# Patient Record
Sex: FEMALE | Race: WHITE | NOT HISPANIC OR LATINO | Employment: OTHER | URBAN - METROPOLITAN AREA
[De-identification: names, ages, dates, MRNs, and addresses within clinical notes are randomized per-mention and may not be internally consistent; named-entity substitution may affect disease eponyms.]

---

## 2017-06-02 ENCOUNTER — TRANSCRIBE ORDERS (OUTPATIENT)
Dept: ADMINISTRATIVE | Facility: HOSPITAL | Age: 77
End: 2017-06-02

## 2017-06-02 DIAGNOSIS — Z13.9 SCREENING: Primary | ICD-10-CM

## 2017-06-19 ENCOUNTER — HOSPITAL ENCOUNTER (OUTPATIENT)
Facility: HOSPITAL | Age: 77
Setting detail: HOSPITAL OUTPATIENT SURGERY
End: 2017-06-19
Attending: INTERNAL MEDICINE | Admitting: INTERNAL MEDICINE

## 2017-07-11 ENCOUNTER — HOSPITAL ENCOUNTER (OUTPATIENT)
Dept: MAMMOGRAPHY | Facility: HOSPITAL | Age: 77
Discharge: HOME | End: 2017-07-11
Attending: INTERNAL MEDICINE | Admitting: INTERNAL MEDICINE

## 2017-07-11 DIAGNOSIS — Z13.9 SCREENING: ICD-10-CM

## 2017-07-11 PROCEDURE — G0202 SCR MAMMO BI INCL CAD: HCPCS

## 2017-07-11 PROCEDURE — 77063 BREAST TOMOSYNTHESIS BI: CPT

## 2018-06-26 ENCOUNTER — TRANSCRIBE ORDERS (OUTPATIENT)
Dept: ADMINISTRATIVE | Facility: HOSPITAL | Age: 78
End: 2018-06-26

## 2018-06-26 DIAGNOSIS — Z12.39 BREAST SCREENING: Primary | ICD-10-CM

## 2018-07-24 ENCOUNTER — HOSPITAL ENCOUNTER (OUTPATIENT)
Dept: MAMMOGRAPHY | Facility: HOSPITAL | Age: 78
Discharge: HOME | End: 2018-07-24
Attending: INTERNAL MEDICINE | Admitting: INTERNAL MEDICINE

## 2018-07-24 DIAGNOSIS — Z12.39 BREAST SCREENING: ICD-10-CM

## 2018-07-24 PROCEDURE — 77067 SCR MAMMO BI INCL CAD: CPT

## 2018-07-24 PROCEDURE — 77063 BREAST TOMOSYNTHESIS BI: CPT

## 2019-01-03 ENCOUNTER — OFFICE VISIT (OUTPATIENT)
Dept: ORTHOPEDIC SURGERY | Facility: CLINIC | Age: 79
End: 2019-01-03

## 2019-01-03 VITALS
DIASTOLIC BLOOD PRESSURE: 85 MMHG | HEART RATE: 71 BPM | BODY MASS INDEX: 23.92 KG/M2 | WEIGHT: 130 LBS | SYSTOLIC BLOOD PRESSURE: 139 MMHG | HEIGHT: 62 IN

## 2019-01-03 DIAGNOSIS — R52 PAIN: Primary | ICD-10-CM

## 2019-01-03 DIAGNOSIS — S56.912A STRAIN OF LEFT FOREARM, INITIAL ENCOUNTER: ICD-10-CM

## 2019-01-03 PROCEDURE — 73090 X-RAY EXAM OF FOREARM: CPT | Performed by: ORTHOPAEDIC SURGERY

## 2019-01-03 PROCEDURE — 99203 OFFICE O/P NEW LOW 30 MIN: CPT | Performed by: ORTHOPAEDIC SURGERY

## 2019-01-03 RX ORDER — LEVOTHYROXINE SODIUM 0.05 MG/1
TABLET ORAL
COMMUNITY
Start: 2012-08-30 | End: 2019-05-28 | Stop reason: SDUPTHER

## 2019-01-03 RX ORDER — ATENOLOL 50 MG/1
TABLET ORAL
COMMUNITY
Start: 2012-05-22 | End: 2019-04-26 | Stop reason: SDUPTHER

## 2019-01-03 RX ORDER — LEVOTHYROXINE SODIUM 0.03 MG/1
TABLET ORAL
COMMUNITY
Start: 2012-10-05 | End: 2019-06-05 | Stop reason: DRUGHIGH

## 2019-01-03 RX ORDER — HYDROCHLOROTHIAZIDE 25 MG/1
TABLET ORAL
COMMUNITY
Start: 2012-05-22 | End: 2019-04-26 | Stop reason: SDUPTHER

## 2019-01-03 RX ORDER — LOSARTAN POTASSIUM 50 MG/1
50 TABLET ORAL NIGHTLY
COMMUNITY
End: 2019-08-13 | Stop reason: SDUPTHER

## 2019-01-03 RX ORDER — MELOXICAM 7.5 MG/1
7.5 TABLET ORAL DAILY
Qty: 30 TABLET | Refills: 5 | Status: SHIPPED | OUTPATIENT
Start: 2019-01-03 | End: 2019-05-13

## 2019-01-03 RX ORDER — VITAMIN E 268 MG
400 CAPSULE ORAL DAILY
COMMUNITY

## 2019-01-03 NOTE — PROGRESS NOTES
Subjective: Left forearm pain     Patient ID: Nguyen Zhang is a 78 y.o. female.    Chief Complaint:    History of Present Illness 78-year-old female known to me but has not seen for over 3 years that evaluation of the left forearm which he injured back in November cane firewood into her home.  Was carrying firewood and the following day began developing pain along the ulnar border of the arm and along the dorsal radial aspect of the thumb.  This has persisted for the past 6 weeks showing no improvement.       Social History     Occupational History   • Not on file   Tobacco Use   • Smoking status: Never Smoker   • Smokeless tobacco: Never Used   Substance and Sexual Activity   • Alcohol use: No     Frequency: Never   • Drug use: No   • Sexual activity: Defer      Review of Systems   Constitutional: Negative for chills, diaphoresis, fever and unexpected weight change.   HENT: Negative for hearing loss, nosebleeds, sore throat and tinnitus.    Eyes: Negative for pain and visual disturbance.   Respiratory: Negative for cough, shortness of breath and wheezing.    Cardiovascular: Negative for chest pain and palpitations.   Gastrointestinal: Negative for abdominal pain, diarrhea, nausea and vomiting.   Endocrine: Negative for cold intolerance, heat intolerance and polydipsia.   Genitourinary: Negative for difficulty urinating, dysuria and hematuria.   Musculoskeletal: Positive for arthralgias, joint swelling and myalgias. Negative for neck pain.   Skin: Negative for rash and wound.   Allergic/Immunologic: Negative for environmental allergies.   Neurological: Negative for dizziness, syncope and numbness.   Hematological: Does not bruise/bleed easily.   Psychiatric/Behavioral: Negative for dysphoric mood and sleep disturbance. The patient is not nervous/anxious.          History reviewed. No pertinent past medical history.  Past Surgical History:   Procedure Laterality Date   • HYSTERECTOMY     • KNEE SURGERY Right     • SHOULDER SURGERY Right      History reviewed. No pertinent family history.      Objective:  Vitals:    01/03/19 1330   BP: 139/85   Pulse: 71         01/03/19  1330   Weight: 59 kg (130 lb)     Body mass index is 23.78 kg/m².        Ortho Exam   AP and lateral of her left forearm to evaluate her chief complain shows evidence of an old ulnar fracture from the 60s and some arthritis at the base of the thumb of that left hand no other acute or chronic injuries noted.  She is alert and oriented ×3.  Has normal cephalic.  Sclerae clear.  She has no motor deficit as far as radial ulnar median nerve function no sensory loss.  Positive grind test and some pain to palpation over the metacarpal carpal junction.  She has full range of motion of the elbow far flexion extension pronation supination.  Mild tenderness along the ulnar border of the ulna itself but there is no erythema swelling noted.  No tenderness over the lateral epicondyle.  Minimal pain with pronation supination of the forearm against resistance.  She can take anti-inflammatories over she's taken none recently.  The pain is on a daily basis and does tend in appearance with activities of daily living as far as feeding dressing care for herself.    Assessment:        1. Pain    2. Strain of left forearm, initial encounter           Plan: Over 20 minutes was spent reviewing the x-rays with the patient and her physical exam.  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  I will place her on meloxicam once daily for the next 3 weeks.  She does home aspirin in the morning as I told her to take meloxicam at nighttime with her evening meal.  Return to see me in 3 weeks if still symptomatic she stopped the meloxicam and she is asymptomatic at that 3 week.  She canceled            Work Status:    LOUISA query complete.    Orders:  Orders Placed This Encounter    Procedures   • XR Forearm 2 View Left       Medications:  New Medications Ordered This Visit   Medications   • meloxicam (MOBIC) 7.5 MG tablet     Sig: Take 1 tablet by mouth Daily.     Dispense:  30 tablet     Refill:  5       Followup:  Return in about 3 weeks (around 1/24/2019).          Dictated utilizing Dragon dictation

## 2019-01-24 ENCOUNTER — OFFICE VISIT (OUTPATIENT)
Dept: ORTHOPEDIC SURGERY | Facility: CLINIC | Age: 79
End: 2019-01-24

## 2019-01-24 VITALS — WEIGHT: 130 LBS | BODY MASS INDEX: 23.92 KG/M2 | HEIGHT: 62 IN

## 2019-01-24 DIAGNOSIS — S56.912A STRAIN OF LEFT FOREARM, INITIAL ENCOUNTER: ICD-10-CM

## 2019-01-24 DIAGNOSIS — R52 PAIN: Primary | ICD-10-CM

## 2019-01-24 PROCEDURE — 99213 OFFICE O/P EST LOW 20 MIN: CPT | Performed by: ORTHOPAEDIC SURGERY

## 2019-01-24 NOTE — PROGRESS NOTES
Subjective: Left forearm pain     Patient ID: Nguyen Zhang is a 78 y.o. female.    Chief Complaint:    History of Present Illness 78-year-old female is seen in follow-up to the left forearm which she injured about a month ago while carrying some far 1 at that time is felt she strained the ulnar border of that arm.  She was placed on molded and noted reduction of the pain is still has some slight tenderness to touch along the ulnar border which she lays her arm on a table or when she like pushing a shopping cart.       Social History     Occupational History   • Not on file   Tobacco Use   • Smoking status: Never Smoker   • Smokeless tobacco: Never Used   Substance and Sexual Activity   • Alcohol use: No     Frequency: Never   • Drug use: No   • Sexual activity: Defer      Review of Systems   Constitutional: Negative for chills, diaphoresis, fever and unexpected weight change.   HENT: Negative for hearing loss, nosebleeds, sore throat and tinnitus.    Eyes: Negative for pain and visual disturbance.   Respiratory: Negative for cough, shortness of breath and wheezing.    Cardiovascular: Negative for chest pain and palpitations.   Gastrointestinal: Negative for abdominal pain, diarrhea, nausea and vomiting.   Endocrine: Negative for cold intolerance, heat intolerance and polydipsia.   Genitourinary: Negative for difficulty urinating, dysuria and hematuria.   Musculoskeletal: Positive for arthralgias and myalgias. Negative for joint swelling.   Skin: Negative for rash and wound.   Allergic/Immunologic: Negative for environmental allergies.   Neurological: Negative for dizziness, syncope and numbness.   Hematological: Does not bruise/bleed easily.   Psychiatric/Behavioral: Negative for dysphoric mood and sleep disturbance. The patient is not nervous/anxious.          History reviewed. No pertinent past medical history.  Past Surgical History:   Procedure Laterality Date   • HYSTERECTOMY     • KNEE SURGERY Right    •  SHOULDER SURGERY Right      History reviewed. No pertinent family history.      Objective:  There were no vitals filed for this visit.      01/24/19  1035   Weight: 59 kg (130 lb)     Body mass index is 23.78 kg/m².        Ortho Exam   she is alert and oriented ×3.  Again she has full range of motion of the shoulder and of the elbow.  Flexion extension pronation supination.  No motor deficit as far as radial ulnar nerve function and no sensory deficit in either.  Some discomfort along the ulnar border of the arm with dorsi and volar flexion against resistance and some mild discomfort to palpation along the subcutaneous border of the ulna to palpation but again there is no erythema.  The skin is cool to touch.  She is tolerating the meloxicam.    Assessment:        1. Pain    2. Strain of left forearm, initial encounter           Plan: Spent 10 minutes with patient face-to-face discussing treatment options going forward.  She is using a topical gel to help increase in blood flow as BenGay or icy hot.  Again she is tolerating meloxicam.  I offered physical therapy but she like to try another month of the anti-inflammatory.  I told at some point she wants try therapy she's going to call and we can schedule it at physical therapy plus in [facility] otherwise return to see me as needed            Work Status:    LOUISA query complete.    Orders:  No orders of the defined types were placed in this encounter.      Medications:  No orders of the defined types were placed in this encounter.      Followup:  Return if symptoms worsen or fail to improve.          Dictated utilizing Dragon dictation

## 2019-04-25 ENCOUNTER — TRANSCRIBE ORDERS (OUTPATIENT)
Dept: ADMINISTRATIVE | Facility: HOSPITAL | Age: 79
End: 2019-04-25

## 2019-04-25 DIAGNOSIS — Z13.6 ENCOUNTER FOR SCREENING FOR VASCULAR DISEASE: Primary | ICD-10-CM

## 2019-04-26 RX ORDER — HYDROCHLOROTHIAZIDE 25 MG/1
TABLET ORAL
Qty: 30 TABLET | Refills: 5 | Status: SHIPPED | OUTPATIENT
Start: 2019-04-26 | End: 2019-10-26 | Stop reason: SDUPTHER

## 2019-04-26 RX ORDER — ATENOLOL 50 MG/1
TABLET ORAL
Qty: 60 TABLET | Refills: 5 | Status: SHIPPED | OUTPATIENT
Start: 2019-04-26 | End: 2019-10-26 | Stop reason: SDUPTHER

## 2019-04-30 ENCOUNTER — HOSPITAL ENCOUNTER (OUTPATIENT)
Dept: CARDIOLOGY | Facility: HOSPITAL | Age: 79
Discharge: HOME | End: 2019-04-30
Admitting: INTERNAL MEDICINE

## 2019-04-30 VITALS
WEIGHT: 132 LBS | HEART RATE: 68 BPM | HEIGHT: 62 IN | BODY MASS INDEX: 24.29 KG/M2 | SYSTOLIC BLOOD PRESSURE: 146 MMHG | DIASTOLIC BLOOD PRESSURE: 66 MMHG

## 2019-04-30 DIAGNOSIS — Z13.6 ENCOUNTER FOR SCREENING FOR VASCULAR DISEASE: ICD-10-CM

## 2019-04-30 LAB
BH CV ECHO MEAS - DIST AO DIAM: 1.35 CM
BH CV VAS BP LEFT ARM: NORMAL MMHG
BH CV VAS BP RIGHT ARM: NORMAL MMHG
BH CV XLRA MEAS - MID AO DIAM: 1.54 CM
BH CV XLRA MEAS - PAD LEFT ABI DP: 1.09
BH CV XLRA MEAS - PAD LEFT ABI PT: 1.09
BH CV XLRA MEAS - PAD LEFT ARM: 146 MMHG
BH CV XLRA MEAS - PAD LEFT LEG DP: 160 MMHG
BH CV XLRA MEAS - PAD LEFT LEG PT: 160 MMHG
BH CV XLRA MEAS - PAD RIGHT ABI DP: 1.02
BH CV XLRA MEAS - PAD RIGHT ABI PT: 1.04
BH CV XLRA MEAS - PAD RIGHT ARM: 141 MMHG
BH CV XLRA MEAS - PAD RIGHT LEG DP: 150 MMHG
BH CV XLRA MEAS - PAD RIGHT LEG PT: 152 MMHG
BH CV XLRA MEAS - PROX AO DIAM: 2.2 CM
BH CV XLRA MEAS LEFT ICA/CCA RATIO: 1.2
BH CV XLRA MEAS LEFT MID CCA PSV: NORMAL CM/SEC
BH CV XLRA MEAS LEFT MID ICA PSV: NORMAL CM/SEC
BH CV XLRA MEAS LEFT PROX ECA PSV: NORMAL CM/SEC
BH CV XLRA MEAS RIGHT ICA/CCA RATIO: 1.23
BH CV XLRA MEAS RIGHT MID CCA PSV: NORMAL CM/SEC
BH CV XLRA MEAS RIGHT MID ICA PSV: NORMAL CM/SEC
BH CV XLRA MEAS RIGHT PROX ECA PSV: NORMAL CM/SEC

## 2019-04-30 PROCEDURE — 93799 UNLISTED CV SVC/PROCEDURE: CPT

## 2019-05-13 ENCOUNTER — APPOINTMENT (OUTPATIENT)
Dept: CARDIOLOGY | Facility: HOSPITAL | Age: 79
End: 2019-05-13

## 2019-05-13 ENCOUNTER — HOSPITAL ENCOUNTER (OUTPATIENT)
Facility: HOSPITAL | Age: 79
Setting detail: OBSERVATION
Discharge: HOME | End: 2019-05-14
Attending: EMERGENCY MEDICINE | Admitting: HOSPITALIST

## 2019-05-13 ENCOUNTER — APPOINTMENT (OUTPATIENT)
Dept: GENERAL RADIOLOGY | Facility: HOSPITAL | Age: 79
End: 2019-05-13

## 2019-05-13 DIAGNOSIS — I10 ELEVATED BLOOD PRESSURE READING WITH DIAGNOSIS OF HYPERTENSION: ICD-10-CM

## 2019-05-13 DIAGNOSIS — R07.9 EXERTIONAL CHEST PAIN: Primary | ICD-10-CM

## 2019-05-13 LAB
ALBUMIN SERPL-MCNC: 4.8 G/DL (ref 3.5–5.2)
ALBUMIN/GLOB SERPL: 1.7 G/DL
ALP SERPL-CCNC: 71 U/L (ref 39–117)
ALT SERPL W P-5'-P-CCNC: 16 U/L (ref 1–33)
ANION GAP SERPL CALCULATED.3IONS-SCNC: 15.2 MMOL/L
AST SERPL-CCNC: 24 U/L (ref 1–32)
BASOPHILS # BLD AUTO: 0.03 10*3/MM3 (ref 0–0.2)
BASOPHILS NFR BLD AUTO: 0.4 % (ref 0–1.5)
BILIRUB SERPL-MCNC: 0.5 MG/DL (ref 0.2–1.2)
BUN BLD-MCNC: 16 MG/DL (ref 8–23)
BUN/CREAT SERPL: 18 (ref 7–25)
CALCIUM SPEC-SCNC: 9.8 MG/DL (ref 8.6–10.5)
CHLORIDE SERPL-SCNC: 93 MMOL/L (ref 98–107)
CO2 SERPL-SCNC: 28.8 MMOL/L (ref 22–29)
CREAT BLD-MCNC: 0.89 MG/DL (ref 0.57–1)
DEPRECATED RDW RBC AUTO: 39.4 FL (ref 37–54)
EOSINOPHIL # BLD AUTO: 0.06 10*3/MM3 (ref 0–0.4)
EOSINOPHIL NFR BLD AUTO: 0.9 % (ref 0.3–6.2)
ERYTHROCYTE [DISTWIDTH] IN BLOOD BY AUTOMATED COUNT: 11.8 % (ref 12.3–15.4)
GFR SERPL CREATININE-BSD FRML MDRD: 61 ML/MIN/1.73
GLOBULIN UR ELPH-MCNC: 2.9 GM/DL
GLUCOSE BLD-MCNC: 109 MG/DL (ref 65–99)
HCT VFR BLD AUTO: 38 % (ref 34–46.6)
HGB BLD-MCNC: 13.3 G/DL (ref 12–15.9)
IMM GRANULOCYTES # BLD AUTO: 0.01 10*3/MM3 (ref 0–0.05)
IMM GRANULOCYTES NFR BLD AUTO: 0.1 % (ref 0–0.5)
LYMPHOCYTES # BLD AUTO: 1.77 10*3/MM3 (ref 0.7–3.1)
LYMPHOCYTES NFR BLD AUTO: 25.5 % (ref 19.6–45.3)
MCH RBC QN AUTO: 32 PG (ref 26.6–33)
MCHC RBC AUTO-ENTMCNC: 35 G/DL (ref 31.5–35.7)
MCV RBC AUTO: 91.6 FL (ref 79–97)
MONOCYTES # BLD AUTO: 0.64 10*3/MM3 (ref 0.1–0.9)
MONOCYTES NFR BLD AUTO: 9.2 % (ref 5–12)
NEUTROPHILS # BLD AUTO: 4.44 10*3/MM3 (ref 1.7–7)
NEUTROPHILS NFR BLD AUTO: 63.9 % (ref 42.7–76)
NRBC BLD AUTO-RTO: 0 /100 WBC (ref 0–0.2)
PLATELET # BLD AUTO: 228 10*3/MM3 (ref 140–450)
PMV BLD AUTO: 10.4 FL (ref 6–12)
POTASSIUM BLD-SCNC: 3.6 MMOL/L (ref 3.5–5.2)
PROT SERPL-MCNC: 7.7 G/DL (ref 6–8.5)
RBC # BLD AUTO: 4.15 10*6/MM3 (ref 3.77–5.28)
SODIUM BLD-SCNC: 137 MMOL/L (ref 136–145)
TROPONIN T SERPL-MCNC: <0.01 NG/ML (ref 0–0.03)
TROPONIN T SERPL-MCNC: <0.01 NG/ML (ref 0–0.03)
TSH SERPL DL<=0.05 MIU/L-ACNC: 4.78 MIU/ML (ref 0.27–4.2)
WBC NRBC COR # BLD: 6.95 10*3/MM3 (ref 3.4–10.8)

## 2019-05-13 PROCEDURE — 71046 X-RAY EXAM CHEST 2 VIEWS: CPT

## 2019-05-13 PROCEDURE — G0378 HOSPITAL OBSERVATION PER HR: HCPCS

## 2019-05-13 PROCEDURE — 93005 ELECTROCARDIOGRAM TRACING: CPT

## 2019-05-13 PROCEDURE — 99204 OFFICE O/P NEW MOD 45 MIN: CPT | Performed by: NURSE PRACTITIONER

## 2019-05-13 PROCEDURE — 99284 EMERGENCY DEPT VISIT MOD MDM: CPT

## 2019-05-13 PROCEDURE — 99219 PR INITIAL OBSERVATION CARE/DAY 50 MINUTES: CPT | Performed by: HOSPITALIST

## 2019-05-13 PROCEDURE — 84484 ASSAY OF TROPONIN QUANT: CPT | Performed by: EMERGENCY MEDICINE

## 2019-05-13 PROCEDURE — 99284 EMERGENCY DEPT VISIT MOD MDM: CPT | Performed by: EMERGENCY MEDICINE

## 2019-05-13 PROCEDURE — 93005 ELECTROCARDIOGRAM TRACING: CPT | Performed by: EMERGENCY MEDICINE

## 2019-05-13 PROCEDURE — 93010 ELECTROCARDIOGRAM REPORT: CPT | Performed by: INTERNAL MEDICINE

## 2019-05-13 PROCEDURE — 80053 COMPREHEN METABOLIC PANEL: CPT | Performed by: EMERGENCY MEDICINE

## 2019-05-13 PROCEDURE — 84484 ASSAY OF TROPONIN QUANT: CPT | Performed by: HOSPITALIST

## 2019-05-13 PROCEDURE — 85025 COMPLETE CBC W/AUTO DIFF WBC: CPT | Performed by: EMERGENCY MEDICINE

## 2019-05-13 PROCEDURE — 84443 ASSAY THYROID STIM HORMONE: CPT | Performed by: HOSPITALIST

## 2019-05-13 RX ORDER — LOSARTAN POTASSIUM 50 MG/1
50 TABLET ORAL NIGHTLY
Status: DISCONTINUED | OUTPATIENT
Start: 2019-05-13 | End: 2019-05-14 | Stop reason: HOSPADM

## 2019-05-13 RX ORDER — SODIUM CHLORIDE 0.9 % (FLUSH) 0.9 %
10 SYRINGE (ML) INJECTION AS NEEDED
Status: DISCONTINUED | OUTPATIENT
Start: 2019-05-13 | End: 2019-05-14 | Stop reason: HOSPADM

## 2019-05-13 RX ORDER — AMLODIPINE BESYLATE 5 MG/1
5 TABLET ORAL NIGHTLY
Status: DISCONTINUED | OUTPATIENT
Start: 2019-05-13 | End: 2019-05-14 | Stop reason: HOSPADM

## 2019-05-13 RX ORDER — ACETAMINOPHEN 325 MG/1
650 TABLET ORAL EVERY 6 HOURS PRN
Status: DISCONTINUED | OUTPATIENT
Start: 2019-05-13 | End: 2019-05-14 | Stop reason: HOSPADM

## 2019-05-13 RX ORDER — ATENOLOL 50 MG/1
50 TABLET ORAL 2 TIMES DAILY
Status: DISCONTINUED | OUTPATIENT
Start: 2019-05-13 | End: 2019-05-14 | Stop reason: HOSPADM

## 2019-05-13 RX ORDER — HYDROCHLOROTHIAZIDE 25 MG/1
25 TABLET ORAL DAILY
Status: DISCONTINUED | OUTPATIENT
Start: 2019-05-13 | End: 2019-05-14 | Stop reason: HOSPADM

## 2019-05-13 RX ORDER — ASPIRIN 81 MG/1
324 TABLET, CHEWABLE ORAL ONCE
Status: COMPLETED | OUTPATIENT
Start: 2019-05-13 | End: 2019-05-13

## 2019-05-13 RX ORDER — HYDRALAZINE HYDROCHLORIDE 20 MG/ML
10 INJECTION INTRAMUSCULAR; INTRAVENOUS EVERY 6 HOURS PRN
Status: DISCONTINUED | OUTPATIENT
Start: 2019-05-13 | End: 2019-05-14 | Stop reason: HOSPADM

## 2019-05-13 RX ADMIN — ASPIRIN 81 MG 324 MG: 81 TABLET ORAL at 09:47

## 2019-05-13 RX ADMIN — AMLODIPINE BESYLATE 5 MG: 5 TABLET ORAL at 20:39

## 2019-05-13 RX ADMIN — LOSARTAN POTASSIUM 50 MG: 50 TABLET ORAL at 20:39

## 2019-05-13 RX ADMIN — ATENOLOL 50 MG: 50 TABLET ORAL at 20:38

## 2019-05-13 NOTE — ED PROVIDER NOTES
EMERGENCY DEPARTMENT ENCOUNTER      Room Number: 2/02      HPI:    Chief complaint: Chest pressure    Location: All across chest with some radiation to the left neck and left arm    Quality/Severity: None at this time, intermittently severe    Timing/Duration: Several weeks but worse in the last 5 days    Modifying Factors: Worse with any exertion; improved with rest    Associated Symptoms: Some dyspnea with exertion.  No nausea, vomiting, diarrhea.  No cough or hemoptysis    Narrative: Pt is a 79 y.o. female who presents complaining of exertional chest pressure with radiation to left neck and left arm.  No history of coronary artery disease.  Last stress test greater than 20 years ago.      PMD: Chelsie Rai MD    REVIEW OF SYSTEMS  Review of Systems  Normal appetite with no nausea, vomiting, diarrhea, abdominal pain.  No cough or hemoptysis.  No fevers.  No syncope.  PAST MEDICAL HISTORY  Active Ambulatory Problems     Diagnosis Date Noted   • No Active Ambulatory Problems     Resolved Ambulatory Problems     Diagnosis Date Noted   • No Resolved Ambulatory Problems     Past Medical History:   Diagnosis Date   • Arthritis    • Disease of thyroid gland    • Hypertension        PAST SURGICAL HISTORY  Past Surgical History:   Procedure Laterality Date   • HYSTERECTOMY     • KNEE SURGERY Right    • SHOULDER SURGERY Right        FAMILY HISTORY  Family History   Problem Relation Age of Onset   • Heart attack Father        SOCIAL HISTORY  Social History     Socioeconomic History   • Marital status:      Spouse name: Not on file   • Number of children: Not on file   • Years of education: Not on file   • Highest education level: Not on file   Tobacco Use   • Smoking status: Never Smoker   • Smokeless tobacco: Never Used   Substance and Sexual Activity   • Alcohol use: No     Frequency: Never   • Drug use: No   • Sexual activity: Defer       ALLERGIES  Ace inhibitors and Codeine    PHYSICAL EXAM  ED Triage  Vitals [05/13/19 0916]   Temp Heart Rate Resp BP SpO2   -- 67 15 (!) 185/88 99 %      Temp src Heart Rate Source Patient Position BP Location FiO2 (%)   -- Monitor -- -- --       Physical Exam   Constitutional: She is oriented to person, place, and time and well-developed, well-nourished, and in no distress. No distress.   HENT:   Head: Normocephalic.   Mucous membranes moist   Eyes: No scleral icterus.   Neck:   Painless range of motion   Cardiovascular: Normal rate and regular rhythm.   Pulmonary/Chest: Effort normal and breath sounds normal. No respiratory distress.   Abdominal: Soft. There is no tenderness.   Musculoskeletal: She exhibits no edema or tenderness.   Moves all extremities equally   Neurological: She is alert and oriented to person, place, and time.   Skin: Skin is warm and dry.   Psychiatric: Mood and affect normal.   Nursing note and vitals reviewed.      LAB RESULTS  Results for orders placed or performed during the hospital encounter of 05/13/19   Comprehensive Metabolic Panel   Result Value Ref Range    Glucose 109 (H) 65 - 99 mg/dL    BUN 16 8 - 23 mg/dL    Creatinine 0.89 0.57 - 1.00 mg/dL    Sodium 137 136 - 145 mmol/L    Potassium 3.6 3.5 - 5.2 mmol/L    Chloride 93 (L) 98 - 107 mmol/L    CO2 28.8 22.0 - 29.0 mmol/L    Calcium 9.8 8.6 - 10.5 mg/dL    Total Protein 7.7 6.0 - 8.5 g/dL    Albumin 4.80 3.50 - 5.20 g/dL    ALT (SGPT) 16 1 - 33 U/L    AST (SGOT) 24 1 - 32 U/L    Alkaline Phosphatase 71 39 - 117 U/L    Total Bilirubin 0.5 0.2 - 1.2 mg/dL    eGFR Non African Amer 61 >60 mL/min/1.73    Globulin 2.9 gm/dL    A/G Ratio 1.7 g/dL    BUN/Creatinine Ratio 18.0 7.0 - 25.0    Anion Gap 15.2 mmol/L   Troponin   Result Value Ref Range    Troponin T <0.010 0.000-<0.030 ng/mL   CBC Auto Differential   Result Value Ref Range    WBC 6.95 3.40 - 10.80 10*3/mm3    RBC 4.15 3.77 - 5.28 10*6/mm3    Hemoglobin 13.3 12.0 - 15.9 g/dL    Hematocrit 38.0 34.0 - 46.6 %    MCV 91.6 79.0 - 97.0 fL    MCH  32.0 26.6 - 33.0 pg    MCHC 35.0 31.5 - 35.7 g/dL    RDW 11.8 (L) 12.3 - 15.4 %    RDW-SD 39.4 37.0 - 54.0 fl    MPV 10.4 6.0 - 12.0 fL    Platelets 228 140 - 450 10*3/mm3    Neutrophil % 63.9 42.7 - 76.0 %    Lymphocyte % 25.5 19.6 - 45.3 %    Monocyte % 9.2 5.0 - 12.0 %    Eosinophil % 0.9 0.3 - 6.2 %    Basophil % 0.4 0.0 - 1.5 %    Immature Grans % 0.1 0.0 - 0.5 %    Neutrophils, Absolute 4.44 1.70 - 7.00 10*3/mm3    Lymphocytes, Absolute 1.77 0.70 - 3.10 10*3/mm3    Monocytes, Absolute 0.64 0.10 - 0.90 10*3/mm3    Eosinophils, Absolute 0.06 0.00 - 0.40 10*3/mm3    Basophils, Absolute 0.03 0.00 - 0.20 10*3/mm3    Immature Grans, Absolute 0.01 0.00 - 0.05 10*3/mm3    nRBC 0.0 0.0 - 0.2 /100 WBC         I ordered the above labs and reviewed the results    RADIOLOGY  RADIOLOGY        Study: Chest x-ray-2 views    Findings: No acute disease; small bilateral pleural effusions    Interpreted contemporaneously with treatment by me, independently viewed by me      I ordered the above radiologic testing and reviewed the results    PROCEDURES  Procedures    EKG           EKG time: 0913  Rhythm/Rate: Sinus, 70  P waves and RI: CASE within normal limits  QRS, axis: Narrow QRS complex  ST and T waves: No STEMI; QTC within normal limits    Interpreted Contemporaneously by me, independently viewed        PROGRESS AND CONSULTS  ED Course as of May 13 1049   Mon May 13, 2019   1047 CONSULT        Provider: Dr. Stephen-hospitalist    Discussion: Reviewed patient history, ED presentation and evaluation.  He accepts the patient has an observation admission with telemetry.    Agreeable c treatment and planned disposition.          [RS]   1049 Patient agreeable with admission plan.  [RS]      ED Course User Index  [RS] Herman Holland MD           MEDICAL DECISION MAKING  Results were reviewed/discussed with the patient and they were also made aware of online access. Pt also made aware that some labs, such as cultures, will not be  resulted during ER visit and follow up with PMD is necessary.     MDM       DIAGNOSIS  Final diagnoses:   Exertional chest pain   Elevated blood pressure reading with diagnosis of hypertension       Latest Documented Vital Signs:  As of 10:49 AM  BP- (!) 187/81 HR- 85 Temp- 98.3 °F (36.8 °C) (Oral) O2 sat- 99%    DISPOSITION  Observation admission-telemetry       Medication List      No changes were made to your prescriptions during this visit.                Herman Holland MD  05/13/19 1047

## 2019-05-13 NOTE — H&P
[facility]PITAL[facility]     Chelsie Rai MD    CHIEF COMPLAINT: Chest Pressure    HISTORY OF PRESENT ILLNESS:    Ms. Zhang is a pleasant, 80 y/o  female who presents w/ a 1 week histroy of intermittent chest pressure that has worsened over the past few days.  She has been in her normal state of health, and she routinely checks her BP which runs systolically in the 120's.  She notes the pressure is exacerbated by activity and relieved w/ rest.  She denies associated nausea and diaphoresis as well as dyspnea.  It would last only about several minutes.  It has intensified because of increased stressors at home from appliances breaking down (dryer, oven, etc).  She has never had symptoms like this previously w/ exertion.  She denies cough and fever.  She denies pain in her back.  She denies reflux symptoms.  She takes all of her medicines as prescribed.      Past Medical History:   Diagnosis Date   • Arthritis    • Disease of thyroid gland    • Hypertension      Past Surgical History:   Procedure Laterality Date   • HYSTERECTOMY     • KNEE SURGERY Right    • SHOULDER SURGERY Right      Family History   Problem Relation Age of Onset   • Heart attack Father      Social History     Tobacco Use   • Smoking status: Never Smoker   • Smokeless tobacco: Never Used   Substance Use Topics   • Alcohol use: No     Frequency: Never   • Drug use: No     Medications Prior to Admission   Medication Sig Dispense Refill Last Dose   • Acetaminophen (TYLENOL ARTHRITIS PAIN PO) Take 500 mg by mouth 2 (Two) Times a Day.   5/12/2019 at Unknown time   • AMLODIPINE BESYLATE PO Take 5 mg by mouth Every Night.   5/12/2019 at Unknown time   • Aspirin-Caffeine (THERON BACK & BODY) 500-32.5 MG tablet Take  by mouth.   5/12/2019 at Unknown time   • atenolol (TENORMIN) 50 MG tablet TAKE 1 TABLET BY MOUTH 2 TIMES A DAY. 60 tablet 5 5/13/2019 at Unknown time   • Bioflavonoid Products (DOROTEO C PO) Take 500 mg by mouth  "Daily.   5/13/2019 at Unknown time   • Cholecalciferol (VITAMIN D3) 1000 units capsule Take 2 capsules by mouth Daily.   5/13/2019 at Unknown time   • hydrochlorothiazide (HYDRODIURIL) 25 MG tablet TAKE 1 TABLET BY MOUTH DAILY. 30 tablet 5 5/13/2019 at Unknown time   • levothyroxine (SYNTHROID) 25 MCG tablet TAKE 1/2 TABLET DAILY WITH 50 MCG   5/13/2019 at Unknown time   • levothyroxine (SYNTHROID) 50 MCG tablet TAKE 1 TABLET DAILY FOR THYROID.   5/13/2019 at Unknown time   • losartan (COZAAR) 50 MG tablet Take 50 mg by mouth Every Night.   5/12/2019 at Unknown time   • multivitamin-minerals (CENTRUM) tablet Take 1 tablet by mouth Daily.   5/13/2019 at Unknown time   • NIACIN PO Take 500 mg by mouth Daily.   5/13/2019 at Unknown time   • Potassium (POTASSIMIN PO) Take  by mouth Once.   5/13/2019 at Unknown time   • vitamin E 400 UNIT capsule Take 400 Units by mouth Daily.   5/13/2019 at Unknown time     Allergies:  Ace inhibitors and Codeine    REVIEW OF SYSTEMS:  Please see the above history of present illness for pertinent positives and negatives.  The remainder of the patient's systems have been reviewed and are negative.    Vital Signs  Temp:  [98.3 °F (36.8 °C)] 98.3 °F (36.8 °C)  Heart Rate:  [67-85] 85  Resp:  [15-16] 16  BP: (185-187)/(81-88) 187/81  Oxygen Therapy  SpO2: 99 %  Device (Oxygen Therapy): room air}  Body mass index is 25.32 kg/m².  Flowsheet Rows      First Filed Value   Admission Height  154.9 cm (61\") Documented at 05/13/2019 0916   Admission Weight  60.8 kg (134 lb) Documented at 05/13/2019 0916           Physical Exam:  Physical Exam   Constitutional: Patient appears well-developed and well-nourished and in no acute distress.  Appears younger than stated age.   HEENT:   Head: Normocephalic and atraumatic.   Eyes:  Pupils are equal, round, and reactive to light. EOM are intact. Sclerae are anicteric and noninjected.  Mouth and Throat: Patient has moist mucous membranes. Oropharynx is clear " of any erythema or exudate.     Neck: Neck supple. No JVD present. No thyromegaly present. No lymphadenopathy present.  Cardiovascular: Regular rate, regular rhythm, S1 normal and S2 normal.  Exam reveals no gallop and no friction rub.  No murmur heard.  Pulmonary/Chest: Lungs are clear to auscultation bilaterally. No respiratory distress. No wheezes. No rhonchi. No rales.   Abdominal: Soft. Bowel sounds are normal. There is no tenderness.   Musculoskeletal: Normal muscle tone  Extremities: No edema. Pulses are palpable in all 4 extremities.  Neurological: Cranial nerves II-XII are grossly intact with no focal deficits.  Skin: Skin is warm. No rash noted. Nails show no clubbing.  No cyanosis or erythema.  Psych: AAOx3. Pleasant affect.  Normal insight and judgement.    Emotional Behavior: As above     Debilities:   Physical Weakness  None   Handicaps  None   Disabilities  None   Agitation  None     Results Review:    I reviewed the patient's new clinical results.  Lab Results (most recent)     Procedure Component Value Units Date/Time    Troponin [[id number]]  (Normal) Collected:  05/13/19 0947    Specimen:  Blood Updated:  05/13/19 1012     Troponin T <0.010 ng/mL     Narrative:       Troponin T Reference Range:  <= 0.03 ng/mL-   Negative for AMI  >0.03 ng/mL-     Abnormal for myocardial necrosis.  Clinicians would have to utilize clinical acumen, EKG, Troponin and serial changes to determine if it is an Acute Myocardial Infarction or myocardial injury due to an underlying chronic condition.     Comprehensive Metabolic Panel [[id number]]  (Abnormal) Collected:  05/13/19 0947    Specimen:  Blood Updated:  05/13/19 1010     Glucose 109 mg/dL      BUN 16 mg/dL      Creatinine 0.89 mg/dL      Sodium 137 mmol/L      Potassium 3.6 mmol/L      Chloride 93 mmol/L      CO2 28.8 mmol/L      Calcium 9.8 mg/dL      Total Protein 7.7 g/dL      Albumin 4.80 g/dL      ALT (SGPT) 16 U/L      AST (SGOT) 24 U/L      Alkaline  Phosphatase 71 U/L      Total Bilirubin 0.5 mg/dL      eGFR Non African Amer 61 mL/min/1.73      Globulin 2.9 gm/dL      A/G Ratio 1.7 g/dL      BUN/Creatinine Ratio 18.0     Anion Gap 15.2 mmol/L     Narrative:       GFR Normal >60  Chronic Kidney Disease <60  Kidney Failure <15    CBC & Differential [[id number]] Collected:  05/13/19 0947    Specimen:  Blood Updated:  05/13/19 0956    Narrative:       The following orders were created for panel order CBC & Differential.  Procedure                               Abnormality         Status                     ---------                               -----------         ------                     CBC Auto Differential[[id number]]        Abnormal            Final result                 Please view results for these tests on the individual orders.    CBC Auto Differential [[id number]]  (Abnormal) Collected:  05/13/19 0947    Specimen:  Blood Updated:  05/13/19 0956     WBC 6.95 10*3/mm3      RBC 4.15 10*6/mm3      Hemoglobin 13.3 g/dL      Hematocrit 38.0 %      MCV 91.6 fL      MCH 32.0 pg      MCHC 35.0 g/dL      RDW 11.8 %      RDW-SD 39.4 fl      MPV 10.4 fL      Platelets 228 10*3/mm3      Neutrophil % 63.9 %      Lymphocyte % 25.5 %      Monocyte % 9.2 %      Eosinophil % 0.9 %      Basophil % 0.4 %      Immature Grans % 0.1 %      Neutrophils, Absolute 4.44 10*3/mm3      Lymphocytes, Absolute 1.77 10*3/mm3      Monocytes, Absolute 0.64 10*3/mm3      Eosinophils, Absolute 0.06 10*3/mm3      Basophils, Absolute 0.03 10*3/mm3      Immature Grans, Absolute 0.01 10*3/mm3      nRBC 0.0 /100 WBC           Imaging Results (most recent)     Procedure Component Value Units Date/Time    XR Chest 2 View [[id number]] Collected:  05/13/19 1049     Updated:  05/13/19 1052    Narrative:       CHEST X-RAY, 05/13/2019         HISTORY:  79-year-old female in the ED complaining of 3-4 day history chest pain  and tightness.     TECHNIQUE:  PA and lateral upright chest x-ray.      FINDINGS:  Heart size and pulmonary vascularity are within normal limits. The lungs  are expanded and clear. No visible pulmonary infiltrate or pleural  effusion. No change since 07/03/2015.       Impression:       Negative chest.     This report was finalized on 5/13/2019 10:50 AM by Dr. Clovis Morales MD.           reviewed    ECG/EMG Results (most recent)     Procedure Component Value Units Date/Time    ECG 12 Lead [[id number]] Collected:  05/13/19 0913     Updated:  05/13/19 0915    Narrative:       HEART RATE= 72  bpm  RR Interval= 836  ms  FL Interval= 188  ms  P Horizontal Axis= 7  deg  P Front Axis= 57  deg  QRSD Interval= 80  ms  QT Interval= 381  ms  QRS Axis= 40  deg  T Wave Axis= 30  deg  - BORDERLINE ECG -  Sinus rhythm  Probable left atrial enlargement  Electronically Signed By:   Date and Time of Study: 2019-05-13 09:13:20        reviewed    Assessment/Plan     1.  Chest Pressure: I'm wondering how much her hypertension may be playing in to this given findings from ER and on the floor.  She declined nitro paste, but repeat systolic pressure is 148mmHg and she is asymptomatic.  She does have risk factors for CAD given family history, age, and treated HTN.  CXR is clear and EKG is unremarkable.  She has r/o for AMI this afternoon.  Will ask Cardiology to see.  Likely stress test to risk stratify.    2.  HTN: As noted above.  Will continue to monitor on current regimen.  Will order prn Hydralazine.    3.  Hypothyroidism: Will check a TSH.  Otherwise, continue home dose.    I discussed the patients findings and my recommendations with patient.     Abdulkadir Stephen MD  05/13/19  12:13 PM

## 2019-05-13 NOTE — CONSULTS
"    Patient Name: Nguyen Zhang  :1940  79 y.o.    Date of Admission: 2019  Date of Consultation:  19  Encounter Provider: ROMÁN Lindo  Place of Service: [facility] CARDIOLOGY  Referring Provider: No ref. provider found  Patient Care Team:  Chelsie Rai MD as PCP - General (Internal Medicine)      Chief complaint:  Chest pain    History of Present Illness:  79-year-old female with complaints of chest pain originating the left side of her chest radiating down her left arm and up through her left neck.  She has been having these episodes on and off for the last several weeks that have worsened in the last few days.  She states she notices it more when she is doing things and then when she sits down to rest.  It does subside with rest.  She states she will take Tylenol or aspirin that does help some but does not totally relieve it.  Rest seems to do that more than anything.  She states that she has \"an enlarged heart\" that someone told her about after having a chest x-ray.  She does have a history of hypertension.  She denies any known history of CAD.  She states in the mornings she will often feel very tight across her pericardium.  She questions whether or not this is musculoskeletal.  She states that she still has a stiff neck at the time of assessment.    Her father had a MI at 52 years old and eventually  from CHF.  Her mother had hypertension and hyperlipidemia.  She has a brother with hypertension and a son with hypertension.     Past Medical History:   Diagnosis Date   • Arthritis    • Disease of thyroid gland    • Hypertension        Past Surgical History:   Procedure Laterality Date   • HYSTERECTOMY     • KNEE SURGERY Right    • SHOULDER SURGERY Right          Prior to Admission medications    Medication Sig Start Date End Date Taking? Authorizing Provider   Acetaminophen (TYLENOL ARTHRITIS PAIN PO) Take 500 mg by mouth 2 (Two) Times a Day.  "  Yes Luana Mckoy MD   AMLODIPINE BESYLATE PO Take 5 mg by mouth Every Night.   Yes Luana Mckoy MD   Aspirin-Caffeine (THERON BACK & BODY) 500-32.5 MG tablet Take  by mouth.   Yes Luana Mckoy MD   atenolol (TENORMIN) 50 MG tablet TAKE 1 TABLET BY MOUTH 2 TIMES A DAY. 4/26/19  Yes Chelsie Rai MD   Bioflavonoid Products (DOROTEO C PO) Take 500 mg by mouth Daily.   Yes Luana Mckoy MD   Cholecalciferol (VITAMIN D3) 1000 units capsule Take 2 capsules by mouth Daily.   Yes Luana Mckoy MD   hydrochlorothiazide (HYDRODIURIL) 25 MG tablet TAKE 1 TABLET BY MOUTH DAILY. 4/26/19  Yes Chelsie Rai MD   levothyroxine (SYNTHROID) 25 MCG tablet TAKE 1/2 TABLET DAILY WITH 50 MCG 10/5/12  Yes Luana Mckoy MD   levothyroxine (SYNTHROID) 50 MCG tablet TAKE 1 TABLET DAILY FOR THYROID. 8/30/12  Yes Luana Mckoy MD   losartan (COZAAR) 50 MG tablet Take 50 mg by mouth Every Night.   Yes Luana Mckoy MD   multivitamin-minerals (CENTRUM) tablet Take 1 tablet by mouth Daily.   Yes Luana Mckoy MD   NIACIN PO Take 500 mg by mouth Daily.   Yes Luana Mckoy MD   Potassium (POTASSIMIN PO) Take  by mouth Once.   Yes Luana Mckoy MD   vitamin E 400 UNIT capsule Take 400 Units by mouth Daily.   Yes Luana Mckoy MD   meloxicam (MOBIC) 7.5 MG tablet Take 1 tablet by mouth Daily. 1/3/19 5/13/19  Sulaiman Jarrett MD       Allergies   Allergen Reactions   • Ace Inhibitors Cough   • Codeine Nausea And Vomiting       Social History     Socioeconomic History   • Marital status:      Spouse name: Not on file   • Number of children: Not on file   • Years of education: Not on file   • Highest education level: Not on file   Tobacco Use   • Smoking status: Never Smoker   • Smokeless tobacco: Never Used   Substance and Sexual Activity   • Alcohol use: No     Frequency: Never   • Drug use: No   • Sexual activity: Defer       Family History  "  Problem Relation Age of Onset   • Heart attack Father        REVIEW OF SYSTEMS:   All systems reviewed.  Pertinent positives identified in HPI.  All other systems are negative.      Objective:     Vitals:    05/13/19 0916 05/13/19 0921 05/13/19 1043   BP: (!) 185/88  (!) 187/81   Pulse: 67  85   Resp: 15  16   Temp:  98.3 °F (36.8 °C)    TempSrc:  Oral    SpO2: 99%  99%   Weight: 60.8 kg (134 lb)     Height: 154.9 cm (61\")       Body mass index is 25.32 kg/m².    General Appearance:    Alert, cooperative, in no acute distress   Head:    Normocephalic, without obvious abnormality, atraumatic   Eyes:            Lids and lashes normal, conjunctivae and sclerae normal, no   icterus, no pallor, corneas clear, PERRLA   Ears:    Ears appear intact with no abnormalities noted   Throat:   No oral lesions, no thrush, oral mucosa moist   Neck:   No adenopathy, supple, trachea midline, no thyromegaly, no   carotid bruit, no JVD   Back:     No kyphosis present, no scoliosis present, no skin lesions, erythema or scars, no tenderness to percussion or palpation, range of motion normal   Lungs:     Clear to auscultation,respirations regular, even and unlabored    Heart:    Regular rhythm and normal rate, normal S1 and S2, no murmur, no gallop, no rub, no click   Chest Wall:    No abnormalities observed   Abdomen:     Normal bowel sounds, no masses, no organomegaly, soft        non-tender, non-distended, no guarding, no rebound  tenderness   Extremities:   Moves all extremities well, no edema, no cyanosis, no redness   Pulses:   Pulses palpable and equal bilaterally. Normal radial, carotid, femoral, dorsalis pedis and posterior tibial pulses bilaterally. Normal abdominal aorta   Skin:  Psychiatric:   No bleeding, bruising or rash    Alert and oriented x 3, normal mood and affect   Lab Review:     Results from last 7 days   Lab Units 05/13/19  0947   SODIUM mmol/L 137   POTASSIUM mmol/L 3.6   CHLORIDE mmol/L 93*   CO2 mmol/L 28.8 " "  BUN mg/dL 16   CREATININE mg/dL 0.89   CALCIUM mg/dL 9.8   BILIRUBIN mg/dL 0.5   ALK PHOS U/L 71   ALT (SGPT) U/L 16   AST (SGOT) U/L 24   GLUCOSE mg/dL 109*     Results from last 7 days   Lab Units 05/13/19  0947   TROPONIN T ng/mL <0.010     Results from last 7 days   Lab Units 05/13/19  0947   WBC 10*3/mm3 6.95   HEMOGLOBIN g/dL 13.3   HEMATOCRIT % 38.0   PLATELETS 10*3/mm3 228                           I personally viewed and interpreted the patient's EKG/Telemetry data.      NSR  Assessment and Plan:       1.  Chest pain - intermittent for the last several weeks, worse with activity.  Last stress test was > 20 years ago. Troponin x 1 negative.  Check echo and stress test.    2.  HTN - elevated at time of admission, continue home medications    3.  \"enlarged heart\" - states she was told that from an xray.  Check echo    Hernandez Mojica, ROMÁN  05/13/19  2:04 PM              "

## 2019-05-14 ENCOUNTER — APPOINTMENT (OUTPATIENT)
Dept: CARDIOLOGY | Facility: HOSPITAL | Age: 79
End: 2019-05-14

## 2019-05-14 VITALS
RESPIRATION RATE: 18 BRPM | WEIGHT: 134 LBS | HEIGHT: 61 IN | DIASTOLIC BLOOD PRESSURE: 80 MMHG | BODY MASS INDEX: 25.3 KG/M2 | HEART RATE: 71 BPM | SYSTOLIC BLOOD PRESSURE: 127 MMHG | TEMPERATURE: 97.6 F | OXYGEN SATURATION: 98 %

## 2019-05-14 PROBLEM — R07.2 PRECORDIAL CHEST PAIN: Status: ACTIVE | Noted: 2019-05-14

## 2019-05-14 PROBLEM — I49.3 PVCS (PREMATURE VENTRICULAR CONTRACTIONS): Status: ACTIVE | Noted: 2019-05-14

## 2019-05-14 LAB
BH CV ECHO MEAS - ACS: 1.7 CM
BH CV ECHO MEAS - AO MAX PG (FULL): 3.8 MMHG
BH CV ECHO MEAS - AO MAX PG: 7.4 MMHG
BH CV ECHO MEAS - AO MEAN PG (FULL): 1 MMHG
BH CV ECHO MEAS - AO MEAN PG: 3 MMHG
BH CV ECHO MEAS - AO ROOT AREA (BSA CORRECTED): 1.9
BH CV ECHO MEAS - AO ROOT AREA: 7.3 CM^2
BH CV ECHO MEAS - AO ROOT DIAM: 3.1 CM
BH CV ECHO MEAS - AO V2 MAX: 136 CM/SEC
BH CV ECHO MEAS - AO V2 MEAN: 82.1 CM/SEC
BH CV ECHO MEAS - AO V2 VTI: 27.9 CM
BH CV ECHO MEAS - AVA(I,A): 2.3 CM^2
BH CV ECHO MEAS - AVA(I,D): 2.3 CM^2
BH CV ECHO MEAS - AVA(V,A): 2.2 CM^2
BH CV ECHO MEAS - AVA(V,D): 2.2 CM^2
BH CV ECHO MEAS - BSA(HAYCOCK): 1.6 M^2
BH CV ECHO MEAS - BSA: 1.6 M^2
BH CV ECHO MEAS - BZI_BMI: 25.3 KILOGRAMS/M^2
BH CV ECHO MEAS - BZI_METRIC_HEIGHT: 154.9 CM
BH CV ECHO MEAS - BZI_METRIC_WEIGHT: 60.8 KG
BH CV ECHO MEAS - EDV(CUBED): 20.3 ML
BH CV ECHO MEAS - EDV(MOD-SP2): 43 ML
BH CV ECHO MEAS - EDV(MOD-SP4): 31.8 ML
BH CV ECHO MEAS - EDV(TEICH): 27.8 ML
BH CV ECHO MEAS - EF(CUBED): 83.1 %
BH CV ECHO MEAS - EF(MOD-BP): 59 %
BH CV ECHO MEAS - EF(MOD-SP2): 66.5 %
BH CV ECHO MEAS - EF(MOD-SP4): 56.6 %
BH CV ECHO MEAS - EF(TEICH): 77.8 %
BH CV ECHO MEAS - ESV(CUBED): 3.4 ML
BH CV ECHO MEAS - ESV(MOD-SP2): 14.4 ML
BH CV ECHO MEAS - ESV(MOD-SP4): 13.8 ML
BH CV ECHO MEAS - ESV(TEICH): 6.2 ML
BH CV ECHO MEAS - FS: 44.7 %
BH CV ECHO MEAS - IVS/LVPW: 0.97
BH CV ECHO MEAS - IVSD: 1.1 CM
BH CV ECHO MEAS - LA DIMENSION: 2.6 CM
BH CV ECHO MEAS - LA/AO: 0.85
BH CV ECHO MEAS - LAT PEAK E' VEL: 6 CM/SEC
BH CV ECHO MEAS - LV DIASTOLIC VOL/BSA (35-75): 20 ML/M^2
BH CV ECHO MEAS - LV MASS(C)D: 81.6 GRAMS
BH CV ECHO MEAS - LV MASS(C)DI: 51.2 GRAMS/M^2
BH CV ECHO MEAS - LV MAX PG: 3.5 MMHG
BH CV ECHO MEAS - LV MEAN PG: 2 MMHG
BH CV ECHO MEAS - LV SYSTOLIC VOL/BSA (12-30): 8.7 ML/M^2
BH CV ECHO MEAS - LV V1 MAX: 94.2 CM/SEC
BH CV ECHO MEAS - LV V1 MEAN: 61.2 CM/SEC
BH CV ECHO MEAS - LV V1 VTI: 20.1 CM
BH CV ECHO MEAS - LVIDD: 2.7 CM
BH CV ECHO MEAS - LVIDS: 1.5 CM
BH CV ECHO MEAS - LVLD AP2: 6 CM
BH CV ECHO MEAS - LVLD AP4: 6 CM
BH CV ECHO MEAS - LVLS AP2: 4.6 CM
BH CV ECHO MEAS - LVLS AP4: 5.2 CM
BH CV ECHO MEAS - LVOT AREA (M): 3.1 CM^2
BH CV ECHO MEAS - LVOT AREA: 3.1 CM^2
BH CV ECHO MEAS - LVOT DIAM: 2 CM
BH CV ECHO MEAS - LVPWD: 1.1 CM
BH CV ECHO MEAS - MED PEAK E' VEL: 5 CM/SEC
BH CV ECHO MEAS - MV A DUR: 0.12 SEC
BH CV ECHO MEAS - MV A MAX VEL: 83.2 CM/SEC
BH CV ECHO MEAS - MV DEC SLOPE: 194 CM/SEC^2
BH CV ECHO MEAS - MV DEC TIME: 0.31 SEC
BH CV ECHO MEAS - MV E MAX VEL: 52 CM/SEC
BH CV ECHO MEAS - MV E/A: 0.63
BH CV ECHO MEAS - MV MAX PG: 3 MMHG
BH CV ECHO MEAS - MV MEAN PG: 1 MMHG
BH CV ECHO MEAS - MV P1/2T MAX VEL: 66.9 CM/SEC
BH CV ECHO MEAS - MV P1/2T: 101 MSEC
BH CV ECHO MEAS - MV V2 MAX: 87.3 CM/SEC
BH CV ECHO MEAS - MV V2 MEAN: 54.6 CM/SEC
BH CV ECHO MEAS - MV V2 VTI: 22 CM
BH CV ECHO MEAS - MVA P1/2T LCG: 3.3 CM^2
BH CV ECHO MEAS - MVA(P1/2T): 2.2 CM^2
BH CV ECHO MEAS - MVA(VTI): 2.9 CM^2
BH CV ECHO MEAS - PA ACC TIME: 0.04 SEC
BH CV ECHO MEAS - PA MAX PG (FULL): 0.31 MMHG
BH CV ECHO MEAS - PA MAX PG: 1.2 MMHG
BH CV ECHO MEAS - PA PR(ACCEL): 60.1 MMHG
BH CV ECHO MEAS - PA V2 MAX: 54.3 CM/SEC
BH CV ECHO MEAS - PULM A REVS DUR: 0.14 SEC
BH CV ECHO MEAS - PULM A REVS VEL: 105 CM/SEC
BH CV ECHO MEAS - PULM DIAS VEL: 50.2 CM/SEC
BH CV ECHO MEAS - PULM S/D: 1.2
BH CV ECHO MEAS - PULM SYS VEL: 62.4 CM/SEC
BH CV ECHO MEAS - PVA(V,A): 1.9 CM^2
BH CV ECHO MEAS - PVA(V,D): 1.9 CM^2
BH CV ECHO MEAS - QP/QS: 0.35
BH CV ECHO MEAS - RAP SYSTOLE: 3 MMHG
BH CV ECHO MEAS - RV MAX PG: 0.87 MMHG
BH CV ECHO MEAS - RV MEAN PG: 0 MMHG
BH CV ECHO MEAS - RV V1 MAX: 46.6 CM/SEC
BH CV ECHO MEAS - RV V1 MEAN: 30.5 CM/SEC
BH CV ECHO MEAS - RV V1 VTI: 9.9 CM
BH CV ECHO MEAS - RVOT AREA: 2.3 CM^2
BH CV ECHO MEAS - RVOT DIAM: 1.7 CM
BH CV ECHO MEAS - RVSP: 23 MMHG
BH CV ECHO MEAS - SI(AO): 127.9 ML/M^2
BH CV ECHO MEAS - SI(CUBED): 10.6 ML/M^2
BH CV ECHO MEAS - SI(LVOT): 39.6 ML/M^2
BH CV ECHO MEAS - SI(MOD-SP2): 17.9 ML/M^2
BH CV ECHO MEAS - SI(MOD-SP4): 11.3 ML/M^2
BH CV ECHO MEAS - SI(TEICH): 13.6 ML/M^2
BH CV ECHO MEAS - SV(AO): 203.8 ML
BH CV ECHO MEAS - SV(CUBED): 16.9 ML
BH CV ECHO MEAS - SV(LVOT): 63.1 ML
BH CV ECHO MEAS - SV(MOD-SP2): 28.6 ML
BH CV ECHO MEAS - SV(MOD-SP4): 18 ML
BH CV ECHO MEAS - SV(RVOT): 22.4 ML
BH CV ECHO MEAS - SV(TEICH): 21.6 ML
BH CV ECHO MEAS - TAPSE (>1.6): 1.7 CM2
BH CV ECHO MEAS - TR MAX VEL: 236 CM/SEC
BH CV ECHO MEASUREMENTS AVERAGE E/E' RATIO: 9.45
BH CV STRESS BP STAGE 1: NORMAL
BH CV STRESS BP STAGE 2: NORMAL
BH CV STRESS BP STAGE 3: NORMAL
BH CV STRESS DURATION MIN STAGE 1: 3
BH CV STRESS DURATION MIN STAGE 2: 3
BH CV STRESS DURATION SEC STAGE 1: 0
BH CV STRESS DURATION SEC STAGE 2: 0
BH CV STRESS DURATION SEC STAGE 3: 4
BH CV STRESS GRADE STAGE 1: 10
BH CV STRESS GRADE STAGE 2: 12
BH CV STRESS GRADE STAGE 3: 14
BH CV STRESS HR STAGE 1: 98
BH CV STRESS HR STAGE 2: 109
BH CV STRESS HR STAGE 3: 109
BH CV STRESS METS STAGE 1: 5
BH CV STRESS METS STAGE 2: 7.5
BH CV STRESS METS STAGE 3: 10
BH CV STRESS PROTOCOL 1: NORMAL
BH CV STRESS RECOVERY BP: NORMAL MMHG
BH CV STRESS RECOVERY HR: 74 BPM
BH CV STRESS SPEED STAGE 1: 1.7
BH CV STRESS SPEED STAGE 2: 2.5
BH CV STRESS SPEED STAGE 3: 3.4
BH CV STRESS STAGE 1: 1
BH CV STRESS STAGE 2: 2
BH CV STRESS STAGE 3: 3
BH CV VAS BP RIGHT ARM: NORMAL MMHG
BH CV XLRA - RV BASE: 3.1 CM
BH CV XLRA - TDI S': 12 CM/SEC
CHOLEST SERPL-MCNC: 218 MG/DL (ref 0–200)
HDLC SERPL-MCNC: 53 MG/DL (ref 40–60)
LDLC SERPL CALC-MCNC: 137 MG/DL (ref 0–100)
LDLC/HDLC SERPL: 2.58 {RATIO}
LEFT ATRIUM VOLUME INDEX: 17 ML/M2
MAXIMAL PREDICTED HEART RATE: 142 BPM
MAXIMAL PREDICTED HEART RATE: 142 BPM
PERCENT MAX PREDICTED HR: 77.46 %
STRESS BASELINE BP: NORMAL MMHG
STRESS BASELINE HR: 76 BPM
STRESS O2 SAT REST: 97 %
STRESS PERCENT HR: 91 %
STRESS POST ESTIMATED WORKLOAD: 7.1 METS
STRESS POST EXERCISE DUR MIN: 6 MIN
STRESS POST EXERCISE DUR SEC: 4 SEC
STRESS POST O2 SAT PEAK: 97 %
STRESS POST PEAK BP: NORMAL MMHG
STRESS POST PEAK HR: 110 BPM
STRESS TARGET HR: 121 BPM
STRESS TARGET HR: 121 BPM
T4 FREE SERPL-MCNC: 1.24 NG/DL (ref 0.93–1.7)
TRIGL SERPL-MCNC: 141 MG/DL (ref 0–150)
VLDLC SERPL-MCNC: 28.2 MG/DL (ref 7–27)

## 2019-05-14 PROCEDURE — G0378 HOSPITAL OBSERVATION PER HR: HCPCS

## 2019-05-14 PROCEDURE — 93016 CV STRESS TEST SUPVJ ONLY: CPT | Performed by: INTERNAL MEDICINE

## 2019-05-14 PROCEDURE — 80061 LIPID PANEL: CPT | Performed by: HOSPITALIST

## 2019-05-14 PROCEDURE — 84439 ASSAY OF FREE THYROXINE: CPT | Performed by: HOSPITALIST

## 2019-05-14 PROCEDURE — 93018 CV STRESS TEST I&R ONLY: CPT | Performed by: INTERNAL MEDICINE

## 2019-05-14 PROCEDURE — 99217 PR OBSERVATION CARE DISCHARGE MANAGEMENT: CPT | Performed by: HOSPITALIST

## 2019-05-14 PROCEDURE — 93306 TTE W/DOPPLER COMPLETE: CPT

## 2019-05-14 PROCEDURE — 99214 OFFICE O/P EST MOD 30 MIN: CPT | Performed by: INTERNAL MEDICINE

## 2019-05-14 PROCEDURE — 93306 TTE W/DOPPLER COMPLETE: CPT | Performed by: INTERNAL MEDICINE

## 2019-05-14 PROCEDURE — 93017 CV STRESS TEST TRACING ONLY: CPT

## 2019-05-14 RX ADMIN — HYDROCHLOROTHIAZIDE 25 MG: 25 TABLET ORAL at 09:24

## 2019-05-14 RX ADMIN — ATENOLOL 50 MG: 50 TABLET ORAL at 09:24

## 2019-05-14 NOTE — PROGRESS NOTES
LOS: 0 days   Patient Care Team:  Chelsie Rai MD as PCP - General (Internal Medicine)    Interval History: No chest pain.  She attempted a treadmill stress today but could not reach peak HR due to knee pain.  There were no cardiac symptoms during exercise and no EKG changes.        Objective   Vital Signs  Temp:  [97.1 °F (36.2 °C)-98.4 °F (36.9 °C)] 98.4 °F (36.9 °C)  Heart Rate:  [52-85] 52  Resp:  [15-18] 18  BP: (136-187)/(75-88) 136/80  No intake or output data in the 24 hours ending 05/14/19 0911        Physical Exam   Constitutional: She is oriented to person, place, and time. She appears well-developed and well-nourished.   HENT:   Head: Normocephalic.   Nose: Nose normal.   Mouth/Throat: Oropharynx is clear and moist.   Eyes: Conjunctivae and EOM are normal. Pupils are equal, round, and reactive to light.   Neck: Normal range of motion. No JVD present.   Cardiovascular: Normal rate, regular rhythm, normal heart sounds and intact distal pulses.   Pulmonary/Chest: Effort normal and breath sounds normal.   Abdominal: Soft. She exhibits no mass. There is no tenderness.   Musculoskeletal: Normal range of motion. She exhibits no edema.   Neurological: She is alert and oriented to person, place, and time. No cranial nerve deficit.   Skin: Skin is warm and dry. No erythema.   Psychiatric: She has a normal mood and affect. Her behavior is normal. Judgment and thought content normal.   Vitals reviewed.      Results Review:      Results from last 7 days   Lab Units 05/13/19  0947   SODIUM mmol/L 137   POTASSIUM mmol/L 3.6   CHLORIDE mmol/L 93*   CO2 mmol/L 28.8   BUN mg/dL 16   CREATININE mg/dL 0.89   GLUCOSE mg/dL 109*   CALCIUM mg/dL 9.8     Results from last 7 days   Lab Units 05/13/19  1551 05/13/19  0947   TROPONIN T ng/mL <0.010 <0.010     Results from last 7 days   Lab Units 05/13/19  0947   WBC 10*3/mm3 6.95   HEMOGLOBIN g/dL 13.3   HEMATOCRIT % 38.0   PLATELETS 10*3/mm3 228         Results from last  7 days   Lab Units 05/14/19  0518   CHOLESTEROL mg/dL 218*         Results from last 7 days   Lab Units 05/14/19  0518   CHOLESTEROL mg/dL 218*   TRIGLYCERIDES mg/dL 141   HDL CHOL mg/dL 53   LDL CHOL mg/dL 137*       I reviewed the patient's new clinical results.    I personally viewed and interpreted the patient's EKG/Telemetry data -- SR, PVCs        Medication Review:     amLODIPine 5 mg Oral Nightly   atenolol 50 mg Oral BID   hydrochlorothiazide 25 mg Oral Daily   losartan 50 mg Oral Nightly            Assessment/Plan     1.  Precordial chest pain -- typical and atypical features.  Echo with normal wall motion/EF but there is a small anterior effusion.  She does not have typical pericarditis symptoms or EKG changes.  She had no chest pain during exercise today and no EKG changes, although it was submaximal.      2.  PVCs (premature ventricular contractions) -- she has benign appearing PVCs at rest that resolve with exercise.  These are asymptomatic.    She can go home today from our standpoint and come in later this week for a low-level regadenoson stress.  The order has been placed and nursing will call to schedule it.        Freddie Griffith MD  05/14/19  9:11 AM

## 2019-05-14 NOTE — NURSING NOTE
Spoke to schedule one outpt stress test is scheduled for Monday 20th at 7 am pt to arrive at 0645 to register.No caffeine ,decaff beverage and no chocolate 24 hours prior to test

## 2019-05-14 NOTE — PLAN OF CARE
Problem: Skin Injury Risk (Adult)  Goal: Skin Health and Integrity  Outcome: Ongoing (interventions implemented as appropriate)   05/14/19 0454   Skin Injury Risk (Adult)   Skin Health and Integrity unable to achieve outcome

## 2019-05-14 NOTE — DISCHARGE SUMMARY
Nguyen Zhang  1940  [id number]    Hospitalists Discharge Summary    Date of Admission: 5/13/2019  Date of Discharge:  5/14/2019    Primary Discharge Diagnoses:    1.  Chest Pain    Secondary Discharge Diagnoses:    1.  HTN  2.  Hypothyroidism      History of Present Illness (taken from my H&P):    Ms. Zhang is a pleasant, 78 y/o  female who presents w/ a 1 week histroy of intermittent chest pressure that has worsened over the past few days.  She has been in her normal state of health, and she routinely checks her BP which runs systolically in the 120's.  She notes the pressure is exacerbated by activity and relieved w/ rest.  She denies associated nausea and diaphoresis as well as dyspnea.  It would last only about several minutes.  It has intensified because of increased stressors at home from appliances breaking down (dryer, oven, etc).  She has never had symptoms like this previously w/ exertion.  She denies cough and fever.  She denies pain in her back.  She denies reflux symptoms.  She takes all of her medicines as prescribed.    Hospital Course:    Ms. Zhang was admitted to the Med/Surg unit and acute MI was excluded by serial biomarker study.  Her blood pressure became better controlled once she was in her room and her home regimen was resumed.  I suspect much of her chest pressure was due to her blood pressure which was being driven by stress at home.  Her treadmill stress was negative.  TSH was elevated but Free T4 was normal.  This should be repeated as an outpatient.      PCP  Patient Care Team:  Chelsie Rai MD as PCP - General (Internal Medicine)    Consults:   Consults     Date and Time Order Name Status Description    5/13/2019 1212 Inpatient Cardiology Consult Completed           Operations and Procedures Performed:       Xr Chest 2 View    Result Date: 5/13/2019  Narrative: CHEST X-RAY, 05/13/2019     HISTORY: 79-year-old female in the ED complaining of 3-4 day  history chest pain and tightness.  TECHNIQUE: PA and lateral upright chest x-ray.  FINDINGS: Heart size and pulmonary vascularity are within normal limits. The lungs are expanded and clear. No visible pulmonary infiltrate or pleural effusion. No change since 07/03/2015.      Impression: Negative chest.  This report was finalized on 5/13/2019 10:50 AM by Dr. Clovis Morales MD.        Allergies:  is allergic to ace inhibitors and codeine.      Discharge Medications:     Discharge Medications      Continue These Medications      Instructions Start Date   AMLODIPINE BESYLATE PO   5 mg, Oral, Nightly      atenolol 50 MG tablet  Commonly known as:  TENORMIN   TAKE 1 TABLET BY MOUTH 2 TIMES A DAY.      THERON BACK & BODY 500-32.5 MG tablet  Generic drug:  Aspirin-Caffeine   Oral      DOROTEO C PO   500 mg, Oral, Daily      hydrochlorothiazide 25 MG tablet  Commonly known as:  HYDRODIURIL   TAKE 1 TABLET BY MOUTH DAILY.      losartan 50 MG tablet  Commonly known as:  COZAAR   50 mg, Oral, Nightly      multivitamin-minerals tablet   1 tablet, Oral, Daily      NIACIN PO   500 mg, Oral, Daily      POTASSIMIN PO   Oral, Once in Dialysis      SYNTHROID 50 MCG tablet  Generic drug:  levothyroxine   TAKE 1 TABLET DAILY FOR THYROID.      SYNTHROID 25 MCG tablet  Generic drug:  levothyroxine   TAKE 1/2 TABLET DAILY WITH 50 MCG      TYLENOL ARTHRITIS PAIN PO   500 mg, Oral, 2 Times Daily      Vitamin D3 1000 units capsule   2 capsules, Oral, Daily      vitamin E 400 UNIT capsule   400 Units, Oral, Daily             Last Lab Results:   Lab Results (most recent)     Procedure Component Value Units Date/Time    Lipid Panel [[id number]]  (Abnormal) Collected:  05/14/19 0518    Specimen:  Blood Updated:  05/14/19 0555     Total Cholesterol 218 mg/dL      Triglycerides 141 mg/dL      HDL Cholesterol 53 mg/dL      LDL Cholesterol  137 mg/dL      VLDL Cholesterol 28.2 mg/dL      LDL/HDL Ratio 2.58    Narrative:       Cholesterol Reference  Ranges  (U.S. Department of Health and Human Services ATP III Classifications)    Desirable          <200 mg/dL  Borderline High    200-239 mg/dL  High Risk          >240 mg/dL      Triglyceride Reference Ranges  (U.S. Department of Health and Human Services ATP III Classifications)    Normal           <150 mg/dL  Borderline High  150-199 mg/dL  High             200-499 mg/dL  Very High        >500 mg/dL    HDL Reference Ranges  (U.S. Department of Health and Human Services ATP III Classifcations)    Low     <40 mg/dl (major risk factor for CHD)  High    >60 mg/dl ('negative' risk factor for CHD)        LDL Reference Ranges  (U.S. Department of Health and Human Services ATP III Classifcations)    Optimal          <100 mg/dL  Near Optimal     100-129 mg/dL  Borderline High  130-159 mg/dL  High             160-189 mg/dL  Very High        >189 mg/dL    TSH [[id number]]  (Abnormal) Collected:  05/13/19 1551    Specimen:  Blood Updated:  05/13/19 1933     TSH 4.780 mIU/mL     Troponin [[id number]]  (Normal) Collected:  05/13/19 1551    Specimen:  Blood Updated:  05/13/19 1611     Troponin T <0.010 ng/mL     Narrative:       Troponin T Reference Range:  <= 0.03 ng/mL-   Negative for AMI  >0.03 ng/mL-     Abnormal for myocardial necrosis.  Clinicians would have to utilize clinical acumen, EKG, Troponin and serial changes to determine if it is an Acute Myocardial Infarction or myocardial injury due to an underlying chronic condition.     Troponin [[id number]]  (Normal) Collected:  05/13/19 0947    Specimen:  Blood Updated:  05/13/19 1012     Troponin T <0.010 ng/mL     Narrative:       Troponin T Reference Range:  <= 0.03 ng/mL-   Negative for AMI  >0.03 ng/mL-     Abnormal for myocardial necrosis.  Clinicians would have to utilize clinical acumen, EKG, Troponin and serial changes to determine if it is an Acute Myocardial Infarction or myocardial injury due to an underlying chronic condition.     Comprehensive Metabolic Panel  [[id number]]  (Abnormal) Collected:  05/13/19 0947    Specimen:  Blood Updated:  05/13/19 1010     Glucose 109 mg/dL      BUN 16 mg/dL      Creatinine 0.89 mg/dL      Sodium 137 mmol/L      Potassium 3.6 mmol/L      Chloride 93 mmol/L      CO2 28.8 mmol/L      Calcium 9.8 mg/dL      Total Protein 7.7 g/dL      Albumin 4.80 g/dL      ALT (SGPT) 16 U/L      AST (SGOT) 24 U/L      Alkaline Phosphatase 71 U/L      Total Bilirubin 0.5 mg/dL      eGFR Non African Amer 61 mL/min/1.73      Globulin 2.9 gm/dL      A/G Ratio 1.7 g/dL      BUN/Creatinine Ratio 18.0     Anion Gap 15.2 mmol/L     Narrative:       GFR Normal >60  Chronic Kidney Disease <60  Kidney Failure <15    CBC & Differential [[id number]] Collected:  05/13/19 0947    Specimen:  Blood Updated:  05/13/19 0956    Narrative:       The following orders were created for panel order CBC & Differential.  Procedure                               Abnormality         Status                     ---------                               -----------         ------                     CBC Auto Differential[[id number]]        Abnormal            Final result                 Please view results for these tests on the individual orders.    CBC Auto Differential [[id number]]  (Abnormal) Collected:  05/13/19 0947    Specimen:  Blood Updated:  05/13/19 0956     WBC 6.95 10*3/mm3      RBC 4.15 10*6/mm3      Hemoglobin 13.3 g/dL      Hematocrit 38.0 %      MCV 91.6 fL      MCH 32.0 pg      MCHC 35.0 g/dL      RDW 11.8 %      RDW-SD 39.4 fl      MPV 10.4 fL      Platelets 228 10*3/mm3      Neutrophil % 63.9 %      Lymphocyte % 25.5 %      Monocyte % 9.2 %      Eosinophil % 0.9 %      Basophil % 0.4 %      Immature Grans % 0.1 %      Neutrophils, Absolute 4.44 10*3/mm3      Lymphocytes, Absolute 1.77 10*3/mm3      Monocytes, Absolute 0.64 10*3/mm3      Eosinophils, Absolute 0.06 10*3/mm3      Basophils, Absolute 0.03 10*3/mm3      Immature Grans, Absolute 0.01 10*3/mm3      nRBC 0.0 /100  WBC         Imaging Results (most recent)     Procedure Component Value Units Date/Time    XR Chest 2 View [[id number]] Collected:  05/13/19 1049     Updated:  05/13/19 1052    Narrative:       CHEST X-RAY, 05/13/2019         HISTORY:  79-year-old female in the ED complaining of 3-4 day history chest pain  and tightness.     TECHNIQUE:  PA and lateral upright chest x-ray.     FINDINGS:  Heart size and pulmonary vascularity are within normal limits. The lungs  are expanded and clear. No visible pulmonary infiltrate or pleural  effusion. No change since 07/03/2015.       Impression:       Negative chest.     This report was finalized on 5/13/2019 10:50 AM by Dr. Clovis Morales MD.             PROCEDURE    Condition on Discharge: Stable    Physical Exam at Discharge  Vital Signs  Temp:  [97.1 °F (36.2 °C)-98.4 °F (36.9 °C)] 98.4 °F (36.9 °C)  Heart Rate:  [52-66] 52  Resp:  [18] 18  BP: (136-157)/(75-86) 136/80    Physical Exam:  Physical Exam   Constitutional: Patient appears well-developed and well-nourished and in no acute distress   Cardiovascular: Regular rate, regular rhythm, S1 normal and S2 normal.  Exam reveals no gallop and no friction rub.  No murmur heard.  Pulmonary/Chest: Lungs are clear to auscultation bilaterally. No respiratory distress. No wheezes. No rhonchi. No rales.   Abdominal: Soft. Bowel sounds are normal. There is no tenderness.   Musculoskeletal: Normal Muscle tone  Extremities: No edema.  Neurological: Cranial nerves II-XII are grossly intact with no focal deficits.    Discharge Disposition  Home    Visiting Nurse:    No     Home PT/OT:  No     Home Safety Evaluation:  No     DME  None    Discharge Diet:      Dietary Orders (From admission, onward)    Start     Ordered    05/14/19 0912  Diet Regular  Diet Effective Now     Question:  Diet Texture / Consistency  Answer:  Regular    05/14/19 0911          Activity at Discharge:  As tolerated      Follow-up Appointments  Future Appointments    Date Time Provider Department Center   5/20/2019  7:00 AM [facility] NM ADMIN RM 1 [facility]   5/20/2019  7:45 AM [facility] NM 1 [facility] NM [facility]   5/20/2019  8:00 AM [facility] STRESS LAB 1 [facility] CARDI [facility]   5/20/2019  8:30 AM [facility] NM 1 [facility] NM [facility]   6/5/2019  8:15 AM Chelsie Rai MD [facility]     Additional Instructions for the Follow-ups that You Need to Schedule     Discharge Follow-up with PCP   As directed       Currently Documented PCP:    Chelsie Rai MD    PCP Phone Number:    [phone]     Follow Up Details:  As scheduled               Test Results Pending at Discharge  None     Abdulkadir Stephen MD  05/14/19  11:17 AM

## 2019-05-14 NOTE — NURSING NOTE
Discharge Planning Assessment   Teresa Diana     Patient Name: Nguyen Zhang  MRN: [id number]  Today's Date: 5/14/2019    Admit Date: 5/13/2019    Discharge Needs Assessment     Row Name 05/14/19 1050       Living Environment    Lives With  spouse    Name(s) of Who Lives With Patient  Gordon -     Current Living Arrangements  home/apartment/condo    Primary Care Provided by  self    Provides Primary Care For  no one    Family Caregiver if Needed  spouse    Quality of Family Relationships  helpful;involved;supportive    Able to Return to Prior Arrangements  yes       Resource/Environmental Concerns    Resource/Environmental Concerns  none       Transition Planning    Patient/Family Anticipates Transition to  home with family    Patient/Family Anticipated Services at Transition  none    Transportation Anticipated  family or friend will provide       Discharge Needs Assessment    Readmission Within the Last 30 Days  no previous admission in last 30 days    Concerns to be Addressed  no discharge needs identified;denies needs/concerns at this time    Equipment Currently Used at Home  none    Anticipated Changes Related to Illness  none    Equipment Needed After Discharge  none        Discharge Plan     Row Name 05/14/19 1058       Plan    Plan  Home when stable    Patient/Family in Agreement with Plan  yes    Plan Comments  Spoke with Mrs Zhang at bedside.  SHe and her  live in a home in [place].  She does not have oxygen or DME.  She had home health in the past (knee surgery) but does not remember the agency.  She is independent with her ADL's and drives herself.  Her pharmacy is [facility] in [place] and there are no issues with paying for her prescriptions.  She has an advanced directive and it is on file here at the hospital.  Plan is home when stable.  Will continue to follow        Destination      No service coordination in this encounter.      Durable Medical Equipment      No service  coordination in this encounter.      Dialysis/Infusion      No service coordination in this encounter.      Home Medical Care      No service coordination in this encounter.      Therapy      No service coordination in this encounter.      Community Resources      No service coordination in this encounter.          Demographic Summary     Row Name 05/14/19 1049       General Information    Admission Type  observation    Arrived From  home    Referral Source  admission list    Reason for Consult  discharge planning    Preferred Language  English     Used During This Interaction  no       Contact Information    Permission Granted to Share Info With          Functional Status    No documentation.       Psychosocial    No documentation.       Abuse/Neglect    No documentation.       Legal    No documentation.       Substance Abuse    No documentation.       Patient Forms    No documentation.           Corrie Marina RN

## 2019-05-14 NOTE — PLAN OF CARE
Problem: Patient Care Overview  Goal: Discharge Needs Assessment  Outcome: Ongoing (interventions implemented as appropriate)   05/14/19 1050   Discharge Needs Assessment   Readmission Within the Last 30 Days no previous admission in last 30 days   Concerns to be Addressed no discharge needs identified;denies needs/concerns at this time   Patient/Family Anticipates Transition to home with family   Patient/Family Anticipated Services at Transition none   Transportation Anticipated family or friend will provide   Anticipated Changes Related to Illness none   Equipment Needed After Discharge none   Disability   Equipment Currently Used at Home none

## 2019-05-14 NOTE — NURSING NOTE
Case Management Discharge Note    Final Note: Discharge home    Destination      No service has been selected for the patient.      Durable Medical Equipment      No service has been selected for the patient.      Dialysis/Infusion      No service has been selected for the patient.      Home Medical Care      No service has been selected for the patient.      Therapy      No service has been selected for the patient.      Community Resources      No service has been selected for the patient.             Final Discharge Disposition Code: 01 - home or self-care

## 2019-05-15 ENCOUNTER — READMISSION MANAGEMENT (OUTPATIENT)
Dept: CALL CENTER | Facility: HOSPITAL | Age: 79
End: 2019-05-15

## 2019-05-15 NOTE — OUTREACH NOTE
Prep Survey      Responses   Facility patient discharged from?  [facility]   Is LACE score < 7 ?  Yes   Is patient eligible?  Yes   Discharge diagnosis  Precordial chest pain,  PVCs (premature ventricular contractions)    Does the patient have one of the following disease processes/diagnoses(primary or secondary)?  Other   Does the patient have Home health ordered?  No   Is there a DME ordered?  No   Prep survey completed?  Yes          Kate Love RN

## 2019-05-16 ENCOUNTER — READMISSION MANAGEMENT (OUTPATIENT)
Dept: CALL CENTER | Facility: HOSPITAL | Age: 79
End: 2019-05-16

## 2019-05-16 NOTE — OUTREACH NOTE
LAG < 7 Survey      Responses   Facility patient discharged from?  [facility]   Does the patient have one of the following disease processes/diagnoses(primary or secondary)?  Other   Is there a successful TCM telephone encounter documented?  No   BHLAG <7 Attempt successful?  No   Unsuccessful attempts  Attempt 1          Elizabeth Hutchins RN

## 2019-05-17 ENCOUNTER — READMISSION MANAGEMENT (OUTPATIENT)
Dept: CALL CENTER | Facility: HOSPITAL | Age: 79
End: 2019-05-17

## 2019-05-17 NOTE — OUTREACH NOTE
LAG < 7 Survey      Responses   Facility patient discharged from?  [facility]   Does the patient have one of the following disease processes/diagnoses(primary or secondary)?  Other   Is there a successful TCM telephone encounter documented?  No   BHLAG <7 Attempt successful?  No   Revoke  Decline to participate [pt hung up the phone]          Elizabeth Tuttle RN

## 2019-05-20 ENCOUNTER — HOSPITAL ENCOUNTER (OUTPATIENT)
Dept: NUCLEAR MEDICINE | Facility: HOSPITAL | Age: 79
Discharge: HOME | End: 2019-05-20

## 2019-05-20 ENCOUNTER — HOSPITAL ENCOUNTER (OUTPATIENT)
Dept: CARDIOLOGY | Facility: HOSPITAL | Age: 79
Discharge: HOME | End: 2019-05-20

## 2019-05-20 DIAGNOSIS — R07.9 EXERTIONAL CHEST PAIN: ICD-10-CM

## 2019-05-20 LAB
BH CV NUCLEAR PRIOR STUDY: 3
BH CV STRESS BP STAGE 1: NORMAL
BH CV STRESS DURATION MIN STAGE 1: 3
BH CV STRESS DURATION SEC STAGE 1: 0
BH CV STRESS GRADE STAGE 1: 0
BH CV STRESS HR STAGE 1: 135
BH CV STRESS METS STAGE 1: 2.3
BH CV STRESS PROTOCOL 1: NORMAL
BH CV STRESS RECOVERY BP: NORMAL MMHG
BH CV STRESS RECOVERY HR: 101 BPM
BH CV STRESS RECOVERY O2: 99 %
BH CV STRESS SPEED STAGE 1: 1.7
BH CV STRESS STAGE 1: 1
LV EF NUC BP: 70 %
MAXIMAL PREDICTED HEART RATE: 142 BPM
PERCENT MAX PREDICTED HR: 97.18 %
STRESS BASELINE BP: NORMAL MMHG
STRESS BASELINE HR: 70 BPM
STRESS O2 SAT REST: 98 %
STRESS PERCENT HR: 114 %
STRESS POST ESTIMATED WORKLOAD: 2.3 METS
STRESS POST EXERCISE DUR MIN: 3 MIN
STRESS POST EXERCISE DUR SEC: 0 SEC
STRESS POST O2 SAT PEAK: 99 %
STRESS POST PEAK BP: NORMAL MMHG
STRESS POST PEAK HR: 138 BPM
STRESS TARGET HR: 121 BPM

## 2019-05-20 PROCEDURE — A9500 TC99M SESTAMIBI: HCPCS | Performed by: INTERNAL MEDICINE

## 2019-05-20 PROCEDURE — 78452 HT MUSCLE IMAGE SPECT MULT: CPT

## 2019-05-20 PROCEDURE — 0 TECHNETIUM SESTAMIBI: Performed by: INTERNAL MEDICINE

## 2019-05-20 PROCEDURE — 93018 CV STRESS TEST I&R ONLY: CPT | Performed by: INTERNAL MEDICINE

## 2019-05-20 PROCEDURE — 25010000002 REGADENOSON 0.4 MG/5ML SOLUTION: Performed by: INTERNAL MEDICINE

## 2019-05-20 PROCEDURE — 93017 CV STRESS TEST TRACING ONLY: CPT

## 2019-05-20 PROCEDURE — 78452 HT MUSCLE IMAGE SPECT MULT: CPT | Performed by: INTERNAL MEDICINE

## 2019-05-20 PROCEDURE — 93016 CV STRESS TEST SUPVJ ONLY: CPT | Performed by: INTERNAL MEDICINE

## 2019-05-20 RX ADMIN — TECHNETIUM TC 99M SESTAMIBI 1 DOSE: 1 INJECTION INTRAVENOUS at 07:17

## 2019-05-20 RX ADMIN — REGADENOSON 0.4 MG: 0.08 INJECTION, SOLUTION INTRAVENOUS at 08:47

## 2019-05-20 RX ADMIN — TECHNETIUM TC 99M SESTAMIBI 1 DOSE: 1 INJECTION INTRAVENOUS at 08:47

## 2019-05-21 ENCOUNTER — TELEPHONE (OUTPATIENT)
Dept: CARDIOLOGY | Facility: CLINIC | Age: 79
End: 2019-05-21

## 2019-05-21 NOTE — TELEPHONE ENCOUNTER
----- Message from Freddie Griffith MD sent at 5/20/2019  3:26 PM EDT -----  Please let her know this is normal.  Follow up with PCP.    pamela cordova

## 2019-05-28 RX ORDER — AMLODIPINE BESYLATE 5 MG/1
TABLET ORAL
Qty: 30 TABLET | Refills: 5 | Status: SHIPPED | OUTPATIENT
Start: 2019-05-28 | End: 2019-06-05

## 2019-05-28 RX ORDER — LEVOTHYROXINE SODIUM 50 MCG
TABLET ORAL
Qty: 30 TABLET | Refills: 5 | Status: SHIPPED | OUTPATIENT
Start: 2019-05-28 | End: 2019-06-05 | Stop reason: DRUGHIGH

## 2019-06-05 ENCOUNTER — OFFICE VISIT (OUTPATIENT)
Dept: FAMILY MEDICINE CLINIC | Facility: CLINIC | Age: 79
End: 2019-06-05

## 2019-06-05 VITALS
SYSTOLIC BLOOD PRESSURE: 138 MMHG | RESPIRATION RATE: 14 BRPM | HEART RATE: 65 BPM | BODY MASS INDEX: 24.64 KG/M2 | OXYGEN SATURATION: 98 % | WEIGHT: 130.5 LBS | HEIGHT: 61 IN | DIASTOLIC BLOOD PRESSURE: 80 MMHG | TEMPERATURE: 97.5 F

## 2019-06-05 DIAGNOSIS — Z00.01 ENCOUNTER FOR WELL ADULT EXAM WITH ABNORMAL FINDINGS: ICD-10-CM

## 2019-06-05 DIAGNOSIS — E87.6 LOW SERUM POTASSIUM LEVEL: ICD-10-CM

## 2019-06-05 DIAGNOSIS — E03.9 ACQUIRED HYPOTHYROIDISM: ICD-10-CM

## 2019-06-05 DIAGNOSIS — I10 ESSENTIAL HYPERTENSION: Primary | ICD-10-CM

## 2019-06-05 PROCEDURE — 99214 OFFICE O/P EST MOD 30 MIN: CPT | Performed by: FAMILY MEDICINE

## 2019-06-05 RX ORDER — AMLODIPINE BESYLATE 10 MG/1
5 TABLET ORAL EVERY EVENING
Qty: 90 TABLET | Refills: 1 | Status: SHIPPED | OUTPATIENT
Start: 2019-06-05 | End: 2021-10-01

## 2019-06-05 RX ORDER — LEVOTHYROXINE SODIUM 0.07 MG/1
75 TABLET ORAL DAILY
Qty: 90 TABLET | Refills: 1 | Status: SHIPPED | OUTPATIENT
Start: 2019-06-05 | End: 2019-12-24

## 2019-06-05 RX ORDER — POTASSIUM CHLORIDE 750 MG/1
10 TABLET, EXTENDED RELEASE ORAL DAILY
Qty: 90 TABLET | Refills: 1 | Status: SHIPPED | OUTPATIENT
Start: 2019-06-05 | End: 2020-05-15

## 2019-06-05 NOTE — PATIENT INSTRUCTIONS

## 2019-06-05 NOTE — PROGRESS NOTES
"Subjective   Nguyen Zhang is a 79 y.o. female is here for   Chief Complaint   Patient presents with   • Hypothyroidism   • Hypertension   • Hyperlipidemia       History of Present Illness     On May 13, 2019 patient had pain in her left hand that went up her left arm and across her chest.  She states that her chest was tight and she felt like her \"heart was up in her throat.\"  She was admitted to [facility] and had a work-up for chest pain.  She was admitted overnight.  MI was excluded by serial biomarkers.  She had elevated blood pressure and her blood pressure became better controlled after she got settled in her hospital room.  States she has been pushing herself.  She had a treadmill stress test that she states she was unable to finish.  She later had a chemical stress test that was normal the following week.  During her overnight hospital stay her TSH was elevated but her free T4 was normal.  She states that she gets a little lightheaded and dizzy when she gets up and is walking and turns quickly, or if she gets real tired.  She thinks it may be related to her beta-blocker.  About a month to 6 weeks ago she had bilateral carotid ultrasound and abdominal aortic ultrasound which was normal.  She states that she was on losartan 100 mg in the past but she had some type of side effect but cannot remember exactly what that was.    The following portions of the patient's history were reviewed and updated as appropriate: allergies, current medications, past family history, past medical history, past social history, past surgical history and problem list.     reports that she has never smoked. She has never used smokeless tobacco. She reports that she does not drink alcohol or use drugs.    Review of Systems   Constitutional: Negative for activity change and unexpected weight change.   Respiratory: Negative for shortness of breath and wheezing.    Cardiovascular: Negative for chest pain and " "palpitations.   Gastrointestinal: Negative for abdominal pain, blood in stool and constipation.   Genitourinary: Negative for difficulty urinating and hematuria.   Musculoskeletal: Negative for gait problem.   Skin: Negative for color change and rash.   Neurological: Positive for dizziness.        PHQ-9 Depression Screening  Little interest or pleasure in doing things? 0   Feeling down, depressed, or hopeless? 0   Trouble falling or staying asleep, or sleeping too much?     Feeling tired or having little energy?     Poor appetite or overeating?     Feeling bad about yourself - or that you are a failure or have let yourself or your family down?     Trouble concentrating on things, such as reading the newspaper or watching television?     Moving or speaking so slowly that other people could have noticed? Or the opposite - being so fidgety or restless that you have been moving around a lot more than usual?     Thoughts that you would be better off dead, or of hurting yourself in some way?     PHQ-9 Total Score 0   If you checked off any problems, how difficult have these problems made it for you to do your work, take care of things at home, or get along with other people?           Objective   /80   Pulse 65   Temp 97.5 °F (36.4 °C) (Oral)   Resp 14   Ht 154.9 cm (61\")   Wt 59.2 kg (130 lb 8 oz)   SpO2 98%   BMI 24.66 kg/m²   Physical Exam   Constitutional: She is oriented to person, place, and time. She appears well-developed and well-nourished. No distress.   HENT:   Head: Normocephalic and atraumatic.   Right Ear: External ear normal.   Left Ear: External ear normal.   Nose: Nose normal.   Mouth/Throat: Oropharynx is clear and moist. No oropharyngeal exudate.   Eyes: Lids are normal. Right eye exhibits no discharge. Left eye exhibits no discharge. No scleral icterus.   Neck: Trachea normal, normal range of motion and full passive range of motion without pain. Neck supple. No tracheal deviation and no " edema present. No thyromegaly present.   Cardiovascular: Normal rate, regular rhythm, normal heart sounds and intact distal pulses. Exam reveals no gallop and no friction rub.   No murmur heard.  Pulmonary/Chest: Effort normal and breath sounds normal. No stridor. No tachypnea and no bradypnea. No respiratory distress. She has no decreased breath sounds. She has no wheezes. She has no rales. She exhibits no tenderness.   Abdominal: Normal appearance. There is no hepatosplenomegaly.   Musculoskeletal: She exhibits no edema.   Lymphadenopathy:        Head (right side): No submental, no submandibular, no tonsillar, no preauricular, no posterior auricular and no occipital adenopathy present.        Head (left side): No submental, no submandibular, no tonsillar, no preauricular, no posterior auricular and no occipital adenopathy present.     She has no cervical adenopathy.        Right cervical: No superficial cervical, no deep cervical and no posterior cervical adenopathy present.       Left cervical: No superficial cervical, no deep cervical and no posterior cervical adenopathy present.   Neurological: She is alert and oriented to person, place, and time. She has normal strength and normal reflexes. She is not disoriented.   Skin: Skin is warm, dry and intact. Capillary refill takes less than 2 seconds. No rash noted. She is not diaphoretic. No cyanosis or erythema. No pallor. Nails show no clubbing.   Psychiatric: She has a normal mood and affect. Her behavior is normal. Cognition and memory are normal.   Nursing note and vitals reviewed.      Procedures    Assessment/Plan   Diagnoses and all orders for this visit:    1. Essential hypertension (Primary)  Assessment & Plan:  Due to lightheadedness and fatigue we are decreasing the atenolol from 50 mg twice daily to 25 mg twice daily and increasing the amlodipine from 5 to 10 mg daily    Orders:  -     Comprehensive metabolic panel; Future  -     Lipid panel; Future  -      CK; Future  -     TSH; Future  -     amLODIPine (NORVASC) 10 MG tablet; Take 0.5 tablets by mouth Every Evening.  Dispense: 90 tablet; Refill: 1    2. Acquired hypothyroidism  Assessment & Plan:  During an overnight hospital stay for chest pain work-up her TSH was elevated with a normal T4.    Orders:  -     Comprehensive metabolic panel; Future  -     Lipid panel; Future  -     CK; Future  -     TSH; Future  -     amLODIPine (NORVASC) 10 MG tablet; Take 0.5 tablets by mouth Every Evening.  Dispense: 90 tablet; Refill: 1    3. Encounter for well adult exam with abnormal findings  -     Comprehensive metabolic panel; Future  -     Lipid panel; Future  -     CK; Future  -     TSH; Future  -     amLODIPine (NORVASC) 10 MG tablet; Take 0.5 tablets by mouth Every Evening.  Dispense: 90 tablet; Refill: 1  Ms. Zhang is going to start the amlodipine 10 mg daily today and also today decrease the atenolol from 50 mg twice daily to 25 mg twice daily.  She has a blood pressure machine at home and is going to check her blood pressure 3 times a day and make a log.  If her blood pressure goes over 140 systolic or 90 diastolic we will make another adjustment.  Is good to drop the blood pressure log off here next week for me to review.    We are going to increase her levothyroxine from 50 mcg 1-1/2 tablets daily (62.5 mcg) to 75 mcg daily.    I spent over 40 minutes with the patient, of which more than 50% was counseling on her blood pressure medicines and medical problems.

## 2019-06-05 NOTE — ASSESSMENT & PLAN NOTE
Due to lightheadedness and fatigue we are decreasing the atenolol from 50 mg twice daily to 25 mg twice daily and increasing the amlodipine from 5 to 10 mg daily

## 2019-06-06 LAB
ALBUMIN SERPL-MCNC: 5.3 G/DL (ref 3.5–5.2)
ALBUMIN/GLOB SERPL: 2.1 G/DL
ALP SERPL-CCNC: 76 U/L (ref 39–117)
ALT SERPL-CCNC: 16 U/L (ref 1–33)
APPEARANCE UR: CLEAR
AST SERPL-CCNC: 24 U/L (ref 1–32)
BACTERIA #/AREA URNS HPF: NORMAL /HPF
BASOPHILS # BLD AUTO: 0.04 10*3/MM3 (ref 0–0.2)
BASOPHILS NFR BLD AUTO: 0.6 % (ref 0–1.5)
BILIRUB SERPL-MCNC: 0.7 MG/DL (ref 0.2–1.2)
BILIRUB UR QL STRIP: NEGATIVE
BUN SERPL-MCNC: 14 MG/DL (ref 8–23)
BUN/CREAT SERPL: 15.1 (ref 7–25)
CALCIUM SERPL-MCNC: 10.7 MG/DL (ref 8.6–10.5)
CHLORIDE SERPL-SCNC: 93 MMOL/L (ref 98–107)
CHOLEST SERPL-MCNC: 240 MG/DL (ref 0–200)
CHOLEST/HDLC SERPL: 4.29 {RATIO}
CO2 SERPL-SCNC: 30.6 MMOL/L (ref 22–29)
COLOR UR: YELLOW
CREAT SERPL-MCNC: 0.93 MG/DL (ref 0.57–1)
EOSINOPHIL # BLD AUTO: 0.07 10*3/MM3 (ref 0–0.4)
EOSINOPHIL NFR BLD AUTO: 1 % (ref 0.3–6.2)
EPI CELLS #/AREA URNS HPF: NORMAL /HPF
ERYTHROCYTE [DISTWIDTH] IN BLOOD BY AUTOMATED COUNT: 12 % (ref 12.3–15.4)
GLOBULIN SER CALC-MCNC: 2.5 GM/DL
GLUCOSE SERPL-MCNC: 95 MG/DL (ref 65–99)
GLUCOSE UR QL: NEGATIVE
HCT VFR BLD AUTO: 39.7 % (ref 34–46.6)
HDLC SERPL-MCNC: 56 MG/DL (ref 40–60)
HGB BLD-MCNC: 13.3 G/DL (ref 12–15.9)
HGB UR QL STRIP: NEGATIVE
IMM GRANULOCYTES # BLD AUTO: 0.01 10*3/MM3 (ref 0–0.05)
IMM GRANULOCYTES NFR BLD AUTO: 0.1 % (ref 0–0.5)
KETONES UR QL STRIP: NEGATIVE
LDLC SERPL CALC-MCNC: 152 MG/DL (ref 0–100)
LEUKOCYTE ESTERASE UR QL STRIP: NEGATIVE
LYMPHOCYTES # BLD AUTO: 1.34 10*3/MM3 (ref 0.7–3.1)
LYMPHOCYTES NFR BLD AUTO: 19.3 % (ref 19.6–45.3)
MCH RBC QN AUTO: 31.2 PG (ref 26.6–33)
MCHC RBC AUTO-ENTMCNC: 33.5 G/DL (ref 31.5–35.7)
MCV RBC AUTO: 93.2 FL (ref 79–97)
MICRO URNS: NORMAL
MICRO URNS: NORMAL
MONOCYTES # BLD AUTO: 0.62 10*3/MM3 (ref 0.1–0.9)
MONOCYTES NFR BLD AUTO: 8.9 % (ref 5–12)
NEUTROPHILS # BLD AUTO: 4.87 10*3/MM3 (ref 1.7–7)
NEUTROPHILS NFR BLD AUTO: 70.1 % (ref 42.7–76)
NITRITE UR QL STRIP: NEGATIVE
NRBC BLD AUTO-RTO: 0 /100 WBC (ref 0–0.2)
PH UR STRIP: 7.5 [PH] (ref 5–7.5)
PLATELET # BLD AUTO: 275 10*3/MM3 (ref 140–450)
POTASSIUM SERPL-SCNC: 4 MMOL/L (ref 3.5–5.2)
PROT SERPL-MCNC: 7.8 G/DL (ref 6–8.5)
PROT UR QL STRIP: NEGATIVE
RBC # BLD AUTO: 4.26 10*6/MM3 (ref 3.77–5.28)
RBC #/AREA URNS HPF: NORMAL /HPF
SODIUM SERPL-SCNC: 137 MMOL/L (ref 136–145)
SP GR UR: 1.01 (ref 1–1.03)
TRIGL SERPL-MCNC: 160 MG/DL (ref 0–150)
TSH SERPL DL<=0.005 MIU/L-ACNC: 3.61 MIU/ML (ref 0.27–4.2)
URINALYSIS REFLEX: NORMAL
UROBILINOGEN UR STRIP-MCNC: 0.2 MG/DL (ref 0.2–1)
VLDLC SERPL CALC-MCNC: 32 MG/DL
WBC # BLD AUTO: 6.95 10*3/MM3 (ref 3.4–10.8)
WBC #/AREA URNS HPF: NORMAL /HPF

## 2019-06-06 NOTE — PROGRESS NOTES
Please call the patient regarding her abnormal result.  Please let the patient know that her labs were normal except her cholesterol was high at 240.  I recommend that she consider eating more natural fish.  She may also consider a Mediterranean diet.

## 2019-06-08 LAB
CHOLEST SERPL-MCNC: 241 MG/DL (ref 0–200)
CK SERPL-CCNC: 194 U/L (ref 20–180)
HDLC SERPL-MCNC: 56 MG/DL (ref 40–60)
LDLC SERPL CALC-MCNC: 151 MG/DL (ref 0–100)
Lab: NORMAL
TRIGL SERPL-MCNC: 168 MG/DL (ref 0–150)
VLDLC SERPL CALC-MCNC: 33.6 MG/DL
WRITTEN AUTHORIZATION: NORMAL

## 2019-06-10 ENCOUNTER — RESULTS ENCOUNTER (OUTPATIENT)
Dept: FAMILY MEDICINE CLINIC | Facility: CLINIC | Age: 79
End: 2019-06-10

## 2019-06-10 DIAGNOSIS — Z00.01 ENCOUNTER FOR WELL ADULT EXAM WITH ABNORMAL FINDINGS: ICD-10-CM

## 2019-06-10 DIAGNOSIS — E03.9 ACQUIRED HYPOTHYROIDISM: ICD-10-CM

## 2019-06-10 DIAGNOSIS — I10 ESSENTIAL HYPERTENSION: ICD-10-CM

## 2019-06-11 ENCOUNTER — HOSPITAL ENCOUNTER (OUTPATIENT)
Dept: GENERAL RADIOLOGY | Facility: HOSPITAL | Age: 79
Discharge: HOME | End: 2019-06-11
Admitting: INTERNAL MEDICINE

## 2019-06-11 DIAGNOSIS — M54.2 NECK PAIN: ICD-10-CM

## 2019-06-11 DIAGNOSIS — M54.2 NECK PAIN: Primary | ICD-10-CM

## 2019-06-11 DIAGNOSIS — M25.512 LEFT SHOULDER PAIN, UNSPECIFIED CHRONICITY: ICD-10-CM

## 2019-06-11 PROCEDURE — 72040 X-RAY EXAM NECK SPINE 2-3 VW: CPT

## 2019-06-12 DIAGNOSIS — M79.602 PAIN OF LEFT UPPER EXTREMITY: ICD-10-CM

## 2019-06-12 DIAGNOSIS — M54.2 NECK PAIN: Primary | ICD-10-CM

## 2019-06-12 RX ORDER — METHYLPREDNISOLONE 4 MG/1
TABLET ORAL
Qty: 21 EACH | Refills: 0 | Status: SHIPPED | OUTPATIENT
Start: 2019-06-12 | End: 2019-08-13 | Stop reason: ALTCHOICE

## 2019-06-12 RX ORDER — CYCLOBENZAPRINE HCL 10 MG
10 TABLET ORAL NIGHTLY PRN
Qty: 15 TABLET | Refills: 0 | Status: SHIPPED | OUTPATIENT
Start: 2019-06-12 | End: 2019-08-13

## 2019-06-13 ENCOUNTER — HOSPITAL ENCOUNTER (OUTPATIENT)
Dept: PHYSICAL THERAPY | Facility: HOSPITAL | Age: 79
Setting detail: THERAPIES SERIES
Discharge: HOME | End: 2019-06-13

## 2019-06-13 DIAGNOSIS — M54.2 NECK PAIN: ICD-10-CM

## 2019-06-13 DIAGNOSIS — M79.602 PAIN OF LEFT UPPER EXTREMITY: Primary | ICD-10-CM

## 2019-06-13 PROCEDURE — 97161 PT EVAL LOW COMPLEX 20 MIN: CPT

## 2019-06-13 NOTE — THERAPY EVALUATION
Outpatient Physical Therapy Ortho Initial Evaluation  JONATHAN Adler     Patient Name: Nguyen Zhang  : 1940  MRN: [id number]  Today's Date: 2019      Visit Date: 2019    Patient Active Problem List   Diagnosis   • Precordial chest pain   • PVCs (premature ventricular contractions)   • Essential hypertension   • Acquired hypothyroidism   • Low serum potassium level        Past Medical History:   Diagnosis Date   • Arthritis    • Disease of thyroid gland    • Hyperlipidemia    • Hypertension    • Hyperthyroidism         Past Surgical History:   Procedure Laterality Date   • HYSTERECTOMY     • KNEE SURGERY Right    • SHOULDER SURGERY Right        Visit Dx:     ICD-10-CM ICD-9-CM   1. Pain of left upper extremity M79.602 729.5   2. Neck pain M54.2 723.1         Patient History     Row Name 19 1300             History    Chief Complaint  Muscle tenderness;Muscle weakness;Pain;Tightness  (Pended)   -EK      Type of Pain  Upper Extremity / Arm;Shoulder pain  (Pended)  left  -EK      Date Current Problem(s) Began  --  (Pended)   -EK      Brief Description of Current Complaint  Pt reports insidious onset of pain in neck/left upper extremity which began 6 months ago (2018).  Pt had an X-ray performed a few months ago of the LUE and revealed nothing significant, and a second x-ray on 19 and revealed arthritis in her neck.  Pt reports pain can get up to 6/10.  Pt has utilized hot pack and OTC topical cream which provides some pain relief.  (Pended)   -EK      Patient/Caregiver Goals  Relieve pain;Return to prior level of function  (Pended)   -EK      Occupation/sports/leisure activities  Retired  (Pended)   -EK      How has patient tried to help current problem?  Gel cream and hot pack  (Pended)   -EK      What clinical tests have you had for this problem?  X-ray  (Pended)   -EK      Results of Clinical Tests  Arthritis  (Pended)   -EK      Related/Recent Hospitalizations  Yes   (Pended)   -EK      Surgery/Hospitalization  referred from PCP because symptoms presented similar to MI  (Pended)   -EK         Pain     Pain Location  Arm;Shoulder  (Pended)  Left  -EK      Pain at Present  4  (Pended)   -EK      Pain at Best  0  (Pended)   -EK      Pain at Worst  6  (Pended)  calms down in 20 minutes with heat and gel cream  -EK      Pain Frequency  Intermittent  (Pended)   -EK      Pain Description  Aching  (Pended)   -EK      What Performance Factors Make the Current Problem(s) WORSE?  More activity  (Pended)   -EK      What Performance Factors Make the Current Problem(s) BETTER?  laying on R side and flexing and adducting L arm  (Pended)   -EK      Is your sleep disturbed?  Yes  (Pended)  if arm gets cold  -EK      Is medication used to assist with sleep?  No  (Pended)   -EK      Difficulties with ADL's?  Bathing, dressing, grooming, and driving  (Pended)   -EK      Difficulties with recreational activities?  reaching overhead  (Pended)   -EK         Daily Activities    Primary Language  English  (Pended)   -EK      Are you able to read  Yes  (Pended)   -EK      Are you able to write  Yes  (Pended)   -EK      How does patient learn best?  Reading  (Pended)   -EK      Does patient have problems with the following?  None  (Pended)   -EK      Functional Status  bathing;dressing;grooming  (Pended)   -EK      Pt Participated in POC and Goals  Yes  (Pended)   -EK         Safety    Are you being hurt, hit, or frightened by anyone at home or in your life?  No  (Pended)   -EK      Are you being neglected by a caregiver  No  (Pended)   -EK        User Key  (r) = Recorded By, (t) = Taken By, (c) = Cosigned By    Initials Name Provider Type    EK Patrica Isbell, PT Student PT Student          PT Ortho     Row Name 06/13/19 1300       Posture/Observations    Alignment Options  Forward head;Rounded shoulders  (Pended)   -EK    Forward Head  Moderate  (Pended)   -EK    Rounded Shoulders  Moderate  (Pended)    -EK       Neural Tension Signs- Upper Quarter Clearing    ULNTT 1  Left:;Postive  (Pended)   -EK    ULNTT 2  Left:;Negative  (Pended)   -EK       Special Tests/Palpation    Special Tests/Palpation  Shoulder  (Pended)   -EK       Shoulder Impingement/Rotator Cuff Special Tests    Munoz-Evan Test (RC Lesion vs. Bursitis)  Left:;Negative  (Pended)   -EK    Neer Impingement Test (RC Lesion vs. Bursitis)  Left:;Negative  (Pended)   -EK    Empty Can Test (RC Lesion)  Right:;Negative;Left:;Positive  (Pended)   -EK    Drop Arm Test (Full Thickness RC Lesion)  Left:;Negative  (Pended)   -EK    Speed's Test (LH of Biceps Lesion)  Left:;Positive  (Pended)   -EK       Head/Neck/Trunk    Neck Extension AROM  50%  (Pended)   -EK    Neck Extension PROM  55%  (Pended)   -EK    Neck Flexion AROM  75%  (Pended)   -EK    Neck Flexion PROM  100%  (Pended)   -EK    Neck Lt Lateral Flexion AROM  25%  (Pended)   -EK    Neck Lt Lateral Flexion PROM  35%  (Pended)   -EK    Neck Rt Lateral Flexion AROM  25%  (Pended)   -EK    Neck Rt Lateral Flexion PROM  30%  (Pended)   -EK    Neck Lt Rotation AROM  50%  (Pended)   -EK    Neck Lt Rotation PROM  60%  (Pended)   -EK    Neck Rt Rotation AROM  50%  (Pended)   -EK    Neck Rt Rotation PROM  60%  (Pended)   -EK       Right Upper Ext    Rt Shoulder Abduction AROM  75%  (Pended)   -EK    Rt Shoulder Abduction PROM  WNL  (Pended)   -EK    Rt Shoulder Extension AROM  WNL  (Pended)   -EK    Rt Shoulder Flexion AROM  WNL  (Pended)   -EK    Rt Shoulder Flexion PROM  WNL  (Pended)   -EK    Rt Shoulder External Rotation AROM  WNL  (Pended)   -EK    Rt Shoulder External Rotation PROM  WNL  (Pended)   -EK    Rt Shoulder Internal Rotation AROM  WNL  (Pended)   -EK    Rt Shoulder Internal Rotation PROM  WNL  (Pended)   -EK       Left Upper Ext    Lt Shoulder Abduction AROM  50%  (Pended)   -EK    Lt Shoulder Abduction PROM  WNL  (Pended)   -EK    Lt Shoulder Extension AROM  WNL  (Pended)   -EK    Lt  Shoulder Flexion AROM  75%  (Pended)   -EK    Lt Shoulder Flexion PROM  WNL  (Pended)   -EK    Lt Shoulder External Rotation AROM  75%  (Pended)   -EK    Lt Shoulder External Rotation PROM  WNL  (Pended)   -EK    Lt Shoulder Internal Rotation AROM  WNL  (Pended)   -EK    Lt Shoulder Internal Rotation PROM  WNL  (Pended)   -EK       MMT Neck/Trunk    Neck Flexion MMT, Gross Movement  (5/5) normal  (Pended)   -EK    Neck Extension MMT, Gross Movement  (5/5) normal  (Pended)   -EK    Neck/Trunk Comments   --  (Pended)  bilateral sidebend and rotation 5/5  -EK       MMT Right Upper Ext    Rt Shoulder Flexion MMT, Gross Movement  (4/5) good  (Pended)   -EK    Rt Shoulder Extension MMT, Gross Movement  (5/5) normal  (Pended)   -EK    Rt Shoulder ABduction MMT, Gross Movement  (5/5) normal  (Pended)   -EK    Rt Shoulder Internal Rotation MMT, Gross Movement  (5/5) normal  (Pended)   -EK    Rt Shoulder External Rotation MMT, Gross Movement  (5/5) normal  (Pended)   -EK       MMT Left Upper Ext    Lt Shoulder Flexion MMT, Gross Movement  (3/5) fair  (Pended)  painful  -EK    Lt Shoulder Extension MMT, Gross Movement  (3+/5) fair plus  (Pended)  painful  -EK    Lt Shoulder ABduction MMT, Gross Movement  (3/5) fair  (Pended)  painful  -EK    Lt Shoulder Internal Rotation MMT, Gross Movement  (4+/5) good plus  (Pended)   -EK    Lt Shoulder External Rotation MMT, Gross Movement  (3+/5) fair plus  (Pended)  painful  -EK      User Key  (r) = Recorded By, (t) = Taken By, (c) = Cosigned By    Initials Name Provider Type    EK Patrica Isbell, PT Student PT Student                      Therapy Education  Given: (P) HEP, Symptoms/condition management, Posture/body mechanics  Program: (P) New  How Provided: (P) Written, Demonstration, Verbal  Provided to: (P) Patient  Level of Understanding: (P) Demonstrated, Verbalized     PT OP Goals     Row Name 06/13/19 1400 06/13/19 1300       PT Short Term Goals    STG Date to Achieve  --  (Pended)    -EK  06/27/19  (Pended)   -EK    STG 1  --  (Pended)   -EK  Pt will improve strength to 4-/5 shoulder flexion and abduction, and 4/5 external rotation with testing.  (Pended)   -EK    STG 2  --  (Pended)   -EK  Pt will improve range of motion neck in all planes to 75% with testing.  (Pended)   -EK    STG 3  --  (Pended)   -EK  Pt will decrease pain at worst to 3/10 with activity.  (Pended)   -EK       Long Term Goals    LTG Date to Achieve  --  (Pended)   -EK  07/11/19  (Pended)   -EK    LTG 1  --  (Pended)   -EK  Pt will improve strength to 4+/5 shoulder flexion, abduction, IR, and ER with testing.  (Pended)   -EK    LTG 2  --  (Pended)   -EK  Pt will improve range of motion neck in all planes to 100% with testing.  (Pended)   -EK    LTG 3  --  (Pended)   -EK  Pt will decrease pain at worst to 0/10 with activity.  (Pended)   -EK      User Key  (r) = Recorded By, (t) = Taken By, (c) = Cosigned By    Initials Name Provider Type    EK Patrica Isbell, PT Student PT Student          PT Assessment/Plan     Row Name 06/13/19 1300          PT Assessment    Functional Limitations  Limitation in home management;Limitations in community activities;Limitations in functional capacity and performance;Performance in leisure activities  (Pended)   -EK     Impairments  Muscle strength;Pain;Posture;Range of motion  (Pended)   -EK     Assessment Comments  Pt presents with 6 month history of left upper extremity/shoulder pain.  Pain began insidiously and has gradually worsened.  Pt demonstrates limited range of motion in neck and LUE, decreased strength in LUE and limited daily function with dressing, bathing, and grooming tasks.  (Pended)   -EK     Rehab Potential  Good  (Pended)   -EK     Patient/caregiver participated in establishment of treatment plan and goals  Yes  (Pended)   -EK     Patient would benefit from skilled therapy intervention  Yes  (Pended)   -EK        PT Plan    PT Frequency  2x/week  (Pended)   -EK     Predicted  Duration of Therapy Intervention (Therapy Eval)  4 weeks  (Pended)   -EK     Planned CPT's?  PT THER PROC EA 15 MIN: 34532;PT MANUAL THERAPY EA 15 MIN: 35611;PT HOT OR COLD PACK TREAT MCARE  (Pended)   -EK     PT Plan Comments  Pt is to continue her HEP 2x daily.  (Pended)   -EK       User Key  (r) = Recorded By, (t) = Taken By, (c) = Cosigned By    Initials Name Provider Type    Patrica Corona, PT Student PT Student            Exercises     Row Name 06/13/19 1300             Exercise 1    Exercise Name 1  Levator stretch bilateral  (Pended)   -EK      Cueing 1  Verbal;Demo  (Pended)   -EK      Reps 1  10 x 10 sec  (Pended)   -EK         Exercise 2    Exercise Name 2  Upper trap stretch  bilateral  (Pended)   -EK      Cueing 2  Verbal;Demo  (Pended)   -EK      Reps 2  10 x 10 sec  (Pended)   -EK         Exercise 3    Exercise Name 3  ER vs theraband  (Pended)   -EK      Cueing 3  Verbal;Tactile;Demo  (Pended)   -EK      Reps 3  20  (Pended)   -EK      Time 3  Green  (Pended)   -EK         Exercise 4    Exercise Name 4  IR vs theraband  (Pended)   -EK      Cueing 4  Verbal;Tactile;Demo  (Pended)   -EK      Reps 4  20  (Pended)   -EK      Time 4  Green  (Pended)   -EK         Exercise 5    Exercise Name 5  Rows vs theraband  (Pended)   -EK      Cueing 5  Verbal;Demo;Tactile  (Pended)   -EK      Reps 5  20  (Pended)   -EK      Time 5  Green  (Pended)   -EK         Exercise 6    Exercise Name 6  Scaption up  (Pended)   -EK      Cueing 6  Demo;Tactile;Verbal  (Pended)   -EK      Reps 6  20  (Pended)   -EK         Exercise 7    Exercise Name 7  Scaption down  (Pended)   -EK      Cueing 7  Tactile;Verbal;Demo  (Pended)   -EK      Reps 7  20  (Pended)   -EK        User Key  (r) = Recorded By, (t) = Taken By, (c) = Cosigned By    Initials Name Provider Type    Patrica Corona, PT Student PT Student                        Outcome Measure Options: (P) Quick DASH  Quick DASH  Open a tight or new jar.: (P) Mild Difficulty  Do  heavy household chores (e.g., wash walls, wash floors): (P) Moderate Difficulty  Carry a shopping bag or briefcase: (P) Mild Difficulty  Wash your back: (P) No Difficulty  Use a knife to cut food: (P) No Difficulty  Recreational activities in which you take some force or impact through your arm, should or hand (e.g. golf, hammering, tennis, etc.): (P) Moderate Difficulty  During the past week, to what extent has your arm, shoulder, or hand problem interfered with your normal social activites with family, friends, neighbors or groups?: (P) Moderately  During the past week, were you limited in your work or other regular daily activities as a result of your arm, shoulder or hand problem?: (P) Very limited  Arm, Shoulder, or hand pain: (P) Moderate  Tingling (pins and needles) in your arm, shoulder, or hand: (P) None  During the past week, how much difficulty have you had sleeping because of the pain in your arm, shoulder or hand?: (P) Moderate Difficiculty  Number of Questions Answered: (P) 11  Quick DASH Score: (P) 34.09         Time Calculation:     Start Time: (P) 1130  Stop Time: (P) 1230  Time Calculation (min): (P) 60 min     Therapy Charges for Today     Code Description Service Date Service Provider Modifiers Qty    13359266248 HC PT EVAL LOW COMPLEXITY 3 6/13/2019 Patrica Isbell, PT Student GP 1          PT G-Codes  Outcome Measure Options: (P) Quick DASH  Quick DASH Score: (P) 34.09         Patrica Isbell, PT Student  6/13/2019

## 2019-06-17 ENCOUNTER — HOSPITAL ENCOUNTER (OUTPATIENT)
Dept: PHYSICAL THERAPY | Facility: HOSPITAL | Age: 79
Setting detail: THERAPIES SERIES
Discharge: HOME | End: 2019-06-17

## 2019-06-17 DIAGNOSIS — M54.2 NECK PAIN: ICD-10-CM

## 2019-06-17 DIAGNOSIS — M79.602 PAIN OF LEFT UPPER EXTREMITY: Primary | ICD-10-CM

## 2019-06-17 PROCEDURE — 97110 THERAPEUTIC EXERCISES: CPT

## 2019-06-17 NOTE — THERAPY TREATMENT NOTE
Outpatient Physical Therapy Ortho Treatment Note  JONATHAN Adler     Patient Name: Nguyen Zhang  : 1940  MRN: [id number]  Today's Date: 2019      Visit Date: 2019    Visit Dx:    ICD-10-CM ICD-9-CM   1. Pain of left upper extremity M79.602 729.5   2. Neck pain M54.2 723.1       Patient Active Problem List   Diagnosis   • Precordial chest pain   • PVCs (premature ventricular contractions)   • Essential hypertension   • Acquired hypothyroidism   • Low serum potassium level        Past Medical History:   Diagnosis Date   • Arthritis    • Disease of thyroid gland    • Hyperlipidemia    • Hypertension    • Hyperthyroidism         Past Surgical History:   Procedure Laterality Date   • HYSTERECTOMY     • KNEE SURGERY Right    • SHOULDER SURGERY Right        PT Ortho     Row Name 19 6171       Subjective Comments    Subjective Comments  Pt reported her shoulder has been feeling a little better.  She also reported she began taking a steroid medication a few days ago and she doesn't know if the exercises or the medicine is what has helped her.  (Pended)   -EK       Left Upper Ext    Lt Shoulder Abduction AROM  75%  (Pended)   -EK    Lt Shoulder Abduction PROM  WNL  (Pended)   -EK    Lt Shoulder Flexion AROM  75%  (Pended)   -EK    Lt Shoulder Flexion PROM  WNL  (Pended)   -EK    Lt Shoulder External Rotation AROM  85%  (Pended)   -EK       MMT (Manual Muscle Testing)    General MMT Comments  Scaption strength is 3+/5  (Pended)   -EK       MMT Left Upper Ext    Lt Shoulder Flexion MMT, Gross Movement  (4+/5) good plus  (Pended)   -EK    Lt Shoulder ABduction MMT, Gross Movement  (4+/5) good plus  (Pended)   -EK    Lt Shoulder Internal Rotation MMT, Gross Movement  (5/5) normal  (Pended)   -EK    Lt Shoulder External Rotation MMT, Gross Movement  (4/5) good  (Pended)   -EK    Lt Scapular ABD & Lateral Rotation MMT, Gross Movement  --  (Pended)   -EK      User Key  (r) = Recorded By, (t) = Taken  By, (c) = Cosigned By    Initials Name Provider Type    CHIOMA Patrica Isbell, PT Student PT Student                      PT Assessment/Plan     Row Name 06/17/19 0830          PT Assessment    Assessment Comments  Pt tolerated treatment with occasional complaint of mild pain/fatigue.  Pt showing overall progress with improvement in range of motion and strength.  (Pended)   -EK        PT Plan    PT Plan Comments  Pt is to continue her HEP 2x daily.  (Pended)   -EK       User Key  (r) = Recorded By, (t) = Taken By, (c) = Cosigned By    Initials Name Provider Type    CHIOMA Patrica Isbell, PT Student PT Student            Exercises     Row Name 06/17/19 0830             Subjective Comments    Subjective Comments  Pt reported her shoulder has been feeling a little better.  She also reported she began taking a steroid medication a few days ago and she doesn't know if the exercises or the medicine is what has helped her.  (Pended)   -EK         Exercise 1    Exercise Name 1  Levator stretch bilateral  (Pended)   -EK      Cueing 1  Verbal;Demo  (Pended)   -EK      Reps 1  10 x 10 sec  (Pended)   -EK         Exercise 2    Exercise Name 2  Upper trap stretch  bilateral  (Pended)   -EK      Cueing 2  Verbal;Demo  (Pended)   -EK      Reps 2  10 x 10 sec  (Pended)   -EK         Exercise 3    Exercise Name 3  ER vs theraband  (Pended)   -EK      Cueing 3  Verbal;Tactile;Demo  (Pended)   -EK      Reps 3  20  (Pended)   -EK      Time 3  Green  (Pended)   -EK         Exercise 4    Exercise Name 4  IR vs theraband  (Pended)   -EK      Cueing 4  Verbal;Tactile;Demo  (Pended)   -EK      Reps 4  20  (Pended)   -EK      Time 4  Green  (Pended)   -EK         Exercise 5    Exercise Name 5  Rows vs theraband  (Pended)   -EK      Cueing 5  Verbal;Demo;Tactile  (Pended)   -EK      Reps 5  20  (Pended)   -EK      Time 5  Green  (Pended)   -EK         Exercise 6    Exercise Name 6  Scaption up  (Pended)   -EK      Cueing 6  Demo;Tactile;Verbal  (Pended)    -EK      Reps 6  20  (Pended)   -EK         Exercise 7    Exercise Name 7  Scaption down  (Pended)   -EK      Cueing 7  Tactile;Verbal;Demo  (Pended)   -EK      Reps 7  20  (Pended)   -EK         Exercise 8    Exercise Name 8  Standing abuction  (Pended)   -EK      Reps 8  20  (Pended)   -EK      Time 8  1#  (Pended)   -EK         Exercise 9    Exercise Name 9  T's  (Pended)   -EK      Cueing 9  Verbal  (Pended)   -EK      Reps 9  20  (Pended)   -EK         Exercise 10    Exercise Name 10  Y's  (Pended)   -EK      Cueing 10  Verbal;Tactile  (Pended)   -EK      Reps 10  20  (Pended)   -EK         Exercise 11    Exercise Name 11  Sidelying ER  (Pended)   -EK      Cueing 11  Verbal  (Pended)   -EK      Reps 11  20  (Pended)   -EK      Time 11  1#  (Pended)   -EK        User Key  (r) = Recorded By, (t) = Taken By, (c) = Cosigned By    Initials Name Provider Type    Patrica Corona, PT Student PT Student                      Manual Rx (last 36 hours)      Manual Treatments     Row Name 06/17/19 0830             Manual Rx 1    Manual Rx 1 Location  Left shoulder, upper trap  (Pended)   -EK      Manual Rx 1 Type  STM  (Pended)   -EK      Manual Rx 1 Duration  6 mins   (Pended)   -EK        User Key  (r) = Recorded By, (t) = Taken By, (c) = Cosigned By    Initials Name Provider Type    Patrica Corona, PT Student PT Student                             Time Calculation:   Start Time: (P) 0830  Stop Time: (P) 0920  Time Calculation (min): (P) 50 min  Therapy Charges for Today     Code Description Service Date Service Provider Modifiers Qty    99846700047  PT THER PROC EA 15 MIN 6/17/2019 Patrica Isbell, PT Student GP 2                    Patrica Isbell PT Student  6/17/2019

## 2019-06-19 RX ORDER — LOSARTAN POTASSIUM 100 MG/1
TABLET ORAL
Qty: 90 TABLET | Refills: 2 | Status: SHIPPED | OUTPATIENT
Start: 2019-06-19 | End: 2020-03-16

## 2019-06-20 ENCOUNTER — HOSPITAL ENCOUNTER (OUTPATIENT)
Dept: PHYSICAL THERAPY | Facility: HOSPITAL | Age: 79
Setting detail: THERAPIES SERIES
Discharge: HOME | End: 2019-06-20

## 2019-06-20 DIAGNOSIS — M79.602 PAIN OF LEFT UPPER EXTREMITY: Primary | ICD-10-CM

## 2019-06-20 PROCEDURE — 97110 THERAPEUTIC EXERCISES: CPT

## 2019-06-20 NOTE — THERAPY TREATMENT NOTE
Outpatient Physical Therapy Ortho Treatment Note  JONATHAN Adler     Patient Name: Nguyen Zhang  : 1940  MRN: [id number]  Today's Date: 2019      Visit Date: 2019    Visit Dx:    ICD-10-CM ICD-9-CM   1. Pain of left upper extremity M79.602 729.5       Patient Active Problem List   Diagnosis   • Precordial chest pain   • PVCs (premature ventricular contractions)   • Essential hypertension   • Acquired hypothyroidism   • Low serum potassium level        Past Medical History:   Diagnosis Date   • Arthritis    • Disease of thyroid gland    • Hyperlipidemia    • Hypertension    • Hyperthyroidism         Past Surgical History:   Procedure Laterality Date   • HYSTERECTOMY     • KNEE SURGERY Right    • SHOULDER SURGERY Right        PT Ortho     Row Name 19 1100       Head/Neck/Trunk    Neck Extension AROM  61 degrees  (Pended)   -EK    Neck Flexion AROM  35 degrees  (Pended)   -EK    Neck Lt Lateral Flexion AROM  20 degrees  (Pended)   -EK    Neck Rt Lateral Flexion AROM  21 degrees  (Pended)   -EK    Neck Lt Rotation AROM  45 degrees  (Pended)   -EK    Neck Rt Rotation AROM  48 degrees  (Pended)   -EK       Right Upper Ext    Rt Shoulder Abduction AROM  146 degrees  (Pended)   -EK    Rt Shoulder Flexion AROM  154 degrees  (Pended)   -EK    Rt Shoulder External Rotation AROM  84 degrees  (Pended)   -EK    Rt Shoulder Internal Rotation AROM  67 degrees  (Pended)   -EK       Left Upper Ext    Lt Shoulder Abduction AROM  101 degrees  (Pended)   -EK    Lt Shoulder Flexion AROM  168 degrees  (Pended)   -EK    Lt Shoulder External Rotation AROM  64 degrees  (Pended)   -EK    Lt Shoulder Internal Rotation AROM  80 degrees  (Pended)   -EK      User Key  (r) = Recorded By, (t) = Taken By, (c) = Cosigned By    Initials Name Provider Type    EK Patrica Isbell, PT Student PT Student                      PT Assessment/Plan     Row Name 19 1100          PT Assessment    Assessment Comments  Pt  tolerated treatment well with only occasional complaint of pain/soreness in anterior/lateral upper arm.  Pt showing progress with AROM.  Pt was able to initiate new stretching/strengthening exercises and advanced some strengthening exercises.  (Pended)   -EK        PT Plan    PT Plan Comments  Pt is to continue her HEP 2x daily.  (Pended)   -EK       User Key  (r) = Recorded By, (t) = Taken By, (c) = Cosigned By    Initials Name Provider Type    Patrica Corona, PT Student PT Student            Exercises     Row Name 06/20/19 1100             Subjective Comments    Subjective Comments  Pt reported her shoulder is feeling better since last visit.  However, she still feels sore in her arm sometimes.  (Pended)   -EK         Exercise 1    Exercise Name 1  Levator stretch bilateral  (Pended)   -EK      Cueing 1  Verbal;Demo  (Pended)   -EK      Reps 1  10 x 10 sec  (Pended)   -EK         Exercise 2    Exercise Name 2  Upper trap stretch  bilateral  (Pended)   -EK      Cueing 2  Verbal;Demo  (Pended)   -EK      Reps 2  10 x 10 sec  (Pended)   -EK         Exercise 3    Exercise Name 3  ER vs theraband  (Pended)   -EK      Cueing 3  Verbal;Tactile;Demo  (Pended)   -EK      Reps 3  20  (Pended)   -EK      Time 3  Green  (Pended)   -EK         Exercise 4    Exercise Name 4  IR vs theraband  (Pended)   -EK      Cueing 4  Verbal;Tactile;Demo  (Pended)   -EK      Reps 4  20  (Pended)   -EK      Time 4  Green  (Pended)   -EK         Exercise 5    Exercise Name 5  Rows vs theraband  (Pended)   -EK      Cueing 5  Verbal;Demo;Tactile  (Pended)   -EK      Reps 5  30  (Pended)   -EK      Time 5  Green  (Pended)   -EK         Exercise 6    Exercise Name 6  Scaption up  (Pended)   -EK      Cueing 6  Demo;Tactile;Verbal  (Pended)   -EK      Reps 6  20  (Pended)   -EK      Time 6  1#  (Pended)   -EK         Exercise 7    Exercise Name 7  Scaption down  (Pended)   -EK      Cueing 7  Tactile;Verbal;Demo  (Pended)   -EK      Reps 7  1#   (Pended)   -EK         Exercise 9    Exercise Name 9  T's  (Pended)   -EK      Cueing 9  Verbal  (Pended)   -EK      Reps 9  20  (Pended)   -EK      Time 9  1#  (Pended)   -EK         Exercise 10    Exercise Name 10  Y's  (Pended)   -EK      Cueing 10  Verbal;Tactile  (Pended)   -EK      Reps 10  20  (Pended)   -EK      Time 10  1#  (Pended)   -EK         Exercise 11    Exercise Name 11  Sidelying ER  (Pended)   -EK      Cueing 11  Verbal  (Pended)   -EK      Reps 11  20  (Pended)   -EK      Time 11  1#  (Pended)   -EK         Exercise 12    Exercise Name 12  Scap retraction-seated  (Pended)   -EK      Cueing 12  Verbal;Tactile  (Pended)   -EK      Reps 12  10 x 10 sec  (Pended)   -EK         Exercise 13    Exercise Name 13  Pec corner stretch  (Pended)   -EK      Cueing 13  Verbal;Demo  (Pended)   -EK      Reps 13  5 x 10 sec  (Pended)   -EK        User Key  (r) = Recorded By, (t) = Taken By, (c) = Cosigned By    Initials Name Provider Type    EK Patrica Isbell, PT Student PT Student                                          Time Calculation:   Start Time: (P) 1100  Stop Time: (P) 1200  Time Calculation (min): (P) 60 min  Therapy Charges for Today     Code Description Service Date Service Provider Modifiers Qty    96659387157  PT THER PROC EA 15 MIN 6/20/2019 Patrica Isbell, PT Student GP 2                    Patrica Isbell, PT Student  6/20/2019

## 2019-06-25 ENCOUNTER — HOSPITAL ENCOUNTER (OUTPATIENT)
Dept: PHYSICAL THERAPY | Facility: HOSPITAL | Age: 79
Setting detail: THERAPIES SERIES
Discharge: HOME | End: 2019-06-25

## 2019-06-25 DIAGNOSIS — M79.602 PAIN OF LEFT UPPER EXTREMITY: Primary | ICD-10-CM

## 2019-06-25 PROCEDURE — 97110 THERAPEUTIC EXERCISES: CPT

## 2019-06-25 PROCEDURE — 97140 MANUAL THERAPY 1/> REGIONS: CPT

## 2019-06-25 NOTE — THERAPY TREATMENT NOTE
Outpatient Physical Therapy Ortho Treatment Note   [place]     Patient Name: Nguyen Zhang  : 1940  MRN: [id number]  Today's Date: 2019      Visit Date: 2019    Visit Dx:    ICD-10-CM ICD-9-CM   1. Pain of left upper extremity M79.602 729.5       Patient Active Problem List   Diagnosis   • Precordial chest pain   • PVCs (premature ventricular contractions)   • Essential hypertension   • Acquired hypothyroidism   • Low serum potassium level        Past Medical History:   Diagnosis Date   • Arthritis    • Disease of thyroid gland    • Hyperlipidemia    • Hypertension    • Hyperthyroidism         Past Surgical History:   Procedure Laterality Date   • HYSTERECTOMY     • KNEE SURGERY Right    • SHOULDER SURGERY Right        PT Ortho     Row Name 19       Neural Tension Signs- Upper Quarter Clearing    ULNTT 1  Left:;Negative  (Pended)   -EK      User Key  (r) = Recorded By, (t) = Taken By, (c) = Cosigned By    Initials Name Provider Type    Patrica Corona, PT Student PT Student                      PT Assessment/Plan     Row Name 19          PT Assessment    Assessment Comments  Pt tolerated treatment well with only occasional complaint of soreness/mild pain with CPA's and STM. STM showing benefit with reduction in pain.  Pt tolerated advancing reps of strengthening exercises with mild fatigue.  (Pended)   -EK        PT Plan    PT Plan Comments  Pt is to continue her HEP 2x daily.  Continue with STM and CPA's.  (Pended)   -EK       User Key  (r) = Recorded By, (t) = Taken By, (c) = Cosigned By    Initials Name Provider Type    Patrica Corona, PT Student PT Student            Exercises     Row Name 19             Subjective Comments    Subjective Comments  Pt reported that she is doing better and is having less pain.  Pt stated though that she worked in her garden yesterday and did more than she should so both of her arms are sore.  (Pended)   -EK          Total Minutes    69879 - PT Manual Therapy Minutes  15  (Pended)   -EK         Exercise 1    Exercise Name 1  Levator stretch bilateral  (Pended)   -EK      Cueing 1  Verbal;Demo  (Pended)   -EK      Reps 1  10 x 10 sec  (Pended)   -EK         Exercise 2    Exercise Name 2  Upper trap stretch  bilateral  (Pended)   -EK      Cueing 2  Verbal;Demo  (Pended)   -EK      Reps 2  10 x 10 sec  (Pended)   -EK         Exercise 3    Exercise Name 3  ER vs theraband  (Pended)   -EK      Cueing 3  Verbal;Tactile;Demo  (Pended)   -EK      Reps 3  30  (Pended)   -EK      Time 3  Green  (Pended)   -EK         Exercise 4    Exercise Name 4  IR vs theraband  (Pended)   -EK      Cueing 4  Verbal;Tactile;Demo  (Pended)   -EK      Reps 4  30  (Pended)   -EK      Time 4  Green  (Pended)   -EK         Exercise 5    Exercise Name 5  Rows vs theraband  (Pended)   -EK      Cueing 5  Verbal;Demo;Tactile  (Pended)   -EK      Reps 5  30  (Pended)   -EK      Time 5  Green  (Pended)   -EK         Exercise 6    Exercise Name 6  Scaption up  (Pended)   -EK      Cueing 6  Demo;Tactile;Verbal  (Pended)   -EK      Reps 6  30  (Pended)   -EK      Time 6  1#  (Pended)   -EK         Exercise 7    Exercise Name 7  Scaption down  (Pended)   -EK      Cueing 7  Tactile;Verbal;Demo  (Pended)   -EK      Reps 7  30  (Pended)   -EK      Time 7  1#  (Pended)   -EK         Exercise 9    Exercise Name 9  T's  (Pended)   -EK      Cueing 9  Verbal  (Pended)   -EK      Reps 9  30  (Pended)   -EK      Time 9  1#  (Pended)   -EK         Exercise 10    Exercise Name 10  Y's  (Pended)   -EK      Cueing 10  Verbal;Tactile  (Pended)   -EK      Reps 10  30  (Pended)   -EK      Time 10  1#  (Pended)   -EK         Exercise 11    Exercise Name 11  Sidelying ER  (Pended)   -EK      Cueing 11  Verbal  (Pended)   -EK      Reps 11  30  (Pended)   -EK      Time 11  1#  (Pended)   -EK         Exercise 12    Exercise Name 12  Scap retraction-seated  (Pended)   -EK      Cueing 12   Verbal;Tactile  (Pended)   -EK      Reps 12  10 x 10 sec  (Pended)   -EK         Exercise 13    Exercise Name 13  Pec corner stretch  (Pended)   -EK      Cueing 13  Verbal;Demo  (Pended)   -EK      Reps 13  10 x 10 sec  (Pended)   -EK        User Key  (r) = Recorded By, (t) = Taken By, (c) = Cosigned By    Initials Name Provider Type    Patrica Corona, PT Student PT Student                      Manual Rx (last 36 hours)      Manual Treatments     Row Name 06/25/19 0930             Total Minutes    07794 - PT Manual Therapy Minutes  15  (Pended)   -EK         Manual Rx 1    Manual Rx 1 Location  Anterior and lateral upper arm and proximal forearm  (Pended)   -EK      Manual Rx 1 Type  STM  (Pended)   -EK      Manual Rx 1 Duration  10 mins  (Pended)   -EK         Manual Rx 2    Manual Rx 2 Location  Cervicothoracic spine  (Pended)   -EK      Manual Rx 2 Type  CPA's  (Pended)   -EK      Manual Rx 2 Grade  Grade III-IV  (Pended)   -EK      Manual Rx 2 Duration  5 mins  (Pended)   -EK        User Key  (r) = Recorded By, (t) = Taken By, (c) = Cosigned By    Initials Name Provider Type    Patrica Corona, PT Student PT Student                             Time Calculation:   Start Time: (P) 0930  Stop Time: (P) 1020  Time Calculation (min): (P) 50 min  Therapy Charges for Today     Code Description Service Date Service Provider Modifiers Qty    77384559012 HC PT THER PROC EA 15 MIN 6/25/2019 Patrica Isbell, PT Student GP 1    [id number] HC PT MANUAL THERAPY EA 15 MIN 6/25/2019 Patrica Isbell PT Student GP 1                    Patrica Isbell PT Student  6/25/2019

## 2019-07-01 ENCOUNTER — HOSPITAL ENCOUNTER (OUTPATIENT)
Dept: PHYSICAL THERAPY | Facility: HOSPITAL | Age: 79
Setting detail: THERAPIES SERIES
Discharge: HOME | End: 2019-07-01

## 2019-07-01 DIAGNOSIS — M79.602 PAIN OF LEFT UPPER EXTREMITY: Primary | ICD-10-CM

## 2019-07-01 PROCEDURE — 97110 THERAPEUTIC EXERCISES: CPT

## 2019-07-01 PROCEDURE — 97140 MANUAL THERAPY 1/> REGIONS: CPT

## 2019-07-01 NOTE — THERAPY TREATMENT NOTE
Outpatient Physical Therapy Ortho Treatment Note  [place] [place]     Patient Name: Nguyen Zhang  : 1940  MRN: [id number]  Today's Date: 2019      Visit Date: 2019    Visit Dx:    ICD-10-CM ICD-9-CM   1. Pain of left upper extremity M79.602 729.5       Patient Active Problem List   Diagnosis   • Precordial chest pain   • PVCs (premature ventricular contractions)   • Essential hypertension   • Acquired hypothyroidism   • Low serum potassium level        Past Medical History:   Diagnosis Date   • Arthritis    • Disease of thyroid gland    • Hyperlipidemia    • Hypertension    • Hyperthyroidism         Past Surgical History:   Procedure Laterality Date   • HYSTERECTOMY     • KNEE SURGERY Right    • SHOULDER SURGERY Right        PT Ortho     Row Name 19       Subjective Comments    Subjective Comments  Pt reported that she was hurting a 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  Pt took a muscle relaxer yesterday and stated it helped and that she isn't feeling pain prior to treatment today.  (Pended)   -EK       Posture/Observations    Rounded Shoulders  Bilateral:;Moderate  (Pended)   -EK      User Key  (r) = Recorded By, (t) = Taken By, (c) = Cosigned By    Initials Name Provider Type    Patrica Corona, PT Student PT Student                      PT Assessment/Plan     Row Name 19          PT Assessment    Assessment Comments  Pt tolerated treatment with mild complaints of tenderness/soreness in L upper trap, cervical and thoracic spine, and down lateral upper arms bilaterally.  Pt stated after cervical/thoracic mobilizations she felt better.  Pt tolerated strengthening exercises with mild fatigue/soreness.  (Pended)   -EK        PT Plan    PT Plan Comments  Pt is to continue her HEP 2x daily.   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  Pt took a muscle relaxer yesterday and stated it helped and that she isn't feeling pain prior to treatment today.  (Pended)   -EK         Total Minutes    92859 - PT Manual Therapy Minutes  25  (Pended)   -EK         Exercise 1    Exercise Name 1  Levator stretch bilateral  (Pended)   -EK      Cueing 1  Verbal;Demo  (Pended)   -EK      Reps 1  10 x 10 sec  (Pended)   -EK         Exercise 2    Exercise Name 2  Upper trap stretch  bilateral  (Pended)   -EK      Cueing 2  Verbal;Demo  (Pended)   -EK      Reps 2  10 x 10 sec  (Pended)   -EK         Exercise 3    Exercise Name 3  ER vs theraband  (Pended)   -EK      Cueing 3  Verbal;Tactile;Demo  (Pended)   -EK      Reps 3  30  (Pended)   -EK      Time 3  Green  (Pended)   -EK         Exercise 4    Exercise Name 4  IR vs theraband  (Pended)   -EK      Cueing 4  Verbal;Tactile;Demo  (Pended)   -EK      Reps 4  30  (Pended)   -EK      Time 4  Green  (Pended)   -EK         Exercise 5    Exercise Name 5  Rows vs theraband  (Pended)   -EK      Cueing 5  Verbal;Demo;Tactile  (Pended)   -EK      Reps 5  30  (Pended)   -EK      Time 5  Green  (Pended)   -EK         Exercise 6    Exercise Name 6  Scaption up  (Pended)   -EK      Cueing 6  Demo;Tactile;Verbal  (Pended)   -EK      Reps 6  30  (Pended)   -EK      Time 6  1#  (Pended)   -EK         Exercise 7    Exercise Name 7  Scaption down  (Pended)   -EK      Cueing 7  Tactile;Verbal;Demo  (Pended)   -EK      Reps 7  30  (Pended)   -EK      Time 7  1#  (Pended)   -EK         Exercise 9    Exercise Name 9  T's  (Pended)   -EK      Cueing 9  Verbal  (Pended)   -EK      Reps 9  30  (Pended)   -EK      Time 9  1#  (Pended)   -EK         Exercise 10    Exercise Name 10  Y's  (Pended)   -EK      Cueing 10  Verbal;Tactile   (Pended)   -EK      Reps 10  30  (Pended)   -EK      Time 10  1#  (Pended)   -EK         Exercise 11    Exercise Name 11  Sidelying ER  (Pended)   -EK      Cueing 11  Verbal  (Pended)   -EK      Reps 11  30  (Pended)   -EK      Time 11  1#  (Pended)   -EK         Exercise 12    Exercise Name 12  Scap retraction-seated  (Pended)   -EK      Cueing 12  Verbal;Tactile  (Pended)   -EK      Reps 12  10 x 10 sec  (Pended)   -EK         Exercise 13    Exercise Name 13  Pec corner stretch  (Pended)   -EK      Cueing 13  Verbal;Demo  (Pended)   -EK      Reps 13  10 x 10 sec  (Pended)   -EK        User Key  (r) = Recorded By, (t) = Taken By, (c) = Cosigned By    Initials Name Provider Type    Patrica Corona, PT Student PT Student                      Manual Rx (last 36 hours)      Manual Treatments     Row Name 07/01/19 0900             Total Minutes    21175 - PT Manual Therapy Minutes  25  (Pended)   -EK         Manual Rx 1    Manual Rx 1 Location  upper trap and SCM-bilateral  (Pended)   -EK      Manual Rx 1 Type  upper trap and SCM stretch  (Pended)   -EK      Manual Rx 1 Duration  10  (Pended)   -EK         Manual Rx 2    Manual Rx 2 Location  Cervicothoracic spine  (Pended)   -EK      Manual Rx 2 Type  CPA's  (Pended)   -EK      Manual Rx 2 Grade  Grade III-IV  (Pended)   -EK      Manual Rx 2 Duration  10 mins  (Pended)   -EK         Manual Rx 3    Manual Rx 3 Location  Cervical spine; Spinous process/transverse process  (Pended)   -EK      Manual Rx 3 Type  Side glides and rotation mobilizations  (Pended)   -EK      Manual Rx 3 Grade  grade II-IV  (Pended)   -EK      Manual Rx 3 Duration  5 mins  (Pended)   -EK        User Key  (r) = Recorded By, (t) = Taken By, (c) = Cosigned By    Initials Name Provider Type    Patrica Corona, PT Student PT Student                             Time Calculation:   Start Time: (P) 0900  Stop Time: (P) 1000  Time Calculation (min): (P) 60 min  Therapy Charges for Today     Code  Description Service Date Service Provider Modifiers Qty    92086538895 HC PT THER PROC EA 15 MIN 7/1/2019 Patrica Isbell, PT Student GP 1    [id number] HC PT MANUAL THERAPY EA 15 MIN 7/1/2019 Patrica Isbell, PT Student GP 1                    Patrica sIbell, PT Student  7/1/2019

## 2019-07-02 ENCOUNTER — TRANSCRIBE ORDERS (OUTPATIENT)
Dept: FAMILY MEDICINE CLINIC | Facility: CLINIC | Age: 79
End: 2019-07-02

## 2019-07-02 DIAGNOSIS — Z12.39 SCREENING BREAST EXAMINATION: Primary | ICD-10-CM

## 2019-07-08 ENCOUNTER — HOSPITAL ENCOUNTER (OUTPATIENT)
Dept: PHYSICAL THERAPY | Facility: HOSPITAL | Age: 79
Setting detail: THERAPIES SERIES
Discharge: HOME | End: 2019-07-08

## 2019-07-08 DIAGNOSIS — M79.602 PAIN OF LEFT UPPER EXTREMITY: Primary | ICD-10-CM

## 2019-07-08 PROCEDURE — 97140 MANUAL THERAPY 1/> REGIONS: CPT

## 2019-07-08 PROCEDURE — 97110 THERAPEUTIC EXERCISES: CPT

## 2019-07-08 NOTE — THERAPY TREATMENT NOTE
Outpatient Physical Therapy Ortho Treatment Note  JONATHAN Adler     Patient Name: Nguyen Zhang  : 1940  MRN: [id number]  Today's Date: 2019      Visit Date: 2019    Visit Dx:    ICD-10-CM ICD-9-CM   1. Pain of left upper extremity M79.602 729.5       Patient Active Problem List   Diagnosis   • Precordial chest pain   • PVCs (premature ventricular contractions)   • Essential hypertension   • Acquired hypothyroidism   • Low serum potassium level        Past Medical History:   Diagnosis Date   • Arthritis    • Disease of thyroid gland    • Hyperlipidemia    • Hypertension    • Hyperthyroidism         Past Surgical History:   Procedure Laterality Date   • HYSTERECTOMY     • KNEE SURGERY Right    • SHOULDER SURGERY Right        PT Ortho     Row Name 19 0840       Subjective Comments    Subjective Comments  Pt reports that her arm/shoulder is feeling a little better.  Pt also reports if her arm starts hurting she'll do her exercises and then the pain goes away.  (Pended)   -EK       Posture/Observations    Rounded Shoulders  Bilateral:;Moderate  (Pended)   -EK       Head/Neck/Trunk    Neck Extension AROM  55 degrees  (Pended)   -EK    Neck Flexion AROM  51 degrees  (Pended)   -EK    Neck Lt Lateral Flexion AROM  17 degrees  (Pended)   -EK    Neck Rt Lateral Flexion AROM  20 degrees  (Pended)   -EK    Neck Lt Rotation AROM  48 degrees  (Pended)   -EK    Neck Rt Rotation AROM  39 degrees  (Pended)   -EK       Left Upper Ext    Lt Shoulder Abduction AROM  149 degrees  (Pended)  pain with this motion  -EK    Lt Shoulder Flexion AROM  170 degrees  (Pended)   -EK    Lt Shoulder External Rotation AROM  72 degrees  (Pended)   -EK    Lt Shoulder Internal Rotation AROM  76 degrees  (Pended)  pain with this motion  -EK       MMT Left Upper Ext    Lt Shoulder Flexion MMT, Gross Movement  (4-/5) good minus  (Pended)  Unable to fully assess, due to limitation of pain  -EK    Lt Shoulder Extension MMT,  Gross Movement  (5/5) normal  (Pended)   -EK    Lt Shoulder ABduction MMT, Gross Movement  (4+/5) good plus  (Pended)   -EK    Lt Shoulder Internal Rotation MMT, Gross Movement  (5/5) normal  (Pended)   -EK    Lt Shoulder External Rotation MMT, Gross Movement  (4/5) good  (Pended)   -EK      User Key  (r) = Recorded By, (t) = Taken By, (c) = Cosigned By    Initials Name Provider Type    Patrica Corona, PT Student PT Student                      PT Assessment/Plan     Row Name 07/08/19 0840          PT Assessment    Assessment Comments  Pt tolerated treatment well with occasional complaint of pain with strength testing.  Pt still demonstrating weakness with shoulder flexion/ER.  Pt showing overall progress with range of motion, however deficits still noted.  Pt still reports pain after activity but not during activity.  Pt reports she feels pain in anterior/medial upper arm.  (Pended)   -EK        PT Plan    PT Plan Comments  Pt is to continue HEP 2x daily.  (Pended)   -EK       User Key  (r) = Recorded By, (t) = Taken By, (c) = Cosigned By    Initials Name Provider Type    Patrica Corona, PT Student PT Student            Exercises     Row Name 07/08/19 0840             Subjective Comments    Subjective Comments  Pt reports that her arm/shoulder is feeling a little better.  Pt also reports if her arm starts hurting she'll do her exercises and then the pain goes away.  (Pended)   -EK         Total Minutes    94246 - PT Manual Therapy Minutes  20  (Pended)   -EK         Exercise 1    Exercise Name 1  Levator stretch bilateral  (Pended)   -EK      Cueing 1  Verbal;Demo  (Pended)   -EK      Reps 1  10 x 10 sec  (Pended)   -EK         Exercise 2    Exercise Name 2  Upper trap stretch  bilateral  (Pended)   -EK      Cueing 2  Verbal;Demo  (Pended)   -EK      Reps 2  10 x 10 sec  (Pended)   -EK         Exercise 3    Exercise Name 3  ER vs theraband  (Pended)   -EK      Cueing 3  Verbal;Tactile;Demo  (Pended)   -EK       Reps 3  30  (Pended)   -EK      Time 3  Green  (Pended)   -EK         Exercise 4    Exercise Name 4  IR vs theraband  (Pended)   -EK      Cueing 4  Verbal;Tactile;Demo  (Pended)   -EK      Reps 4  30  (Pended)   -EK      Time 4  Green  (Pended)   -EK         Exercise 5    Exercise Name 5  Rows vs theraband  (Pended)   -EK      Cueing 5  Verbal;Demo;Tactile  (Pended)   -EK      Reps 5  30  (Pended)   -EK      Time 5  Green  (Pended)   -EK         Exercise 6    Exercise Name 6  Scaption up  (Pended)   -EK      Cueing 6  Demo;Tactile;Verbal  (Pended)   -EK      Reps 6  15  (Pended)   -EK      Time 6  2#  (Pended)   -EK         Exercise 7    Exercise Name 7  Scaption down  (Pended)   -EK      Cueing 7  Tactile;Verbal;Demo  (Pended)   -EK      Reps 7  15  (Pended)   -EK      Time 7  1#  (Pended)   -EK         Exercise 9    Exercise Name 9  T's  (Pended)   -EK      Cueing 9  Verbal  (Pended)   -EK      Reps 9  30  (Pended)   -EK      Time 9  1#-advance weight next visit  (Pended)   -EK         Exercise 10    Exercise Name 10  Y's  (Pended)   -EK      Cueing 10  Verbal;Tactile  (Pended)   -EK      Reps 10  30  (Pended)   -EK      Time 10  1#-advance weight next visit  (Pended)   -EK         Exercise 11    Exercise Name 11  Sidelying ER  (Pended)   -EK      Cueing 11  Verbal  (Pended)   -EK      Reps 11  30  (Pended)   -EK      Time 11  1#-advance weight next visit  (Pended)   -EK         Exercise 12    Exercise Name 12  Scap retraction-seated  (Pended)   -EK      Cueing 12  Verbal;Tactile  (Pended)   -EK      Reps 12  10 x 10 sec  (Pended)   -EK         Exercise 13    Exercise Name 13  Pec corner stretch  (Pended)   -EK      Cueing 13  Verbal;Demo  (Pended)   -EK      Reps 13  10 x 10 sec  (Pended)   -EK        User Key  (r) = Recorded By, (t) = Taken By, (c) = Cosigned By    Initials Name Provider Type    EK Patrica Isbell, PT Student PT Student                      Manual Rx (last 36 hours)      Manual Treatments     Row  Name 07/08/19 0840             Total Minutes    80549 - PT Manual Therapy Minutes  20  (Pended)   -EK         Manual Rx 2    Manual Rx 2 Location  Cervicothoracic spine  (Pended)   -EK      Manual Rx 2 Type  CPA's  (Pended)   -EK      Manual Rx 2 Grade  Grade III-IV  (Pended)   -EK      Manual Rx 2 Duration  10 mins  (Pended)   -EK         Manual Rx 4    Manual Rx 4 Location  Left UE- biceps and anterior shoulder  (Pended)   -EK      Manual Rx 4 Type  STM  (Pended)   -EK      Manual Rx 4 Duration  10 mins  (Pended)   -EK        User Key  (r) = Recorded By, (t) = Taken By, (c) = Cosigned By    Initials Name Provider Type    Patrica Corona, PT Student PT Student          PT OP Goals     Row Name 07/08/19 0840          PT Short Term Goals    STG Date to Achieve  06/27/19  (Pended)   -EK     STG 1  Pt will improve strength to 4-/5 shoulder flexion and abduction, and 4/5 external rotation with testing.  (Pended)   -EK     STG 1 Progress  Met  (Pended)   -EK     STG 2  Pt will improve range of motion neck in all planes to 75% with testing.  (Pended)   -EK     STG 2 Progress  Progressing  (Pended)   -EK     STG 3  Pt will decrease pain at worst to 3/10 with activity.  (Pended)   -EK     STG 3 Progress  Progressing  (Pended)  Still can get up to 7/10 pain at times  -EK        Long Term Goals    LTG Date to Achieve  07/11/19  (Pended)   -EK     LTG 1  Pt will improve strength to 4+/5 shoulder flexion, abduction, IR, and ER with testing.  (Pended)   -EK     LTG 2  Pt will improve range of motion neck in all planes to 100% with testing.  (Pended)   -EK     LTG 3  Pt will decrease pain at worst to 0/10 with activity.  (Pended)   -EK       User Key  (r) = Recorded By, (t) = Taken By, (c) = Cosigned By    Initials Name Provider Type    Patrica Corona, PT Student PT Student                         Time Calculation:   Start Time: (P) 0840  Stop Time: (P) 0940  Time Calculation (min): (P) 60 min  Therapy Charges for Today     Code  Description Service Date Service Provider Modifiers Qty    35890322065 HC PT THER PROC EA 15 MIN 7/8/2019 Patrica Isbell, PT Student GP 1    42224332841 HC PT MANUAL THERAPY EA 15 MIN 7/8/2019 Patrica Isbell, PT Student GP 1                    Patrica Isbell, PT Student  7/8/2019

## 2019-07-15 ENCOUNTER — HOSPITAL ENCOUNTER (OUTPATIENT)
Dept: PHYSICAL THERAPY | Facility: HOSPITAL | Age: 79
Setting detail: THERAPIES SERIES
Discharge: HOME | End: 2019-07-15

## 2019-07-15 DIAGNOSIS — M79.602 PAIN OF LEFT UPPER EXTREMITY: Primary | ICD-10-CM

## 2019-07-15 DIAGNOSIS — M54.2 NECK PAIN: ICD-10-CM

## 2019-07-15 PROCEDURE — 97110 THERAPEUTIC EXERCISES: CPT

## 2019-07-15 PROCEDURE — 97140 MANUAL THERAPY 1/> REGIONS: CPT

## 2019-07-15 NOTE — THERAPY TREATMENT NOTE
Outpatient Physical Therapy Ortho Treatment Note  JONATHAN Adler     Patient Name: Nguyen Zhang  : 1940  MRN: [id number]  Today's Date: 7/15/2019      Visit Date: 07/15/2019    Visit Dx:    ICD-10-CM ICD-9-CM   1. Pain of left upper extremity M79.602 729.5   2. Neck pain M54.2 723.1       Patient Active Problem List   Diagnosis   • Precordial chest pain   • PVCs (premature ventricular contractions)   • Essential hypertension   • Acquired hypothyroidism   • Low serum potassium level        Past Medical History:   Diagnosis Date   • Arthritis    • Disease of thyroid gland    • Hyperlipidemia    • Hypertension    • Hyperthyroidism         Past Surgical History:   Procedure Laterality Date   • HYSTERECTOMY     • KNEE SURGERY Right    • SHOULDER SURGERY Right                        PT Assessment/Plan     Row Name 07/15/19 0900          PT Assessment    Assessment Comments  Pt tolerated treatment with mild to moderate complaint of pain with range of motion stretching.  Pt demonstrating painful arc, range of motion was less painful with distraction force, possible impingement.    (Pended)   -EK        PT Plan    PT Plan Comments  Pt is to continue HEP.  Will reassess patient condition next visit.  (Pended)   -EK       User Key  (r) = Recorded By, (t) = Taken By, (c) = Cosigned By    Initials Name Provider Type    Patrica Corona, PT Student PT Student          Modalities     Row Name 07/15/19 0900             Subjective Comments    Subjective Comments  Pt reports that her arm was very sore after last treatment.  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  Pt states she took Tylenol this morning and her shoulder is feeling better this morning prior to treatment.  (Pended)   -EK         Moist Heat    MH Applied  Yes  (Pended)   -EK      Location  Anterior/Posterior left shoulder  (Pended)   -EK      Rx Minutes  10 mins  (Pended)   -EK      MH Prior to Rx  Yes  (Pended)    -EK         ELECTRICAL STIMULATION    Attended/Unattended  Unattended  (Pended)   -EK      Stimulation Type  IFC  (Pended)   -EK      Location/Electrode Placement/Other  L shoulder  (Pended)   -EK       PT E-Stim Unattended (Manual) Minutes  5  (Pended)  only 5 mins due to pt needing to get to another appointment  -EK        User Key  (r) = Recorded By, (t) = Taken By, (c) = Cosigned By    Initials Name Provider Type    EK Patrica Isbell, PT Student PT Student        Exercises     Row Name 07/15/19 0900             Subjective Comments    Subjective Comments  Pt reports that her arm was very sore after last treatment.  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  Pt states she took Tylenol this morning and her shoulder is feeling better this morning prior to treatment.  (Pended)   -EK         Total Minutes    29459 - PT Manual Therapy Minutes  15  (Pended)   -EK         Exercise 1    Exercise Name 1  Levator stretch bilateral  (Pended)   -EK      Cueing 1  Verbal;Demo  (Pended)   -EK      Reps 1  10 x 10 sec  (Pended)   -EK         Exercise 2    Exercise Name 2  Upper trap stretch  bilateral  (Pended)   -EK      Cueing 2  Verbal;Demo  (Pended)   -EK      Reps 2  10 x 10 sec  (Pended)   -EK         Exercise 3    Exercise Name 3  ER vs theraband  (Pended)   -EK      Cueing 3  Verbal;Tactile;Demo  (Pended)   -EK      Reps 3  30  (Pended)   -EK      Time 3  Green  (Pended)   -EK         Exercise 4    Exercise Name 4  IR vs theraband  (Pended)   -EK      Cueing 4  Verbal;Tactile;Demo  (Pended)   -EK      Reps 4  30  (Pended)   -EK      Time 4  Green  (Pended)   -EK         Exercise 5    Exercise Name 5  Rows vs theraband  (Pended)   -EK      Cueing 5  Verbal;Demo;Tactile  (Pended)   -EK      Reps 5  30  (Pended)   -EK      Time 5  Green  (Pended)   -EK         Exercise 6    Exercise Name 6  Scaption up  (Pended)   -EK      Cueing 6  Demo;Tactile;Verbal  (Pended)   -EK      Reps 6   15  (Pended)   -EK      Time 6  1#  (Pended)   -EK         Exercise 7    Exercise Name 7  Scaption down  (Pended)   -EK      Cueing 7  Tactile;Verbal;Demo  (Pended)   -EK      Reps 7  15  (Pended)   -EK      Time 7  1#  (Pended)   -EK         Exercise 9    Exercise Name 9  T's  (Pended)   -EK      Cueing 9  Verbal  (Pended)   -EK      Reps 9  30  (Pended)   -EK      Time 9  1#-advance weight next visit  (Pended)   -EK         Exercise 10    Exercise Name 10  Y's  (Pended)   -EK      Cueing 10  Verbal;Tactile  (Pended)   -EK      Reps 10  30  (Pended)   -EK      Time 10  1#-advance weight next visit  (Pended)   -EK         Exercise 11    Exercise Name 11  Sidelying ER  (Pended)   -EK      Cueing 11  Verbal  (Pended)   -EK      Reps 11  30  (Pended)   -EK      Time 11  1#-advance weight next visit  (Pended)   -EK         Exercise 12    Exercise Name 12  Scap retraction-seated  (Pended)   -EK      Cueing 12  Verbal;Tactile  (Pended)   -EK      Reps 12  10 x 10 sec  (Pended)   -EK         Exercise 13    Exercise Name 13  Pec corner stretch  (Pended)   -EK      Cueing 13  Verbal;Demo  (Pended)   -EK      Reps 13  10 x 10 sec  (Pended)   -EK        User Key  (r) = Recorded By, (t) = Taken By, (c) = Cosigned By    Initials Name Provider Type    Patrica Corona, PT Student PT Student                      Manual Rx (last 36 hours)      Manual Treatments     Row Name 07/15/19 0900             Total Minutes    44973 - PT Manual Therapy Minutes  15  (Pended)   -EK         Manual Rx 3    Manual Rx 3 Location  Cervical spine; Spinous process/transverse process  (Pended)   -EK      Manual Rx 3 Type  Side glides and rotation mobilizations  (Pended)   -EK      Manual Rx 3 Grade  grade II-IV  (Pended)   -EK      Manual Rx 3 Duration  5 mins  (Pended)   -EK         Manual Rx 5    Manual Rx 5 Location  L shoulder  (Pended)   -EK      Manual Rx 5 Type  PROM flex, abd, ER, IR  (Pended)   -EK      Manual Rx 5 Duration  10 mins  (Pended)    -EK        User Key  (r) = Recorded By, (t) = Taken By, (c) = Cosigned By    Initials Name Provider Type    EK Patrica Isbell, PT Student PT Student          PT OP Goals     Row Name 07/15/19 0900          PT Short Term Goals    STG Date to Achieve  06/27/19  (Pended)   -EK     STG 1  Pt will improve strength to 4-/5 shoulder flexion and abduction, and 4/5 external rotation with testing.  (Pended)   -EK     STG 1 Progress  Met  (Pended)   -EK     STG 2  Pt will improve range of motion neck in all planes to 75% with testing.  (Pended)   -EK     STG 2 Progress  Progressing  (Pended)   -EK     STG 3  Pt will decrease pain at worst to 3/10 with activity.  (Pended)   -EK     STG 3 Progress  Progressing  (Pended)  Still can get up to 7/10 pain at times  -EK        Long Term Goals    LTG Date to Achieve  07/11/19  (Pended)   -EK     LTG 1  Pt will improve strength to 4+/5 shoulder flexion, abduction, IR, and ER with testing.  (Pended)   -EK     LTG 1 Progress  Progressing  (Pended)   -EK     LTG 2  Pt will improve range of motion neck in all planes to 100% with testing.  (Pended)   -EK     LTG 2 Progress  Progressing  (Pended)   -EK     LTG 3  Pt will decrease pain at worst to 0/10 with activity.  (Pended)   -EK     LTG 3 Progress  Progressing  (Pended)   -EK       User Key  (r) = Recorded By, (t) = Taken By, (c) = Cosigned By    Initials Name Provider Type    Patrica Corona, PT Student PT Student                         Time Calculation:   Start Time: (P) 0900  Stop Time: (P) 1005  Time Calculation (min): (P) 65 min  Therapy Charges for Today     Code Description Service Date Service Provider Modifiers Qty    53084104173 HC PT THER PROC EA 15 MIN 7/15/2019 Patrica Isbell, PT Student GP 1    [id number] HC PT MANUAL THERAPY EA 15 MIN 7/15/2019 Patrica Isbell, PT Student GP 1                    Patrica Isbell PT Student  7/15/2019

## 2019-07-23 ENCOUNTER — HOSPITAL ENCOUNTER (OUTPATIENT)
Dept: PHYSICAL THERAPY | Facility: HOSPITAL | Age: 79
Setting detail: THERAPIES SERIES
Discharge: HOME | End: 2019-07-23

## 2019-07-23 DIAGNOSIS — M54.2 NECK PAIN: ICD-10-CM

## 2019-07-23 DIAGNOSIS — M79.602 PAIN OF LEFT UPPER EXTREMITY: Primary | ICD-10-CM

## 2019-07-23 PROCEDURE — 97110 THERAPEUTIC EXERCISES: CPT

## 2019-07-23 NOTE — THERAPY DISCHARGE NOTE
Outpatient Physical Therapy Ortho Treatment Note/Discharge Summary  [facility]     Patient Name: Nguyen Zhang  : 1940  MRN: [id number]  Today's Date: 2019      Visit Date: 2019    Visit Dx:    ICD-10-CM ICD-9-CM   1. Pain of left upper extremity M79.602 729.5   2. Neck pain M54.2 723.1       Patient Active Problem List   Diagnosis   • Precordial chest pain   • PVCs (premature ventricular contractions)   • Essential hypertension   • Acquired hypothyroidism   • Low serum potassium level        Past Medical History:   Diagnosis Date   • Arthritis    • Disease of thyroid gland    • Hyperlipidemia    • Hypertension    • Hyperthyroidism         Past Surgical History:   Procedure Laterality Date   • HYSTERECTOMY     • KNEE SURGERY Right    • SHOULDER SURGERY Right        PT Ortho     Row Name 19 0900       Subjective Comments    Subjective Comments  Pt reports that her shoulder hasn't been hurting when she's doing activity, it only hurts after activity when she's resting.  Pt wants this treatment to be her last because she knows it will take time to improve so she'll do her exercises at home.  (Pended)   -EK       Subjective Pain    Subjective Pain Comment  0/10 with activity, at worst 5/10 after activity  (Pended)   -EK       Head/Neck/Trunk    Neck Extension AROM  57 degrees  (Pended)   -EK    Neck Flexion AROM  55 degrees  (Pended)   -EK    Neck Lt Lateral Flexion AROM  23 degrees  (Pended)   -EK    Neck Rt Lateral Flexion AROM  23 degrees  (Pended)   -EK    Neck Lt Rotation AROM  54 degrees  (Pended)   -EK    Neck Rt Rotation AROM  47 degrees  (Pended)   -EK       Left Upper Ext    Lt Shoulder Abduction AROM  83 degrees  (Pended)  seated  -EK    Lt Shoulder Flexion AROM  161 degrees  (Pended)  seated  -EK    Lt Shoulder External Rotation AROM  85 degrees  (Pended)   -EK    Lt Shoulder Internal Rotation AROM  81 degrees  (Pended)   -EK       MMT Left Upper Ext    Lt Shoulder Flexion  MMT, Gross Movement  (3/5) fair  (Pended)  very painful  -EK    Lt Shoulder ABduction MMT, Gross Movement  (3/5) fair  (Pended)  very painful  -EK    Lt Shoulder Internal Rotation MMT, Gross Movement  (4+/5) good plus  (Pended)   -EK    Lt Shoulder External Rotation MMT, Gross Movement  (4-/5) good minus  (Pended)   -EK      User Key  (r) = Recorded By, (t) = Taken By, (c) = Cosigned By    Initials Name Provider Type    Patrica Corona, PT Student PT Student                      PT Assessment/Plan     Row Name 07/23/19 0900          PT Assessment    Assessment Comments  Pt demonstrated increase in cervical ROM and some shoulder ROM.  Pt not showing improvement with strength.  Pt wishes to be discharged, feels that she can improve with only home exercise program.  Pt has made progress with range of motion and met some of her goals, pt would benefit from continued skilled therapy.  (Pended)   -EK        PT Plan    PT Plan Comments  Pt is to continue her HEP daily.  (Pended)   -EK       User Key  (r) = Recorded By, (t) = Taken By, (c) = Cosigned By    Initials Name Provider Type    Patrica Corona, PT Student PT Student              Exercises     Row Name 07/23/19 0900             Subjective Comments    Subjective Comments  Pt reports that her shoulder hasn't been hurting when she's doing activity, it only hurts after activity when she's resting.  Pt wants this treatment to be her last because she knows it will take time to improve so she'll do her exercises at home.  (Pended)   -EK         Subjective Pain    Subjective Pain Comment  0/10 with activity, at worst 5/10 after activity  (Pended)   -EK         Total Minutes    01125 - PT Manual Therapy Minutes  10  (Pended)   -EK         Exercise 1    Exercise Name 1  Levator stretch bilateral  (Pended)   -EK      Cueing 1  Verbal;Demo  (Pended)   -EK      Reps 1  10 x 10 sec  (Pended)   -EK         Exercise 2    Exercise Name 2  Upper trap stretch  bilateral  (Pended)    -EK      Cueing 2  Verbal;Demo  (Pended)   -EK      Reps 2  10 x 10 sec  (Pended)   -EK         Exercise 3    Exercise Name 3  ER vs theraband  (Pended)   -EK      Cueing 3  Verbal;Tactile;Demo  (Pended)   -EK      Reps 3  30  (Pended)   -EK      Time 3  Green  (Pended)   -EK         Exercise 4    Exercise Name 4  IR vs theraband  (Pended)   -EK      Cueing 4  Verbal;Tactile;Demo  (Pended)   -EK      Reps 4  30  (Pended)   -EK      Time 4  Green  (Pended)   -EK         Exercise 5    Exercise Name 5  Rows vs theraband  (Pended)   -EK      Cueing 5  Verbal;Demo;Tactile  (Pended)   -EK      Reps 5  30  (Pended)   -EK      Time 5  Green  (Pended)   -EK         Exercise 6    Exercise Name 6  Scaption up  (Pended)   -EK      Cueing 6  Demo;Tactile;Verbal  (Pended)   -EK      Reps 6  15  (Pended)   -EK      Time 6  1#  (Pended)   -EK         Exercise 7    Exercise Name 7  Scaption down  (Pended)   -EK      Cueing 7  Tactile;Verbal;Demo  (Pended)   -EK      Reps 7  15  (Pended)   -EK      Time 7  1#  (Pended)   -EK         Exercise 9    Exercise Name 9  T's  (Pended)   -EK      Cueing 9  Verbal  (Pended)   -EK      Reps 9  30  (Pended)   -EK      Time 9  1#-advance weight next visit  (Pended)   -EK         Exercise 10    Exercise Name 10  Y's  (Pended)   -EK      Cueing 10  Verbal;Tactile  (Pended)   -EK      Reps 10  30  (Pended)   -EK      Time 10  1#-advance weight next visit  (Pended)   -EK         Exercise 11    Exercise Name 11  Sidelying ER  (Pended)   -EK      Cueing 11  Verbal  (Pended)   -EK      Reps 11  30  (Pended)   -EK      Time 11  1#-advance weight next visit  (Pended)   -EK         Exercise 12    Exercise Name 12  Scap retraction-seated  (Pended)   -EK      Cueing 12  Verbal;Tactile  (Pended)   -EK      Reps 12  10 x 10 sec  (Pended)   -EK         Exercise 13    Exercise Name 13  Pec corner stretch  (Pended)   -EK      Cueing 13  Verbal;Demo  (Pended)   -EK      Reps 13  10 x 10 sec  (Pended)   -EK         User Key  (r) = Recorded By, (t) = Taken By, (c) = Cosigned By    Initials Name Provider Type    EK Patrica Isbell, PT Student PT Student                        Manual Rx (last 36 hours)      Manual Treatments     Row Name 07/23/19 0900             Total Minutes    07418 - PT Manual Therapy Minutes  10  (Pended)   -EK         Manual Rx 5    Manual Rx 5 Location  L shoulder  (Pended)   -EK      Manual Rx 5 Type  PROM flex, abd, ER, IR  (Pended)   -EK      Manual Rx 5 Duration  10 mins  (Pended)   -EK        User Key  (r) = Recorded By, (t) = Taken By, (c) = Cosigned By    Initials Name Provider Type    EK Patrica Isbell, PT Student PT Student          PT OP Goals     Row Name 07/23/19 0900          PT Short Term Goals    STG Date to Achieve  06/27/19  (Pended)   -EK     STG 1  Pt will improve strength to 4-/5 shoulder flexion and abduction, and 4/5 external rotation with testing.  (Pended)   -EK     STG 1 Progress  Met  (Pended)   -EK     STG 2  Pt will improve range of motion neck in all planes to 75% with testing.  (Pended)   -EK     STG 2 Progress  Met  (Pended)   -EK     STG 3  Pt will decrease pain at worst to 3/10 with activity.  (Pended)   -EK     STG 3 Progress  Met  (Pended)  Still can get up to 7/10 pain at times  -EK        Long Term Goals    LTG Date to Achieve  07/11/19  (Pended)   -EK     LTG 1  Pt will improve strength to 4+/5 shoulder flexion, abduction, IR, and ER with testing.  (Pended)   -EK     LTG 1 Progress  Not Met  (Pended)   -EK     LTG 2  Pt will improve range of motion neck in all planes to 100% with testing.  (Pended)   -EK     LTG 2 Progress  Progressing  (Pended)   -EK     LTG 3  Pt will decrease pain at worst to 0/10 with activity.  (Pended)   -EK     LTG 3 Progress  Met  (Pended)   -EK       User Key  (r) = Recorded By, (t) = Taken By, (c) = Cosigned By    Initials Name Provider Type    EK Patrica Isbell, PT Student PT Student                         Time Calculation:   Start Time: (P)  0900  Stop Time: (P) 1000  Time Calculation (min): (P) 60 min  Therapy Charges for Today     Code Description Service Date Service Provider Modifiers Qty    11629156123 HC PT THER PROC EA 15 MIN 7/23/2019 Patrica Isbell, PT Student GP 1    29412563589 HC PT THER PROC EA 15 MIN 7/23/2019 Patrica Isbell, PT Student GP 1                OP PT Discharge Summary  Date of Discharge: (P) 07/23/19  Reason for Discharge: (P) Patient/Caregiver request  Outcomes Achieved: (P) Patient able to partially acheive established goals  Discharge Destination: (P) Home with home program      Patrica Isbell, PT Student  7/23/2019

## 2019-07-30 ENCOUNTER — HOSPITAL ENCOUNTER (OUTPATIENT)
Dept: MAMMOGRAPHY | Facility: HOSPITAL | Age: 79
Discharge: HOME | End: 2019-07-30
Admitting: INTERNAL MEDICINE

## 2019-07-30 DIAGNOSIS — Z12.39 SCREENING BREAST EXAMINATION: ICD-10-CM

## 2019-07-30 PROCEDURE — 77063 BREAST TOMOSYNTHESIS BI: CPT

## 2019-07-30 PROCEDURE — 77067 SCR MAMMO BI INCL CAD: CPT

## 2019-08-13 ENCOUNTER — OFFICE VISIT (OUTPATIENT)
Dept: FAMILY MEDICINE CLINIC | Facility: CLINIC | Age: 79
End: 2019-08-13

## 2019-08-13 VITALS
WEIGHT: 132 LBS | TEMPERATURE: 97.4 F | OXYGEN SATURATION: 97 % | BODY MASS INDEX: 24.92 KG/M2 | SYSTOLIC BLOOD PRESSURE: 134 MMHG | RESPIRATION RATE: 14 BRPM | HEART RATE: 61 BPM | HEIGHT: 61 IN | DIASTOLIC BLOOD PRESSURE: 80 MMHG

## 2019-08-13 DIAGNOSIS — E78.49 OTHER HYPERLIPIDEMIA: ICD-10-CM

## 2019-08-13 DIAGNOSIS — I10 ESSENTIAL HYPERTENSION: Primary | ICD-10-CM

## 2019-08-13 DIAGNOSIS — E87.6 LOW SERUM POTASSIUM LEVEL: ICD-10-CM

## 2019-08-13 DIAGNOSIS — E03.9 ACQUIRED HYPOTHYROIDISM: ICD-10-CM

## 2019-08-13 PROBLEM — M25.512 ACUTE PAIN OF LEFT SHOULDER: Status: ACTIVE | Noted: 2019-08-13

## 2019-08-13 PROCEDURE — 99214 OFFICE O/P EST MOD 30 MIN: CPT | Performed by: INTERNAL MEDICINE

## 2019-08-13 NOTE — PROGRESS NOTES
Subsequent Medicare Wellness Visit   The ABC's of the Annual Wellness Visit    Chief Complaint   Patient presents with   • Hyperlipidemia   • Hypertension   • Hypothyroidism       HPI:  Nguyen Zhang, -1940, is a 79 y.o. female who presents for a Subsequent Medicare Wellness Visit.  She is also here for hypertension, hyperlipidemia and thyroid.  Blood pressure medications were adjusted last visit.  Had labs done last visits.  Denies any chest pain or shortness of breath.  Blood pressure under control.  Still has a left shoulder and arm pain.  No numbness.  She has not seen Dr. Lazo.,  Going to physical therapy that is helping    Recent Hospitalizations:  No hospitalization(s) within the last year..    Current Medical Providers:  Patient Care Team:  Chelsie Rai MD as PCP - General (Internal Medicine)    Health Habits and Functional and Cognitive Screening and Depression Screening:  Functional & Cognitive Status 2019   Do you have difficulty preparing food and eating? No   Do you have difficulty bathing yourself, getting dressed or grooming yourself? No   Do you have difficulty using the toilet? No   Do you have difficulty moving around from place to place? No   Do you have trouble with steps or getting out of a bed or a chair? No   Current Diet Well Balanced Diet   Dental Exam Up to date   Eye Exam Up to date   Exercise (times per week) 5 times per week   Current Exercise Activities Include Aerobics   Do you need help using the phone?  No   Are you deaf or do you have serious difficulty hearing?  No   Do you need help with transportation? No   Do you need help shopping? No   Do you need help preparing meals?  No   Do you need help with housework?  No   Do you need help with laundry? No   Do you need help taking your medications? No   Do you need help managing money? No   Do you ever drive or ride in a car without wearing a seat belt? No   Have you felt unusual stress, anger or loneliness in  the last month? No   Who do you live with? Spouse   If you need help, do you have trouble finding someone available to you? No   Do you have difficulty concentrating, remembering or making decisions? No       Compared to one year ago, the patient feels her physical health is the same and her mental health is the same.    Depression Screen:  PHQ-2/PHQ-9 Depression Screening 8/13/2019   Little interest or pleasure in doing things 0   Feeling down, depressed, or hopeless 0   Trouble falling or staying asleep, or sleeping too much 0   Feeling tired or having little energy 0   Poor appetite or overeating 0   Feeling bad about yourself - or that you are a failure or have let yourself or your family down 0   Moving or speaking so slowly that other people could have noticed. Or the opposite - being so fidgety or restless that you have been moving around a lot more than usual 0   Thoughts that you would be better off dead, or of hurting yourself in some way 0   Total Score 0   If you checked off any problems, how difficult have these problems made it for you to do your work, take care of things at home, or get along with other people? Not difficult at all         Past Medical/Family/Social History:  The following portions of the patient's history were reviewed and updated as appropriate: allergies, current medications, past family history, past medical history, past social history, past surgical history and problem list.    Allergies   Allergen Reactions   • Ace Inhibitors Cough   • Codeine Nausea And Vomiting         Current Outpatient Medications:   •  Acetaminophen (TYLENOL ARTHRITIS PAIN PO), Take 500 mg by mouth 2 (Two) Times a Day., Disp: , Rfl:   •  amLODIPine (NORVASC) 10 MG tablet, Take 0.5 tablets by mouth Every Evening., Disp: 90 tablet, Rfl: 1  •  atenolol (TENORMIN) 50 MG tablet, TAKE 1 TABLET BY MOUTH 2 TIMES A DAY., Disp: 60 tablet, Rfl: 5  •  Bioflavonoid Products (DOROTEO C PO), Take 500 mg by mouth Daily.,  Disp: , Rfl:   •  Cholecalciferol (VITAMIN D3) 1000 units capsule, Take 2 capsules by mouth Daily., Disp: , Rfl:   •  hydrochlorothiazide (HYDRODIURIL) 25 MG tablet, TAKE 1 TABLET BY MOUTH DAILY., Disp: 30 tablet, Rfl: 5  •  levothyroxine (SYNTHROID) 75 MCG tablet, Take 1 tablet by mouth Daily., Disp: 90 tablet, Rfl: 1  •  losartan (COZAAR) 100 MG tablet, TAKE 1 TABLET BY MOUTH DAILY., Disp: 90 tablet, Rfl: 2  •  multivitamin-minerals (CENTRUM) tablet, Take 1 tablet by mouth Daily., Disp: , Rfl:   •  NIACIN PO, Take 500 mg by mouth Daily., Disp: , Rfl:   •  potassium chloride (K-DUR,KLOR-CON) 10 MEQ CR tablet, Take 1 tablet by mouth Daily., Disp: 90 tablet, Rfl: 1  •  vitamin E 400 UNIT capsule, Take 400 Units by mouth Daily., Disp: , Rfl:     Aspirin use counseling: Start ASA 81 mg daily     Current medication list contains no high risk medications.  No harmful drug interactions have been identified.     Family History   Problem Relation Age of Onset   • Heart attack Father    • Breast cancer Neg Hx        Social History     Tobacco Use   • Smoking status: Never Smoker   • Smokeless tobacco: Never Used   Substance Use Topics   • Alcohol use: No     Frequency: Never       Past Surgical History:   Procedure Laterality Date   • HYSTERECTOMY     • KNEE SURGERY Right    • SHOULDER SURGERY Right        Patient Active Problem List   Diagnosis   • Precordial chest pain   • PVCs (premature ventricular contractions)   • Essential hypertension   • Acquired hypothyroidism   • Low serum potassium level   • Other hyperlipidemia   • Acute pain of left shoulder       Review of Systems   Constitutional: Negative for activity change and unexpected weight change.   HENT: Negative for congestion.    Respiratory: Negative for shortness of breath and wheezing.    Cardiovascular: Negative for chest pain and palpitations.   Gastrointestinal: Negative for abdominal pain, blood in stool and constipation.   Genitourinary: Negative for  "difficulty urinating and hematuria.   Musculoskeletal: Positive for arthralgias. Negative for gait problem.        Left shoulder and arm pain no numbness.   Skin: Negative for color change and rash.   Neurological: Negative for dizziness, facial asymmetry and headaches.   Psychiatric/Behavioral: Negative for confusion, dysphoric mood and hallucinations.       Objective     Vitals:    08/13/19 1005   BP: 134/80   BP Location: Right arm   Patient Position: Sitting   Cuff Size: Adult   Pulse: 61   Resp: 14   Temp: 97.4 °F (36.3 °C)   TempSrc: Oral   SpO2: 97%   Weight: 59.9 kg (132 lb)   Height: 154.9 cm (61\")       Patient's Body mass index is 24.94 kg/m². BMI is within normal parameters. No follow-up required..      No exam data present    The patient has no evidence of cognitve impairment.  The patient has no evidence of cognitive impairment. 3/3 items were correctly remembered at 5 minutes.     Physical Exam   Constitutional: She is oriented to person, place, and time. She appears well-developed and well-nourished.   Eyes: EOM are normal. Pupils are equal, round, and reactive to light.   Neck: Normal range of motion. Neck supple. No JVD present. No tracheal deviation present. No thyromegaly present.   Cardiovascular: Normal rate, regular rhythm and normal heart sounds. Exam reveals no friction rub.   No murmur heard.  Pulmonary/Chest: Effort normal and breath sounds normal. No stridor. No respiratory distress. She has no wheezes.   Abdominal: Soft. Bowel sounds are normal. She exhibits no distension. There is no tenderness. There is no guarding.   Musculoskeletal:   Left shoulder tender, decreased range of motion.  Neurovascular intact.   Neurological: She is alert and oriented to person, place, and time. She displays normal reflexes. No cranial nerve deficit. She exhibits normal muscle tone. Coordination normal.   Psychiatric: She has a normal mood and affect. Her behavior is normal.       Recent Lab Results:  Lab " Results   Component Value Date    GLU 95 06/05/2019     Lab Results   Component Value Date    CHOL 218 (H) 05/14/2019    TRIG 160 (H) 06/05/2019    TRIG 168 (H) 06/05/2019    HDL 56 06/05/2019    HDL 56 06/05/2019    VLDL 32 06/05/2019    VLDL 33.6 06/05/2019    LDLHDL 2.58 05/14/2019       Assessment/Plan   Age-appropriate Screening Schedule:  Refer to the list below for future screening recommendations based on patient's age, sex and/or medical conditions.      Health Maintenance   Topic Date Due   • TDAP/TD VACCINES (1 - Tdap) 02/18/1960   • INFLUENZA VACCINE  08/01/2019   • ZOSTER VACCINE (1 of 2) 08/13/2019 (Originally 2/18/1991)   • LIPID PANEL  06/05/2020   • PNEUMOCOCCAL VACCINES (65+ LOW/MEDIUM RISK)  Discontinued       Medicare Risks and Personalized Health Plan:  Advance Directive Discussion      CMS-Preventive Services Quick Reference  Medicare Preventive Services Addressed:  Colorectal Cancer Screening, Colonoscopy    Advance Care Planning:  Patient does not have an advance directive - information provided to the patient today    Diagnoses and all orders for this visit:    1. Essential hypertension (Primary)  -     Basic Metabolic Panel    2. Acquired hypothyroidism  -     Basic Metabolic Panel    3. Low serum potassium level  -     Basic Metabolic Panel    4. Other hyperlipidemia        An After Visit Summary and PPPS with all of these plans were given to the patient.      Follow Up:  Return in about 3 months (around 11/13/2019).      Continue diet and exercise.  Continue current medications.  Check blood pressure at home.  Continue physical therapy.  Request patient to keep appointment with Dr. Lazo may get the relief from cortisone shot.  Discussed with patient fall precaution, keep path with lighted and clear out for objects.  Get mammogram every year, colonoscopy every 10 years flu shot every year, pneumonia shot every 10 years return in 3 months

## 2019-08-21 DIAGNOSIS — I10 ESSENTIAL HYPERTENSION: Primary | ICD-10-CM

## 2019-08-21 DIAGNOSIS — E78.49 OTHER HYPERLIPIDEMIA: ICD-10-CM

## 2019-08-21 DIAGNOSIS — E87.6 LOW SERUM POTASSIUM LEVEL: ICD-10-CM

## 2019-08-23 ENCOUNTER — LAB (OUTPATIENT)
Dept: LAB | Facility: HOSPITAL | Age: 79
End: 2019-08-23

## 2019-08-23 DIAGNOSIS — I10 ESSENTIAL HYPERTENSION: ICD-10-CM

## 2019-08-23 DIAGNOSIS — E87.6 LOW SERUM POTASSIUM LEVEL: ICD-10-CM

## 2019-08-23 DIAGNOSIS — E78.49 OTHER HYPERLIPIDEMIA: ICD-10-CM

## 2019-08-23 LAB
ANION GAP SERPL CALCULATED.3IONS-SCNC: 15.2 MMOL/L (ref 5–15)
BUN BLD-MCNC: 13 MG/DL (ref 8–23)
BUN/CREAT SERPL: 13.5 (ref 7–25)
CALCIUM SPEC-SCNC: 9.9 MG/DL (ref 8.6–10.5)
CHLORIDE SERPL-SCNC: 91 MMOL/L (ref 98–107)
CO2 SERPL-SCNC: 27.8 MMOL/L (ref 22–29)
CREAT BLD-MCNC: 0.96 MG/DL (ref 0.57–1)
GFR SERPL CREATININE-BSD FRML MDRD: 56 ML/MIN/1.73
GLUCOSE BLD-MCNC: 95 MG/DL (ref 65–99)
POTASSIUM BLD-SCNC: 3.3 MMOL/L (ref 3.5–5.2)
SODIUM BLD-SCNC: 134 MMOL/L (ref 136–145)

## 2019-08-23 PROCEDURE — 36415 COLL VENOUS BLD VENIPUNCTURE: CPT

## 2019-08-23 PROCEDURE — 80048 BASIC METABOLIC PNL TOTAL CA: CPT

## 2019-09-11 ENCOUNTER — OFFICE VISIT (OUTPATIENT)
Dept: ORTHOPEDIC SURGERY | Facility: CLINIC | Age: 79
End: 2019-09-11

## 2019-09-11 VITALS — BODY MASS INDEX: 24.92 KG/M2 | HEIGHT: 61 IN | WEIGHT: 132 LBS

## 2019-09-11 DIAGNOSIS — M47.22 OSTEOARTHRITIS OF SPINE WITH RADICULOPATHY, CERVICAL REGION: ICD-10-CM

## 2019-09-11 DIAGNOSIS — M75.50 SUBACROMIAL BURSITIS: ICD-10-CM

## 2019-09-11 DIAGNOSIS — R52 PAIN: Primary | ICD-10-CM

## 2019-09-11 PROCEDURE — 73030 X-RAY EXAM OF SHOULDER: CPT | Performed by: ORTHOPAEDIC SURGERY

## 2019-09-11 PROCEDURE — 99214 OFFICE O/P EST MOD 30 MIN: CPT | Performed by: ORTHOPAEDIC SURGERY

## 2019-09-11 NOTE — PROGRESS NOTES
Subjective: Left arm pain     Patient ID: Nguyen Zhang is a 79 y.o. female.    Chief Complaint:    History of Present Illness  79-year old female known to me seen in January for left shoulder pain now presents with left arm pain.  She now has pain and numbness extending into her hand involving the ulnar digits of the hand.  No new history of trauma.  Was seen in the ER here and x-rays of her cervical spine showed significant arthritic changes at C5-6 and 6 7.  Is been in physical therapy but has not shown significant improvement.  She is taking only one aspirin and Tylenol for pain does apparently have some renal disease pending nonsteroidal anti-inflammatories.       Social History     Occupational History   • Not on file   Tobacco Use   • Smoking status: Never Smoker   • Smokeless tobacco: Never Used   Substance and Sexual Activity   • Alcohol use: No     Frequency: Never   • Drug use: No   • Sexual activity: Defer      Review of Systems   Constitutional: Negative for chills, diaphoresis, fever and unexpected weight change.   HENT: Negative for hearing loss, nosebleeds, sore throat and tinnitus.    Eyes: Negative for pain and visual disturbance.   Respiratory: Negative for cough, shortness of breath and wheezing.    Cardiovascular: Negative for chest pain and palpitations.   Gastrointestinal: Negative for abdominal pain, diarrhea, nausea and vomiting.   Endocrine: Negative for cold intolerance, heat intolerance and polydipsia.   Genitourinary: Negative for difficulty urinating, dysuria and hematuria.   Musculoskeletal: Positive for arthralgias, myalgias, neck pain and neck stiffness. Negative for joint swelling.   Skin: Negative for rash and wound.   Allergic/Immunologic: Negative for environmental allergies.   Neurological: Negative for dizziness, syncope and numbness.   Hematological: Does not bruise/bleed easily.   Psychiatric/Behavioral: Negative for dysphoric mood and sleep disturbance. The patient is  not nervous/anxious.          Past Medical History:   Diagnosis Date   • Arthritis    • Disease of thyroid gland    • Hyperlipidemia    • Hypertension    • Hyperthyroidism      Past Surgical History:   Procedure Laterality Date   • HYSTERECTOMY     • KNEE SURGERY Right    • SHOULDER SURGERY Right      Family History   Problem Relation Age of Onset   • Heart attack Father    • Breast cancer Neg Hx          Objective:  There were no vitals filed for this visit.      09/11/19  1347   Weight: 59.9 kg (132 lb)     Body mass index is 24.94 kg/m².        Ortho Exam   Review of the C-spine x-rays done at the hospital AP and lateral show significant arthritic changes at C5-6 and 6 7.  AP lateral outlet view of her left shoulder done in the office today shows no acute or chronic changes.  No prior x-rays available for comparison.  She is alert and oriented x3.  She has no motor deficits right radial ulnar median nerve function in the left upper extremity she does have sensory loss as far as little ringing in the long finger.  She has good capillary refill.  She has full range of motion of the elbow.  She can abduct and extend about 170 degrees and abduction extension and 90 degrees is intact with minimal to no pain.  Moderate positive Munoz sign.  External rotation is 40 degrees internal rotation is 45 degrees.  Biceps function is 5/5 as his deltoid function.  She has minimal to no pain with flexion extension of the neck with minimal pain with Spurling's.  There is pain with lateral rotation on both the right and left side.  Skin is cool to touch good capillary refill no erythema noted.  Takes aspirin without any GI side effect he takes no other medication on a regular basis.  Pain is increasing to begin any further activity daily living.  He has no problem lifting her arms overhead and doing overhead activity but again has recurrent radicular pain.    Assessment:        1. Pain    2. Osteoarthritis of spine with  radiculopathy, cervical region    3. Subacromial bursitis           Plan: Over 15 minutes was spent reviewing the patient's x-rays her history and physical exam.  Based on her x-rays of her shoulder which were done here in the office and based on her x-rays done at the hospital I believe the problem is coming from her neck.  Before proceeding with further treatment I want to get an MRI of the neck to fully evaluate for cervical impingement.  Answered all questions patient was in agreement.  Return to see me after the MRIs been completed.            Work Status:    LOUISA query complete.    Orders:  Orders Placed This Encounter   Procedures   • XR Shoulder 2+ View Left   • MRI Cervical Spine Without Contrast       Medications:  No orders of the defined types were placed in this encounter.      Followup:  Return in about 2 weeks (around 9/25/2019).          Dictated utilizing Dragon dictation

## 2019-09-19 ENCOUNTER — HOSPITAL ENCOUNTER (OUTPATIENT)
Dept: MRI IMAGING | Facility: HOSPITAL | Age: 79
Discharge: HOME | End: 2019-09-19
Admitting: ORTHOPAEDIC SURGERY

## 2019-09-19 DIAGNOSIS — R52 PAIN: ICD-10-CM

## 2019-09-19 DIAGNOSIS — M47.22 OSTEOARTHRITIS OF SPINE WITH RADICULOPATHY, CERVICAL REGION: ICD-10-CM

## 2019-09-19 PROCEDURE — 72141 MRI NECK SPINE W/O DYE: CPT

## 2019-09-23 ENCOUNTER — OFFICE VISIT (OUTPATIENT)
Dept: ORTHOPEDIC SURGERY | Facility: CLINIC | Age: 79
End: 2019-09-23

## 2019-09-23 VITALS — BODY MASS INDEX: 24.92 KG/M2 | HEIGHT: 61 IN | WEIGHT: 132 LBS

## 2019-09-23 DIAGNOSIS — M47.22 OSTEOARTHRITIS OF SPINE WITH RADICULOPATHY, CERVICAL REGION: Primary | ICD-10-CM

## 2019-09-23 DIAGNOSIS — R52 PAIN: ICD-10-CM

## 2019-09-23 PROCEDURE — 99212 OFFICE O/P EST SF 10 MIN: CPT | Performed by: ORTHOPAEDIC SURGERY

## 2019-09-23 NOTE — PROGRESS NOTES
Subjective: Cervical pain with left radiculopathy     Patient ID: Nguyen Zhang is a 79 y.o. female.    Chief Complaint:    History of Present Illness patient returns with results the MRI of the neck which are personally reviewed the images and report which shows multiple disc involvement C4-5, C5-6, and C6-7 with disc protrusion and foraminal stenosis.       Social History     Occupational History   • Not on file   Tobacco Use   • Smoking status: Never Smoker   • Smokeless tobacco: Never Used   Substance and Sexual Activity   • Alcohol use: No     Frequency: Never   • Drug use: No   • Sexual activity: Defer      Review of Systems   Constitutional: Negative for chills, diaphoresis, fever and unexpected weight change.   HENT: Negative for hearing loss, nosebleeds, sore throat and tinnitus.    Eyes: Negative for pain and visual disturbance.   Respiratory: Negative for cough, shortness of breath and wheezing.    Cardiovascular: Negative for chest pain and palpitations.   Gastrointestinal: Negative for abdominal pain, diarrhea, nausea and vomiting.   Endocrine: Negative for cold intolerance, heat intolerance and polydipsia.   Genitourinary: Negative for difficulty urinating, dysuria and hematuria.   Musculoskeletal: Positive for arthralgias, myalgias and neck pain. Negative for joint swelling.   Skin: Negative for rash and wound.   Allergic/Immunologic: Negative for environmental allergies.   Neurological: Positive for numbness. Negative for dizziness and syncope.   Hematological: Does not bruise/bleed easily.   Psychiatric/Behavioral: Negative for dysphoric mood and sleep disturbance. The patient is not nervous/anxious.          Past Medical History:   Diagnosis Date   • Arthritis    • Disease of thyroid gland    • Hyperlipidemia    • Hypertension    • Hyperthyroidism      Past Surgical History:   Procedure Laterality Date   • HYSTERECTOMY     • KNEE SURGERY Right    • SHOULDER SURGERY Right      Family History    Problem Relation Age of Onset   • Heart attack Father    • Breast cancer Neg Hx          Objective:  There were no vitals filed for this visit.      09/23/19  1511   Weight: 59.9 kg (132 lb)     Body mass index is 24.94 kg/m².        Ortho Exam   Patient persistent having symptoms of the left arm with pain discomfort and paresthesia.    Assessment:        1. Osteoarthritis of spine with radiculopathy, cervical region    2. Pain           Plan:  Reviewed the results of the MRI of the patient.  We will set her up for series of cervical lumbar blocks.  Return to see me after completion of the blocks          Work Status:    LOUISA query complete.    Orders:  Orders Placed This Encounter   Procedures   • Ambulatory Referral to Pain Management       Medications:  No orders of the defined types were placed in this encounter.      Followup:  Return in about 6 weeks (around 11/4/2019).          Dictated utilizing Dragon dictation

## 2019-10-07 ENCOUNTER — HOSPITAL ENCOUNTER (OUTPATIENT)
Dept: PAIN MEDICINE | Facility: HOSPITAL | Age: 79
Discharge: HOME | End: 2019-10-07
Admitting: ANESTHESIOLOGY

## 2019-10-07 ENCOUNTER — HOSPITAL ENCOUNTER (OUTPATIENT)
Dept: GENERAL RADIOLOGY | Facility: HOSPITAL | Age: 79
Discharge: HOME | End: 2019-10-07

## 2019-10-07 ENCOUNTER — ANESTHESIA (OUTPATIENT)
Dept: PAIN MEDICINE | Facility: HOSPITAL | Age: 79
End: 2019-10-07

## 2019-10-07 ENCOUNTER — ANESTHESIA EVENT (OUTPATIENT)
Dept: PAIN MEDICINE | Facility: HOSPITAL | Age: 79
End: 2019-10-07

## 2019-10-07 VITALS
RESPIRATION RATE: 16 BRPM | HEART RATE: 67 BPM | TEMPERATURE: 97.9 F | SYSTOLIC BLOOD PRESSURE: 181 MMHG | DIASTOLIC BLOOD PRESSURE: 102 MMHG | OXYGEN SATURATION: 99 % | BODY MASS INDEX: 23.92 KG/M2 | WEIGHT: 130 LBS | HEIGHT: 62 IN

## 2019-10-07 DIAGNOSIS — R52 PAIN: ICD-10-CM

## 2019-10-07 DIAGNOSIS — M54.12 CERVICAL RADICULOPATHY: Primary | ICD-10-CM

## 2019-10-07 PROCEDURE — C1755 CATHETER, INTRASPINAL: HCPCS

## 2019-10-07 PROCEDURE — 25010000002 DEXAMETHASONE SODIUM PHOSPHATE 10 MG/ML SOLUTION: Performed by: ANESTHESIOLOGY

## 2019-10-07 PROCEDURE — 0 IOPAMIDOL 41 % SOLUTION: Performed by: ANESTHESIOLOGY

## 2019-10-07 PROCEDURE — 77003 FLUOROGUIDE FOR SPINE INJECT: CPT

## 2019-10-07 RX ORDER — LIDOCAINE HYDROCHLORIDE 10 MG/ML
1 INJECTION, SOLUTION INFILTRATION; PERINEURAL ONCE
Status: DISCONTINUED | OUTPATIENT
Start: 2019-10-07 | End: 2019-10-08 | Stop reason: HOSPADM

## 2019-10-07 RX ORDER — FENTANYL CITRATE 50 UG/ML
50 INJECTION, SOLUTION INTRAMUSCULAR; INTRAVENOUS ONCE
Status: DISCONTINUED | OUTPATIENT
Start: 2019-10-07 | End: 2019-10-08 | Stop reason: HOSPADM

## 2019-10-07 RX ORDER — ASPIRIN 325 MG
81 TABLET ORAL DAILY
COMMUNITY
End: 2021-06-01 | Stop reason: SDUPTHER

## 2019-10-07 RX ORDER — MIDAZOLAM HYDROCHLORIDE 1 MG/ML
1 INJECTION INTRAMUSCULAR; INTRAVENOUS AS NEEDED
Status: DISCONTINUED | OUTPATIENT
Start: 2019-10-07 | End: 2019-10-08 | Stop reason: HOSPADM

## 2019-10-07 RX ORDER — DEXAMETHASONE SODIUM PHOSPHATE 10 MG/ML
10 INJECTION, SOLUTION INTRAMUSCULAR; INTRAVENOUS ONCE
Status: COMPLETED | OUTPATIENT
Start: 2019-10-07 | End: 2019-10-07

## 2019-10-07 RX ADMIN — IOPAMIDOL 10 ML: 408 INJECTION, SOLUTION INTRATHECAL at 12:01

## 2019-10-07 RX ADMIN — DEXAMETHASONE SODIUM PHOSPHATE 10 MG: 10 INJECTION, SOLUTION INTRAMUSCULAR; INTRAVENOUS at 12:02

## 2019-10-07 NOTE — ANESTHESIA PROCEDURE NOTES
PAIN Epidural block      Patient reassessed immediately prior to procedure    Patient location during procedure: pain clinic  Indication:procedure for pain  Performed By  Anesthesiologist: Aniceto Crespo MD  Preanesthetic Checklist  Completed: patient identified, site marked, surgical consent, pre-op evaluation, timeout performed, risks and benefits discussed and monitors and equipment checked  Additional Notes    Needle position confirmed by fluoroscopy and epidurogram using 2cc of [id number].    Diagnosis   Post-Op Diagnosis Codes:     * Cervical radiculopathy (M54.12)     * Cervical spine degeneration (M47.812)      Prep:  Pt Position:prone  Sterile Tech:cap, gloves, mask and sterile barrier  Prep:chlorhexidine gluconate and isopropyl alcohol  Monitoring:blood pressure monitoring, continuous pulse oximetry and EKG  Procedure:Sedation: no     Approach:midline  Guidance: fluoroscopy  Location:cervical  Level:5-6  Needle Type:Tuohy  Needle Gauge:20  Aspiration:negative  Medications:  Analgesia: decadron 10mg.  Preservative Free Saline:2mL  Isovue:2mL  Comments:Needle position confirmed by fluoroscopy and epidurogram using 2cc of [id number].  Post Assessment:  Post-procedure: bandaid.  Pt Tolerance:patient tolerated the procedure well with no apparent complications  Complications:no

## 2019-10-07 NOTE — H&P
[facility]    History and Physical    Patient Name: Nguyen Zhang  :  1940  MRN:  [id number]  Date of Admission: 10/7/2019    Subjective     Patient is a 79 y.o. female presents with chief complaint of chronic, intermitent, moderate neck, shoulder: left and arm: left pain.  Onset of symptoms was gradual starting several months ago.  Symptoms are associated/aggravated by activity, exercise or twisting. Symptoms improve with rest    The following portions of the patients history were reviewed and updated as appropriate: current medications, allergies, past medical history, past surgical history, past family history, past social history and problem list                Objective     Past Medical History:   Past Medical History:   Diagnosis Date   • Arthritis    • Disease of thyroid gland    • Hyperlipidemia    • Hypertension    • Hyperthyroidism      Past Surgical History:   Past Surgical History:   Procedure Laterality Date   • HYSTERECTOMY     • KNEE SURGERY Right    • SHOULDER SURGERY Right      Family History:   Family History   Problem Relation Age of Onset   • Heart attack Father    • Breast cancer Neg Hx      Social History:   Social History     Tobacco Use   • Smoking status: Never Smoker   • Smokeless tobacco: Never Used   Substance Use Topics   • Alcohol use: No     Frequency: Never   • Drug use: No       Vital Signs Range for the last 24 hours  Temperature:     Temp Source:     BP: BP: ()/()    Pulse:     Respirations:     SPO2:     O2 Amount (l/min):     O2 Devices     Weight:           --------------------------------------------------------------------------------    Current Outpatient Medications   Medication Sig Dispense Refill   • Acetaminophen (TYLENOL ARTHRITIS PAIN PO) Take 500 mg by mouth 2 (Two) Times a Day.     • amLODIPine (NORVASC) 10 MG tablet Take 0.5 tablets by mouth Every Evening. 90 tablet 1   • atenolol (TENORMIN) 50 MG tablet TAKE 1 TABLET BY MOUTH 2 TIMES A DAY. 60  tablet 5   • Bioflavonoid Products (DOROTEO C PO) Take 500 mg by mouth Daily.     • Cholecalciferol (VITAMIN D3) 1000 units capsule Take 2 capsules by mouth Daily.     • hydrochlorothiazide (HYDRODIURIL) 25 MG tablet TAKE 1 TABLET BY MOUTH DAILY. 30 tablet 5   • levothyroxine (SYNTHROID) 75 MCG tablet Take 1 tablet by mouth Daily. 90 tablet 1   • losartan (COZAAR) 100 MG tablet TAKE 1 TABLET BY MOUTH DAILY. 90 tablet 2   • multivitamin-minerals (CENTRUM) tablet Take 1 tablet by mouth Daily.     • NIACIN PO Take 500 mg by mouth Daily.     • potassium chloride (K-DUR,KLOR-CON) 10 MEQ CR tablet Take 1 tablet by mouth Daily. 90 tablet 1   • vitamin E 400 UNIT capsule Take 400 Units by mouth Daily.       No current facility-administered medications for this encounter.        --------------------------------------------------------------------------------  Assessment/Plan      Anesthesia Evaluation     Patient summary reviewed and Nursing notes reviewed         Pain impairs ability to perform ADLs: Sleeping  Modalities previously tried to control pain with limited effectiveness within the last 4-6 weeks: Rest     Airway   Mallampati: II  TM distance: >3 FB  Neck ROM: full  Dental - normal exam     Pulmonary - negative pulmonary ROS and normal exam   Cardiovascular - normal exam    (+) hypertension 2 medications or greater, hyperlipidemia,       Neuro/Psych- negative ROS and neuro exam normal  GI/Hepatic/Renal/Endo    (+)   hypothyroidism,     Musculoskeletal (-) normal exam    (+) neck pain, radiculopathy Left upper extremity  Abdominal  - normal exam   Substance History - negative use     OB/GYN negative ob/gyn ROS         Other   (+) arthritis                Diagnosis and Plan    Treatment Plan  ASA 3      Procedures: Cervical Epidural Steroid Injection(SAMANTHA), With fluoroscopy,       Anesthetic plan and risks discussed with patient.          Diagnosis     * Cervical radiculopathy [M54.12]     * Cervical spine degeneration  [S04.330]

## 2019-10-28 RX ORDER — HYDROCHLOROTHIAZIDE 25 MG/1
TABLET ORAL
Qty: 30 TABLET | Refills: 5 | Status: SHIPPED | OUTPATIENT
Start: 2019-10-28 | End: 2020-04-24

## 2019-10-28 RX ORDER — ATENOLOL 50 MG/1
TABLET ORAL
Qty: 60 TABLET | Refills: 5 | Status: SHIPPED | OUTPATIENT
Start: 2019-10-28 | End: 2020-04-24

## 2019-11-05 ENCOUNTER — TELEPHONE (OUTPATIENT)
Dept: PAIN MEDICINE | Facility: HOSPITAL | Age: 79
End: 2019-11-05

## 2019-11-05 ENCOUNTER — OFFICE VISIT (OUTPATIENT)
Dept: FAMILY MEDICINE CLINIC | Facility: CLINIC | Age: 79
End: 2019-11-05

## 2019-11-05 VITALS
OXYGEN SATURATION: 99 % | TEMPERATURE: 97.9 F | HEART RATE: 66 BPM | SYSTOLIC BLOOD PRESSURE: 130 MMHG | HEIGHT: 62 IN | DIASTOLIC BLOOD PRESSURE: 80 MMHG | BODY MASS INDEX: 24.29 KG/M2 | WEIGHT: 132 LBS | RESPIRATION RATE: 14 BRPM

## 2019-11-05 DIAGNOSIS — E78.49 OTHER HYPERLIPIDEMIA: ICD-10-CM

## 2019-11-05 DIAGNOSIS — M25.512 ACUTE PAIN OF LEFT SHOULDER: ICD-10-CM

## 2019-11-05 DIAGNOSIS — E03.9 ACQUIRED HYPOTHYROIDISM: ICD-10-CM

## 2019-11-05 DIAGNOSIS — I10 ESSENTIAL HYPERTENSION: Primary | ICD-10-CM

## 2019-11-05 PROCEDURE — 90653 IIV ADJUVANT VACCINE IM: CPT | Performed by: INTERNAL MEDICINE

## 2019-11-05 PROCEDURE — G0008 ADMIN INFLUENZA VIRUS VAC: HCPCS | Performed by: INTERNAL MEDICINE

## 2019-11-05 PROCEDURE — 99214 OFFICE O/P EST MOD 30 MIN: CPT | Performed by: INTERNAL MEDICINE

## 2019-11-05 PROCEDURE — 90472 IMMUNIZATION ADMIN EACH ADD: CPT | Performed by: INTERNAL MEDICINE

## 2019-11-05 PROCEDURE — 90715 TDAP VACCINE 7 YRS/> IM: CPT | Performed by: INTERNAL MEDICINE

## 2019-11-05 RX ORDER — AMLODIPINE BESYLATE 5 MG/1
TABLET ORAL
COMMUNITY
Start: 2019-09-23 | End: 2020-10-22

## 2019-11-05 NOTE — PROGRESS NOTES
LIDA@  Nguyenyolie Zhang is a 79 y.o. female.     Chief Complaint   Patient presents with   • Hypertension   • Hyperlipidemia   Hypothyroid, neck and left shoulder pain    History of Present Illness   Patient here follow-up for hypertension, hyperlipidemia, thyroid.  Reports blood pressure under control.  Dieting and exercising.  No issues with Synthroid.  Taking medications as prescribed.  Taking higher dose of aspirin reports more for pain.  She has neck pain and left shoulder pain undergoing pain shortness without much relief.  She is following with Dr. Lazo.  MRI of the neck reviewed.  The following portions of the patient's history were reviewed and updated as appropriate: allergies, current medications, past family history, past medical history, past social history, past surgical history and problem list.    Review of Systems   Constitutional: Negative for activity change, appetite change, fatigue and fever.   Eyes: Negative for blurred vision and double vision.   Respiratory: Negative.  Negative for shortness of breath.    Cardiovascular: Negative.  Negative for chest pain, palpitations and leg swelling.   Gastrointestinal: Negative.    Endocrine: Negative.    Genitourinary: Negative for amenorrhea, dysuria and flank pain.   Musculoskeletal: Positive for arthralgias and neck pain.   Neurological: Negative for dizziness, seizures, syncope, speech difficulty, light-headedness, numbness and headache.   Psychiatric/Behavioral: Negative for agitation, behavioral problems, decreased concentration and depressed mood.       Allergies   Allergen Reactions   • Ace Inhibitors Cough   • Codeine Nausea And Vomiting       Current Outpatient Medications on File Prior to Visit   Medication Sig Dispense Refill   • Acetaminophen (TYLENOL ARTHRITIS PAIN PO) Take 500 mg by mouth 2 (Two) Times a Day.     • amLODIPine (NORVASC) 5 MG tablet      • aspirin 325 MG tablet Take 325 mg by mouth Daily.     • atenolol  (TENORMIN) 50 MG tablet TAKE 1 TABLET BY MOUTH 2 TIMES A DAY. 60 tablet 5   • Bioflavonoid Products (DOROTEO C PO) Take 500 mg by mouth Daily.     • Cholecalciferol (VITAMIN D3) 1000 units capsule Take 2 capsules by mouth Daily.     • hydroCHLOROthiazide (HYDRODIURIL) 25 MG tablet TAKE 1 TABLET BY MOUTH DAILY. 30 tablet 5   • levothyroxine (SYNTHROID) 75 MCG tablet Take 1 tablet by mouth Daily. 90 tablet 1   • losartan (COZAAR) 100 MG tablet TAKE 1 TABLET BY MOUTH DAILY. 90 tablet 2   • multivitamin-minerals (CENTRUM) tablet Take 1 tablet by mouth Daily.     • NIACIN PO Take 500 mg by mouth Daily.     • potassium chloride (K-DUR,KLOR-CON) 10 MEQ CR tablet Take 1 tablet by mouth Daily. 90 tablet 1   • vitamin E 400 UNIT capsule Take 400 Units by mouth Daily.     • amLODIPine (NORVASC) 10 MG tablet Take 0.5 tablets by mouth Every Evening. 90 tablet 1     No current facility-administered medications on file prior to visit.        Family History   Problem Relation Age of Onset   • Heart attack Father    • Breast cancer Neg Hx        Past Medical History:   Diagnosis Date   • Arthritis    • Disease of thyroid gland    • Hyperlipidemia    • Hypertension    • Hyperthyroidism    • Neck pain        Past Surgical History:   Procedure Laterality Date   • HYSTERECTOMY     • KNEE SURGERY Right    • SHOULDER SURGERY Right        Social History     Socioeconomic History   • Marital status:      Spouse name: Not on file   • Number of children: Not on file   • Years of education: Not on file   • Highest education level: Not on file   Tobacco Use   • Smoking status: Never Smoker   • Smokeless tobacco: Never Used   Substance and Sexual Activity   • Alcohol use: No     Frequency: Never   • Drug use: No   • Sexual activity: Defer       Patient Active Problem List   Diagnosis   • Precordial chest pain   • PVCs (premature ventricular contractions)   • Essential hypertension   • Acquired hypothyroidism   • Low serum potassium level    • Other hyperlipidemia   • Acute pain of left shoulder       Vitals:    11/05/19 0931   BP: 130/80   Pulse: 66   Resp: 14   Temp: 97.9 °F (36.6 °C)   SpO2: 99%       Objective   Physical Exam   Constitutional: She is oriented to person, place, and time. She appears well-developed and well-nourished.   HENT:   Head: Normocephalic and atraumatic.   Eyes: EOM are normal. Pupils are equal, round, and reactive to light.   Neck: Normal range of motion. Neck supple. No JVD present. No tracheal deviation present. No thyromegaly present.   Cardiovascular: Normal rate, regular rhythm and intact distal pulses. Exam reveals no gallop and no friction rub.   No murmur heard.  Pulmonary/Chest: Effort normal and breath sounds normal. No stridor. No respiratory distress. She has no wheezes. She has no rales. She exhibits no tenderness.   Abdominal: Soft. Bowel sounds are normal. She exhibits no distension and no mass. There is no tenderness. There is no rebound and no guarding. No hernia.   Musculoskeletal: Normal range of motion. She exhibits no edema, tenderness or deformity.   Left shoulder tenderness to range of motion   Lymphadenopathy:     She has no cervical adenopathy.   Neurological: She is alert and oriented to person, place, and time. She displays normal reflexes. No cranial nerve deficit or sensory deficit. She exhibits normal muscle tone. Coordination normal.   Skin: Skin is warm and dry.   Psychiatric: She has a normal mood and affect. Her behavior is normal.         Assessment/Plan   Nguyen was seen today for hypertension and hyperlipidemia.    Diagnoses and all orders for this visit:    Essential hypertension  -     CBC & Differential  -     Comprehensive Metabolic Panel  -     Lipid Panel With / Chol / HDL Ratio  -     TSH  -     T4, Free    Other hyperlipidemia  -     CBC & Differential  -     Comprehensive Metabolic Panel  -     Lipid Panel With / Chol / HDL Ratio  -     TSH  -     T4, Free    Acquired  hypothyroidism  -     CBC & Differential  -     Comprehensive Metabolic Panel  -     Lipid Panel With / Chol / HDL Ratio  -     TSH  -     T4, Free    Acute pain of left shoulder  -     CBC & Differential  -     Comprehensive Metabolic Panel  -     Lipid Panel With / Chol / HDL Ratio  -     TSH  -     T4, Free    Other orders  -     Fluad Tri 65yr ([id number])  -     Td Vaccine Greater Than or Equal To 6yo Preservative Free IM    New diet and exercise.  Continue current medications.  Patient cannot tolerate statin does not want to take it.  Continue blood pressure medication and Synthroid.  Patient on high dose of aspirin for pain control.  Keep follow-up with the pain management and Dr. Lazo.  Return in 3 months time.  All other problems are chronic and stable except for neck and shoulder pain.

## 2019-11-05 NOTE — TELEPHONE ENCOUNTER
..What would you rate your pain on a 1-10 scale...   Prior to your last procedure? 5   On the best days following your last procedure? 2   Currently? 6    How frequently were you having pain...   Prior to your last procedure? Almost all the time   On the best days following your last procedure? Not near as much, maybe every other day    Currently? All day    When you do have pain does an episode last as long as before your last procedure? yes  What has been the difference?      Has your function improved?  Are you walking farther or more often?  I can use my arms for basic stuff in the house, but if I use left arm too much it starts really hurting  If so, how much farther and how much more often than before your last procedure?    Have you tried/continued to try any other treatments since your last procedure? (Chiropractor, physical therapy, massage, yoga, back exercises, heat/ice) I use a gel on my arm (Absorbine) I    What medications are you currently taking to help with your pain?Aspirin and Tylenol  (Narcotics, muscle relaxer's, NSAID's, Tylenol, other)    Considering all factors what % improvement would you say you have had overall?50%   At its best following your last procedure? 75%   Currently? 50%    Is there anything else you'd like to tell us that has improved since your last procedure? No, except there was three weeks when the pain was a lot better, but it is now returning

## 2019-11-06 LAB
ALBUMIN SERPL-MCNC: 5.2 G/DL (ref 3.5–5.2)
ALBUMIN/GLOB SERPL: 2.2 G/DL
ALP SERPL-CCNC: 73 U/L (ref 39–117)
ALT SERPL-CCNC: 17 U/L (ref 1–33)
AST SERPL-CCNC: 25 U/L (ref 1–32)
BASOPHILS # BLD AUTO: 0.04 10*3/MM3 (ref 0–0.2)
BASOPHILS NFR BLD AUTO: 0.6 % (ref 0–1.5)
BILIRUB SERPL-MCNC: 0.6 MG/DL (ref 0.2–1.2)
BUN SERPL-MCNC: 12 MG/DL (ref 8–23)
BUN/CREAT SERPL: 12 (ref 7–25)
CALCIUM SERPL-MCNC: 9.8 MG/DL (ref 8.6–10.5)
CHLORIDE SERPL-SCNC: 90 MMOL/L (ref 98–107)
CHOLEST SERPL-MCNC: 230 MG/DL (ref 0–200)
CHOLEST/HDLC SERPL: 4.11 {RATIO}
CO2 SERPL-SCNC: 29.4 MMOL/L (ref 22–29)
CREAT SERPL-MCNC: 1 MG/DL (ref 0.57–1)
EOSINOPHIL # BLD AUTO: 0.02 10*3/MM3 (ref 0–0.4)
EOSINOPHIL NFR BLD AUTO: 0.3 % (ref 0.3–6.2)
ERYTHROCYTE [DISTWIDTH] IN BLOOD BY AUTOMATED COUNT: 12 % (ref 12.3–15.4)
GLOBULIN SER CALC-MCNC: 2.4 GM/DL
GLUCOSE SERPL-MCNC: 87 MG/DL (ref 65–99)
HCT VFR BLD AUTO: 37.5 % (ref 34–46.6)
HDLC SERPL-MCNC: 56 MG/DL (ref 40–60)
HGB BLD-MCNC: 12.6 G/DL (ref 12–15.9)
IMM GRANULOCYTES # BLD AUTO: 0.02 10*3/MM3 (ref 0–0.05)
IMM GRANULOCYTES NFR BLD AUTO: 0.3 % (ref 0–0.5)
LDLC SERPL CALC-MCNC: 148 MG/DL (ref 0–100)
LYMPHOCYTES # BLD AUTO: 1.62 10*3/MM3 (ref 0.7–3.1)
LYMPHOCYTES NFR BLD AUTO: 24.4 % (ref 19.6–45.3)
MCH RBC QN AUTO: 31.3 PG (ref 26.6–33)
MCHC RBC AUTO-ENTMCNC: 33.6 G/DL (ref 31.5–35.7)
MCV RBC AUTO: 93.3 FL (ref 79–97)
MONOCYTES # BLD AUTO: 0.71 10*3/MM3 (ref 0.1–0.9)
MONOCYTES NFR BLD AUTO: 10.7 % (ref 5–12)
NEUTROPHILS # BLD AUTO: 4.22 10*3/MM3 (ref 1.7–7)
NEUTROPHILS NFR BLD AUTO: 63.7 % (ref 42.7–76)
NRBC BLD AUTO-RTO: 0 /100 WBC (ref 0–0.2)
PLATELET # BLD AUTO: 280 10*3/MM3 (ref 140–450)
POTASSIUM SERPL-SCNC: 3.5 MMOL/L (ref 3.5–5.2)
PROT SERPL-MCNC: 7.6 G/DL (ref 6–8.5)
RBC # BLD AUTO: 4.02 10*6/MM3 (ref 3.77–5.28)
SODIUM SERPL-SCNC: 135 MMOL/L (ref 136–145)
T4 FREE SERPL-MCNC: 1.6 NG/DL (ref 0.93–1.7)
TRIGL SERPL-MCNC: 132 MG/DL (ref 0–150)
TSH SERPL DL<=0.005 MIU/L-ACNC: 2.86 UIU/ML (ref 0.27–4.2)
VLDLC SERPL CALC-MCNC: 26.4 MG/DL
WBC # BLD AUTO: 6.63 10*3/MM3 (ref 3.4–10.8)

## 2019-11-19 ENCOUNTER — HOSPITAL ENCOUNTER (OUTPATIENT)
Dept: PAIN MEDICINE | Facility: HOSPITAL | Age: 79
Discharge: HOME | End: 2019-11-19
Admitting: ANESTHESIOLOGY

## 2019-11-19 ENCOUNTER — ANESTHESIA (OUTPATIENT)
Dept: PAIN MEDICINE | Facility: HOSPITAL | Age: 79
End: 2019-11-19

## 2019-11-19 ENCOUNTER — HOSPITAL ENCOUNTER (OUTPATIENT)
Dept: GENERAL RADIOLOGY | Facility: HOSPITAL | Age: 79
Discharge: HOME | End: 2019-11-19

## 2019-11-19 ENCOUNTER — ANESTHESIA EVENT (OUTPATIENT)
Dept: PAIN MEDICINE | Facility: HOSPITAL | Age: 79
End: 2019-11-19

## 2019-11-19 VITALS
SYSTOLIC BLOOD PRESSURE: 153 MMHG | RESPIRATION RATE: 16 BRPM | OXYGEN SATURATION: 97 % | HEART RATE: 67 BPM | TEMPERATURE: 98 F | DIASTOLIC BLOOD PRESSURE: 80 MMHG

## 2019-11-19 DIAGNOSIS — R52 PAIN: ICD-10-CM

## 2019-11-19 DIAGNOSIS — M54.12 CERVICAL RADICULOPATHY: ICD-10-CM

## 2019-11-19 PROCEDURE — C1755 CATHETER, INTRASPINAL: HCPCS

## 2019-11-19 PROCEDURE — 25010000002 DEXAMETHASONE SODIUM PHOSPHATE 10 MG/ML SOLUTION: Performed by: ANESTHESIOLOGY

## 2019-11-19 PROCEDURE — 77003 FLUOROGUIDE FOR SPINE INJECT: CPT

## 2019-11-19 PROCEDURE — 0 IOPAMIDOL 41 % SOLUTION: Performed by: ANESTHESIOLOGY

## 2019-11-19 RX ORDER — LIDOCAINE HYDROCHLORIDE 10 MG/ML
1 INJECTION, SOLUTION INFILTRATION; PERINEURAL ONCE AS NEEDED
Status: DISCONTINUED | OUTPATIENT
Start: 2019-11-19 | End: 2019-11-20 | Stop reason: HOSPADM

## 2019-11-19 RX ORDER — MIDAZOLAM HYDROCHLORIDE 1 MG/ML
1 INJECTION INTRAMUSCULAR; INTRAVENOUS AS NEEDED
Status: DISCONTINUED | OUTPATIENT
Start: 2019-11-19 | End: 2019-11-20 | Stop reason: HOSPADM

## 2019-11-19 RX ORDER — DEXAMETHASONE SODIUM PHOSPHATE 10 MG/ML
10 INJECTION INTRAMUSCULAR; INTRAVENOUS EVERY 6 HOURS
Status: CANCELLED | OUTPATIENT
Start: 2019-11-19

## 2019-11-19 RX ORDER — FENTANYL CITRATE 50 UG/ML
50 INJECTION, SOLUTION INTRAMUSCULAR; INTRAVENOUS AS NEEDED
Status: DISCONTINUED | OUTPATIENT
Start: 2019-11-19 | End: 2019-11-20 | Stop reason: HOSPADM

## 2019-11-19 RX ORDER — DEXAMETHASONE SODIUM PHOSPHATE 10 MG/ML
10 INJECTION, SOLUTION INTRAMUSCULAR; INTRAVENOUS EVERY 6 HOURS
Status: DISCONTINUED | OUTPATIENT
Start: 2019-11-19 | End: 2019-11-20 | Stop reason: HOSPADM

## 2019-11-19 RX ORDER — SODIUM CHLORIDE 0.9 % (FLUSH) 0.9 %
1-10 SYRINGE (ML) INJECTION AS NEEDED
Status: DISCONTINUED | OUTPATIENT
Start: 2019-11-19 | End: 2019-11-20 | Stop reason: HOSPADM

## 2019-11-19 RX ADMIN — IOPAMIDOL 2 ML: 408 INJECTION, SOLUTION INTRATHECAL at 14:19

## 2019-11-19 RX ADMIN — DEXAMETHASONE SODIUM PHOSPHATE 10 MG: 10 INJECTION, SOLUTION INTRAMUSCULAR; INTRAVENOUS at 14:20

## 2019-11-19 NOTE — H&P
HPI:  The patient returns for another Cervical epidural steroid injection today.  They have received 50-75% improvement since their last injection with a pain level of 4/10 at its worst recently.    Conservative measures tried in the interim Tylenol water aerobics    Current Outpatient Medications on File Prior to Encounter   Medication Sig Dispense Refill   • Acetaminophen (TYLENOL ARTHRITIS PAIN PO) Take 500 mg by mouth 2 (Two) Times a Day.     • amLODIPine (NORVASC) 10 MG tablet Take 0.5 tablets by mouth Every Evening. 90 tablet 1   • amLODIPine (NORVASC) 5 MG tablet      • aspirin 325 MG tablet Take 325 mg by mouth Daily.     • atenolol (TENORMIN) 50 MG tablet TAKE 1 TABLET BY MOUTH 2 TIMES A DAY. 60 tablet 5   • Bioflavonoid Products (DOROTEO C PO) Take 500 mg by mouth Daily.     • Cholecalciferol (VITAMIN D3) 1000 units capsule Take 2 capsules by mouth Daily.     • hydroCHLOROthiazide (HYDRODIURIL) 25 MG tablet TAKE 1 TABLET BY MOUTH DAILY. 30 tablet 5   • levothyroxine (SYNTHROID) 75 MCG tablet Take 1 tablet by mouth Daily. 90 tablet 1   • losartan (COZAAR) 100 MG tablet TAKE 1 TABLET BY MOUTH DAILY. 90 tablet 2   • multivitamin-minerals (CENTRUM) tablet Take 1 tablet by mouth Daily.     • NIACIN PO Take 500 mg by mouth Daily.     • potassium chloride (K-DUR,KLOR-CON) 10 MEQ CR tablet Take 1 tablet by mouth Daily. 90 tablet 1   • vitamin E 400 UNIT capsule Take 400 Units by mouth Daily.       No current facility-administered medications on file prior to encounter.        Past Medical History:   Diagnosis Date   • Arthritis    • Disease of thyroid gland    • Hyperlipidemia    • Hypertension    • Hyperthyroidism    • Neck pain        Negative screen for CAROLINA  Review of systems negative for hematologic, infectious or constitutional symptoms    Exam:  There were no vitals taken for this visit.  Airway Mallampatti 2  Alert and oriented    Diagnosis:    Post-Op Diagnosis Codes:     * DDD (degenerative disc disease),  cervical [M50.30]     * Cervical neuritis [M54.12]    Plan:  Cervical 6 epidural steroid injection under fluoroscopic guidance    I have encouraged them to continue:  1.  Physical therapy exercises at home as prescribed by physical therapy or from the pain clinic handout (given to the patient).  Continuation of these exercises every day, or multiple times per week, even when the patient has good pain relief, was stressed to the patient as a preventative measure to decrease the frequency and severity of future pain episodes.  2.  Continue pain medicines as already prescribed.  If patient not currently taking any, it is recommended to begin Acetaminophen 1000 mg po q 8 hours.  If other medicines containing Acetaminophen are currently prescribed, maintain daily dose at 3000mg.    3.  If they can tolerate NSAIDS, it is recommended to take Ibuprofen 600 mg po q 6 hours for 7 days during pain exacerbations.  Alternatively, they may substitute an NSAID of their choice (e.g. Aleve)  4.  Heat and ice to the affected area as tolerated for pain control.  It was discussed that heating pads can cause burns.  5.  Low impact exercise such as walking or water exercise was recommended to maintain overall health and aid in weight control.   6.  Follow up as needed for subsequent injections.  7.  Patient was counseled to abstain from tobacco products.

## 2019-11-19 NOTE — ANESTHESIA PROCEDURE NOTES
PAIN Epidural block      Patient reassessed immediately prior to procedure    Patient location during procedure: pain clinic  Indication:procedure for pain  Performed By  Anesthesiologist: Dorian Quinonez MD  Preanesthetic Checklist  Completed: patient identified and risks and benefits discussed  Additional Notes  Diagnosis:    Post-Op Diagnosis Codes:     * DDD (degenerative disc disease), cervical (M50.30)     * Cervical neuritis (M54.12)    Sedation: none    A cervical epidural steroid injection with fluoroscopic guidance and an Isovue dye epidurogram was performed.  Under fluoroscopic guidance, the epidural space was identified and accessed, confirmed by loss of resistance to saline followed by injection of Isovue dye, which shows a good epidurogram.  The above medications were injected uneventfully.    Prep:  Pt Position:prone  Sterile Tech:cap, gloves, mask and sterile barrier  Prep:chlorhexidine gluconate and isopropyl alcohol  Monitoring:blood pressure monitoring, continuous pulse oximetry and EKG  Procedure:Sedation: no     Approach:left paramedian  Guidance: fluoroscopy  Location:cervical  Level:6-7  Needle Type:Tuohy  Needle Gauge:20  Aspiration:negative  Medications:  Isovue:1mL  Comments:Isovue dye reveals good epidurogram    Decadron 10 mg preservative-free  Post Assessment:  Pt Tolerance:patient tolerated the procedure well with no apparent complications  Complications:no

## 2019-11-25 RX ORDER — AMLODIPINE BESYLATE 5 MG/1
TABLET ORAL
Qty: 30 TABLET | Refills: 5 | Status: SHIPPED | OUTPATIENT
Start: 2019-11-25 | End: 2020-05-26

## 2019-12-24 DIAGNOSIS — E03.9 ACQUIRED HYPOTHYROIDISM: ICD-10-CM

## 2019-12-24 RX ORDER — LEVOTHYROXINE SODIUM 0.07 MG/1
75 TABLET ORAL DAILY
Qty: 90 TABLET | Refills: 2 | Status: SHIPPED | OUTPATIENT
Start: 2019-12-24 | End: 2020-09-16

## 2019-12-30 ENCOUNTER — TELEPHONE (OUTPATIENT)
Dept: FAMILY MEDICINE CLINIC | Facility: CLINIC | Age: 79
End: 2019-12-30

## 2019-12-30 ENCOUNTER — OFFICE VISIT (OUTPATIENT)
Dept: FAMILY MEDICINE CLINIC | Facility: CLINIC | Age: 79
End: 2019-12-30

## 2019-12-30 VITALS
TEMPERATURE: 98.1 F | BODY MASS INDEX: 24.84 KG/M2 | RESPIRATION RATE: 16 BRPM | WEIGHT: 135 LBS | HEIGHT: 62 IN | OXYGEN SATURATION: 97 % | SYSTOLIC BLOOD PRESSURE: 134 MMHG | DIASTOLIC BLOOD PRESSURE: 74 MMHG | HEART RATE: 72 BPM

## 2019-12-30 DIAGNOSIS — J20.8 ACUTE BACTERIAL BRONCHITIS: Primary | ICD-10-CM

## 2019-12-30 DIAGNOSIS — B96.89 ACUTE BACTERIAL BRONCHITIS: Primary | ICD-10-CM

## 2019-12-30 PROCEDURE — 99213 OFFICE O/P EST LOW 20 MIN: CPT | Performed by: FAMILY MEDICINE

## 2019-12-30 RX ORDER — AZITHROMYCIN 250 MG/1
TABLET, FILM COATED ORAL
Qty: 6 TABLET | Refills: 0 | Status: SHIPPED | OUTPATIENT
Start: 2019-12-30 | End: 2020-10-22

## 2019-12-30 RX ORDER — DEXTROMETHORPHAN HYDROBROMIDE AND PROMETHAZINE HYDROCHLORIDE 15; 6.25 MG/5ML; MG/5ML
SOLUTION ORAL
Qty: 120 ML | Refills: 0 | OUTPATIENT
Start: 2019-12-30

## 2019-12-30 NOTE — PATIENT INSTRUCTIONS
If symptoms do not improve within the next 24 to 48 hours return to the office or go to the urgent care.  If symptoms get worse any time go to the emergency room.      Upper Respiratory Infection, Adult  An upper respiratory infection (URI) is a common viral infection of the nose, throat, and upper air passages that lead to the lungs. The most common type of URI is the common cold. URIs usually get better on their own, without medical treatment.  What are the causes?  A URI is caused by a virus. You may catch a virus by:  · Breathing in droplets from an infected person's cough or sneeze.  · Touching something that has been exposed to the virus (contaminated) and then touching your mouth, nose, or eyes.  What increases the risk?  You are more likely to get a URI if:  · You are very young or very old.  · It is lyn or winter.  · You have close contact with others, such as at a , school, or health care facility.  · You smoke.  · You have long-term (chronic) heart or lung disease.  · You have a weakened disease-fighting (immune) system.  · You have nasal allergies or asthma.  · You are experiencing a lot of stress.  · You work in an area that has poor air circulation.  · You have poor nutrition.  What are the signs or symptoms?  A URI usually involves some of the following symptoms:  · Runny or stuffy (congested) nose.  · Sneezing.  · Cough.  · Sore throat.  · Headache.  · Fatigue.  · Fever.  · Loss of appetite.  · Pain in your forehead, behind your eyes, and over your cheekbones (sinus pain).  · Muscle aches.  · Redness or irritation of the eyes.  · Pressure in the ears or face.  How is this diagnosed?  This condition may be diagnosed based on your medical history and symptoms, and a physical exam. Your health care provider may use a cotton swab to take a mucus sample from your nose (nasal swab). This sample can be tested to determine what virus is causing the illness.  How is this treated?  URIs usually get  better on their own within 7-10 days. You can take steps at home to relieve your symptoms. Medicines cannot cure URIs, but your health care provider may recommend certain medicines to help relieve symptoms, such as:  · Over-the-counter cold medicines.  · Cough suppressants. Coughing is a type of defense against infection that helps to clear the respiratory system, so take these medicines only as recommended by your health care provider.  · Fever-reducing medicines.  Follow these instructions at home:  Activity  · Rest as needed.  · If you have a fever, stay home from work or school until your fever is gone or until your health care provider says you are no longer contagious. Your health care provider may have you wear a face mask to prevent your infection from spreading.  Relieving symptoms  · Gargle with a salt-water mixture 3-4 times a day or as needed. To make a salt-water mixture, completely dissolve ½-1 tsp of salt in 1 cup of warm water.  · Use a cool-mist humidifier to add moisture to the air. This can help you breathe more easily.  Eating and drinking    · Drink enough fluid to keep your urine pale yellow.  · Eat soups and other clear broths.  General instructions    · Take over-the-counter and prescription medicines only as told by your health care provider. These include cold medicines, fever reducers, and cough suppressants.  · Do not use any products that contain nicotine or tobacco, such as cigarettes and e-cigarettes. If you need help quitting, ask your health care provider.  · Stay away from secondhand smoke.  · Stay up to date on all immunizations, including the yearly (annual) flu vaccine.  · Keep all follow-up visits as told by your health care provider. This is important.  How to prevent the spread of infection to others    · URIs can be passed from person to person (are contagious). To prevent the infection from spreading:  ? Wash your hands often with soap and water. If soap and water are not  available, use hand .  ? Avoid touching your mouth, face, eyes, or nose.  ? Cough or sneeze into a tissue or your sleeve or elbow instead of into your hand or into the air.  Contact a health care provider if:  · You are getting worse instead of better.  · You have a fever or chills.  · Your mucus is brown or red.  · You have yellow or brown discharge coming from your nose.  · You have pain in your face, especially when you bend forward.  · You have swollen neck glands.  · You have pain while swallowing.  · You have white areas in the back of your throat.  Get help right away if:  · You have shortness of breath that gets worse.  · You have severe or persistent:  ? Headache.  ? Ear pain.  ? Sinus pain.  ? Chest pain.  · You have chronic lung disease along with any of the following:  ? Wheezing.  ? Prolonged cough.  ? Coughing up blood.  ? A change in your usual mucus.  · You have a stiff neck.  · You have changes in your:  ? Vision.  ? Hearing.  ? Thinking.  ? Mood.  Summary  · An upper respiratory infection (URI) is a common infection of the nose, throat, and upper air passages that lead to the lungs.  · A URI is caused by a virus.  · URIs usually get better on their own within 7-10 days.  · Medicines cannot cure URIs, but your health care provider may recommend certain medicines to help relieve symptoms.  This information is not intended to replace advice given to you by your health care provider. Make sure you discuss any questions you have with your health care provider.  Document Released: 06/13/2002 Document Revised: 08/03/2018 Document Reviewed: 08/03/2018  [employer] Interactive Patient Education © 2019 [employer]

## 2019-12-30 NOTE — PROGRESS NOTES
Subjective   Nguyen Zhang is a 79 y.o. female is here for   Chief Complaint   Patient presents with   • URI     thick phlegm        History of Present Illness     Patient states a couple weeks ago her  was sick.  She states about a week and half ago she started feeling bad and developed a moderate and persistent cough with thick yellow sputum that is become clear.  She is not had any nausea or vomiting.  She has had some loose stool.  No fever or chills.  No blood in the sputum.  No shortness of breath.  No chest pain, dizziness, or headaches.  No ear pain.  She does have a runny nose.  No sinus pressure or pain.  She went to the urgent care on Friday and was given Omnicef.  Has not seemed to help.    The following portions of the patient's history were reviewed and updated as appropriate: allergies, current medications, past family history, past medical history, past social history, past surgical history and problem list.     reports that she has never smoked. She has never used smokeless tobacco. She reports that she does not drink alcohol or use drugs.    Review of Systems   Constitutional: Negative for activity change and unexpected weight change.   HENT: Positive for congestion, postnasal drip and trouble swallowing.    Respiratory: Positive for cough and chest tightness. Negative for shortness of breath and wheezing.    Cardiovascular: Negative for chest pain and palpitations.   Gastrointestinal: Negative for abdominal pain, blood in stool and constipation.   Genitourinary: Negative for difficulty urinating and hematuria.   Musculoskeletal: Negative for gait problem.   Skin: Negative for color change and rash.        PHQ-9 Depression Screening  Little interest or pleasure in doing things?     Feeling down, depressed, or hopeless?     Trouble falling or staying asleep, or sleeping too much?     Feeling tired or having little energy?     Poor appetite or overeating?     Feeling bad about yourself -  "or that you are a failure or have let yourself or your family down?     Trouble concentrating on things, such as reading the newspaper or watching television?     Moving or speaking so slowly that other people could have noticed? Or the opposite - being so fidgety or restless that you have been moving around a lot more than usual?     Thoughts that you would be better off dead, or of hurting yourself in some way?     PHQ-9 Total Score     If you checked off any problems, how difficult have these problems made it for you to do your work, take care of things at home, or get along with other people?           Objective   /74 (BP Location: Right arm, Patient Position: Sitting, Cuff Size: Adult)   Pulse 72   Temp 98.1 °F (36.7 °C) (Oral)   Resp 16   Ht 157.5 cm (62\")   Wt 61.2 kg (135 lb)   SpO2 97%   BMI 24.69 kg/m²   Physical Exam   Constitutional: She is oriented to person, place, and time. She appears well-developed and well-nourished. No distress.   HENT:   Head: Normocephalic and atraumatic.   Right Ear: External ear normal.   Left Ear: External ear normal.   Nose: Nose normal.   Mouth/Throat: Oropharynx is clear and moist. No oropharyngeal exudate.   Eyes: Lids are normal. Right eye exhibits no discharge. Left eye exhibits no discharge. No scleral icterus.   Neck: Trachea normal, normal range of motion and full passive range of motion without pain. Neck supple. No tracheal deviation and no edema present. No thyromegaly present.   Cardiovascular: Normal rate, regular rhythm, normal heart sounds and intact distal pulses. Exam reveals no gallop and no friction rub.   No murmur heard.  Pulmonary/Chest: Effort normal and breath sounds normal. No stridor. No tachypnea and no bradypnea. No respiratory distress. She has no decreased breath sounds. She has no wheezes. She has no rales. She exhibits no tenderness.   Abdominal: Normal appearance. There is no hepatosplenomegaly.   Musculoskeletal: She exhibits no " edema.   Lymphadenopathy:        Head (right side): No submental, no submandibular, no tonsillar, no preauricular, no posterior auricular and no occipital adenopathy present.        Head (left side): No submental, no submandibular, no tonsillar, no preauricular, no posterior auricular and no occipital adenopathy present.     She has no cervical adenopathy.        Right cervical: No superficial cervical, no deep cervical and no posterior cervical adenopathy present.       Left cervical: No superficial cervical, no deep cervical and no posterior cervical adenopathy present.   Neurological: She is alert and oriented to person, place, and time. She has normal strength and normal reflexes. She is not disoriented.   Skin: Skin is warm, dry and intact. Capillary refill takes less than 2 seconds. No rash noted. She is not diaphoretic. No cyanosis or erythema. No pallor. Nails show no clubbing.   Psychiatric: She has a normal mood and affect. Her behavior is normal. Cognition and memory are normal.   Nursing note and vitals reviewed.      Procedures    Assessment/Plan   Diagnoses and all orders for this visit:    1. Acute bacterial bronchitis (Primary)  -     azithromycin (ZITHROMAX Z-WILBUR) 250 MG tablet; Take 2 tablets the first day, then 1 tablet daily for 4 days.  Dispense: 6 tablet; Refill: 0

## 2020-01-14 ENCOUNTER — OFFICE VISIT (OUTPATIENT)
Dept: FAMILY MEDICINE CLINIC | Facility: CLINIC | Age: 80
End: 2020-01-14

## 2020-01-14 ENCOUNTER — HOSPITAL ENCOUNTER (OUTPATIENT)
Dept: GENERAL RADIOLOGY | Facility: HOSPITAL | Age: 80
Discharge: HOME | End: 2020-01-14
Admitting: INTERNAL MEDICINE

## 2020-01-14 VITALS
DIASTOLIC BLOOD PRESSURE: 72 MMHG | RESPIRATION RATE: 16 BRPM | HEIGHT: 62 IN | HEART RATE: 65 BPM | SYSTOLIC BLOOD PRESSURE: 130 MMHG | TEMPERATURE: 98.5 F | WEIGHT: 132 LBS | BODY MASS INDEX: 24.29 KG/M2 | OXYGEN SATURATION: 98 %

## 2020-01-14 DIAGNOSIS — J06.9 ACUTE URI: Primary | ICD-10-CM

## 2020-01-14 LAB
EXPIRATION DATE: NORMAL
FLUAV AG NPH QL: NEGATIVE
FLUBV AG NPH QL: NEGATIVE
INTERNAL CONTROL: NORMAL
Lab: NORMAL

## 2020-01-14 PROCEDURE — 87804 INFLUENZA ASSAY W/OPTIC: CPT | Performed by: INTERNAL MEDICINE

## 2020-01-14 PROCEDURE — 99213 OFFICE O/P EST LOW 20 MIN: CPT | Performed by: INTERNAL MEDICINE

## 2020-01-14 PROCEDURE — 71046 X-RAY EXAM CHEST 2 VIEWS: CPT

## 2020-01-14 RX ORDER — POTASSIUM CHLORIDE 750 MG/1
TABLET, FILM COATED, EXTENDED RELEASE ORAL
COMMUNITY
Start: 2019-11-25 | End: 2020-11-26

## 2020-01-14 NOTE — PROGRESS NOTES
LIDA@  Nguyen Zhang is a 79 y.o. female.     Chief Complaint   Patient presents with   • Sinusitis       History of Present Illness   Patient here for continued cough, congestion feverish,.  Main thing patient's reports is bothering her tracheal and throat that causing her to cough.  Mostly dry.  No shortness of breath, chest pain, nausea vomiting abdominal pain.  Patient was seen in the ER received Omnicef, she was seen here by Dr. Jones and and received Z-Chucho.  This all within the last couple weeks.  Exam is normal  The following portions of the patient's history were reviewed and updated as appropriate: allergies, current medications, past family history, past medical history, past social history, past surgical history and problem list.    Review of Systems   Constitutional: Positive for chills. Negative for activity change, appetite change, fatigue and fever.   HENT: Positive for postnasal drip and sneezing. Negative for congestion, ear discharge, ear pain, facial swelling, rhinorrhea, sinus pressure and sore throat.    Respiratory: Positive for cough. Negative for chest tightness, shortness of breath, wheezing and stridor.        Allergies   Allergen Reactions   • Ace Inhibitors Cough   • Codeine Nausea And Vomiting       Current Outpatient Medications on File Prior to Visit   Medication Sig Dispense Refill   • Acetaminophen (TYLENOL ARTHRITIS PAIN PO) Take 500 mg by mouth 2 (Two) Times a Day.     • amLODIPine (NORVASC) 10 MG tablet Take 0.5 tablets by mouth Every Evening. 90 tablet 1   • amLODIPine (NORVASC) 5 MG tablet TAKE 1 TABLET BY MOUTH EVERY EVENING. 30 tablet 5   • aspirin 325 MG tablet Take 325 mg by mouth Daily.     • atenolol (TENORMIN) 50 MG tablet TAKE 1 TABLET BY MOUTH 2 TIMES A DAY. 60 tablet 5   • Bioflavonoid Products (DOROTEO C PO) Take 500 mg by mouth Daily.     • Cholecalciferol (VITAMIN D3) 1000 units capsule Take 2 capsules by mouth Daily.     • hydroCHLOROthiazide  (HYDRODIURIL) 25 MG tablet TAKE 1 TABLET BY MOUTH DAILY. 30 tablet 5   • levothyroxine (SYNTHROID, LEVOTHROID) 75 MCG tablet TAKE 1 TABLET BY MOUTH DAILY. 90 tablet 2   • losartan (COZAAR) 100 MG tablet TAKE 1 TABLET BY MOUTH DAILY. 90 tablet 2   • multivitamin-minerals (CENTRUM) tablet Take 1 tablet by mouth Daily.     • NIACIN PO Take 500 mg by mouth Daily.     • potassium chloride (K-DUR,KLOR-CON) 10 MEQ CR tablet Take 1 tablet by mouth Daily. 90 tablet 1   • vitamin E 400 UNIT capsule Take 400 Units by mouth Daily.     • amLODIPine (NORVASC) 5 MG tablet      • azithromycin (ZITHROMAX Z-WILBUR) 250 MG tablet Take 2 tablets the first day, then 1 tablet daily for 4 days. 6 tablet 0   • potassium chloride (K-DUR) 10 MEQ CR tablet        No current facility-administered medications on file prior to visit.        Family History   Problem Relation Age of Onset   • Heart attack Father    • Breast cancer Neg Hx        Past Medical History:   Diagnosis Date   • Arthritis    • Disease of thyroid gland    • Hyperlipidemia    • Hypertension    • Hyperthyroidism    • Neck pain        Past Surgical History:   Procedure Laterality Date   • HYSTERECTOMY     • KNEE SURGERY Right    • SHOULDER SURGERY Right        Social History     Socioeconomic History   • Marital status:      Spouse name: Not on file   • Number of children: Not on file   • Years of education: Not on file   • Highest education level: Not on file   Tobacco Use   • Smoking status: Never Smoker   • Smokeless tobacco: Never Used   Substance and Sexual Activity   • Alcohol use: No     Frequency: Never   • Drug use: No   • Sexual activity: Defer       Patient Active Problem List   Diagnosis   • Precordial chest pain   • PVCs (premature ventricular contractions)   • Essential hypertension   • Acquired hypothyroidism   • Low serum potassium level   • Other hyperlipidemia   • Acute pain of left shoulder       /72 (BP Location: Left arm, Patient Position:  "Sitting, Cuff Size: Adult)   Pulse 65   Temp 98.5 °F (36.9 °C) (Oral)   Resp 16   Ht 157.5 cm (62\")   Wt 59.9 kg (132 lb)   SpO2 98%   BMI 24.14 kg/m²   Body mass index is 24.14 kg/m².    Objective   Physical Exam   Constitutional: She appears well-developed and well-nourished.   Neck: Normal range of motion. Neck supple. No JVD present. No tracheal deviation present. No thyromegaly present.   Cardiovascular: Normal rate, regular rhythm and normal heart sounds.   Pulmonary/Chest: Effort normal and breath sounds normal. No stridor. No respiratory distress. She has no wheezes. She has no rales.   Abdominal: Soft. Bowel sounds are normal. She exhibits no distension. There is no tenderness. There is no guarding.   Nursing note and vitals reviewed.        Assessment/Plan   Nguyen was seen today for sinusitis.    Diagnoses and all orders for this visit:    Acute URI  -     Cancel: Influenza Antigen, Rapid - Swab, Nasopharynx; Future  -     CBC & Differential  -     XR Chest 2 View  -     POCT Influenza A/B    Flu test is negative.  Discussed with patient she has to course of antibiotics recently.  I would rather not given antibiotics.  Most likely is viral.  Symptomatic treatment with Flonase, Allegra.  Treat with antibiotic if chest x-ray showed pneumonia, or high white count.  Check temperature.  Return if no better.  Patient aware that chest x-ray normal, CBC normal vitals cough can stay for 4 weeks.  Return as needed or as scheduled.         "

## 2020-01-15 LAB
BASOPHILS # BLD AUTO: 0.05 10*3/MM3 (ref 0–0.2)
BASOPHILS NFR BLD AUTO: 0.7 % (ref 0–1.5)
EOSINOPHIL # BLD AUTO: 0.05 10*3/MM3 (ref 0–0.4)
EOSINOPHIL NFR BLD AUTO: 0.7 % (ref 0.3–6.2)
ERYTHROCYTE [DISTWIDTH] IN BLOOD BY AUTOMATED COUNT: 11.8 % (ref 12.3–15.4)
HCT VFR BLD AUTO: 34.9 % (ref 34–46.6)
HGB BLD-MCNC: 11.7 G/DL (ref 12–15.9)
IMM GRANULOCYTES # BLD AUTO: 0.02 10*3/MM3 (ref 0–0.05)
IMM GRANULOCYTES NFR BLD AUTO: 0.3 % (ref 0–0.5)
LYMPHOCYTES # BLD AUTO: 1.71 10*3/MM3 (ref 0.7–3.1)
LYMPHOCYTES NFR BLD AUTO: 23.6 % (ref 19.6–45.3)
MCH RBC QN AUTO: 31.5 PG (ref 26.6–33)
MCHC RBC AUTO-ENTMCNC: 33.5 G/DL (ref 31.5–35.7)
MCV RBC AUTO: 93.8 FL (ref 79–97)
MONOCYTES # BLD AUTO: 1.08 10*3/MM3 (ref 0.1–0.9)
MONOCYTES NFR BLD AUTO: 14.9 % (ref 5–12)
NEUTROPHILS # BLD AUTO: 4.34 10*3/MM3 (ref 1.7–7)
NEUTROPHILS NFR BLD AUTO: 59.8 % (ref 42.7–76)
NRBC BLD AUTO-RTO: 0 /100 WBC (ref 0–0.2)
PLATELET # BLD AUTO: 262 10*3/MM3 (ref 140–450)
RBC # BLD AUTO: 3.72 10*6/MM3 (ref 3.77–5.28)
WBC # BLD AUTO: 7.25 10*3/MM3 (ref 3.4–10.8)

## 2020-02-11 ENCOUNTER — OFFICE VISIT (OUTPATIENT)
Dept: FAMILY MEDICINE CLINIC | Facility: CLINIC | Age: 80
End: 2020-02-11

## 2020-02-11 VITALS
OXYGEN SATURATION: 98 % | TEMPERATURE: 97.5 F | HEART RATE: 59 BPM | DIASTOLIC BLOOD PRESSURE: 72 MMHG | SYSTOLIC BLOOD PRESSURE: 132 MMHG | BODY MASS INDEX: 23.92 KG/M2 | WEIGHT: 130 LBS | RESPIRATION RATE: 14 BRPM | HEIGHT: 62 IN

## 2020-02-11 DIAGNOSIS — I10 ESSENTIAL HYPERTENSION: Primary | ICD-10-CM

## 2020-02-11 DIAGNOSIS — E03.9 ACQUIRED HYPOTHYROIDISM: ICD-10-CM

## 2020-02-11 DIAGNOSIS — E78.49 OTHER HYPERLIPIDEMIA: ICD-10-CM

## 2020-02-11 PROCEDURE — G0439 PPPS, SUBSEQ VISIT: HCPCS | Performed by: INTERNAL MEDICINE

## 2020-02-11 PROCEDURE — 99213 OFFICE O/P EST LOW 20 MIN: CPT | Performed by: INTERNAL MEDICINE

## 2020-02-11 NOTE — PROGRESS NOTES
Subsequent Medicare Wellness Visit   The ABC's of the Annual Wellness Visit    Chief Complaint   Patient presents with   • Medicare Wellness-subsequent   • Hypertension   • Hypothyroidism   • Hyperlipidemia       HPI:  Nguyen Zhang, -1940, is a 79 y.o. female who presents for a Subsequent Medicare Wellness Visit.    Recent Hospitalizations:  No hospitalization(s) within the last year..    Current Medical Providers:  Patient Care Team:  Chelsie Rai MD as PCP - General (Internal Medicine)    Health Habits and Functional and Cognitive Screening and Depression Screening:  Functional & Cognitive Status 2020   Do you have difficulty preparing food and eating? No   Do you have difficulty bathing yourself, getting dressed or grooming yourself? No   Do you have difficulty using the toilet? No   Do you have difficulty moving around from place to place? No   Do you have trouble with steps or getting out of a bed or a chair? No   Current Diet Well Balanced Diet   Dental Exam Up to date   Eye Exam Up to date   Exercise (times per week) 3 times per week   Current Exercise Activities Include Walking   Do you need help using the phone?  No   Are you deaf or do you have serious difficulty hearing?  No   Do you need help with transportation? No   Do you need help shopping? No   Do you need help preparing meals?  No   Do you need help with housework?  No   Do you need help with laundry? No   Do you need help taking your medications? No   Do you need help managing money? No   Do you ever drive or ride in a car without wearing a seat belt? No   Have you felt unusual stress, anger or loneliness in the last month? No   Who do you live with? Spouse   If you need help, do you have trouble finding someone available to you? No   Have you been bothered in the last four weeks by sexual problems? No   Do you have difficulty concentrating, remembering or making decisions? No       Compared to one year ago, the patient  feels her physical health is the same and her mental health is the same.    Depression Screen:  PHQ-2/PHQ-9 Depression Screening 2/11/2020   Little interest or pleasure in doing things 0   Feeling down, depressed, or hopeless 0   Trouble falling or staying asleep, or sleeping too much -   Feeling tired or having little energy -   Poor appetite or overeating -   Feeling bad about yourself - or that you are a failure or have let yourself or your family down -   Moving or speaking so slowly that other people could have noticed. Or the opposite - being so fidgety or restless that you have been moving around a lot more than usual -   Thoughts that you would be better off dead, or of hurting yourself in some way -   Total Score 0   If you checked off any problems, how difficult have these problems made it for you to do your work, take care of things at home, or get along with other people? -         Past Medical/Family/Social History:  The following portions of the patient's history were reviewed and updated as appropriate: allergies, current medications, past family history, past medical history, past social history, past surgical history and problem list.    Allergies   Allergen Reactions   • Ace Inhibitors Cough   • Codeine Nausea And Vomiting         Current Outpatient Medications:   •  amLODIPine (NORVASC) 10 MG tablet, Take 0.5 tablets by mouth Every Evening., Disp: 90 tablet, Rfl: 1  •  amLODIPine (NORVASC) 5 MG tablet, TAKE 1 TABLET BY MOUTH EVERY EVENING., Disp: 30 tablet, Rfl: 5  •  aspirin 325 MG tablet, Take 325 mg by mouth Daily., Disp: , Rfl:   •  atenolol (TENORMIN) 50 MG tablet, TAKE 1 TABLET BY MOUTH 2 TIMES A DAY., Disp: 60 tablet, Rfl: 5  •  Bioflavonoid Products (DOROTEO C PO), Take 500 mg by mouth Daily., Disp: , Rfl:   •  Cholecalciferol (VITAMIN D3) 1000 units capsule, Take 2 capsules by mouth Daily., Disp: , Rfl:   •  hydroCHLOROthiazide (HYDRODIURIL) 25 MG tablet, TAKE 1 TABLET BY MOUTH DAILY.,  Disp: 30 tablet, Rfl: 5  •  levothyroxine (SYNTHROID, LEVOTHROID) 75 MCG tablet, TAKE 1 TABLET BY MOUTH DAILY., Disp: 90 tablet, Rfl: 2  •  losartan (COZAAR) 100 MG tablet, TAKE 1 TABLET BY MOUTH DAILY., Disp: 90 tablet, Rfl: 2  •  multivitamin-minerals (CENTRUM) tablet, Take 1 tablet by mouth Daily., Disp: , Rfl:   •  NIACIN PO, Take 500 mg by mouth Daily., Disp: , Rfl:   •  potassium chloride (K-DUR) 10 MEQ CR tablet, , Disp: , Rfl:   •  potassium chloride (K-DUR,KLOR-CON) 10 MEQ CR tablet, Take 1 tablet by mouth Daily., Disp: 90 tablet, Rfl: 1  •  vitamin E 400 UNIT capsule, Take 400 Units by mouth Daily., Disp: , Rfl:   •  Acetaminophen (TYLENOL ARTHRITIS PAIN PO), Take 500 mg by mouth 2 (Two) Times a Day., Disp: , Rfl:   •  amLODIPine (NORVASC) 5 MG tablet, , Disp: , Rfl:   •  azithromycin (ZITHROMAX Z-WILBUR) 250 MG tablet, Take 2 tablets the first day, then 1 tablet daily for 4 days., Disp: 6 tablet, Rfl: 0    Aspirin use counseling: Start ASA 81 mg daily     Current medication list contains no high risk medications.  No harmful drug interactions have been identified.     Family History   Problem Relation Age of Onset   • Heart attack Father    • Breast cancer Neg Hx        Social History     Tobacco Use   • Smoking status: Never Smoker   • Smokeless tobacco: Never Used   Substance Use Topics   • Alcohol use: No     Frequency: Never       Past Surgical History:   Procedure Laterality Date   • HYSTERECTOMY     • KNEE SURGERY Right    • SHOULDER SURGERY Right        Patient Active Problem List   Diagnosis   • Precordial chest pain   • PVCs (premature ventricular contractions)   • Essential hypertension   • Acquired hypothyroidism   • Low serum potassium level   • Other hyperlipidemia   • Acute pain of left shoulder       Review of Systems    Objective     Vitals:    02/11/20 0947   BP: 132/72   BP Location: Right arm   Pulse: 59   Resp: 14   Temp: 97.5 °F (36.4 °C)   TempSrc: Oral   SpO2: 98%   Weight: 59 kg (130  "lb)   Height: 157.5 cm (62\")       Patient's Body mass index is 23.78 kg/m². BMI is within normal parameters. No follow-up required..      No exam data present    The patient has no evidence of cognitve impairment.     Physical Exam    Recent Lab Results:  Lab Results   Component Value Date    GLU 87 11/05/2019     Lab Results   Component Value Date    CHOL 218 (H) 05/14/2019    TRIG 132 11/05/2019    HDL 56 11/05/2019    VLDL 26.4 11/05/2019    LDLHDL 2.58 05/14/2019       Assessment/Plan   Age-appropriate Screening Schedule:  Refer to the list below for future screening recommendations based on patient's age, sex and/or medical conditions.      Health Maintenance   Topic Date Due   • ZOSTER VACCINE (1 of 2) 12/30/2020 (Originally 2/18/1991)   • LIPID PANEL  11/05/2020   • TDAP/TD VACCINES (2 - Tdap) 11/05/2029   • INFLUENZA VACCINE  Completed       Medicare Risks and Personalized Health Plan:  Fall Risk      CMS-Preventive Services Quick Reference  Medicare Preventive Services Addressed:  Annual Wellness Visit (AWV)    Advance Care Planning:  ACP discussion was held with the patient during this visit. Patient has an advance directive that is valid in EMR.     There are no diagnoses linked to this encounter.    An After Visit Summary and PPPS with all of these plans were given to the patient.      Follow Up:  No follow-ups on file.      Discussed with patient take medication regularly.  Keep follow-ups.  Fall precaution.  Keep pathways clear and lighted.  Colonoscopy every 10 years.  Mammogram every year.  Flu shot every year, pneumonia shot every 10 years.                                      "

## 2020-02-11 NOTE — PROGRESS NOTES
LIDA@  Nguyen Zhang is a 79 y.o. female.     Chief Complaint   Patient presents with   • Medicare Wellness-subsequent   • Hypertension   • Hypothyroidism   • Hyperlipidemia       History of Present Illness   Here follow-up for hypertension, hypothyroidism, hyperlipidemia.  Denies any chest pain or shortness of breath.  Cough and congestion she had is improved.  Cholesterol is high but does not take any medication due to myalgia, dieting and exercising.  Blood pressure is under control checking blood pressure.  She is taking Synthroid without any problems no palpitation, weakness.  The following portions of the patient's history were reviewed and updated as appropriate: allergies, current medications, past family history, past medical history, past social history, past surgical history and problem list.    Review of Systems   Constitutional: Negative for activity change, appetite change, fatigue and fever.   Eyes: Negative for blurred vision and double vision.   Respiratory: Negative for shortness of breath.    Cardiovascular: Negative for chest pain, palpitations and leg swelling.   Gastrointestinal: Negative.    Genitourinary: Negative for hematuria.   Neurological: Negative for dizziness, syncope, light-headedness and headache.   Psychiatric/Behavioral: Negative for agitation, behavioral problems and depressed mood.       Allergies   Allergen Reactions   • Ace Inhibitors Cough   • Codeine Nausea And Vomiting       Current Outpatient Medications on File Prior to Visit   Medication Sig Dispense Refill   • amLODIPine (NORVASC) 10 MG tablet Take 0.5 tablets by mouth Every Evening. 90 tablet 1   • aspirin 325 MG tablet Take 325 mg by mouth Daily.     • atenolol (TENORMIN) 50 MG tablet TAKE 1 TABLET BY MOUTH 2 TIMES A DAY. 60 tablet 5   • Bioflavonoid Products (DOROTEO C PO) Take 500 mg by mouth Daily.     • Cholecalciferol (VITAMIN D3) 1000 units capsule Take 2 capsules by mouth Daily.     •  hydroCHLOROthiazide (HYDRODIURIL) 25 MG tablet TAKE 1 TABLET BY MOUTH DAILY. 30 tablet 5   • levothyroxine (SYNTHROID, LEVOTHROID) 75 MCG tablet TAKE 1 TABLET BY MOUTH DAILY. 90 tablet 2   • losartan (COZAAR) 100 MG tablet TAKE 1 TABLET BY MOUTH DAILY. 90 tablet 2   • multivitamin-minerals (CENTRUM) tablet Take 1 tablet by mouth Daily.     • NIACIN PO Take 500 mg by mouth Daily.     • potassium chloride (K-DUR,KLOR-CON) 10 MEQ CR tablet Take 1 tablet by mouth Daily. 90 tablet 1   • vitamin E 400 UNIT capsule Take 400 Units by mouth Daily.     • Acetaminophen (TYLENOL ARTHRITIS PAIN PO) Take 500 mg by mouth 2 (Two) Times a Day.     • amLODIPine (NORVASC) 5 MG tablet      • amLODIPine (NORVASC) 5 MG tablet TAKE 1 TABLET BY MOUTH EVERY EVENING. 30 tablet 5   • azithromycin (ZITHROMAX Z-WILBUR) 250 MG tablet Take 2 tablets the first day, then 1 tablet daily for 4 days. 6 tablet 0   • potassium chloride (K-DUR) 10 MEQ CR tablet        No current facility-administered medications on file prior to visit.        Family History   Problem Relation Age of Onset   • Heart attack Father    • Breast cancer Neg Hx        Past Medical History:   Diagnosis Date   • Arthritis    • Disease of thyroid gland    • Hyperlipidemia    • Hypertension    • Hyperthyroidism    • Neck pain        Past Surgical History:   Procedure Laterality Date   • HYSTERECTOMY     • KNEE SURGERY Right    • SHOULDER SURGERY Right        Social History     Socioeconomic History   • Marital status:      Spouse name: Not on file   • Number of children: Not on file   • Years of education: Not on file   • Highest education level: Not on file   Tobacco Use   • Smoking status: Never Smoker   • Smokeless tobacco: Never Used   Substance and Sexual Activity   • Alcohol use: No     Frequency: Never   • Drug use: No   • Sexual activity: Defer       Patient Active Problem List   Diagnosis   • Precordial chest pain   • PVCs (premature ventricular contractions)   •  "Essential hypertension   • Acquired hypothyroidism   • Low serum potassium level   • Other hyperlipidemia   • Acute pain of left shoulder       /72 (BP Location: Right arm)   Pulse 59   Temp 97.5 °F (36.4 °C) (Oral)   Resp 14   Ht 157.5 cm (62\")   Wt 59 kg (130 lb)   SpO2 98%   BMI 23.78 kg/m²   Body mass index is 23.78 kg/m².    Objective   Physical Exam   Constitutional: She is oriented to person, place, and time. She appears well-developed.   Eyes: Pupils are equal, round, and reactive to light.   Neck: Normal range of motion. Neck supple. No JVD present. No tracheal deviation present. No thyromegaly present.   Cardiovascular: Normal rate, regular rhythm and intact distal pulses.   No murmur heard.  Pulmonary/Chest: Effort normal and breath sounds normal. No respiratory distress. She has no wheezes.   Abdominal: Soft. Bowel sounds are normal. She exhibits no distension and no mass. There is no tenderness. There is no rebound and no guarding.   Musculoskeletal: She exhibits no edema.   Lymphadenopathy:     She has no cervical adenopathy.   Neurological: She is alert and oriented to person, place, and time. She displays normal reflexes. No cranial nerve deficit. She exhibits normal muscle tone. Coordination normal.   Psychiatric: She has a normal mood and affect. Her behavior is normal.   Nursing note and vitals reviewed.        Assessment/Plan   Nguyen was seen today for medicare wellness-subsequent, hypertension, hypothyroidism and hyperlipidemia.    Diagnoses and all orders for this visit:    Essential hypertension  -     Comprehensive Metabolic Panel  -     Lipid Panel With / Chol / HDL Ratio  -     TSH    Other hyperlipidemia  -     Comprehensive Metabolic Panel  -     Lipid Panel With / Chol / HDL Ratio  -     TSH    Acquired hypothyroidism  -     Comprehensive Metabolic Panel  -     Lipid Panel With / Chol / HDL Ratio  -     TSH    Continue diet and exercise.  Continue amlodipine, Cozaar, " hydrochlorothiazide.  Patient also on potassium.  Discussed with patient she is taking high dose of aspirin 305 she does not need that recommend cutting it down to 81.  Patient reports taking 4 pain the shoulder.  Continue Synthroid.  Continue diet and exercise.  Patient does not want statins.  Return in 3 months time.  All conditions are chronic and stable.

## 2020-02-12 LAB
ALBUMIN SERPL-MCNC: 5.2 G/DL (ref 3.5–5.2)
ALBUMIN/GLOB SERPL: 2 G/DL
ALP SERPL-CCNC: 70 U/L (ref 39–117)
ALT SERPL-CCNC: 15 U/L (ref 1–33)
AST SERPL-CCNC: 23 U/L (ref 1–32)
BILIRUB SERPL-MCNC: 0.6 MG/DL (ref 0.2–1.2)
BUN SERPL-MCNC: 17 MG/DL (ref 8–23)
BUN/CREAT SERPL: 17.9 (ref 7–25)
CALCIUM SERPL-MCNC: 9.8 MG/DL (ref 8.6–10.5)
CHLORIDE SERPL-SCNC: 90 MMOL/L (ref 98–107)
CHOLEST SERPL-MCNC: 250 MG/DL (ref 0–200)
CHOLEST/HDLC SERPL: 3.91 {RATIO}
CO2 SERPL-SCNC: 28.6 MMOL/L (ref 22–29)
CREAT SERPL-MCNC: 0.95 MG/DL (ref 0.57–1)
GLOBULIN SER CALC-MCNC: 2.6 GM/DL
GLUCOSE SERPL-MCNC: 87 MG/DL (ref 65–99)
HDLC SERPL-MCNC: 64 MG/DL (ref 40–60)
LDLC SERPL CALC-MCNC: 163 MG/DL (ref 0–100)
POTASSIUM SERPL-SCNC: 3.4 MMOL/L (ref 3.5–5.2)
PROT SERPL-MCNC: 7.8 G/DL (ref 6–8.5)
SODIUM SERPL-SCNC: 136 MMOL/L (ref 136–145)
TRIGL SERPL-MCNC: 114 MG/DL (ref 0–150)
TSH SERPL DL<=0.005 MIU/L-ACNC: 2.28 UIU/ML (ref 0.27–4.2)
VLDLC SERPL CALC-MCNC: 22.8 MG/DL

## 2020-03-16 RX ORDER — LOSARTAN POTASSIUM 100 MG/1
TABLET ORAL
Qty: 90 TABLET | Refills: 2 | Status: SHIPPED | OUTPATIENT
Start: 2020-03-16 | End: 2020-12-16

## 2020-04-24 RX ORDER — HYDROCHLOROTHIAZIDE 25 MG/1
TABLET ORAL
Qty: 30 TABLET | Refills: 6 | Status: SHIPPED | OUTPATIENT
Start: 2020-04-24 | End: 2020-11-26

## 2020-04-24 RX ORDER — ATENOLOL 50 MG/1
TABLET ORAL
Qty: 60 TABLET | Refills: 6 | Status: SHIPPED | OUTPATIENT
Start: 2020-04-24 | End: 2020-04-27

## 2020-04-27 RX ORDER — ATENOLOL 50 MG/1
TABLET ORAL
Qty: 60 TABLET | Refills: 3 | Status: SHIPPED | OUTPATIENT
Start: 2020-04-27 | End: 2020-08-24

## 2020-05-15 DIAGNOSIS — I10 ESSENTIAL HYPERTENSION: ICD-10-CM

## 2020-05-15 DIAGNOSIS — Z00.01 ENCOUNTER FOR WELL ADULT EXAM WITH ABNORMAL FINDINGS: ICD-10-CM

## 2020-05-15 DIAGNOSIS — E03.9 ACQUIRED HYPOTHYROIDISM: ICD-10-CM

## 2020-05-15 RX ORDER — POTASSIUM CHLORIDE 750 MG/1
TABLET, FILM COATED, EXTENDED RELEASE ORAL
Qty: 90 TABLET | Refills: 0 | Status: SHIPPED | OUTPATIENT
Start: 2020-05-15 | End: 2020-08-24

## 2020-05-19 ENCOUNTER — OFFICE VISIT (OUTPATIENT)
Dept: FAMILY MEDICINE CLINIC | Facility: CLINIC | Age: 80
End: 2020-05-19

## 2020-05-19 VITALS
BODY MASS INDEX: 23.55 KG/M2 | OXYGEN SATURATION: 97 % | TEMPERATURE: 98.5 F | DIASTOLIC BLOOD PRESSURE: 70 MMHG | WEIGHT: 128 LBS | HEIGHT: 62 IN | SYSTOLIC BLOOD PRESSURE: 148 MMHG | HEART RATE: 68 BPM

## 2020-05-19 DIAGNOSIS — E03.9 ACQUIRED HYPOTHYROIDISM: ICD-10-CM

## 2020-05-19 DIAGNOSIS — E78.49 OTHER HYPERLIPIDEMIA: ICD-10-CM

## 2020-05-19 DIAGNOSIS — I10 ESSENTIAL HYPERTENSION: Primary | ICD-10-CM

## 2020-05-19 DIAGNOSIS — M15.9 PRIMARY OSTEOARTHRITIS INVOLVING MULTIPLE JOINTS: ICD-10-CM

## 2020-05-19 PROCEDURE — 99214 OFFICE O/P EST MOD 30 MIN: CPT | Performed by: INTERNAL MEDICINE

## 2020-05-19 NOTE — PROGRESS NOTES
Subjective   Nguyen Zhang is a 80 y.o. female is here for   Chief Complaint   Patient presents with   • Hyperlipidemia   • Hypertension   Thyroidism    History of Present Illness   Patient here follow-up for hypertension hyperlipidemia thyroid, DJD.  Denies any chest pain or shortness of breath.  Taking blood pressure at home has been okay.  She is taking Synthroid, Norvasc, higher dose of aspirin 325 but reports he takes more for arthritis than for cardioprotection.  She is also on Tenormin, hydrochlorothiazide, losartan.  No problems with thyroid, no palpitation weakness.  Blood pressure under control.  Dieting and exercising.  Cannot tolerate statins.  Arthritis under control with Tylenol and aspirin.  No stomach issues.  The following portions of the patient's history were reviewed and updated as appropriate: allergies, current medications, past family history, past medical history, past social history, past surgical history and problem list.     reports that she has never smoked. She has never used smokeless tobacco. She reports that she does not drink alcohol or use drugs.    Review of Systems   Constitutional: Negative for activity change, appetite change and unexpected weight change.   HENT: Negative for congestion.    Respiratory: Negative.  Negative for shortness of breath and wheezing.    Cardiovascular: Negative.  Negative for chest pain and palpitations.   Gastrointestinal: Negative.  Negative for abdominal pain, blood in stool and constipation.   Endocrine: Negative.    Genitourinary: Negative for difficulty urinating and hematuria.   Musculoskeletal: Negative for gait problem.   Skin: Negative for color change and rash.   Neurological: Negative.    Psychiatric/Behavioral: Negative for behavioral problems, decreased concentration and dysphoric mood.        PHQ-9 Depression Screening  Little interest or pleasure in doing things?     Feeling down, depressed, or hopeless?     Trouble falling or  "staying asleep, or sleeping too much?     Feeling tired or having little energy?     Poor appetite or overeating?     Feeling bad about yourself - or that you are a failure or have let yourself or your family down?     Trouble concentrating on things, such as reading the newspaper or watching television?     Moving or speaking so slowly that other people could have noticed? Or the opposite - being so fidgety or restless that you have been moving around a lot more than usual?     Thoughts that you would be better off dead, or of hurting yourself in some way?     PHQ-9 Total Score     If you checked off any problems, how difficult have these problems made it for you to do your work, take care of things at home, or get along with other people?           Objective   /70 (BP Location: Right arm, Patient Position: Sitting, Cuff Size: Adult)   Pulse 68   Temp 98.5 °F (36.9 °C) (Tympanic)   Ht 157.5 cm (62.01\")   Wt 58.1 kg (128 lb)   SpO2 97%   BMI 23.41 kg/m²   Physical Exam   Constitutional: She is oriented to person, place, and time. She appears well-developed and well-nourished.   Eyes: Pupils are equal, round, and reactive to light. EOM are normal.   Neck: Normal range of motion. Neck supple. No JVD present. No tracheal deviation present. No thyromegaly present.   Cardiovascular: Normal rate, regular rhythm, normal heart sounds and intact distal pulses.   No murmur heard.  Pulmonary/Chest: Effort normal and breath sounds normal. No respiratory distress. She has no wheezes. She has no rales.   Abdominal: Soft. Bowel sounds are normal. She exhibits no distension and no mass. There is no tenderness. There is rebound. There is no guarding.   Musculoskeletal: She exhibits no edema or deformity.   Lymphadenopathy:     She has no cervical adenopathy.   Neurological: She is alert and oriented to person, place, and time. She displays normal reflexes. No cranial nerve deficit or sensory deficit. She exhibits normal " muscle tone. Coordination normal.   Psychiatric: She has a normal mood and affect. Her behavior is normal.   Nursing note and vitals reviewed.      Procedures    Assessment/Plan   Diagnoses and all orders for this visit:    1. Essential hypertension (Primary)  -     CBC & Differential  -     Comprehensive Metabolic Panel  -     TSH  -     T4, Free  -     Lipid Panel With / Chol / HDL Ratio    2. Other hyperlipidemia  -     CBC & Differential  -     Comprehensive Metabolic Panel  -     TSH  -     T4, Free  -     Lipid Panel With / Chol / HDL Ratio    3. Acquired hypothyroidism  -     CBC & Differential  -     Comprehensive Metabolic Panel  -     TSH  -     T4, Free  -     Lipid Panel With / Chol / HDL Ratio    4. Primary osteoarthritis involving multiple joints  -     CBC & Differential  -     Comprehensive Metabolic Panel  -     TSH  -     T4, Free  -     Lipid Panel With / Chol / HDL Ratio    Continue diet and exercise.  Went to the list of all medications Synthroid, losartan, Tenormin, Norvasc, aspirin.  Encourage patient to cut down aspirin to 81 mg daily.  Check blood pressure.  Patient cannot tolerate statin.  Continue diet and exercise.  Return in 3 months time.  All her conditions are chronic and stable.  Doing labs.         Answers for HPI/ROS submitted by the patient on 5/15/2020   Hypertension  What is the primary reason for your visit?: High Blood Pressure

## 2020-05-20 LAB
ALBUMIN SERPL-MCNC: 5 G/DL (ref 3.5–5.2)
ALBUMIN/GLOB SERPL: 2 G/DL
ALP SERPL-CCNC: 65 U/L (ref 39–117)
ALT SERPL-CCNC: 16 U/L (ref 1–33)
AST SERPL-CCNC: 23 U/L (ref 1–32)
BASOPHILS # BLD AUTO: 0.05 10*3/MM3 (ref 0–0.2)
BASOPHILS NFR BLD AUTO: 0.7 % (ref 0–1.5)
BILIRUB SERPL-MCNC: 0.8 MG/DL (ref 0.2–1.2)
BUN SERPL-MCNC: 14 MG/DL (ref 8–23)
BUN/CREAT SERPL: 15.1 (ref 7–25)
CALCIUM SERPL-MCNC: 10 MG/DL (ref 8.6–10.5)
CHLORIDE SERPL-SCNC: 93 MMOL/L (ref 98–107)
CHOLEST SERPL-MCNC: 218 MG/DL (ref 0–200)
CHOLEST/HDLC SERPL: 3.69 {RATIO}
CO2 SERPL-SCNC: 28.4 MMOL/L (ref 22–29)
CREAT SERPL-MCNC: 0.93 MG/DL (ref 0.57–1)
EOSINOPHIL # BLD AUTO: 0.06 10*3/MM3 (ref 0–0.4)
EOSINOPHIL NFR BLD AUTO: 0.9 % (ref 0.3–6.2)
ERYTHROCYTE [DISTWIDTH] IN BLOOD BY AUTOMATED COUNT: 11.8 % (ref 12.3–15.4)
GLOBULIN SER CALC-MCNC: 2.5 GM/DL
GLUCOSE SERPL-MCNC: 97 MG/DL (ref 65–99)
HCT VFR BLD AUTO: 35.1 % (ref 34–46.6)
HDLC SERPL-MCNC: 59 MG/DL (ref 40–60)
HGB BLD-MCNC: 12.2 G/DL (ref 12–15.9)
IMM GRANULOCYTES # BLD AUTO: 0.01 10*3/MM3 (ref 0–0.05)
IMM GRANULOCYTES NFR BLD AUTO: 0.1 % (ref 0–0.5)
LDLC SERPL CALC-MCNC: 141 MG/DL (ref 0–100)
LYMPHOCYTES # BLD AUTO: 1.58 10*3/MM3 (ref 0.7–3.1)
LYMPHOCYTES NFR BLD AUTO: 23.4 % (ref 19.6–45.3)
MCH RBC QN AUTO: 31.9 PG (ref 26.6–33)
MCHC RBC AUTO-ENTMCNC: 34.8 G/DL (ref 31.5–35.7)
MCV RBC AUTO: 91.9 FL (ref 79–97)
MONOCYTES # BLD AUTO: 0.68 10*3/MM3 (ref 0.1–0.9)
MONOCYTES NFR BLD AUTO: 10.1 % (ref 5–12)
NEUTROPHILS # BLD AUTO: 4.37 10*3/MM3 (ref 1.7–7)
NEUTROPHILS NFR BLD AUTO: 64.8 % (ref 42.7–76)
NRBC BLD AUTO-RTO: 0 /100 WBC (ref 0–0.2)
PLATELET # BLD AUTO: 254 10*3/MM3 (ref 140–450)
POTASSIUM SERPL-SCNC: 4.1 MMOL/L (ref 3.5–5.2)
PROT SERPL-MCNC: 7.5 G/DL (ref 6–8.5)
RBC # BLD AUTO: 3.82 10*6/MM3 (ref 3.77–5.28)
SODIUM SERPL-SCNC: 137 MMOL/L (ref 136–145)
T4 FREE SERPL-MCNC: 1.81 NG/DL (ref 0.93–1.7)
TRIGL SERPL-MCNC: 90 MG/DL (ref 0–150)
TSH SERPL DL<=0.005 MIU/L-ACNC: 1.12 UIU/ML (ref 0.27–4.2)
VLDLC SERPL CALC-MCNC: 18 MG/DL
WBC # BLD AUTO: 6.75 10*3/MM3 (ref 3.4–10.8)

## 2020-05-26 RX ORDER — AMLODIPINE BESYLATE 5 MG/1
TABLET ORAL
Qty: 30 TABLET | Refills: 5 | Status: SHIPPED | OUTPATIENT
Start: 2020-05-26 | End: 2020-10-22

## 2020-06-17 ENCOUNTER — TRANSCRIBE ORDERS (OUTPATIENT)
Dept: ADMINISTRATIVE | Facility: HOSPITAL | Age: 80
End: 2020-06-17

## 2020-06-17 DIAGNOSIS — Z12.31 SCREENING MAMMOGRAM, ENCOUNTER FOR: Primary | ICD-10-CM

## 2020-07-07 ENCOUNTER — HOSPITAL ENCOUNTER (OUTPATIENT)
Dept: MAMMOGRAPHY | Facility: HOSPITAL | Age: 80
Discharge: HOME | End: 2020-07-07
Admitting: INTERNAL MEDICINE

## 2020-07-07 DIAGNOSIS — Z12.31 SCREENING MAMMOGRAM, ENCOUNTER FOR: ICD-10-CM

## 2020-07-07 PROCEDURE — 77063 BREAST TOMOSYNTHESIS BI: CPT

## 2020-07-07 PROCEDURE — 77067 SCR MAMMO BI INCL CAD: CPT

## 2020-08-24 DIAGNOSIS — I10 ESSENTIAL HYPERTENSION: ICD-10-CM

## 2020-08-24 DIAGNOSIS — E03.9 ACQUIRED HYPOTHYROIDISM: ICD-10-CM

## 2020-08-24 DIAGNOSIS — Z00.01 ENCOUNTER FOR WELL ADULT EXAM WITH ABNORMAL FINDINGS: ICD-10-CM

## 2020-08-24 RX ORDER — POTASSIUM CHLORIDE 750 MG/1
TABLET, FILM COATED, EXTENDED RELEASE ORAL
Qty: 90 TABLET | Refills: 0 | Status: SHIPPED | OUTPATIENT
Start: 2020-08-24 | End: 2020-10-22

## 2020-08-24 RX ORDER — ATENOLOL 50 MG/1
TABLET ORAL
Qty: 60 TABLET | Refills: 11 | Status: SHIPPED | OUTPATIENT
Start: 2020-08-24 | End: 2021-08-25

## 2020-08-25 ENCOUNTER — OFFICE VISIT (OUTPATIENT)
Dept: FAMILY MEDICINE CLINIC | Facility: CLINIC | Age: 80
End: 2020-08-25

## 2020-08-25 VITALS
HEART RATE: 67 BPM | SYSTOLIC BLOOD PRESSURE: 142 MMHG | WEIGHT: 126 LBS | BODY MASS INDEX: 23.19 KG/M2 | HEIGHT: 62 IN | TEMPERATURE: 97.1 F | OXYGEN SATURATION: 98 % | DIASTOLIC BLOOD PRESSURE: 74 MMHG | RESPIRATION RATE: 14 BRPM

## 2020-08-25 DIAGNOSIS — E03.9 ACQUIRED HYPOTHYROIDISM: ICD-10-CM

## 2020-08-25 DIAGNOSIS — I10 ESSENTIAL HYPERTENSION: Primary | ICD-10-CM

## 2020-08-25 DIAGNOSIS — M15.9 PRIMARY OSTEOARTHRITIS INVOLVING MULTIPLE JOINTS: ICD-10-CM

## 2020-08-25 DIAGNOSIS — E78.49 OTHER HYPERLIPIDEMIA: ICD-10-CM

## 2020-08-25 PROCEDURE — 99214 OFFICE O/P EST MOD 30 MIN: CPT | Performed by: INTERNAL MEDICINE

## 2020-08-25 NOTE — PROGRESS NOTES
LIDA@  Nguyen Zhang is a 80 y.o. female.     Chief Complaint   Patient presents with   • Hypertension   • Hyperlipidemia   • Hypothyroidism   DJD    History of Present Illness   Patient here follow-up for hypertension, hyperlipidemia, thyroid, DJD.  No chest pain or shortness of breath.  Continues to take Norvasc 5, Tenormin, hydrochlorothiazide, potassium, Cozaar.  Denies any chest pain or shortness breath.  Blood pressure under control.  Reports she is taking higher dose of aspirin for arthritis.  She has been doing it for years has no problems.  Had the same discussion last visit .  The following portions of the patient's history were reviewed and updated as appropriate: allergies, current medications, past family history, past medical history, past social history, past surgical history and problem list.    Review of Systems   Constitutional: Negative for activity change, appetite change, fatigue and fever.   Eyes: Negative for blurred vision and double vision.   Respiratory: Negative.  Negative for shortness of breath.    Cardiovascular: Negative for chest pain, palpitations and leg swelling.   Gastrointestinal: Negative.    Genitourinary: Negative for dysuria and hematuria.   Musculoskeletal: Negative for arthralgias and back pain.   Neurological: Negative for dizziness, syncope, light-headedness and headache.   Psychiatric/Behavioral: Negative for agitation, behavioral problems, decreased concentration and dysphoric mood.       Allergies   Allergen Reactions   • Ace Inhibitors Cough   • Codeine Nausea And Vomiting       Current Outpatient Medications on File Prior to Visit   Medication Sig Dispense Refill   • Acetaminophen (TYLENOL ARTHRITIS PAIN PO) Take 500 mg by mouth Daily.     • amLODIPine (NORVASC) 5 MG tablet TAKE 1 TABLET BY MOUTH EVERY EVENING. 30 tablet 5   • aspirin 325 MG tablet Take 325 mg by mouth Daily.     • atenolol (TENORMIN) 50 MG tablet TAKE 1 TABLET BY MOUTH 2 TIMES A DAY.  60 tablet 11   • Bioflavonoid Products (DOROTEO C PO) Take 500 mg by mouth Daily.     • Cholecalciferol (VITAMIN D3) 1000 units capsule Take 2 capsules by mouth Daily.     • hydroCHLOROthiazide (HYDRODIURIL) 25 MG tablet TAKE 1 TABLET BY MOUTH DAILY. 30 tablet 6   • levothyroxine (SYNTHROID, LEVOTHROID) 75 MCG tablet TAKE 1 TABLET BY MOUTH DAILY. 90 tablet 2   • losartan (COZAAR) 100 MG tablet TAKE 1 TABLET BY MOUTH DAILY. 90 tablet 2   • multivitamin-minerals (CENTRUM) tablet Take 1 tablet by mouth Daily.     • NIACIN PO Take 500 mg by mouth Daily.     • potassium chloride (K-DUR) 10 MEQ CR tablet TAKE 1 TABLET BY MOUTH DAILY. 90 tablet 0   • vitamin E 400 UNIT capsule Take 400 Units by mouth Daily.     • amLODIPine (NORVASC) 10 MG tablet Take 0.5 tablets by mouth Every Evening. 90 tablet 1   • amLODIPine (NORVASC) 5 MG tablet      • azithromycin (ZITHROMAX Z-WILBUR) 250 MG tablet Take 2 tablets the first day, then 1 tablet daily for 4 days. 6 tablet 0   • potassium chloride (K-DUR) 10 MEQ CR tablet        No current facility-administered medications on file prior to visit.        Family History   Problem Relation Age of Onset   • Heart attack Father    • Breast cancer Neg Hx        Past Medical History:   Diagnosis Date   • Arthritis    • Disease of thyroid gland    • Hyperlipidemia    • Hypertension    • Hyperthyroidism    • Neck pain        Past Surgical History:   Procedure Laterality Date   • HYSTERECTOMY     • KNEE SURGERY Right    • SHOULDER SURGERY Right        Social History     Socioeconomic History   • Marital status:      Spouse name: Not on file   • Number of children: Not on file   • Years of education: Not on file   • Highest education level: Not on file   Tobacco Use   • Smoking status: Never Smoker   • Smokeless tobacco: Never Used   Substance and Sexual Activity   • Alcohol use: No     Frequency: Never   • Drug use: No   • Sexual activity: Defer       Patient Active Problem List   Diagnosis   •  "Precordial chest pain   • PVCs (premature ventricular contractions)   • Essential hypertension   • Acquired hypothyroidism   • Low serum potassium level   • Other hyperlipidemia   • Acute pain of left shoulder   • Osteoarthritis of multiple joints       /74 (BP Location: Left arm, Patient Position: Sitting, Cuff Size: Adult)   Pulse 67   Temp 97.1 °F (36.2 °C) (Temporal)   Resp 14   Ht 157.5 cm (62\")   Wt 57.2 kg (126 lb)   SpO2 98%   BMI 23.05 kg/m²   Body mass index is 23.05 kg/m².    Objective   Physical Exam   Constitutional: She is oriented to person, place, and time. She appears well-developed.   Eyes: Pupils are equal, round, and reactive to light.   Neck: Normal range of motion. Neck supple. No JVD present. No tracheal deviation present. No thyromegaly present.   Cardiovascular: Normal rate, regular rhythm and intact distal pulses.   No murmur heard.  Pulmonary/Chest: Effort normal and breath sounds normal. No respiratory distress. She has no wheezes.   Abdominal: Soft. Bowel sounds are normal. She exhibits no distension and no mass. There is no tenderness. There is no rebound and no guarding. No hernia.   Musculoskeletal: She exhibits no edema.   Lymphadenopathy:     She has no cervical adenopathy.   Neurological: She is alert and oriented to person, place, and time. She displays normal reflexes. No cranial nerve deficit or sensory deficit. She exhibits normal muscle tone. Coordination normal.   Skin: Skin is warm and dry.   Psychiatric: She has a normal mood and affect. Her behavior is normal.   Nursing note and vitals reviewed.        Assessment/Plan   Nguyen was seen today for hypertension, hyperlipidemia and hypothyroidism.    Diagnoses and all orders for this visit:    Essential hypertension  -     Hepatitis C antibody  -     CBC & Differential  -     Comprehensive Metabolic Panel  -     TSH    Other hyperlipidemia  -     Hepatitis C antibody  -     CBC & Differential  -     Comprehensive " Metabolic Panel  -     TSH    Acquired hypothyroidism  -     Hepatitis C antibody  -     CBC & Differential  -     Comprehensive Metabolic Panel  -     TSH    Primary osteoarthritis involving multiple joints  -     Hepatitis C antibody  -     CBC & Differential  -     Comprehensive Metabolic Panel  -     TSH    Continue diet and exercise.  Continue all current medication.  Recommend cutting down aspirin to 81.  Continue checking blood pressure.  Return in 3 months time.  All her problems are chronic and stable for now.  Awaiting labs.

## 2020-08-26 LAB
ALBUMIN SERPL-MCNC: 4.9 G/DL (ref 3.5–5.2)
ALBUMIN/GLOB SERPL: 2.7 G/DL
ALP SERPL-CCNC: 63 U/L (ref 39–117)
ALT SERPL-CCNC: 17 U/L (ref 1–33)
AST SERPL-CCNC: 26 U/L (ref 1–32)
BASOPHILS # BLD AUTO: 0.04 10*3/MM3 (ref 0–0.2)
BASOPHILS NFR BLD AUTO: 0.6 % (ref 0–1.5)
BILIRUB SERPL-MCNC: 0.7 MG/DL (ref 0–1.2)
BUN SERPL-MCNC: 16 MG/DL (ref 8–23)
BUN/CREAT SERPL: 17.8 (ref 7–25)
CALCIUM SERPL-MCNC: 9.4 MG/DL (ref 8.6–10.5)
CHLORIDE SERPL-SCNC: 94 MMOL/L (ref 98–107)
CO2 SERPL-SCNC: 29 MMOL/L (ref 22–29)
CREAT SERPL-MCNC: 0.9 MG/DL (ref 0.57–1)
EOSINOPHIL # BLD AUTO: 0.06 10*3/MM3 (ref 0–0.4)
EOSINOPHIL NFR BLD AUTO: 0.9 % (ref 0.3–6.2)
ERYTHROCYTE [DISTWIDTH] IN BLOOD BY AUTOMATED COUNT: 11.9 % (ref 12.3–15.4)
GLOBULIN SER CALC-MCNC: 1.8 GM/DL
GLUCOSE SERPL-MCNC: 88 MG/DL (ref 65–99)
HCT VFR BLD AUTO: 36.4 % (ref 34–46.6)
HCV AB S/CO SERPL IA: <0.1 S/CO RATIO (ref 0–0.9)
HGB BLD-MCNC: 12.5 G/DL (ref 12–15.9)
IMM GRANULOCYTES # BLD AUTO: 0.01 10*3/MM3 (ref 0–0.05)
IMM GRANULOCYTES NFR BLD AUTO: 0.1 % (ref 0–0.5)
LYMPHOCYTES # BLD AUTO: 1.56 10*3/MM3 (ref 0.7–3.1)
LYMPHOCYTES NFR BLD AUTO: 22.6 % (ref 19.6–45.3)
MCH RBC QN AUTO: 31.6 PG (ref 26.6–33)
MCHC RBC AUTO-ENTMCNC: 34.3 G/DL (ref 31.5–35.7)
MCV RBC AUTO: 92.2 FL (ref 79–97)
MONOCYTES # BLD AUTO: 0.64 10*3/MM3 (ref 0.1–0.9)
MONOCYTES NFR BLD AUTO: 9.3 % (ref 5–12)
NEUTROPHILS # BLD AUTO: 4.58 10*3/MM3 (ref 1.7–7)
NEUTROPHILS NFR BLD AUTO: 66.5 % (ref 42.7–76)
NRBC BLD AUTO-RTO: 0 /100 WBC (ref 0–0.2)
PLATELET # BLD AUTO: 227 10*3/MM3 (ref 140–450)
POTASSIUM SERPL-SCNC: 3.8 MMOL/L (ref 3.5–5.2)
PROT SERPL-MCNC: 6.7 G/DL (ref 6–8.5)
RBC # BLD AUTO: 3.95 10*6/MM3 (ref 3.77–5.28)
SODIUM SERPL-SCNC: 136 MMOL/L (ref 136–145)
TSH SERPL DL<=0.005 MIU/L-ACNC: 3.92 UIU/ML (ref 0.27–4.2)
WBC # BLD AUTO: 6.89 10*3/MM3 (ref 3.4–10.8)

## 2020-08-27 ENCOUNTER — TRANSCRIBE ORDERS (OUTPATIENT)
Dept: ADMINISTRATIVE | Facility: HOSPITAL | Age: 80
End: 2020-08-27

## 2020-08-27 DIAGNOSIS — Z13.6 ENCOUNTER FOR SCREENING FOR VASCULAR DISEASE: Primary | ICD-10-CM

## 2020-09-08 ENCOUNTER — HOSPITAL ENCOUNTER (OUTPATIENT)
Dept: CARDIOLOGY | Facility: HOSPITAL | Age: 80
Discharge: HOME | End: 2020-09-08
Admitting: INTERNAL MEDICINE

## 2020-09-08 VITALS
WEIGHT: 125 LBS | SYSTOLIC BLOOD PRESSURE: 160 MMHG | BODY MASS INDEX: 23.6 KG/M2 | HEART RATE: 60 BPM | HEIGHT: 61 IN | DIASTOLIC BLOOD PRESSURE: 74 MMHG

## 2020-09-08 DIAGNOSIS — Z13.6 ENCOUNTER FOR SCREENING FOR VASCULAR DISEASE: ICD-10-CM

## 2020-09-08 LAB
BH CV ECHO MEAS - DIST AO DIAM: 1.32 CM
BH CV VAS BP LEFT ARM: NORMAL MMHG
BH CV VAS BP RIGHT ARM: NORMAL MMHG
BH CV XLRA MEAS - MID AO DIAM: 1.57 CM
BH CV XLRA MEAS - PAD LEFT ABI DP: 1.06
BH CV XLRA MEAS - PAD LEFT ABI PT: 1.06
BH CV XLRA MEAS - PAD LEFT ARM: 160 MMHG
BH CV XLRA MEAS - PAD LEFT LEG DP: 170 MMHG
BH CV XLRA MEAS - PAD LEFT LEG PT: 170 MMHG
BH CV XLRA MEAS - PAD RIGHT ABI DP: 1.06
BH CV XLRA MEAS - PAD RIGHT ABI PT: 1.12
BH CV XLRA MEAS - PAD RIGHT ARM: 158 MMHG
BH CV XLRA MEAS - PAD RIGHT LEG DP: 170 MMHG
BH CV XLRA MEAS - PAD RIGHT LEG PT: 180 MMHG
BH CV XLRA MEAS - PROX AO DIAM: 1.71 CM
BH CV XLRA MEAS LEFT ICA/CCA RATIO: 1.35
BH CV XLRA MEAS LEFT MID CCA PSV: NORMAL CM/SEC
BH CV XLRA MEAS LEFT MID ICA PSV: NORMAL CM/SEC
BH CV XLRA MEAS LEFT PROX ECA PSV: NORMAL CM/SEC
BH CV XLRA MEAS RIGHT ICA/CCA RATIO: 1.23
BH CV XLRA MEAS RIGHT MID CCA PSV: NORMAL CM/SEC
BH CV XLRA MEAS RIGHT MID ICA PSV: NORMAL CM/SEC
BH CV XLRA MEAS RIGHT PROX ECA PSV: NORMAL CM/SEC

## 2020-09-08 PROCEDURE — 93799 UNLISTED CV SVC/PROCEDURE: CPT

## 2020-09-15 DIAGNOSIS — E03.9 ACQUIRED HYPOTHYROIDISM: ICD-10-CM

## 2020-09-16 RX ORDER — LEVOTHYROXINE SODIUM 0.07 MG/1
75 TABLET ORAL DAILY
Qty: 90 TABLET | Refills: 3 | Status: SHIPPED | OUTPATIENT
Start: 2020-09-16 | End: 2021-09-17

## 2020-10-16 ENCOUNTER — APPOINTMENT (OUTPATIENT)
Dept: GENERAL RADIOLOGY | Facility: HOSPITAL | Age: 80
End: 2020-10-16

## 2020-10-16 ENCOUNTER — HOSPITAL ENCOUNTER (EMERGENCY)
Facility: HOSPITAL | Age: 80
Discharge: HOME | End: 2020-10-16
Attending: EMERGENCY MEDICINE | Admitting: EMERGENCY MEDICINE

## 2020-10-16 VITALS
BODY MASS INDEX: 23.94 KG/M2 | HEIGHT: 61 IN | TEMPERATURE: 97 F | OXYGEN SATURATION: 96 % | HEART RATE: 77 BPM | WEIGHT: 126.8 LBS | SYSTOLIC BLOOD PRESSURE: 167 MMHG | RESPIRATION RATE: 18 BRPM | DIASTOLIC BLOOD PRESSURE: 93 MMHG

## 2020-10-16 DIAGNOSIS — R55 NEAR SYNCOPE: Primary | ICD-10-CM

## 2020-10-16 DIAGNOSIS — I10 HYPERTENSION, UNSPECIFIED TYPE: ICD-10-CM

## 2020-10-16 DIAGNOSIS — R11.0 NAUSEA: ICD-10-CM

## 2020-10-16 LAB
ALBUMIN SERPL-MCNC: 4.8 G/DL (ref 3.5–5.2)
ALBUMIN/GLOB SERPL: 1.5 G/DL
ALP SERPL-CCNC: 80 U/L (ref 39–117)
ALT SERPL W P-5'-P-CCNC: 24 U/L (ref 1–33)
ANION GAP SERPL CALCULATED.3IONS-SCNC: 11.5 MMOL/L (ref 5–15)
AST SERPL-CCNC: 31 U/L (ref 1–32)
BASOPHILS # BLD AUTO: 0.04 10*3/MM3 (ref 0–0.2)
BASOPHILS NFR BLD AUTO: 0.9 % (ref 0–1.5)
BILIRUB SERPL-MCNC: 0.3 MG/DL (ref 0–1.2)
BUN SERPL-MCNC: 18 MG/DL (ref 8–23)
BUN/CREAT SERPL: 17.5 (ref 7–25)
CALCIUM SPEC-SCNC: 10 MG/DL (ref 8.6–10.5)
CHLORIDE SERPL-SCNC: 95 MMOL/L (ref 98–107)
CO2 SERPL-SCNC: 30.5 MMOL/L (ref 22–29)
CREAT SERPL-MCNC: 1.03 MG/DL (ref 0.57–1)
DEPRECATED RDW RBC AUTO: 41.1 FL (ref 37–54)
EOSINOPHIL # BLD AUTO: 0.02 10*3/MM3 (ref 0–0.4)
EOSINOPHIL NFR BLD AUTO: 0.5 % (ref 0.3–6.2)
ERYTHROCYTE [DISTWIDTH] IN BLOOD BY AUTOMATED COUNT: 11.8 % (ref 12.3–15.4)
GFR SERPL CREATININE-BSD FRML MDRD: 52 ML/MIN/1.73
GLOBULIN UR ELPH-MCNC: 3.2 GM/DL
GLUCOSE BLDC GLUCOMTR-MCNC: 95 MG/DL (ref 70–130)
GLUCOSE SERPL-MCNC: 97 MG/DL (ref 65–99)
HCT VFR BLD AUTO: 40.2 % (ref 34–46.6)
HGB BLD-MCNC: 13.4 G/DL (ref 12–15.9)
IMM GRANULOCYTES # BLD AUTO: 0.01 10*3/MM3 (ref 0–0.05)
IMM GRANULOCYTES NFR BLD AUTO: 0.2 % (ref 0–0.5)
LYMPHOCYTES # BLD AUTO: 0.9 10*3/MM3 (ref 0.7–3.1)
LYMPHOCYTES NFR BLD AUTO: 20.7 % (ref 19.6–45.3)
MCH RBC QN AUTO: 31.3 PG (ref 26.6–33)
MCHC RBC AUTO-ENTMCNC: 33.3 G/DL (ref 31.5–35.7)
MCV RBC AUTO: 93.9 FL (ref 79–97)
MONOCYTES # BLD AUTO: 0.76 10*3/MM3 (ref 0.1–0.9)
MONOCYTES NFR BLD AUTO: 17.5 % (ref 5–12)
NEUTROPHILS NFR BLD AUTO: 2.61 10*3/MM3 (ref 1.7–7)
NEUTROPHILS NFR BLD AUTO: 60.2 % (ref 42.7–76)
PLATELET # BLD AUTO: 208 10*3/MM3 (ref 140–450)
PMV BLD AUTO: 10 FL (ref 6–12)
POTASSIUM SERPL-SCNC: 3.6 MMOL/L (ref 3.5–5.2)
PROT SERPL-MCNC: 8 G/DL (ref 6–8.5)
RBC # BLD AUTO: 4.28 10*6/MM3 (ref 3.77–5.28)
SARS-COV-2 RNA PNL SPEC NAA+PROBE: NOT DETECTED
SODIUM SERPL-SCNC: 137 MMOL/L (ref 136–145)
TROPONIN T SERPL-MCNC: <0.01 NG/ML (ref 0–0.03)
WBC # BLD AUTO: 4.34 10*3/MM3 (ref 3.4–10.8)

## 2020-10-16 PROCEDURE — 99282 EMERGENCY DEPT VISIT SF MDM: CPT | Performed by: EMERGENCY MEDICINE

## 2020-10-16 PROCEDURE — 80053 COMPREHEN METABOLIC PANEL: CPT | Performed by: EMERGENCY MEDICINE

## 2020-10-16 PROCEDURE — 93010 ELECTROCARDIOGRAM REPORT: CPT | Performed by: INTERNAL MEDICINE

## 2020-10-16 PROCEDURE — 99284 EMERGENCY DEPT VISIT MOD MDM: CPT

## 2020-10-16 PROCEDURE — 93005 ELECTROCARDIOGRAM TRACING: CPT | Performed by: EMERGENCY MEDICINE

## 2020-10-16 PROCEDURE — 85025 COMPLETE CBC W/AUTO DIFF WBC: CPT | Performed by: EMERGENCY MEDICINE

## 2020-10-16 PROCEDURE — 71045 X-RAY EXAM CHEST 1 VIEW: CPT

## 2020-10-16 PROCEDURE — 87635 SARS-COV-2 COVID-19 AMP PRB: CPT | Performed by: EMERGENCY MEDICINE

## 2020-10-16 PROCEDURE — 84484 ASSAY OF TROPONIN QUANT: CPT | Performed by: EMERGENCY MEDICINE

## 2020-10-16 PROCEDURE — 82962 GLUCOSE BLOOD TEST: CPT

## 2020-10-16 RX ORDER — SODIUM CHLORIDE 0.9 % (FLUSH) 0.9 %
10 SYRINGE (ML) INJECTION AS NEEDED
Status: DISCONTINUED | OUTPATIENT
Start: 2020-10-16 | End: 2020-10-16 | Stop reason: HOSPADM

## 2020-10-16 RX ADMIN — SODIUM CHLORIDE 500 ML: 9 INJECTION, SOLUTION INTRAVENOUS at 07:48

## 2020-10-16 NOTE — ED PROVIDER NOTES
I assumed care of this patient from Dr. Rodríguez at the time of shift change.  Certain labs and the x-ray report were still pending at that time.  I have now reviewed all the labs.    Results for orders placed or performed during the hospital encounter of 10/16/20   COVID-19,[facility] IN-HOUSE,Nasal Swab No Transport Media 3-4 HR TAT - Swab, Nasal Cavity    Specimen: Nasal Cavity; Swab   Result Value Ref Range    COVID19 Not Detected Not Detected - Ref. Range   Comprehensive Metabolic Panel    Specimen: Blood   Result Value Ref Range    Glucose 97 65 - 99 mg/dL    BUN 18 8 - 23 mg/dL    Creatinine 1.03 (H) 0.57 - 1.00 mg/dL    Sodium 137 136 - 145 mmol/L    Potassium 3.6 3.5 - 5.2 mmol/L    Chloride 95 (L) 98 - 107 mmol/L    CO2 30.5 (H) 22.0 - 29.0 mmol/L    Calcium 10.0 8.6 - 10.5 mg/dL    Total Protein 8.0 6.0 - 8.5 g/dL    Albumin 4.80 3.50 - 5.20 g/dL    ALT (SGPT) 24 1 - 33 U/L    AST (SGOT) 31 1 - 32 U/L    Alkaline Phosphatase 80 39 - 117 U/L    Total Bilirubin 0.3 0.0 - 1.2 mg/dL    eGFR Non African Amer 52 (L) >60 mL/min/1.73    Globulin 3.2 gm/dL    A/G Ratio 1.5 g/dL    BUN/Creatinine Ratio 17.5 7.0 - 25.0    Anion Gap 11.5 5.0 - 15.0 mmol/L   Troponin    Specimen: Blood   Result Value Ref Range    Troponin T <0.010 0.000 - 0.030 ng/mL   CBC Auto Differential    Specimen: Blood   Result Value Ref Range    WBC 4.34 3.40 - 10.80 10*3/mm3    RBC 4.28 3.77 - 5.28 10*6/mm3    Hemoglobin 13.4 12.0 - 15.9 g/dL    Hematocrit 40.2 34.0 - 46.6 %    MCV 93.9 79.0 - 97.0 fL    MCH 31.3 26.6 - 33.0 pg    MCHC 33.3 31.5 - 35.7 g/dL    RDW 11.8 (L) 12.3 - 15.4 %    RDW-SD 41.1 37.0 - 54.0 fl    MPV 10.0 6.0 - 12.0 fL    Platelets 208 140 - 450 10*3/mm3    Neutrophil % 60.2 42.7 - 76.0 %    Lymphocyte % 20.7 19.6 - 45.3 %    Monocyte % 17.5 (H) 5.0 - 12.0 %    Eosinophil % 0.5 0.3 - 6.2 %    Basophil % 0.9 0.0 - 1.5 %    Immature Grans % 0.2 0.0 - 0.5 %    Neutrophils, Absolute 2.61 1.70 - 7.00 10*3/mm3    Lymphocytes,  "Absolute 0.90 0.70 - 3.10 10*3/mm3    Monocytes, Absolute 0.76 0.10 - 0.90 10*3/mm3    Eosinophils, Absolute 0.02 0.00 - 0.40 10*3/mm3    Basophils, Absolute 0.04 0.00 - 0.20 10*3/mm3    Immature Grans, Absolute 0.01 0.00 - 0.05 10*3/mm3   POC Glucose Once    Specimen: Blood   Result Value Ref Range    Glucose 95 70 - 130 mg/dL       RADIOLOGY        Study: Chest x-ray    Findings: 1. Pulmonary hyperinflation, otherwise negative chest    Interpreted contemporaneously with treatment by Dr. Tejada, independently viewed by me          I just returned from the patient's bedside.  She says she is feeling better now.  She says that she felt like she might faint earlier this morning when she was getting very nauseated in the bathroom.  She did not have any chest pain or shortness of breath feeling at all.  I have given her some IV fluids here.  We stood her up for orthostatic readings and she did very well with those position changes, not feeling weak or dizzy at all.  Her Covid-19 swab is negative, although I do understand her  at home is positive for the diagnosis.  I had a long conversation with the patient that right now it does not seem her symptoms are related to Covid-19 at all.  However I explained that she certainly is still at high risk for acquiring the viral illness and should be on the look out for any further symptoms.    Patient says she is feeling much better now and would like to return home.  I will have the nurse and technician unhook her from all monitoring devices and ambulate her around the room and hallway.  If she does well with that and does not have any presyncopal or syncopal type of feelings, then I think we can discharge her home safely to continue her usual medications and therapies per routine.  I did advise her to follow-up with her PCP for repeat evaluation.  I will review with her all of the usual \"return to ER\" instructions were any worsening signs or symptoms prior to her " departure.        Diagnosis:  Near syncopal episode  Nausea  Hypertension         Jomar Oviedo MD  10/16/20 0900

## 2020-10-16 NOTE — DISCHARGE INSTRUCTIONS
Rest as needed.  Drink plenty fluids to stay well-hydrated.  Please return to the emergency room for any worsening pain, fevers, nausea, vomiting, diarrhea, weakness, dizziness, difficulties breathing or any other concerns.

## 2020-10-16 NOTE — ED NOTES
Pt presented with c/o near syncopal episode this AM after using bathroom. She woke up to use restroom, had a BM, became nauseated, dry heaved twice then had a near syncopal episode that made her fall to the floor. She felt very weak and shaky and could not stand up so called 911. Pt denies pain, denies injury. Denies LOC. She is A&Ox4 .  covid +. Pt denies SOB/cough/fever     Keysha Chang, RN  10/16/20 5118

## 2020-10-16 NOTE — ED PROVIDER NOTES
"Subjective   Nguyen Zhang is a 80-year-old white female who presents secondary to near syncopal episode.  Patient was seated on the toilet.  She had just completed having a bowel movement when she suddenly felt nauseous.  Patient then slid into the floor.  She denies any injury.  Patient states she could have been \"out for a split second\".  Following this event patient was very shaky. She felt too weak to get out of the floor.  Eventually patient was able to get out of the floor. However patient's  was diagnosed earlier this week with COVID-19.  Patient was obviously concerned that she had contracted COVID.  Thus EMS was called.  Patient was stable in route.  She presents for evaluation.    Patient has had 2 prior syncopal episodes.  The most recent was 7 years ago.  The first was 20+ years ago.  Patient reports both these occurred when she needed to use the restroom.      History provided by:  Patient and EMS personnel      Review of Systems   Constitutional: Negative.  Negative for fever.   HENT: Negative.  Negative for rhinorrhea.    Eyes: Negative.  Negative for redness.   Respiratory: Negative for cough.    Cardiovascular: Negative for chest pain.   Gastrointestinal: Negative for abdominal pain.   Endocrine: Negative.    Genitourinary: Negative.  Negative for difficulty urinating.   Musculoskeletal: Negative.  Negative for back pain.   Skin: Negative.  Negative for color change.   Neurological: Positive for syncope.   Hematological: Negative.    Psychiatric/Behavioral: Negative.    All other systems reviewed and are negative.      Past Medical History:   Diagnosis Date   • Arthritis    • Disease of thyroid gland    • Hyperlipidemia    • Hypertension    • Hyperthyroidism    • Neck pain        Allergies   Allergen Reactions   • Ace Inhibitors Cough   • Codeine Nausea And Vomiting       Past Surgical History:   Procedure Laterality Date   • HYSTERECTOMY     • KNEE SURGERY Right    • SHOULDER SURGERY " Right        Family History   Problem Relation Age of Onset   • Heart attack Father    • Breast cancer Neg Hx        Social History     Socioeconomic History   • Marital status:      Spouse name: Not on file   • Number of children: Not on file   • Years of education: Not on file   • Highest education level: Not on file   Tobacco Use   • Smoking status: Never Smoker   • Smokeless tobacco: Never Used   Substance and Sexual Activity   • Alcohol use: No     Frequency: Never   • Drug use: No   • Sexual activity: Defer           Objective   Physical Exam  Vitals signs and nursing note reviewed.   Constitutional:       General: She is not in acute distress.     Appearance: Normal appearance. She is well-developed and normal weight. She is not ill-appearing or diaphoretic.      Comments: 80-year-old white female laying in bed.  Patient appears in good health for age.  Vital signs are unremarkable.  Patient is friendly and cooperative.  She is in no distress.   HENT:      Head: Normocephalic and atraumatic.      Right Ear: External ear normal.      Left Ear: External ear normal.      Nose: Nose normal.   Eyes:      Conjunctiva/sclera: Conjunctivae normal.      Pupils: Pupils are equal, round, and reactive to light.   Neck:      Musculoskeletal: Normal range of motion and neck supple.   Cardiovascular:      Rate and Rhythm: Normal rate and regular rhythm.      Heart sounds: Normal heart sounds. No murmur. No friction rub. No gallop.    Pulmonary:      Effort: Pulmonary effort is normal.      Breath sounds: Normal breath sounds.   Abdominal:      General: Bowel sounds are normal. There is no distension.      Palpations: Abdomen is soft.      Tenderness: There is no abdominal tenderness.   Musculoskeletal: Normal range of motion.   Skin:     General: Skin is warm and dry.   Neurological:      Mental Status: She is alert and oriented to person, place, and time.      Deep Tendon Reflexes: Reflexes are normal and symmetric.    Psychiatric:         Behavior: Behavior normal.         Procedures     EKG 12-lead  Date 10/16/2020  Time 06: 47  Normal sinus rhythm  Normal rate axis  Normal intervals  T wave inversion in leads V1 and V2 indicating left atrial enlargement  Q wave present in V1 and V2 suggesting age-indeterminate anterior septal infarct  No ST elevations or depressions  No significant T wave abnormalities  Abnormal EKG    T wave flattening seen in V2 otherwise unchanged from EKG dated 5/13/2019      ED Course  ED Course as of Oct 18 1602   Fri Oct 16, 2020   0652 Based on patient's description she has had a vasovagal near syncope.  She has had 2 episodes of syncope.  The most recent 7 years in the past.  Both of these also sound vasovagal in nature.  Nonetheless will obtain EKG, chest x-ray and full set of lab work.  As patient's spouse is COVID positive and patient had near syncopal episode will obtain in-house COVID-19 testing.    [SS]   0738 CBC unremarkable.  CMP shows mildly elevated creatinine over baseline.  Otherwise unremarkable.  Troponin is negative.    [SS]   0739 Patient has been discussed with and care transferred to Dr. Jomar Oviedo      [SS]      ED Course User Index  [SS] Darryn Rodríguez MD      Labs Reviewed   COMPREHENSIVE METABOLIC PANEL - Abnormal; Notable for the following components:       Result Value    Creatinine 1.03 (*)     Chloride 95 (*)     CO2 30.5 (*)     eGFR Non  Amer 52 (*)     All other components within normal limits    Narrative:     GFR Normal >60  Chronic Kidney Disease <60  Kidney Failure <15     CBC WITH AUTO DIFFERENTIAL - Abnormal; Notable for the following components:    RDW 11.8 (*)     Monocyte % 17.5 (*)     All other components within normal limits   COVID-19,[facility] IN-HOUSE,[facility] NO TRANSPORT MEDIA 2 HR TAT - Normal    Narrative:     Fact sheet for providers: https://www.fda.gov/media/886324/download     Fact sheet for patients:  https://www.fda.gov/media/495443/download   TROPONIN (IN-HOUSE) - Normal    Narrative:     Troponin T Reference Range:  <= 0.03 ng/mL-   Negative for AMI  >0.03 ng/mL-     Abnormal for myocardial necrosis.  Clinicians would have to utilize clinical acumen, EKG, Troponin and serial changes to determine if it is an Acute Myocardial Infarction or myocardial injury due to an underlying chronic condition.       Results may be falsely decreased if patient taking Biotin.     POCT GLUCOSE FINGERSTICK - Normal   POCT GLUCOSE FINGERSTICK   CBC AND DIFFERENTIAL    Narrative:     The following orders were created for panel order CBC & Differential.  Procedure                               Abnormality         Status                     ---------                               -----------         ------                     CBC Auto Differential[[id number]]        Abnormal            Final result                 Please view results for these tests on the individual orders.                                          MDM    Final diagnoses:   Near syncope   Nausea   Hypertension, unspecified type            Darryn Rodríguez MD  10/18/20 5684

## 2020-10-22 ENCOUNTER — TELEPHONE (OUTPATIENT)
Dept: FAMILY MEDICINE CLINIC | Facility: CLINIC | Age: 80
End: 2020-10-22

## 2020-10-22 NOTE — TELEPHONE ENCOUNTER
Patient called stating that she needed an appointment today. Her  tested positive for covid and she started feeling bad. She had a fainting spell and seen at ER, testing negative herself for covid but still not feeling any better. I referred her back to either ER or UC

## 2020-11-17 ENCOUNTER — OFFICE VISIT (OUTPATIENT)
Dept: FAMILY MEDICINE CLINIC | Facility: CLINIC | Age: 80
End: 2020-11-17

## 2020-11-17 VITALS
OXYGEN SATURATION: 96 % | HEIGHT: 61 IN | SYSTOLIC BLOOD PRESSURE: 138 MMHG | TEMPERATURE: 96.9 F | HEART RATE: 86 BPM | DIASTOLIC BLOOD PRESSURE: 80 MMHG | BODY MASS INDEX: 23.41 KG/M2 | WEIGHT: 124 LBS

## 2020-11-17 DIAGNOSIS — M15.9 PRIMARY OSTEOARTHRITIS INVOLVING MULTIPLE JOINTS: ICD-10-CM

## 2020-11-17 DIAGNOSIS — E03.9 ACQUIRED HYPOTHYROIDISM: ICD-10-CM

## 2020-11-17 DIAGNOSIS — E78.49 OTHER HYPERLIPIDEMIA: ICD-10-CM

## 2020-11-17 DIAGNOSIS — I10 ESSENTIAL HYPERTENSION: Primary | ICD-10-CM

## 2020-11-17 PROCEDURE — G0008 ADMIN INFLUENZA VIRUS VAC: HCPCS | Performed by: INTERNAL MEDICINE

## 2020-11-17 PROCEDURE — 99214 OFFICE O/P EST MOD 30 MIN: CPT | Performed by: INTERNAL MEDICINE

## 2020-11-17 PROCEDURE — 90694 VACC AIIV4 NO PRSRV 0.5ML IM: CPT | Performed by: INTERNAL MEDICINE

## 2020-11-17 RX ORDER — AMLODIPINE BESYLATE 5 MG/1
TABLET ORAL
COMMUNITY
Start: 2020-10-26 | End: 2020-11-26

## 2020-11-17 NOTE — PROGRESS NOTES
LIDA@  Nguyenyolie Zhang is a 80 y.o. female.     Chief Complaint   Patient presents with   • Hypertension   • Hyperlipidemia   • Hypothyroidism       History of Present Illness   Follow-up for hypertension, hyperlipidemia, thyroid, DJD.  Denies any chest pain or shortness of breath.  She was seen in the ER in October for syncopal episode.  Patient reports he does that for several years once in a while early in the morning she passes out.  No symptoms since.  No chest pain or shortness of breath.  Blood pressure is under control.  She is trying to diet and exercise.  Taking Norvasc, Tenormin, hydrochlorothiazide, Cozaar, potassium, over-the-counter vitamins.  She is still on aspirin 325 I have asked her to cut down to 81.    The following portions of the patient's history were reviewed and updated as appropriate: allergies, current medications, past family history, past medical history, past social history, past surgical history and problem list.    Review of Systems   Constitutional: Negative for activity change, appetite change, fatigue and fever.   Eyes: Negative for blurred vision and double vision.   Respiratory: Negative.  Negative for shortness of breath.    Cardiovascular: Negative for chest pain, palpitations and leg swelling.   Gastrointestinal: Negative.    Genitourinary: Positive for hematuria. Negative for dysuria.   Musculoskeletal: Negative for arthralgias and back pain.   Neurological: Negative.  Negative for dizziness, syncope and light-headedness.   Psychiatric/Behavioral: Negative for agitation. The patient is not nervous/anxious.        Allergies   Allergen Reactions   • Ace Inhibitors Cough   • Codeine Nausea And Vomiting       Current Outpatient Medications on File Prior to Visit   Medication Sig Dispense Refill   • Acetaminophen (TYLENOL ARTHRITIS PAIN PO) Take 500 mg by mouth Daily.     • amLODIPine (NORVASC) 10 MG tablet Take 0.5 tablets by mouth Every Evening. 90 tablet 1   •  amLODIPine (NORVASC) 5 MG tablet      • aspirin 325 MG tablet Take 325 mg by mouth Daily.     • atenolol (TENORMIN) 50 MG tablet TAKE 1 TABLET BY MOUTH 2 TIMES A DAY. 60 tablet 11   • Bioflavonoid Products (DOROTEO C PO) Take 500 mg by mouth Daily.     • Cholecalciferol (VITAMIN D3) 1000 units capsule Take 2 capsules by mouth Daily.     • hydroCHLOROthiazide (HYDRODIURIL) 25 MG tablet TAKE 1 TABLET BY MOUTH DAILY. 30 tablet 6   • levothyroxine (SYNTHROID, LEVOTHROID) 75 MCG tablet TAKE 1 TABLET BY MOUTH DAILY. 90 tablet 3   • losartan (COZAAR) 100 MG tablet TAKE 1 TABLET BY MOUTH DAILY. 90 tablet 2   • multivitamin-minerals (CENTRUM) tablet Take 1 tablet by mouth Daily.     • NIACIN PO Take 500 mg by mouth Daily.     • potassium chloride (K-DUR) 10 MEQ CR tablet      • vitamin E 400 UNIT capsule Take 400 Units by mouth Daily.       No current facility-administered medications on file prior to visit.        Family History   Problem Relation Age of Onset   • Heart attack Father    • Breast cancer Neg Hx        Past Medical History:   Diagnosis Date   • Arthritis    • Disease of thyroid gland    • Hyperlipidemia    • Hypertension    • Hyperthyroidism    • Neck pain        Past Surgical History:   Procedure Laterality Date   • HYSTERECTOMY     • KNEE SURGERY Right    • SHOULDER SURGERY Right        Social History     Socioeconomic History   • Marital status:      Spouse name: Not on file   • Number of children: Not on file   • Years of education: Not on file   • Highest education level: Not on file   Tobacco Use   • Smoking status: Never Smoker   • Smokeless tobacco: Never Used   Substance and Sexual Activity   • Alcohol use: No     Frequency: Never   • Drug use: No   • Sexual activity: Defer       Patient Active Problem List   Diagnosis   • Precordial chest pain   • PVCs (premature ventricular contractions)   • Essential hypertension   • Acquired hypothyroidism   • Low serum potassium level   • Other  "hyperlipidemia   • Acute pain of left shoulder   • Osteoarthritis of multiple joints       /80 (BP Location: Right arm, Patient Position: Sitting, Cuff Size: Adult)   Pulse 86   Temp 96.9 °F (36.1 °C) (Temporal)   Ht 154.9 cm (61\")   Wt 56.2 kg (124 lb)   SpO2 96%   BMI 23.43 kg/m²   Body mass index is 23.43 kg/m².    Objective   Physical Exam  Vitals signs and nursing note reviewed.   Constitutional:       Appearance: She is well-developed.   Eyes:      Pupils: Pupils are equal, round, and reactive to light.   Neck:      Musculoskeletal: Normal range of motion and neck supple.      Thyroid: No thyromegaly.      Vascular: No JVD.      Trachea: No tracheal deviation.   Cardiovascular:      Rate and Rhythm: Normal rate and regular rhythm.      Heart sounds: No murmur.   Pulmonary:      Effort: Pulmonary effort is normal. No respiratory distress.      Breath sounds: Normal breath sounds. No wheezing.   Abdominal:      General: Bowel sounds are normal. There is no distension.      Palpations: Abdomen is soft. There is no mass.      Tenderness: There is no abdominal tenderness. There is no right CVA tenderness, left CVA tenderness, guarding or rebound.      Hernia: No hernia is present.   Musculoskeletal: Normal range of motion.   Lymphadenopathy:      Cervical: No cervical adenopathy.   Neurological:      General: No focal deficit present.      Mental Status: She is alert and oriented to person, place, and time. Mental status is at baseline.      Cranial Nerves: No cranial nerve deficit.      Sensory: No sensory deficit.      Motor: No weakness.      Coordination: Coordination normal.      Gait: Gait normal.      Deep Tendon Reflexes: Reflexes normal.   Psychiatric:         Mood and Affect: Mood normal.         Behavior: Behavior normal.           Assessment/Plan   Diagnoses and all orders for this visit:    1. Essential hypertension (Primary)  -     Comprehensive Metabolic Panel  -     Lipid Panel With / " Chol / HDL Ratio  -     TSH    2. Other hyperlipidemia  -     Comprehensive Metabolic Panel  -     Lipid Panel With / Chol / HDL Ratio  -     TSH    3. Acquired hypothyroidism  -     Comprehensive Metabolic Panel  -     Lipid Panel With / Chol / HDL Ratio  -     TSH    4. Primary osteoarthritis involving multiple joints    Other orders  -     Fluad Quad 65+ yrs ([id number])    Continue diet and exercise.  Continue current medications.  Check blood pressure at home.  Discussed with patient if she wants to go to a neurologist for syncopal episodes.  Per patient they are not frequent they have been going on for several years does not want to go at this time.  She does not want statins second side effects.  Continue diet and exercise.  All her problems are chronic and stable return in 3 months time.

## 2020-11-18 LAB
ALBUMIN SERPL-MCNC: 4.6 G/DL (ref 3.5–5.2)
ALBUMIN/GLOB SERPL: 2.1 G/DL
ALP SERPL-CCNC: 90 U/L (ref 39–117)
ALT SERPL-CCNC: 17 U/L (ref 1–33)
AST SERPL-CCNC: 24 U/L (ref 1–32)
BILIRUB SERPL-MCNC: 0.5 MG/DL (ref 0–1.2)
BUN SERPL-MCNC: 14 MG/DL (ref 8–23)
BUN/CREAT SERPL: 16.5 (ref 7–25)
CALCIUM SERPL-MCNC: 9.4 MG/DL (ref 8.6–10.5)
CHLORIDE SERPL-SCNC: 93 MMOL/L (ref 98–107)
CHOLEST SERPL-MCNC: 200 MG/DL (ref 0–200)
CHOLEST/HDLC SERPL: 3.39 {RATIO}
CO2 SERPL-SCNC: 30.8 MMOL/L (ref 22–29)
CREAT SERPL-MCNC: 0.85 MG/DL (ref 0.57–1)
GLOBULIN SER CALC-MCNC: 2.2 GM/DL
GLUCOSE SERPL-MCNC: 86 MG/DL (ref 65–99)
HDLC SERPL-MCNC: 59 MG/DL (ref 40–60)
LDLC SERPL CALC-MCNC: 125 MG/DL (ref 0–100)
POTASSIUM SERPL-SCNC: 3.7 MMOL/L (ref 3.5–5.2)
PROT SERPL-MCNC: 6.8 G/DL (ref 6–8.5)
SODIUM SERPL-SCNC: 135 MMOL/L (ref 136–145)
TRIGL SERPL-MCNC: 91 MG/DL (ref 0–150)
TSH SERPL DL<=0.005 MIU/L-ACNC: 2.79 UIU/ML (ref 0.27–4.2)
VLDLC SERPL CALC-MCNC: 16 MG/DL (ref 5–40)

## 2020-11-26 RX ORDER — HYDROCHLOROTHIAZIDE 25 MG/1
TABLET ORAL
Qty: 30 TABLET | Refills: 6 | Status: SHIPPED | OUTPATIENT
Start: 2020-11-26 | End: 2021-06-24

## 2020-11-26 RX ORDER — AMLODIPINE BESYLATE 5 MG/1
TABLET ORAL
Qty: 30 TABLET | Refills: 3 | Status: SHIPPED | OUTPATIENT
Start: 2020-11-26 | End: 2021-03-23

## 2020-11-26 RX ORDER — POTASSIUM CHLORIDE 750 MG/1
TABLET, FILM COATED, EXTENDED RELEASE ORAL
Qty: 90 TABLET | Refills: 0 | Status: SHIPPED | OUTPATIENT
Start: 2020-11-26 | End: 2021-03-02

## 2020-12-16 RX ORDER — LOSARTAN POTASSIUM 100 MG/1
TABLET ORAL
Qty: 90 TABLET | Refills: 3 | Status: SHIPPED | OUTPATIENT
Start: 2020-12-16 | End: 2021-12-29

## 2020-12-28 ENCOUNTER — TELEPHONE (OUTPATIENT)
Dept: FAMILY MEDICINE CLINIC | Facility: CLINIC | Age: 80
End: 2020-12-28

## 2020-12-28 NOTE — TELEPHONE ENCOUNTER
PATIENT CALLED STATED THAT SHE WOULD LIKE IT IF DR PATEL WOULD GIVE HER AN ORDER TO HAVE A WALK IN TUB PUT IN HER HOME FOR SAFETY REASONS.    PATIENT STATED THAT INSURANCE MAY PAY OR HELP PAY FOR THE TUB REPLACEMENT.  ALSO SHE WAS HOPING TO TURN IT IN THE TAX FORMS TO SEE ABOUT GETTING A TAX BREAK FOR THE HOME IMPROVEMENT.    PLEASE ADVISE  [phone]

## 2021-02-23 ENCOUNTER — OFFICE VISIT (OUTPATIENT)
Dept: FAMILY MEDICINE CLINIC | Facility: CLINIC | Age: 81
End: 2021-02-23

## 2021-02-23 VITALS
OXYGEN SATURATION: 97 % | HEIGHT: 61 IN | TEMPERATURE: 97.1 F | DIASTOLIC BLOOD PRESSURE: 70 MMHG | WEIGHT: 128 LBS | HEART RATE: 65 BPM | BODY MASS INDEX: 24.17 KG/M2 | SYSTOLIC BLOOD PRESSURE: 118 MMHG

## 2021-02-23 DIAGNOSIS — E03.9 ACQUIRED HYPOTHYROIDISM: ICD-10-CM

## 2021-02-23 DIAGNOSIS — I10 ESSENTIAL HYPERTENSION: Primary | ICD-10-CM

## 2021-02-23 DIAGNOSIS — E78.49 OTHER HYPERLIPIDEMIA: ICD-10-CM

## 2021-02-23 DIAGNOSIS — M15.9 PRIMARY OSTEOARTHRITIS INVOLVING MULTIPLE JOINTS: ICD-10-CM

## 2021-02-23 LAB
ALBUMIN SERPL-MCNC: 4.6 G/DL (ref 3.5–5.2)
ALBUMIN/GLOB SERPL: 2.4 G/DL
ALP SERPL-CCNC: 68 U/L (ref 39–117)
ALT SERPL-CCNC: 18 U/L (ref 1–33)
AST SERPL-CCNC: 28 U/L (ref 1–32)
BASOPHILS # BLD AUTO: 0.04 10*3/MM3 (ref 0–0.2)
BASOPHILS NFR BLD AUTO: 0.6 % (ref 0–1.5)
BILIRUB SERPL-MCNC: 0.6 MG/DL (ref 0–1.2)
BUN SERPL-MCNC: 16 MG/DL (ref 8–23)
BUN/CREAT SERPL: 17.4 (ref 7–25)
CALCIUM SERPL-MCNC: 9.7 MG/DL (ref 8.6–10.5)
CHLORIDE SERPL-SCNC: 94 MMOL/L (ref 98–107)
CHOLEST SERPL-MCNC: 212 MG/DL (ref 0–200)
CHOLEST/HDLC SERPL: 3.48 {RATIO}
CO2 SERPL-SCNC: 30.8 MMOL/L (ref 22–29)
CREAT SERPL-MCNC: 0.92 MG/DL (ref 0.57–1)
EOSINOPHIL # BLD AUTO: 0.07 10*3/MM3 (ref 0–0.4)
EOSINOPHIL NFR BLD AUTO: 1.1 % (ref 0.3–6.2)
ERYTHROCYTE [DISTWIDTH] IN BLOOD BY AUTOMATED COUNT: 11.8 % (ref 12.3–15.4)
GLOBULIN SER CALC-MCNC: 1.9 GM/DL
GLUCOSE SERPL-MCNC: 92 MG/DL (ref 65–99)
HCT VFR BLD AUTO: 36.3 % (ref 34–46.6)
HDLC SERPL-MCNC: 61 MG/DL (ref 40–60)
HGB BLD-MCNC: 12.4 G/DL (ref 12–15.9)
IMM GRANULOCYTES # BLD AUTO: 0 10*3/MM3 (ref 0–0.05)
IMM GRANULOCYTES NFR BLD AUTO: 0 % (ref 0–0.5)
LDLC SERPL CALC-MCNC: 133 MG/DL (ref 0–100)
LYMPHOCYTES # BLD AUTO: 1.74 10*3/MM3 (ref 0.7–3.1)
LYMPHOCYTES NFR BLD AUTO: 27.4 % (ref 19.6–45.3)
MCH RBC QN AUTO: 32 PG (ref 26.6–33)
MCHC RBC AUTO-ENTMCNC: 34.2 G/DL (ref 31.5–35.7)
MCV RBC AUTO: 93.6 FL (ref 79–97)
MONOCYTES # BLD AUTO: 0.66 10*3/MM3 (ref 0.1–0.9)
MONOCYTES NFR BLD AUTO: 10.4 % (ref 5–12)
NEUTROPHILS # BLD AUTO: 3.84 10*3/MM3 (ref 1.7–7)
NEUTROPHILS NFR BLD AUTO: 60.5 % (ref 42.7–76)
NRBC BLD AUTO-RTO: 0 /100 WBC (ref 0–0.2)
PLATELET # BLD AUTO: 236 10*3/MM3 (ref 140–450)
POTASSIUM SERPL-SCNC: 3.6 MMOL/L (ref 3.5–5.2)
PROT SERPL-MCNC: 6.5 G/DL (ref 6–8.5)
RBC # BLD AUTO: 3.88 10*6/MM3 (ref 3.77–5.28)
SODIUM SERPL-SCNC: 136 MMOL/L (ref 136–145)
T4 FREE SERPL-MCNC: 1.67 NG/DL (ref 0.93–1.7)
TRIGL SERPL-MCNC: 102 MG/DL (ref 0–150)
TSH SERPL DL<=0.005 MIU/L-ACNC: 2.65 UIU/ML (ref 0.27–4.2)
VLDLC SERPL CALC-MCNC: 18 MG/DL (ref 5–40)
WBC # BLD AUTO: 6.35 10*3/MM3 (ref 3.4–10.8)

## 2021-02-23 PROCEDURE — 99214 OFFICE O/P EST MOD 30 MIN: CPT | Performed by: INTERNAL MEDICINE

## 2021-02-23 NOTE — PROGRESS NOTES
Subjective   Nguyen Zhang is a 81 y.o. female.     Chief Complaint   Patient presents with   • Hypertension     3 month follow up and blood work    • Dizziness   Hyperlipidemia, thyroid.  DJD.    History of Present Illness   Patient here for up on hypertension, hyperlipidemia, dizziness, hypothyroidism.  Denies any chest pain or shortness of breath.  No further dizziness.  Taking all current medications.  Reports that she has bilateral arm pain mostly in the left side.  Reports couple years ago Dr. Lazo refer her for epidural shots that helped her.  She has an appoint with Dr. Lazo she is going to go back and see him she think it could be from her shoulder.  No numbness in the arms.  No weakness.  The following portions of the patient's history were reviewed and updated as appropriate: allergies, current medications, past family history, past medical history, past social history, past surgical history and problem list.    Review of Systems   Constitutional: Negative for activity change, appetite change, diaphoresis and unexpected weight gain.   Eyes: Negative for blurred vision and double vision.   Respiratory: Negative.  Negative for shortness of breath.    Cardiovascular: Negative for chest pain, palpitations and leg swelling.   Gastrointestinal: Negative.    Genitourinary: Negative for frequency and hematuria.   Musculoskeletal: Positive for arthralgias.   Skin: Negative for skin lesions.   Neurological: Negative for dizziness, syncope, numbness and headache.   Psychiatric/Behavioral: Negative for agitation and decreased concentration.       Allergies   Allergen Reactions   • Ace Inhibitors Cough   • Codeine Nausea And Vomiting       Current Outpatient Medications on File Prior to Visit   Medication Sig Dispense Refill   • Acetaminophen (TYLENOL ARTHRITIS PAIN PO) Take 500 mg by mouth Daily.     • amLODIPine (NORVASC) 10 MG tablet Take 0.5 tablets by mouth Every Evening. 90 tablet 1   • amLODIPine  (NORVASC) 5 MG tablet TAKE 1 TABLET BY MOUTH EVERY EVENING. 30 tablet 3   • aspirin 325 MG tablet Take 325 mg by mouth Daily.     • atenolol (TENORMIN) 50 MG tablet TAKE 1 TABLET BY MOUTH 2 TIMES A DAY. 60 tablet 11   • Bioflavonoid Products (DOROTEO C PO) Take 500 mg by mouth Daily.     • Cholecalciferol (VITAMIN D3) 1000 units capsule Take 2 capsules by mouth Daily.     • hydroCHLOROthiazide (HYDRODIURIL) 25 MG tablet TAKE 1 TABLET BY MOUTH DAILY. 30 tablet 6   • levothyroxine (SYNTHROID, LEVOTHROID) 75 MCG tablet TAKE 1 TABLET BY MOUTH DAILY. 90 tablet 3   • losartan (COZAAR) 100 MG tablet TAKE 1 TABLET BY MOUTH DAILY. 90 tablet 3   • multivitamin-minerals (CENTRUM) tablet Take 1 tablet by mouth Daily.     • NIACIN PO Take 500 mg by mouth Daily.     • potassium chloride 10 MEQ CR tablet TAKE 1 TABLET BY MOUTH DAILY. 90 tablet 0   • vitamin E 400 UNIT capsule Take 400 Units by mouth Daily.       No current facility-administered medications on file prior to visit.        Family History   Problem Relation Age of Onset   • Heart attack Father    • Breast cancer Neg Hx        Past Medical History:   Diagnosis Date   • Arthritis    • Disease of thyroid gland    • Hyperlipidemia    • Hypertension    • Hyperthyroidism    • Neck pain        Past Surgical History:   Procedure Laterality Date   • HYSTERECTOMY     • KNEE SURGERY Right    • SHOULDER SURGERY Right        Social History     Socioeconomic History   • Marital status:      Spouse name: Not on file   • Number of children: Not on file   • Years of education: Not on file   • Highest education level: Not on file   Tobacco Use   • Smoking status: Never Smoker   • Smokeless tobacco: Never Used   Substance and Sexual Activity   • Alcohol use: No     Frequency: Never   • Drug use: No   • Sexual activity: Defer       Patient Active Problem List   Diagnosis   • Precordial chest pain   • PVCs (premature ventricular contractions)   • Essential hypertension   • Acquired  "hypothyroidism   • Low serum potassium level   • Other hyperlipidemia   • Acute pain of left shoulder   • Osteoarthritis of multiple joints       /70 (BP Location: Right arm, Patient Position: Sitting, Cuff Size: Adult)   Pulse 65   Temp 97.1 °F (36.2 °C) (Temporal)   Ht 154.9 cm (60.98\")   Wt 58.1 kg (128 lb)   SpO2 97%   BMI 24.20 kg/m²   Body mass index is 24.2 kg/m².    Objective   Physical Exam  Vitals signs and nursing note reviewed.   Constitutional:       Appearance: She is well-developed.   Eyes:      Pupils: Pupils are equal, round, and reactive to light.   Neck:      Musculoskeletal: Normal range of motion and neck supple.      Thyroid: No thyromegaly.      Vascular: No JVD.      Trachea: No tracheal deviation.   Cardiovascular:      Rate and Rhythm: Normal rate and regular rhythm.      Heart sounds: No murmur.   Pulmonary:      Effort: Pulmonary effort is normal. No respiratory distress.      Breath sounds: Normal breath sounds. No wheezing.   Abdominal:      General: Bowel sounds are normal. There is no distension.      Palpations: Abdomen is soft. There is no mass.      Tenderness: There is no abdominal tenderness. There is no right CVA tenderness, left CVA tenderness, guarding or rebound.      Hernia: No hernia is present.   Musculoskeletal:         General: No swelling or tenderness.   Lymphadenopathy:      Cervical: No cervical adenopathy.   Neurological:      General: No focal deficit present.      Mental Status: She is alert and oriented to person, place, and time. Mental status is at baseline.      Cranial Nerves: No cranial nerve deficit.      Sensory: No sensory deficit.      Motor: No weakness.      Coordination: Coordination normal.      Gait: Gait normal.      Deep Tendon Reflexes: Reflexes normal.   Psychiatric:         Behavior: Behavior normal.           Assessment/Plan   Diagnoses and all orders for this visit:    1. Essential hypertension (Primary)  -     CBC & " Differential  -     Comprehensive Metabolic Panel  -     Lipid Panel With / Chol / HDL Ratio  -     TSH  -     T4, Free    2. Other hyperlipidemia  -     CBC & Differential  -     Comprehensive Metabolic Panel  -     Lipid Panel With / Chol / HDL Ratio  -     TSH  -     T4, Free    3. Acquired hypothyroidism  -     CBC & Differential  -     Comprehensive Metabolic Panel  -     Lipid Panel With / Chol / HDL Ratio  -     TSH  -     T4, Free    4. Primary osteoarthritis involving multiple joints  -     CBC & Differential  -     Comprehensive Metabolic Panel  -     Lipid Panel With / Chol / HDL Ratio  -     TSH  -     T4, Free    Continue diet and exercise.  Continue all current medication including Synthroid, Norvasc, Hyzaar.  Patient does not want statins.  All her problems are chronic and stable.  Regarding her left arm pain she is going to see Dr. Lazo.  There is also pain in the right side.  She thinks from the shoulder.  Patient also has degenerative disc disease from by MRI 2019 of the neck.  Rest of her problems are chronic and stable.  Return in 3 months time.

## 2021-03-02 RX ORDER — POTASSIUM CHLORIDE 750 MG/1
TABLET, FILM COATED, EXTENDED RELEASE ORAL
Qty: 90 TABLET | Refills: 0 | Status: SHIPPED | OUTPATIENT
Start: 2021-03-02 | End: 2021-06-02

## 2021-03-15 ENCOUNTER — OFFICE VISIT (OUTPATIENT)
Dept: ORTHOPEDIC SURGERY | Facility: CLINIC | Age: 81
End: 2021-03-15

## 2021-03-15 VITALS — BODY MASS INDEX: 24.17 KG/M2 | HEIGHT: 61 IN | WEIGHT: 128 LBS

## 2021-03-15 DIAGNOSIS — M75.52 SUBACROMIAL BURSITIS OF LEFT SHOULDER JOINT: ICD-10-CM

## 2021-03-15 DIAGNOSIS — M47.22 OSTEOARTHRITIS OF SPINE WITH RADICULOPATHY, CERVICAL REGION: ICD-10-CM

## 2021-03-15 DIAGNOSIS — R52 PAIN: Primary | ICD-10-CM

## 2021-03-15 PROCEDURE — 20610 DRAIN/INJ JOINT/BURSA W/O US: CPT | Performed by: ORTHOPAEDIC SURGERY

## 2021-03-15 PROCEDURE — 99213 OFFICE O/P EST LOW 20 MIN: CPT | Performed by: ORTHOPAEDIC SURGERY

## 2021-03-15 PROCEDURE — 73030 X-RAY EXAM OF SHOULDER: CPT | Performed by: ORTHOPAEDIC SURGERY

## 2021-03-15 RX ORDER — TRIAMCINOLONE ACETONIDE 40 MG/ML
40 INJECTION, SUSPENSION INTRA-ARTICULAR; INTRAMUSCULAR
Status: COMPLETED | OUTPATIENT
Start: 2021-03-15 | End: 2021-03-15

## 2021-03-15 RX ORDER — LIDOCAINE HYDROCHLORIDE 10 MG/ML
4 INJECTION, SOLUTION EPIDURAL; INFILTRATION; INTRACAUDAL; PERINEURAL
Status: COMPLETED | OUTPATIENT
Start: 2021-03-15 | End: 2021-03-15

## 2021-03-15 RX ADMIN — LIDOCAINE HYDROCHLORIDE 4 ML: 10 INJECTION, SOLUTION EPIDURAL; INFILTRATION; INTRACAUDAL; PERINEURAL at 12:10

## 2021-03-15 RX ADMIN — TRIAMCINOLONE ACETONIDE 40 MG: 40 INJECTION, SUSPENSION INTRA-ARTICULAR; INTRAMUSCULAR at 12:10

## 2021-03-15 NOTE — PROGRESS NOTES
Subjective: Left shoulder pain, osteoarthritis cervical spine     Patient ID: Nguyen Zhang is a 81 y.o. female.    Chief Complaint:    History of Present Illness 81-year-old female known to me presents today for evaluation primarily of the left shoulder but also for recurrence of some cervical arthritis type pain.  Epidural blocks 2 years ago that helped until just recently.  The shoulder pain started last summer after helping her  lift some boxes.       Social History     Occupational History   • Not on file   Tobacco Use   • Smoking status: Never Smoker   • Smokeless tobacco: Never Used   Vaping Use   • Vaping Use: Never used   Substance and Sexual Activity   • Alcohol use: No   • Drug use: No   • Sexual activity: Defer      Review of Systems   Constitutional: Negative for chills, diaphoresis, fever and unexpected weight change.   HENT: Negative for hearing loss, nosebleeds, sore throat and tinnitus.    Eyes: Negative for pain and visual disturbance.   Respiratory: Negative for cough, shortness of breath and wheezing.    Cardiovascular: Negative for chest pain and palpitations.   Gastrointestinal: Negative for abdominal pain, diarrhea, nausea and vomiting.   Endocrine: Negative for cold intolerance, heat intolerance and polydipsia.   Genitourinary: Negative for difficulty urinating, dysuria and hematuria.   Musculoskeletal: Positive for arthralgias and myalgias. Negative for joint swelling.   Skin: Negative for rash and wound.   Allergic/Immunologic: Negative for environmental allergies.   Neurological: Negative for dizziness, syncope and numbness.   Hematological: Does not bruise/bleed easily.   Psychiatric/Behavioral: Negative for dysphoric mood and sleep disturbance. The patient is not nervous/anxious.          Past Medical History:   Diagnosis Date   • Arthritis    • Disease of thyroid gland    • Hyperlipidemia    • Hypertension    • Hyperthyroidism    • Neck pain      Past Surgical History:    Procedure Laterality Date   • HYSTERECTOMY     • KNEE SURGERY Right    • SHOULDER SURGERY Right      Family History   Problem Relation Age of Onset   • Heart attack Father    • Breast cancer Neg Hx          Objective:  There were no vitals filed for this visit.      03/15/21  1142   Weight: 58.1 kg (128 lb)     Body mass index is 24.2 kg/m².        Ortho Exam   AP lateral outlet view of the left shoulder show some mild AC arthritis but otherwise no acute or chronic changes.  Noted no prior x-rays available.  She is alert and oriented x3.  Left upper extremity has no motor deficit she does have some tingling in the fingers which is probably she thinks coming from the neck.  There is pain with range of motion of the neck causing some radiculopathy.  She has full range of motion of the elbow.  She can abduct and extend to but 170 degrees at 90 degrees abduction extension is intact mildly painful.  Mildly positive Munoz sign.  External rotation is 45 degrees and internal rotation is only 25 degrees.  She has good capillary refill.    Assessment:        1. Pain    2. Osteoarthritis of spine with radiculopathy, cervical region    3. Subacromial bursitis of left shoulder joint           Plan: Reviewed the results of the shoulder x-rays and reviewed her physical exam and history with the patient.  She developed a subacromial bursitis of the shoulder.  I would inject the subacromial space with 4 cc lidocaine 1 cc of Kenalog and that was tolerated after sterile prep without complications.  Postinjection instructions given to the patient.  With regard to resumption of cervical pain number to schedule epidural blocks once again at [facility] at her request.  Answered all questions.  Return to see me as needed regarding the shoulder.  Large Joint Arthrocentesis: L glenohumeral  Date/Time: 3/15/2021 12:10 PM  Consent given by: patient  Site marked: site marked  Timeout: Immediately prior to procedure a time out was called  to verify the correct patient, procedure, equipment, support staff and site/side marked as required   Supporting Documentation  Indications: pain   Procedure Details  Location: shoulder - L glenohumeral  Preparation: Patient was prepped and draped in the usual sterile fashion  Needle size: 22 G  Approach: posterior  Medications administered: 40 mg triamcinolone acetonide 40 MG/ML; 4 mL lidocaine PF 1% 1 %  Patient tolerance: patient tolerated the procedure well with no immediate complications                  Work Status:    LOUISA query complete.    Orders:  Orders Placed This Encounter   Procedures   • Large Joint Arthrocentesis: L glenohumeral   • XR Shoulder 2+ View Left   • Ambulatory Referral to Hospital Pain Management Department       Medications:  No orders of the defined types were placed in this encounter.      Followup:  Return if symptoms worsen or fail to improve.          Dictated utilizing Dragon dictation

## 2021-03-22 ENCOUNTER — TELEPHONE (OUTPATIENT)
Dept: ORTHOPEDICS | Facility: OTHER | Age: 81
End: 2021-03-22

## 2021-03-22 NOTE — TELEPHONE ENCOUNTER
PATIENT CALLED TO LET DR. RAMIREZ KNOW THAT PAIN MANAGEMENT HAS NOT CALLED HER TO SCHEDULE AN APPT.    PLEASE ADVISE:    [phone]

## 2021-03-23 RX ORDER — AMLODIPINE BESYLATE 5 MG/1
TABLET ORAL
Qty: 30 TABLET | Refills: 11 | Status: SHIPPED | OUTPATIENT
Start: 2021-03-23 | End: 2022-03-28

## 2021-03-24 ENCOUNTER — HOSPITAL ENCOUNTER (OUTPATIENT)
Dept: PAIN MEDICINE | Facility: HOSPITAL | Age: 81
Discharge: HOME | End: 2021-03-24
Admitting: ORTHOPAEDIC SURGERY

## 2021-03-24 ENCOUNTER — ANESTHESIA EVENT (OUTPATIENT)
Dept: PAIN MEDICINE | Facility: HOSPITAL | Age: 81
End: 2021-03-24

## 2021-03-24 ENCOUNTER — ANESTHESIA (OUTPATIENT)
Dept: PAIN MEDICINE | Facility: HOSPITAL | Age: 81
End: 2021-03-24

## 2021-03-24 DIAGNOSIS — R52 PAIN: ICD-10-CM

## 2021-03-24 DIAGNOSIS — M47.22 OSTEOARTHRITIS OF SPINE WITH RADICULOPATHY, CERVICAL REGION: ICD-10-CM

## 2021-03-24 PROCEDURE — G0463 HOSPITAL OUTPT CLINIC VISIT: HCPCS

## 2021-03-24 NOTE — H&P
CHIEF COMPLAINT: Neck and arm pain      HISTORY OF PRESENT ILLNESS:  She was last here in November 2019 for cervical epidural injection.  She got several months of relief from that.  She now describes cervical neck pain which radiates into her left upper extremity.  Between a 4 and 8 out of 10 on the pain scale.  Intermittent timing.  Aching sharp burning stabbing throbbing in nature.  Relieved by massage rest pain medications and hot water and heat.  Is affecting her sleep her exercise and her ADLs.  For over 6 weeks she is tried rest heat Tylenol and a prescription muscle relaxer without relief.  She finds that being in warm water and doing some exercises helps her feel better.  Due to Covid she has not been able to do her normal water aerobics.    PAST MEDICAL HISTORY:  Current Outpatient Medications on File Prior to Encounter   Medication Sig Dispense Refill   • Acetaminophen (TYLENOL ARTHRITIS PAIN PO) Take 500 mg by mouth Daily.     • amLODIPine (NORVASC) 10 MG tablet Take 0.5 tablets by mouth Every Evening. 90 tablet 1   • amLODIPine (NORVASC) 5 MG tablet TAKE 1 TABLET BY MOUTH EVERY EVENING. 30 tablet 11   • aspirin 325 MG tablet Take 325 mg by mouth Daily.     • atenolol (TENORMIN) 50 MG tablet TAKE 1 TABLET BY MOUTH 2 TIMES A DAY. 60 tablet 11   • Bioflavonoid Products (DOROTEO C PO) Take 500 mg by mouth Daily.     • Cholecalciferol (VITAMIN D3) 1000 units capsule Take 2 capsules by mouth Daily.     • hydroCHLOROthiazide (HYDRODIURIL) 25 MG tablet TAKE 1 TABLET BY MOUTH DAILY. 30 tablet 6   • levothyroxine (SYNTHROID, LEVOTHROID) 75 MCG tablet TAKE 1 TABLET BY MOUTH DAILY. 90 tablet 3   • losartan (COZAAR) 100 MG tablet TAKE 1 TABLET BY MOUTH DAILY. 90 tablet 3   • multivitamin-minerals (CENTRUM) tablet Take 1 tablet by mouth Daily.     • NIACIN PO Take 500 mg by mouth Daily.     • potassium chloride 10 MEQ CR tablet TAKE 1 TABLET BY MOUTH DAILY. 90 tablet 0   • vitamin E 400 UNIT capsule Take 400 Units by  mouth Daily.       No current facility-administered medications on file prior to encounter.       Past Medical History:   Diagnosis Date   • Arthritis    • Disease of thyroid gland    • Hyperlipidemia    • Hypertension    • Hyperthyroidism    • Neck pain          SOCIAL HISTORY:  No tobacco    REVIEW OF SYSTEMS:  No hematologic infectious or constitutional symptoms  Negative screen for [place]      PHYSICAL EXAM:  There were no vitals taken for this visit.    Well-developed well-nourished no acute distress  Extra ocular movements intact  Mallampati class II airway  Cardiac:  Regular rate and rhythm  Lungs:  Clear to auscultation bilaterally with good effort  Alert and oriented ×3  Deep tendon reflexes normal in the bilateral bicep and tricep   3 out of 5 strength bilateral upper extremities  Cervical spine without obvious deformities ecchymoses  Cervical spine nontender to palpation      DIAGNOSIS:  Post-Op Diagnosis Codes:     * DDD (degenerative disc disease), cervical [M50.30]     * Cervical neuritis [M54.12]    PLAN:  21 minutes were spent reviewing old records and in consultation with the patient    1.  Cervical 6 epidural steroid injections, up to 3, spaced 1-2 weeks apart.  If pain control is acceptable after 1 or 2 injections, it was discussed with the patient that they may return for the subsequent injections if and when their pain returns.  The risks were discussed with the patient including failure of relief, worsening pain, Headache (post dural puncture headache), bleeding (epidural hematoma) and infection (epidural abscess or skin infection).  2.  Physical therapy exercises at home as prescribed by physical therapy or from the pain clinic handout (given to the patient).  Continuation of these exercises every day, or multiple times per week, even when the patient has good pain relief, was stressed to the patient as a preventative measure to decrease the frequency and severity of future pain episodes.  3.   Continue pain medicines as already prescribed.  If patient not currently taking any, it is recommended to begin Acetaminophen 1000 mg po q 8 hours.  If other medicines containing Acetaminophen are currently prescribed, maintain daily dose at 3000 mg.    4.  If they can tolerate NSAIDS, it is recommended to take Ibuprofen 600 mg po q 6 hours for 7 days during pain exacerbations.  Alternatively, they may substitute an NSAID of their choice (e.g. Aleve).  This may be taken at the same time as Acetaminophen.  5.  Heat and ice to the affected area as tolerated for pain control.  It was discussed that heating pads can cause burns.  6.  Low impact exercise such as walking or water exercise was recommended to maintain overall health and aid in weight control.   7.  Follow up as needed for subsequent injections.  8.  Patient was counseled to abstain from tobacco products.

## 2021-04-06 ENCOUNTER — ANESTHESIA EVENT (OUTPATIENT)
Dept: PAIN MEDICINE | Facility: HOSPITAL | Age: 81
End: 2021-04-06

## 2021-04-06 ENCOUNTER — ANESTHESIA (OUTPATIENT)
Dept: PAIN MEDICINE | Facility: HOSPITAL | Age: 81
End: 2021-04-06

## 2021-04-06 ENCOUNTER — HOSPITAL ENCOUNTER (OUTPATIENT)
Dept: GENERAL RADIOLOGY | Facility: HOSPITAL | Age: 81
Discharge: HOME | End: 2021-04-06

## 2021-04-06 ENCOUNTER — HOSPITAL ENCOUNTER (OUTPATIENT)
Dept: PAIN MEDICINE | Facility: HOSPITAL | Age: 81
Discharge: HOME | End: 2021-04-06

## 2021-04-06 VITALS
BODY MASS INDEX: 24.17 KG/M2 | WEIGHT: 128 LBS | HEART RATE: 60 BPM | OXYGEN SATURATION: 96 % | SYSTOLIC BLOOD PRESSURE: 144 MMHG | HEIGHT: 61 IN | TEMPERATURE: 97.3 F | RESPIRATION RATE: 14 BRPM | DIASTOLIC BLOOD PRESSURE: 75 MMHG

## 2021-04-06 DIAGNOSIS — M54.12 CERVICAL RADICULOPATHY: Primary | ICD-10-CM

## 2021-04-06 DIAGNOSIS — R52 PAIN: ICD-10-CM

## 2021-04-06 PROCEDURE — 77003 FLUOROGUIDE FOR SPINE INJECT: CPT

## 2021-04-06 PROCEDURE — 25010000002 DEXAMETHASONE SODIUM PHOSPHATE 10 MG/ML SOLUTION: Performed by: ANESTHESIOLOGY

## 2021-04-06 PROCEDURE — 0 IOPAMIDOL 41 % SOLUTION: Performed by: ANESTHESIOLOGY

## 2021-04-06 RX ORDER — LIDOCAINE HYDROCHLORIDE 10 MG/ML
1 INJECTION, SOLUTION INFILTRATION; PERINEURAL ONCE
Status: DISCONTINUED | OUTPATIENT
Start: 2021-04-06 | End: 2021-04-07 | Stop reason: HOSPADM

## 2021-04-06 RX ORDER — FENTANYL CITRATE 50 UG/ML
50 INJECTION, SOLUTION INTRAMUSCULAR; INTRAVENOUS ONCE
Status: DISCONTINUED | OUTPATIENT
Start: 2021-04-06 | End: 2021-04-07 | Stop reason: HOSPADM

## 2021-04-06 RX ORDER — MIDAZOLAM HYDROCHLORIDE 1 MG/ML
1 INJECTION INTRAMUSCULAR; INTRAVENOUS AS NEEDED
Status: DISCONTINUED | OUTPATIENT
Start: 2021-04-06 | End: 2021-04-07 | Stop reason: HOSPADM

## 2021-04-06 RX ORDER — DEXAMETHASONE SODIUM PHOSPHATE 10 MG/ML
10 INJECTION, SOLUTION INTRAMUSCULAR; INTRAVENOUS ONCE
Status: COMPLETED | OUTPATIENT
Start: 2021-04-06 | End: 2021-04-06

## 2021-04-06 RX ADMIN — IOPAMIDOL 1 ML: 408 INJECTION, SOLUTION INTRATHECAL at 14:18

## 2021-04-06 RX ADMIN — DEXAMETHASONE SODIUM PHOSPHATE 10 MG: 10 INJECTION, SOLUTION INTRAMUSCULAR; INTRAVENOUS at 14:19

## 2021-04-06 NOTE — INTERVAL H&P NOTE
[facility]  H&P reviewed. No changes since last visit.    Diagnosis     * Cervical radiculopathy [M54.12]     * Cervical spine degeneration [M47.812]      Airway assessed since last visit. Airway class equals: 2.

## 2021-04-06 NOTE — ANESTHESIA PROCEDURE NOTES
PAIN Epidural block      Patient reassessed immediately prior to procedure    Patient location during procedure: pain clinic  Indication:procedure for pain  Performed By  Anesthesiologist: Aniceto Crespo MD  Preanesthetic Checklist  Completed: patient identified, site marked, risks and benefits discussed, surgical consent, monitors and equipment checked, pre-op evaluation and timeout performed  Additional Notes    Needle position confirmed by fluoroscopy and epidurogram using 2cc of [id number].    Diagnosis   Post-Op Diagnosis Codes:     * Cervical radiculopathy (M54.12)     * Cervical spine degeneration (M47.812)      Prep:  Pt Position:prone  Sterile Tech:cap, gloves, mask and sterile barrier  Prep:chlorhexidine gluconate and isopropyl alcohol  Monitoring:blood pressure monitoring, continuous pulse oximetry and EKG  Procedure:Sedation: no     Approach:midline  Guidance: fluoroscopy  Location:cervical  Level:6-7  Needle Type:Tuohy  Needle Gauge:20  Aspiration:negative  Medications:  Analgesia: decadron 10mg.  Preservative Free Saline:2mL  Isovue:2mL  Comments:Needle position confirmed by fluoroscopy and epidurogram using 2cc of [id number].  Post Assessment:  Post-procedure: bandaid.  Pt Tolerance:patient tolerated the procedure well with no apparent complications  Complications:no

## 2021-04-29 ENCOUNTER — OFFICE VISIT (OUTPATIENT)
Dept: ORTHOPEDIC SURGERY | Facility: CLINIC | Age: 81
End: 2021-04-29

## 2021-04-29 VITALS — BODY MASS INDEX: 24.17 KG/M2 | WEIGHT: 128 LBS | HEIGHT: 61 IN

## 2021-04-29 DIAGNOSIS — M75.52 SUBACROMIAL BURSITIS OF LEFT SHOULDER JOINT: ICD-10-CM

## 2021-04-29 DIAGNOSIS — R52 PAIN: Primary | ICD-10-CM

## 2021-04-29 DIAGNOSIS — M47.22 OSTEOARTHRITIS OF SPINE WITH RADICULOPATHY, CERVICAL REGION: ICD-10-CM

## 2021-04-29 PROCEDURE — 99213 OFFICE O/P EST LOW 20 MIN: CPT | Performed by: ORTHOPAEDIC SURGERY

## 2021-04-29 PROCEDURE — 20610 DRAIN/INJ JOINT/BURSA W/O US: CPT | Performed by: ORTHOPAEDIC SURGERY

## 2021-04-29 RX ORDER — LIDOCAINE HYDROCHLORIDE 10 MG/ML
4 INJECTION, SOLUTION EPIDURAL; INFILTRATION; INTRACAUDAL; PERINEURAL
Status: COMPLETED | OUTPATIENT
Start: 2021-04-29 | End: 2021-04-29

## 2021-04-29 RX ORDER — TRIAMCINOLONE ACETONIDE 40 MG/ML
40 INJECTION, SUSPENSION INTRA-ARTICULAR; INTRAMUSCULAR
Status: COMPLETED | OUTPATIENT
Start: 2021-04-29 | End: 2021-04-29

## 2021-04-29 RX ADMIN — TRIAMCINOLONE ACETONIDE 40 MG: 40 INJECTION, SUSPENSION INTRA-ARTICULAR; INTRAMUSCULAR at 09:57

## 2021-04-29 RX ADMIN — LIDOCAINE HYDROCHLORIDE 4 ML: 10 INJECTION, SOLUTION EPIDURAL; INFILTRATION; INTRACAUDAL; PERINEURAL at 09:57

## 2021-04-29 NOTE — PROGRESS NOTES
Subjective: Left shoulder pain, neck pain     Patient ID: Nguyen Zhang is a 81 y.o. female.    Chief Complaint:    History of Present Illness 81-year-old female with osteoarthritis of the cervical spine and bursitis of the left shoulder returns after related to cervical epidural and having undergone a cortisone injection in her left shoulder 3 months ago.  The cervical epidural did relieve a lot of the left upper extremity pain and the cortisone injection given into the subacromial space of the left shoulder 3 months ago did reduce a lot of the pain but she is beginning to develop pain once again she presents for evaluation.       Social History     Occupational History   • Not on file   Tobacco Use   • Smoking status: Never Smoker   • Smokeless tobacco: Never Used   Vaping Use   • Vaping Use: Never used   Substance and Sexual Activity   • Alcohol use: No   • Drug use: No   • Sexual activity: Defer      Review of Systems   Constitutional: Negative for chills, diaphoresis, fever and unexpected weight change.   HENT: Negative for hearing loss, nosebleeds, sore throat and tinnitus.    Eyes: Negative for pain and visual disturbance.   Respiratory: Negative for cough, shortness of breath and wheezing.    Cardiovascular: Negative for chest pain and palpitations.   Gastrointestinal: Negative for abdominal pain, diarrhea, nausea and vomiting.   Endocrine: Negative for cold intolerance, heat intolerance and polydipsia.   Genitourinary: Negative for difficulty urinating, dysuria and hematuria.   Musculoskeletal: Positive for arthralgias, myalgias and neck pain. Negative for joint swelling.   Skin: Negative for rash and wound.   Allergic/Immunologic: Negative for environmental allergies.   Neurological: Negative for dizziness, syncope and numbness.   Hematological: Does not bruise/bleed easily.   Psychiatric/Behavioral: Negative for dysphoric mood and sleep disturbance. The patient is not nervous/anxious.          Past  Medical History:   Diagnosis Date   • Arthritis    • Disease of thyroid gland    • Hyperlipidemia    • Hypertension    • Hyperthyroidism    • Neck pain      Past Surgical History:   Procedure Laterality Date   • HYSTERECTOMY     • KNEE SURGERY Right    • SHOULDER SURGERY Right      Family History   Problem Relation Age of Onset   • Heart attack Father    • Breast cancer Neg Hx          Objective:  There were no vitals filed for this visit.      04/29/21  0945   Weight: 58.1 kg (128 lb)     Body mass index is 24.48 kg/m².        Ortho Exam   He is alert and oriented x3.  There is no motor or sensory deficit in the left upper extremity she has full range of motion of the elbow.  She can abduct and extend about 120 degrees and at 90 degrees it is they are intact but painful with deltoid function is 4 out of 5.  Mildly positive Munoz sign.  External rotation is 25 to 30 degrees internal rotation is 40 degrees.  Good capillary refill.    Assessment:        1. Pain    2. Osteoarthritis of spine with radiculopathy, cervical region    3. Subacromial bursitis of left shoulder joint           Plan: She has had one cervical epidural and if he got a good response a second shot at this time is now scheduled.  She had a good response to the cortisone injection into the subacromial space and she began to have pain once again.  After reviewing treatment options with physical therapy versus repeat injection program proceed with another subacromial injection of lidocaine Kenalog at a ratio 4:1 given to the posterior portal.  Told the patient that these injections can be given in 3 months.  Return to see me as needed.  Answered all questions  Large Joint Arthrocentesis: L subacromial bursa  Date/Time: 4/29/2021 9:57 AM  Consent given by: patient  Site marked: site marked  Timeout: Immediately prior to procedure a time out was called to verify the correct patient, procedure, equipment, support staff and site/side marked as required    Supporting Documentation  Indications: pain   Procedure Details  Location: shoulder - L subacromial bursa  Preparation: Patient was prepped and draped in the usual sterile fashion  Needle size: 22 G  Approach: posterior  Medications administered: 40 mg triamcinolone acetonide 40 MG/ML; 4 mL lidocaine PF 1% 1 %  Patient tolerance: patient tolerated the procedure well with no immediate complications                  Work Status:    LOUISA query complete.    Orders:  Orders Placed This Encounter   Procedures   • Large Joint Arthrocentesis: L subacromial bursa       Medications:  No orders of the defined types were placed in this encounter.      Followup:  Return if symptoms worsen or fail to improve.          Dictated utilizing Dragon dictation

## 2021-05-04 ENCOUNTER — APPOINTMENT (OUTPATIENT)
Dept: PAIN MEDICINE | Facility: HOSPITAL | Age: 81
End: 2021-05-04

## 2021-06-01 ENCOUNTER — OFFICE VISIT (OUTPATIENT)
Dept: FAMILY MEDICINE CLINIC | Facility: CLINIC | Age: 81
End: 2021-06-01

## 2021-06-01 VITALS
OXYGEN SATURATION: 98 % | TEMPERATURE: 97.1 F | SYSTOLIC BLOOD PRESSURE: 150 MMHG | DIASTOLIC BLOOD PRESSURE: 72 MMHG | BODY MASS INDEX: 24.17 KG/M2 | WEIGHT: 128 LBS | HEART RATE: 67 BPM | HEIGHT: 61 IN

## 2021-06-01 DIAGNOSIS — E03.9 ACQUIRED HYPOTHYROIDISM: ICD-10-CM

## 2021-06-01 DIAGNOSIS — R00.2 PALPITATIONS: ICD-10-CM

## 2021-06-01 DIAGNOSIS — I10 ESSENTIAL HYPERTENSION: Primary | ICD-10-CM

## 2021-06-01 DIAGNOSIS — E78.49 OTHER HYPERLIPIDEMIA: ICD-10-CM

## 2021-06-01 PROCEDURE — 99214 OFFICE O/P EST MOD 30 MIN: CPT | Performed by: INTERNAL MEDICINE

## 2021-06-01 PROCEDURE — 93000 ELECTROCARDIOGRAM COMPLETE: CPT | Performed by: INTERNAL MEDICINE

## 2021-06-01 RX ORDER — ASPIRIN 81 MG/1
81 TABLET ORAL DAILY
COMMUNITY
End: 2021-10-01

## 2021-06-01 NOTE — PROGRESS NOTES
Subjective   Nguyen Zhang is a 81 y.o. female.     Chief Complaint   Patient presents with   • Hypertension   • Hyperlipidemia   • Hypothyroidism   • Primary osteoarthritis involving multiple joints       History of Present Illness   Patient here follow-up for hypertension, hyperlipidemia, thyroid problems.  Taking all current medication without any problems.  Reports she had right conjunctival hemorrhage sugar cut her aspirin 81 mg daily.  She has seen IMD and it has resolved.  Complains of palpitations.  Reports this have been going for several months.  No syncope, dizziness, chest pain or shortness of breath.  Extra beats were noted when she was getting epidural.  The following portions of the patient's history were reviewed and updated as appropriate: allergies, current medications, past family history, past medical history, past social history, past surgical history and problem list.    Review of Systems   Constitutional: Negative for activity change, appetite change, fatigue and fever.   Eyes: Negative for blurred vision and double vision.   Respiratory: Negative.  Negative for shortness of breath.    Cardiovascular: Positive for palpitations. Negative for chest pain and leg swelling.   Gastrointestinal: Negative.    Genitourinary: Negative for dysuria and hematuria.   Neurological: Negative for dizziness, syncope, light-headedness and headache.   Psychiatric/Behavioral: Negative for agitation, behavioral problems and depressed mood.       Allergies   Allergen Reactions   • Ace Inhibitors Cough   • Codeine Nausea And Vomiting       Current Outpatient Medications on File Prior to Visit   Medication Sig Dispense Refill   • Acetaminophen (TYLENOL ARTHRITIS PAIN PO) Take 500 mg by mouth Daily.     • amLODIPine (NORVASC) 10 MG tablet Take 0.5 tablets by mouth Every Evening. 90 tablet 1   • amLODIPine (NORVASC) 5 MG tablet TAKE 1 TABLET BY MOUTH EVERY EVENING. 30 tablet 11   • aspirin 81 MG EC tablet Take 81  mg by mouth Daily.     • atenolol (TENORMIN) 50 MG tablet TAKE 1 TABLET BY MOUTH 2 TIMES A DAY. 60 tablet 11   • Bioflavonoid Products (DOROTEO C PO) Take 500 mg by mouth Daily.     • Cholecalciferol (VITAMIN D3) 1000 units capsule Take 2 capsules by mouth Daily.     • hydroCHLOROthiazide (HYDRODIURIL) 25 MG tablet TAKE 1 TABLET BY MOUTH DAILY. 30 tablet 6   • levothyroxine (SYNTHROID, LEVOTHROID) 75 MCG tablet TAKE 1 TABLET BY MOUTH DAILY. 90 tablet 3   • losartan (COZAAR) 100 MG tablet TAKE 1 TABLET BY MOUTH DAILY. 90 tablet 3   • multivitamin-minerals (CENTRUM) tablet Take 1 tablet by mouth Daily.     • NIACIN PO Take 500 mg by mouth Daily.     • potassium chloride 10 MEQ CR tablet TAKE 1 TABLET BY MOUTH DAILY. 90 tablet 0   • vitamin E 400 UNIT capsule Take 400 Units by mouth Daily.     • [DISCONTINUED] aspirin 325 MG tablet Take 81 mg by mouth Daily.       No current facility-administered medications on file prior to visit.       Family History   Problem Relation Age of Onset   • Heart attack Father    • Breast cancer Neg Hx        Past Medical History:   Diagnosis Date   • Arthritis    • Disease of thyroid gland    • Hyperlipidemia    • Hypertension    • Hyperthyroidism    • Neck pain        Past Surgical History:   Procedure Laterality Date   • HYSTERECTOMY     • KNEE SURGERY Right    • SHOULDER SURGERY Right        Social History     Socioeconomic History   • Marital status:      Spouse name: Not on file   • Number of children: Not on file   • Years of education: Not on file   • Highest education level: Not on file   Tobacco Use   • Smoking status: Never Smoker   • Smokeless tobacco: Never Used   Vaping Use   • Vaping Use: Never used   Substance and Sexual Activity   • Alcohol use: No   • Drug use: No   • Sexual activity: Defer       Patient Active Problem List   Diagnosis   • Precordial chest pain   • PVCs (premature ventricular contractions)   • Essential hypertension   • Acquired hypothyroidism   •  "Low serum potassium level   • Other hyperlipidemia   • Acute pain of left shoulder   • Osteoarthritis of multiple joints       /72 (BP Location: Left arm, Patient Position: Sitting, Cuff Size: Small Adult)   Pulse 67   Temp 97.1 °F (36.2 °C) (Temporal)   Ht 154 cm (60.63\")   Wt 58.1 kg (128 lb)   SpO2 98%   BMI 24.48 kg/m²   Body mass index is 24.48 kg/m².    Objective   Physical Exam  Vitals and nursing note reviewed.   Constitutional:       Appearance: She is well-developed.   Eyes:      Pupils: Pupils are equal, round, and reactive to light.   Neck:      Thyroid: No thyromegaly.      Vascular: No JVD.      Trachea: No tracheal deviation.   Cardiovascular:      Rate and Rhythm: Normal rate and regular rhythm.      Heart sounds: Murmur heard.     Pulmonary:      Effort: Pulmonary effort is normal. No respiratory distress.      Breath sounds: Normal breath sounds. No wheezing.   Abdominal:      General: Bowel sounds are normal. There is no distension.      Palpations: Abdomen is soft. There is no mass.      Tenderness: There is no abdominal tenderness. There is no guarding or rebound.      Hernia: No hernia is present.   Musculoskeletal:      Cervical back: Normal range of motion and neck supple.   Lymphadenopathy:      Cervical: No cervical adenopathy.   Neurological:      General: No focal deficit present.      Mental Status: She is alert and oriented to person, place, and time. Mental status is at baseline.   Psychiatric:         Mood and Affect: Mood normal.         Behavior: Behavior normal.           Assessment/Plan   Diagnoses and all orders for this visit:    1. Essential hypertension (Primary)  -     Comprehensive Metabolic Panel  -     CK  -     CBC & Differential  -     Lipid Panel With / Chol / HDL Ratio  -     TSH  -     T4, Free  -     ECG 12 Lead    2. Other hyperlipidemia  -     Comprehensive Metabolic Panel  -     CK  -     CBC & Differential  -     Lipid Panel With / Chol / HDL Ratio  - "     TSH  -     T4, Free  -     ECG 12 Lead    3. Acquired hypothyroidism  -     Comprehensive Metabolic Panel  -     CK  -     CBC & Differential  -     Lipid Panel With / Chol / HDL Ratio  -     TSH  -     T4, Free  -     ECG 12 Lead    4. Palpitations  -     ECG 12 Lead  -     Holter Monitor - 24 Hour; Future    Continue diet and exercise.  Continue all current medications that include low-dose aspirin, Synthroid, amlodipine.  Patient cannot tolerate statins.  Keep other appointments.  All problems are chronic stable.  Subconjunctival hemorrhage has resolved.  Regarding her palpitations she had an echo, stress test in 2019.  EKG showed normal sinus rhythm, no PVCs or PACs.  No changes from previous EKGs.  No ST elevation or depression.  24-hour Holter is ordered.  Patient will come back in 2 weeks, if her Holter is not done by the end she can postpone the appointment.  To ER if he starts passing out, chest pain or shortness of breath.

## 2021-06-02 LAB
ALBUMIN SERPL-MCNC: 4.7 G/DL (ref 3.5–5.2)
ALBUMIN/GLOB SERPL: 2.6 G/DL
ALP SERPL-CCNC: 65 U/L (ref 39–117)
ALT SERPL-CCNC: 20 U/L (ref 1–33)
AST SERPL-CCNC: 24 U/L (ref 1–32)
BASOPHILS # BLD AUTO: 0.03 10*3/MM3 (ref 0–0.2)
BASOPHILS NFR BLD AUTO: 0.4 % (ref 0–1.5)
BILIRUB SERPL-MCNC: 0.7 MG/DL (ref 0–1.2)
BUN SERPL-MCNC: 12 MG/DL (ref 8–23)
BUN/CREAT SERPL: 13.6 (ref 7–25)
CALCIUM SERPL-MCNC: 9.7 MG/DL (ref 8.6–10.5)
CHLORIDE SERPL-SCNC: 92 MMOL/L (ref 98–107)
CHOLEST SERPL-MCNC: 211 MG/DL (ref 0–200)
CHOLEST/HDLC SERPL: 3.25 {RATIO}
CK SERPL-CCNC: 130 U/L (ref 20–180)
CO2 SERPL-SCNC: 30.1 MMOL/L (ref 22–29)
CREAT SERPL-MCNC: 0.88 MG/DL (ref 0.57–1)
EOSINOPHIL # BLD AUTO: 0.08 10*3/MM3 (ref 0–0.4)
EOSINOPHIL NFR BLD AUTO: 1.1 % (ref 0.3–6.2)
ERYTHROCYTE [DISTWIDTH] IN BLOOD BY AUTOMATED COUNT: 12.3 % (ref 12.3–15.4)
GLOBULIN SER CALC-MCNC: 1.8 GM/DL
GLUCOSE SERPL-MCNC: 89 MG/DL (ref 65–99)
HCT VFR BLD AUTO: 37 % (ref 34–46.6)
HDLC SERPL-MCNC: 65 MG/DL (ref 40–60)
HGB BLD-MCNC: 12.6 G/DL (ref 12–15.9)
IMM GRANULOCYTES # BLD AUTO: 0.01 10*3/MM3 (ref 0–0.05)
IMM GRANULOCYTES NFR BLD AUTO: 0.1 % (ref 0–0.5)
LDLC SERPL CALC-MCNC: 131 MG/DL (ref 0–100)
LYMPHOCYTES # BLD AUTO: 1.62 10*3/MM3 (ref 0.7–3.1)
LYMPHOCYTES NFR BLD AUTO: 23.2 % (ref 19.6–45.3)
MCH RBC QN AUTO: 33.1 PG (ref 26.6–33)
MCHC RBC AUTO-ENTMCNC: 34.1 G/DL (ref 31.5–35.7)
MCV RBC AUTO: 97.1 FL (ref 79–97)
MONOCYTES # BLD AUTO: 0.76 10*3/MM3 (ref 0.1–0.9)
MONOCYTES NFR BLD AUTO: 10.9 % (ref 5–12)
NEUTROPHILS # BLD AUTO: 4.47 10*3/MM3 (ref 1.7–7)
NEUTROPHILS NFR BLD AUTO: 64.3 % (ref 42.7–76)
NRBC BLD AUTO-RTO: 0 /100 WBC (ref 0–0.2)
PLATELET # BLD AUTO: 244 10*3/MM3 (ref 140–450)
POTASSIUM SERPL-SCNC: 3.6 MMOL/L (ref 3.5–5.2)
PROT SERPL-MCNC: 6.5 G/DL (ref 6–8.5)
RBC # BLD AUTO: 3.81 10*6/MM3 (ref 3.77–5.28)
SODIUM SERPL-SCNC: 132 MMOL/L (ref 136–145)
T4 FREE SERPL-MCNC: 1.64 NG/DL (ref 0.93–1.7)
TRIGL SERPL-MCNC: 84 MG/DL (ref 0–150)
TSH SERPL DL<=0.005 MIU/L-ACNC: 2.34 UIU/ML (ref 0.27–4.2)
VLDLC SERPL CALC-MCNC: 15 MG/DL (ref 5–40)
WBC # BLD AUTO: 6.97 10*3/MM3 (ref 3.4–10.8)

## 2021-06-02 RX ORDER — POTASSIUM CHLORIDE 750 MG/1
TABLET, FILM COATED, EXTENDED RELEASE ORAL
Qty: 90 TABLET | Refills: 0 | Status: SHIPPED | OUTPATIENT
Start: 2021-06-02 | End: 2021-08-28

## 2021-06-14 ENCOUNTER — HOSPITAL ENCOUNTER (OUTPATIENT)
Dept: CARDIOLOGY | Facility: HOSPITAL | Age: 81
Discharge: HOME | End: 2021-06-14
Admitting: INTERNAL MEDICINE

## 2021-06-14 DIAGNOSIS — R00.2 PALPITATIONS: ICD-10-CM

## 2021-06-14 PROCEDURE — 93225 XTRNL ECG REC<48 HRS REC: CPT

## 2021-06-14 PROCEDURE — 93226 XTRNL ECG REC<48 HR SCAN A/R: CPT

## 2021-06-15 LAB
MAXIMAL PREDICTED HEART RATE: 140 BPM
STRESS TARGET HR: 119 BPM

## 2021-06-15 PROCEDURE — 93227 XTRNL ECG REC<48 HR R&I: CPT | Performed by: INTERNAL MEDICINE

## 2021-06-17 ENCOUNTER — OFFICE VISIT (OUTPATIENT)
Dept: FAMILY MEDICINE CLINIC | Facility: CLINIC | Age: 81
End: 2021-06-17

## 2021-06-17 VITALS
OXYGEN SATURATION: 98 % | SYSTOLIC BLOOD PRESSURE: 115 MMHG | WEIGHT: 129 LBS | HEIGHT: 61 IN | BODY MASS INDEX: 24.35 KG/M2 | TEMPERATURE: 98 F | RESPIRATION RATE: 16 BRPM | DIASTOLIC BLOOD PRESSURE: 64 MMHG | HEART RATE: 70 BPM

## 2021-06-17 DIAGNOSIS — E87.1 HYPONATREMIA: ICD-10-CM

## 2021-06-17 DIAGNOSIS — I10 ESSENTIAL HYPERTENSION: Primary | ICD-10-CM

## 2021-06-17 DIAGNOSIS — R00.2 PALPITATIONS: ICD-10-CM

## 2021-06-17 PROCEDURE — 99213 OFFICE O/P EST LOW 20 MIN: CPT | Performed by: INTERNAL MEDICINE

## 2021-06-17 NOTE — PROGRESS NOTES
Subjective   Nguyen Zhang is a 81 y.o. female.     Chief Complaint   Patient presents with   • Palpitations     2 week f/u    Hyponatremia    History of Present Illness   Patient here follow-up for hyponatremia sodium was 132.  She is on hydrochlorothiazide but has been on it for long time.  Had 24-hour Holter done that was normal.  She had reported palpitations on the Holter monitor corresponding rhythm was sinus.  Patient reports no further palpitations.  Thinks it may have been anxiety.  No change in diet  The following portions of the patient's history were reviewed and updated as appropriate: allergies, current medications, past family history, past medical history, past social history, past surgical history and problem list.    Review of Systems   Constitutional: Negative for activity change, appetite change, fatigue and fever.   Eyes: Negative for blurred vision and double vision.   Respiratory: Negative.  Negative for shortness of breath.    Cardiovascular: Negative for chest pain, palpitations and leg swelling.   Gastrointestinal: Negative.    Neurological: Negative for dizziness, syncope, light-headedness and headache.       Allergies   Allergen Reactions   • Ace Inhibitors Cough   • Codeine Nausea And Vomiting       Current Outpatient Medications on File Prior to Visit   Medication Sig Dispense Refill   • Acetaminophen (TYLENOL ARTHRITIS PAIN PO) Take 500 mg by mouth Daily.     • amLODIPine (NORVASC) 10 MG tablet Take 0.5 tablets by mouth Every Evening. 90 tablet 1   • aspirin 81 MG EC tablet Take 81 mg by mouth Daily.     • atenolol (TENORMIN) 50 MG tablet TAKE 1 TABLET BY MOUTH 2 TIMES A DAY. 60 tablet 11   • Bioflavonoid Products (DOROTEO C PO) Take 500 mg by mouth Daily.     • Cholecalciferol (VITAMIN D3) 1000 units capsule Take 2 capsules by mouth Daily.     • hydroCHLOROthiazide (HYDRODIURIL) 25 MG tablet TAKE 1 TABLET BY MOUTH DAILY. 30 tablet 6   • levothyroxine (SYNTHROID, LEVOTHROID) 75 MCG  "tablet TAKE 1 TABLET BY MOUTH DAILY. 90 tablet 3   • losartan (COZAAR) 100 MG tablet TAKE 1 TABLET BY MOUTH DAILY. 90 tablet 3   • multivitamin-minerals (CENTRUM) tablet Take 1 tablet by mouth Daily.     • NIACIN PO Take 500 mg by mouth Daily.     • potassium chloride 10 MEQ CR tablet TAKE 1 TABLET BY MOUTH DAILY. 90 tablet 0   • vitamin E 400 UNIT capsule Take 400 Units by mouth Daily.     • amLODIPine (NORVASC) 5 MG tablet TAKE 1 TABLET BY MOUTH EVERY EVENING. 30 tablet 11     No current facility-administered medications on file prior to visit.       Family History   Problem Relation Age of Onset   • Heart attack Father    • Breast cancer Neg Hx        Past Medical History:   Diagnosis Date   • Arthritis    • Disease of thyroid gland    • Hyperlipidemia    • Hypertension    • Hyperthyroidism    • Neck pain        Past Surgical History:   Procedure Laterality Date   • HYSTERECTOMY     • KNEE SURGERY Right    • SHOULDER SURGERY Right        Social History     Socioeconomic History   • Marital status:      Spouse name: Not on file   • Number of children: Not on file   • Years of education: Not on file   • Highest education level: Not on file   Tobacco Use   • Smoking status: Never Smoker   • Smokeless tobacco: Never Used   Vaping Use   • Vaping Use: Never used   Substance and Sexual Activity   • Alcohol use: No   • Drug use: No   • Sexual activity: Defer       Patient Active Problem List   Diagnosis   • Precordial chest pain   • PVCs (premature ventricular contractions)   • Essential hypertension   • Acquired hypothyroidism   • Low serum potassium level   • Other hyperlipidemia   • Acute pain of left shoulder   • Osteoarthritis of multiple joints       /64   Pulse 70   Temp 98 °F (36.7 °C)   Resp 16   Ht 154 cm (60.63\")   Wt 58.5 kg (129 lb)   SpO2 98%   BMI 24.67 kg/m²   Body mass index is 24.67 kg/m².    Objective   Physical Exam  Constitutional:       Appearance: She is well-developed.   Eyes: "      Pupils: Pupils are equal, round, and reactive to light.   Neck:      Thyroid: No thyromegaly.      Vascular: No JVD.      Trachea: No tracheal deviation.   Cardiovascular:      Rate and Rhythm: Normal rate and regular rhythm.      Heart sounds: No murmur heard.     Pulmonary:      Effort: Pulmonary effort is normal. No respiratory distress.      Breath sounds: Normal breath sounds. No wheezing.   Abdominal:      General: Bowel sounds are normal. There is no distension.      Palpations: Abdomen is soft. There is no mass.      Tenderness: There is no abdominal tenderness. There is no guarding or rebound.      Hernia: No hernia is present.   Musculoskeletal:      Cervical back: Normal range of motion and neck supple.   Lymphadenopathy:      Cervical: No cervical adenopathy.   Neurological:      General: No focal deficit present.      Mental Status: She is alert and oriented to person, place, and time.           Assessment/Plan   Diagnoses and all orders for this visit:    1. Essential hypertension (Primary)  -     Basic Metabolic Panel    2. Hyponatremia  -     Basic Metabolic Panel    3. Palpitations    Continue diet and exercise.  Awaiting labs.  Discussed with patient if palpitations recur or continue call will refer her to cardiology.  Continue all medications.  Awaiting labs.  Return as scheduled.  Patient is euvolemic

## 2021-06-18 LAB
BUN SERPL-MCNC: 15 MG/DL (ref 8–23)
BUN/CREAT SERPL: 16.9 (ref 7–25)
CALCIUM SERPL-MCNC: 9.9 MG/DL (ref 8.6–10.5)
CHLORIDE SERPL-SCNC: 96 MMOL/L (ref 98–107)
CO2 SERPL-SCNC: 28.4 MMOL/L (ref 22–29)
CREAT SERPL-MCNC: 0.89 MG/DL (ref 0.57–1)
GLUCOSE SERPL-MCNC: 72 MG/DL (ref 65–99)
POTASSIUM SERPL-SCNC: 3.9 MMOL/L (ref 3.5–5.2)
SODIUM SERPL-SCNC: 139 MMOL/L (ref 136–145)

## 2021-06-24 RX ORDER — HYDROCHLOROTHIAZIDE 25 MG/1
TABLET ORAL
Qty: 30 TABLET | Refills: 5 | Status: SHIPPED | OUTPATIENT
Start: 2021-06-24 | End: 2021-12-29

## 2021-06-30 ENCOUNTER — OFFICE VISIT (OUTPATIENT)
Dept: FAMILY MEDICINE CLINIC | Facility: CLINIC | Age: 81
End: 2021-06-30

## 2021-06-30 VITALS
HEART RATE: 66 BPM | RESPIRATION RATE: 16 BRPM | HEIGHT: 61 IN | SYSTOLIC BLOOD PRESSURE: 130 MMHG | WEIGHT: 128 LBS | TEMPERATURE: 98 F | OXYGEN SATURATION: 98 % | DIASTOLIC BLOOD PRESSURE: 70 MMHG | BODY MASS INDEX: 24.17 KG/M2

## 2021-06-30 DIAGNOSIS — E87.1 HYPONATREMIA: ICD-10-CM

## 2021-06-30 DIAGNOSIS — R00.2 PALPITATIONS: ICD-10-CM

## 2021-06-30 DIAGNOSIS — F41.9 ANXIETY: Primary | ICD-10-CM

## 2021-06-30 PROCEDURE — 99213 OFFICE O/P EST LOW 20 MIN: CPT | Performed by: INTERNAL MEDICINE

## 2021-06-30 RX ORDER — ALPRAZOLAM 0.25 MG/1
0.25 TABLET ORAL NIGHTLY PRN
Qty: 10 TABLET | Refills: 0 | Status: SHIPPED | OUTPATIENT
Start: 2021-06-30

## 2021-06-30 NOTE — PROGRESS NOTES
Subjective   Nguyen Zhang is a 81 y.o. female.     Chief Complaint   Patient presents with   • Hypertension     lab f/u    Palpitation, hyponatremia    History of Present Illness   Patient here follow-up for palpitation, hyponatremia.  Patient is very vague about her symptoms.  Denies any palpitations or thumping of the heart anymore.  She had a Holter monitor done that was normal, during the Holter she had 2 episodes that she reported with palpitation there was normal sinus rhythm.  She reports feels like her throat swelling,, no pain.  Very vague about her symptoms.  No chest pain or shortness of breath.  Patient thinks she is having problems with anxiety.  Reports at night sometimes she lays awake unable to sleep.  The following portions of the patient's history were reviewed and updated as appropriate: allergies, current medications, past family history, past medical history, past social history, past surgical history and problem list.    Review of Systems   Constitutional: Negative for activity change, appetite change, fatigue and fever.   Eyes: Negative for blurred vision and double vision.   Respiratory: Negative.  Negative for shortness of breath.    Cardiovascular: Positive for palpitations. Negative for chest pain and leg swelling.        Throat swelling   Neurological: Negative for dizziness, syncope, light-headedness and headache.   Psychiatric/Behavioral: Positive for sleep disturbance and stress. Negative for agitation, behavioral problems and suicidal ideas. The patient is nervous/anxious.        Allergies   Allergen Reactions   • Ace Inhibitors Cough   • Codeine Nausea And Vomiting       Current Outpatient Medications on File Prior to Visit   Medication Sig Dispense Refill   • Acetaminophen (TYLENOL ARTHRITIS PAIN PO) Take 500 mg by mouth Daily.     • amLODIPine (NORVASC) 10 MG tablet Take 0.5 tablets by mouth Every Evening. 90 tablet 1   • amLODIPine (NORVASC) 5 MG tablet TAKE 1 TABLET BY  MOUTH EVERY EVENING. 30 tablet 11   • aspirin 81 MG EC tablet Take 81 mg by mouth Daily.     • atenolol (TENORMIN) 50 MG tablet TAKE 1 TABLET BY MOUTH 2 TIMES A DAY. 60 tablet 11   • Bioflavonoid Products (DOROTEO C PO) Take 500 mg by mouth Daily.     • Cholecalciferol (VITAMIN D3) 1000 units capsule Take 2 capsules by mouth Daily.     • hydroCHLOROthiazide (HYDRODIURIL) 25 MG tablet TAKE 1 TABLET BY MOUTH DAILY. 30 tablet 5   • levothyroxine (SYNTHROID, LEVOTHROID) 75 MCG tablet TAKE 1 TABLET BY MOUTH DAILY. 90 tablet 3   • losartan (COZAAR) 100 MG tablet TAKE 1 TABLET BY MOUTH DAILY. 90 tablet 3   • multivitamin-minerals (CENTRUM) tablet Take 1 tablet by mouth Daily.     • NIACIN PO Take 500 mg by mouth Daily.     • potassium chloride 10 MEQ CR tablet TAKE 1 TABLET BY MOUTH DAILY. 90 tablet 0   • vitamin E 400 UNIT capsule Take 400 Units by mouth Daily.       No current facility-administered medications on file prior to visit.       Family History   Problem Relation Age of Onset   • Heart attack Father    • Breast cancer Neg Hx        Past Medical History:   Diagnosis Date   • Arthritis    • Disease of thyroid gland    • Hyperlipidemia    • Hypertension    • Hyperthyroidism    • Neck pain        Past Surgical History:   Procedure Laterality Date   • HYSTERECTOMY     • KNEE SURGERY Right    • SHOULDER SURGERY Right        Social History     Socioeconomic History   • Marital status:      Spouse name: Not on file   • Number of children: Not on file   • Years of education: Not on file   • Highest education level: Not on file   Tobacco Use   • Smoking status: Never Smoker   • Smokeless tobacco: Never Used   Vaping Use   • Vaping Use: Never used   Substance and Sexual Activity   • Alcohol use: No   • Drug use: No   • Sexual activity: Defer       Patient Active Problem List   Diagnosis   • Precordial chest pain   • PVCs (premature ventricular contractions)   • Essential hypertension   • Acquired hypothyroidism   •  "Low serum potassium level   • Other hyperlipidemia   • Acute pain of left shoulder   • Osteoarthritis of multiple joints       /70   Pulse 66   Temp 98 °F (36.7 °C)   Resp 16   Ht 154 cm (60.63\")   Wt 58.1 kg (128 lb)   SpO2 98%   BMI 24.48 kg/m²   Body mass index is 24.48 kg/m².    Objective   Physical Exam  Vitals and nursing note reviewed.   Constitutional:       Appearance: She is well-developed.   HENT:      Mouth/Throat:      Mouth: Mucous membranes are dry.      Pharynx: Oropharynx is clear. No oropharyngeal exudate or posterior oropharyngeal erythema.   Eyes:      Pupils: Pupils are equal, round, and reactive to light.   Neck:      Thyroid: No thyromegaly.      Vascular: No JVD.      Trachea: No tracheal deviation.   Cardiovascular:      Rate and Rhythm: Normal rate and regular rhythm.      Heart sounds: No murmur heard.     Pulmonary:      Effort: Pulmonary effort is normal. No respiratory distress.      Breath sounds: Normal breath sounds. No wheezing.   Abdominal:      General: Bowel sounds are normal. There is no distension.      Palpations: Abdomen is soft. There is no mass.      Tenderness: There is no abdominal tenderness. There is no right CVA tenderness, left CVA tenderness, guarding or rebound.      Hernia: No hernia is present.   Musculoskeletal:      Cervical back: Normal range of motion and neck supple.   Lymphadenopathy:      Cervical: No cervical adenopathy.   Neurological:      General: No focal deficit present.      Mental Status: She is alert and oriented to person, place, and time. Mental status is at baseline.   Psychiatric:         Mood and Affect: Mood normal.         Behavior: Behavior normal.         Thought Content: Thought content normal.           Assessment/Plan   Diagnoses and all orders for this visit:    1. Anxiety (Primary)  -     ALPRAZolam (Xanax) 0.25 MG tablet; Take 1 tablet by mouth At Night As Needed for Anxiety.  Dispense: 10 tablet; Refill: 0    2. " Hyponatremia    3. Palpitations    Her symptoms are very vague not clear about her history.  Discussed with patient for palpitations her Holter was normal I can refer her back to cardiology for event monitor.  Hyponatremia has resolved.  Patient does not want any medication that she takes every day.  She is aware of side effects from Xanax.  She is aware that she will not get any refills.  Return in 3 months time or before if needed.

## 2021-07-23 ENCOUNTER — OFFICE VISIT (OUTPATIENT)
Dept: ORTHOPEDIC SURGERY | Facility: CLINIC | Age: 81
End: 2021-07-23

## 2021-07-23 VITALS — WEIGHT: 128 LBS | HEIGHT: 61 IN | BODY MASS INDEX: 24.17 KG/M2

## 2021-07-23 DIAGNOSIS — R52 PAIN: Primary | ICD-10-CM

## 2021-07-23 DIAGNOSIS — M75.22 BICEPS TENDINITIS OF LEFT UPPER EXTREMITY: ICD-10-CM

## 2021-07-23 DIAGNOSIS — M75.52 SUBACROMIAL BURSITIS OF LEFT SHOULDER JOINT: ICD-10-CM

## 2021-07-23 DIAGNOSIS — M75.82 ROTATOR CUFF TENDINITIS, LEFT: ICD-10-CM

## 2021-07-23 PROCEDURE — 99213 OFFICE O/P EST LOW 20 MIN: CPT | Performed by: ORTHOPAEDIC SURGERY

## 2021-07-23 NOTE — PROGRESS NOTES
Subjective:  Left shoulder pain   Patient ID: Nguyen Zhang is a 81 y.o. female.    Chief Complaint:    History of Present Illness 81-year-old female returns with below shoulder once again symptomatic.  States the neck at this time is doing well following the epidural blocks but the shoulder is quite symptomatic and painful.  The cortisone injection given the end of April lasted for maybe 2 months she is now once again having severe pain with any activity.  Has difficulty she states doing all activities of daily living is trouble getting hand over her head for feeding dressing and caring for herself.  Describes the pain is anywhere from 7-9 out of 10.       Social History     Occupational History   • Not on file   Tobacco Use   • Smoking status: Never Smoker   • Smokeless tobacco: Never Used   Vaping Use   • Vaping Use: Never used   Substance and Sexual Activity   • Alcohol use: No   • Drug use: No   • Sexual activity: Defer      Review of Systems      Past Medical History:   Diagnosis Date   • Arthritis    • Disease of thyroid gland    • Hyperlipidemia    • Hypertension    • Hyperthyroidism    • Neck pain      Past Surgical History:   Procedure Laterality Date   • HYSTERECTOMY     • KNEE SURGERY Right    • SHOULDER SURGERY Right      Family History   Problem Relation Age of Onset   • Heart attack Father    • Breast cancer Neg Hx          Objective:  There were no vitals filed for this visit.      07/23/21  0854   Weight: 58.1 kg (128 lb)     Body mass index is 24.48 kg/m².        Ortho Exam   She is alert and oriented x3.  Left upper extremity has no motor or sensory deficit as far as radial, ulnar median nerve function.  She has full range of motion of the elbow but flexion does cause anterior shoulder pain.  Biceps function is 4 out of 5 secondary to anterior shoulder pain.  She can extend and abduct about 100 degrees possibly 110 degrees but it is painful against resistance at 90 degrees with deltoid  function 3-1/2 to 4 out of 5.  Markedly positive Munoz sign.  Positive Speed test.  External rotation is approximately 40 degrees and internal rotation is to T12-L1.  She has good capillary refill the skin is cool to touch.  There is no ecchymosis or bruising.    Assessment:        1. Pain    2. Subacromial bursitis of left shoulder joint    3. Biceps tendinitis of left upper extremity    4. Rotator cuff tendinitis, left           Plan: Reviewed the patient's symptoms and history with him.  She has had persistent pain discomfort of the cortisone injections given in the past the only offered short-term relief.  Again at previous x-ray does show some AC arthritis but no other significant arthritic changes noted.  I reviewed treatment options proceed with an MRI of the shoulder first before making further recommendations.  Patient was in agreement.  Return to see me after the MRIs been completed.  Answered all questions            Work Status:    LOUISA query complete.    Orders:  Orders Placed This Encounter   Procedures   • MRI Shoulder Left Without Contrast       Medications:  No orders of the defined types were placed in this encounter.      Followup:  Return in about 2 weeks (around 8/6/2021).          Dictated utilizing Dragon dictation

## 2021-07-28 ENCOUNTER — TELEPHONE (OUTPATIENT)
Dept: ORTHOPEDIC SURGERY | Facility: CLINIC | Age: 81
End: 2021-07-28

## 2021-07-28 NOTE — TELEPHONE ENCOUNTER
Caller: TO CORLEY  Relationship to Patient: SELF    Phone Number: [phone]  Reason for Call: PT CALLED STATING THAT SHE WAS TO HAVE AN MRI COMPLETED BUT SHE HAS NEVER RECEIVED AN APPOINTMENT TO COMPLETE MRI L SHOULDER. SHE STATES THAT DR. RAMIREZ ADVISED HER TO CONTACT THE OFFICE. PT WOULD LIKE A CALL BACK AT ABOVE PHONE NUMBER.

## 2021-08-16 ENCOUNTER — HOSPITAL ENCOUNTER (OUTPATIENT)
Dept: MRI IMAGING | Facility: HOSPITAL | Age: 81
Discharge: HOME | End: 2021-08-16
Admitting: ORTHOPAEDIC SURGERY

## 2021-08-16 DIAGNOSIS — M75.22 BICEPS TENDINITIS OF LEFT UPPER EXTREMITY: ICD-10-CM

## 2021-08-16 DIAGNOSIS — M75.82 ROTATOR CUFF TENDINITIS, LEFT: ICD-10-CM

## 2021-08-16 DIAGNOSIS — R52 PAIN: ICD-10-CM

## 2021-08-16 DIAGNOSIS — M75.52 SUBACROMIAL BURSITIS OF LEFT SHOULDER JOINT: ICD-10-CM

## 2021-08-16 PROCEDURE — 73221 MRI JOINT UPR EXTREM W/O DYE: CPT

## 2021-08-23 ENCOUNTER — OFFICE VISIT (OUTPATIENT)
Dept: ORTHOPEDIC SURGERY | Facility: CLINIC | Age: 81
End: 2021-08-23

## 2021-08-23 VITALS — BODY MASS INDEX: 23.98 KG/M2 | WEIGHT: 127 LBS | HEIGHT: 61 IN

## 2021-08-23 DIAGNOSIS — M75.122 NONTRAUMATIC COMPLETE TEAR OF LEFT ROTATOR CUFF: Primary | ICD-10-CM

## 2021-08-23 DIAGNOSIS — M19.012 GLENOHUMERAL ARTHRITIS, LEFT: ICD-10-CM

## 2021-08-23 PROCEDURE — 99213 OFFICE O/P EST LOW 20 MIN: CPT | Performed by: ORTHOPAEDIC SURGERY

## 2021-08-23 NOTE — PROGRESS NOTES
Subjective:  Left shoulder pain   Patient ID: Nguyen Zhang is a 81 y.o. female.    Chief Complaint:    History of Present Illness 81-year-old female returns to review the results of the MRI whose images and report are personally reviewed.  Shows a complete tear of the supraspinatus with 2 cm retraction, partial tendon infraspinatus with tear of the long head of the biceps.  Also shows thinning of the tibial cartilage of the humeral head and AC arthritis.  Also shows fraying of the glenoid labrum.  Patient continues to have moderate pain and discomfort       Social History     Occupational History   • Not on file   Tobacco Use   • Smoking status: Never Smoker   • Smokeless tobacco: Never Used   Vaping Use   • Vaping Use: Never used   Substance and Sexual Activity   • Alcohol use: No   • Drug use: No   • Sexual activity: Defer      Review of Systems      Past Medical History:   Diagnosis Date   • Arthritis    • Disease of thyroid gland    • Hyperlipidemia    • Hypertension    • Hyperthyroidism    • Neck pain      Past Surgical History:   Procedure Laterality Date   • HYSTERECTOMY     • KNEE SURGERY Right    • SHOULDER SURGERY Right      Family History   Problem Relation Age of Onset   • Heart attack Father    • Breast cancer Neg Hx          Objective:  There were no vitals filed for this visit.      08/23/21  1251   Weight: 57.6 kg (127 lb)     Body mass index is 24.29 kg/m².        Ortho Exam  She is alert and oriented x3.  Again there is no motor or sensory deficit in the left upper extremity there is marked pain and weakness in abduction extension against resistance with deltoid function 3-1/2 out of 5.  Positive Speed test.  Positive Munoz sign.  External rotation is 30 to 35 degrees and internal rotation is to L1.  Is good capillary refill skin is cool to touch    Assessment:        1. Nontraumatic complete tear of left rotator cuff    2. Glenohumeral arthritis, left           Plan: Reviewed the results  of the MRI with the patient.  She still having moderate to severe pain that interferes all activities of daily living.  Discussed with the patient that I am no longer doing elective total reverse shoulder procedure as well as what she requires and therefore referred her to Dr. Palmer for his evaluation and surgical consideration.  Answered all questions            Work Status:    LOUISA query complete.    Orders:  No orders of the defined types were placed in this encounter.      Medications:  No orders of the defined types were placed in this encounter.      Followup:  Return if symptoms worsen or fail to improve.          Dictated utilizing Dragon dictation

## 2021-08-25 ENCOUNTER — OFFICE VISIT (OUTPATIENT)
Dept: ORTHOPEDIC SURGERY | Facility: CLINIC | Age: 81
End: 2021-08-25

## 2021-08-25 VITALS
HEART RATE: 65 BPM | HEIGHT: 61 IN | BODY MASS INDEX: 22.84 KG/M2 | SYSTOLIC BLOOD PRESSURE: 150 MMHG | DIASTOLIC BLOOD PRESSURE: 80 MMHG | WEIGHT: 121 LBS

## 2021-08-25 DIAGNOSIS — M75.22 BICEPS TENDINITIS OF LEFT UPPER EXTREMITY: ICD-10-CM

## 2021-08-25 DIAGNOSIS — M19.012 GLENOHUMERAL ARTHRITIS, LEFT: ICD-10-CM

## 2021-08-25 DIAGNOSIS — M75.122 NONTRAUMATIC COMPLETE TEAR OF LEFT ROTATOR CUFF: Primary | ICD-10-CM

## 2021-08-25 PROCEDURE — 73030 X-RAY EXAM OF SHOULDER: CPT | Performed by: ORTHOPAEDIC SURGERY

## 2021-08-25 PROCEDURE — 99214 OFFICE O/P EST MOD 30 MIN: CPT | Performed by: ORTHOPAEDIC SURGERY

## 2021-08-25 RX ORDER — ATENOLOL 50 MG/1
TABLET ORAL
Qty: 60 TABLET | Refills: 3 | Status: SHIPPED | OUTPATIENT
Start: 2021-08-25 | End: 2021-12-29

## 2021-08-27 ENCOUNTER — TELEPHONE (OUTPATIENT)
Dept: ORTHOPEDIC SURGERY | Facility: CLINIC | Age: 81
End: 2021-08-27

## 2021-08-27 NOTE — TELEPHONE ENCOUNTER
Provider: DR. LOYA    Caller: TO CORLEY    Relationship to Patient: SELF     Phone Number: [phone]    Reason for Call: PATIENT ADVISED SHE HAS NOT BEEN CONTACTED BY THE HOSPITAL TO SCHEDULE CT SCAN.

## 2021-08-28 RX ORDER — POTASSIUM CHLORIDE 750 MG/1
TABLET, FILM COATED, EXTENDED RELEASE ORAL
Qty: 90 TABLET | Refills: 0 | Status: SHIPPED | OUTPATIENT
Start: 2021-08-28 | End: 2021-11-26

## 2021-09-01 ENCOUNTER — HOSPITAL ENCOUNTER (OUTPATIENT)
Dept: CT IMAGING | Facility: HOSPITAL | Age: 81
Discharge: HOME | End: 2021-09-01
Admitting: ORTHOPAEDIC SURGERY

## 2021-09-01 DIAGNOSIS — M19.012 GLENOHUMERAL ARTHRITIS, LEFT: ICD-10-CM

## 2021-09-01 DIAGNOSIS — M75.122 NONTRAUMATIC COMPLETE TEAR OF LEFT ROTATOR CUFF: ICD-10-CM

## 2021-09-01 PROCEDURE — 73200 CT UPPER EXTREMITY W/O DYE: CPT

## 2021-09-09 ENCOUNTER — TELEPHONE (OUTPATIENT)
Dept: ORTHOPEDIC SURGERY | Facility: CLINIC | Age: 81
End: 2021-09-09

## 2021-09-09 NOTE — TELEPHONE ENCOUNTER
Caller:  TO    Relationship to patient: SELF    Best call back number:     Additional notes:PATIENT REQ CALL BACK WOULD LIKE TO DISCUSS GOING AHEAD AND SETTING UP SX.

## 2021-09-09 NOTE — TELEPHONE ENCOUNTER
Needing surgery orders, however patient is aware that we are not currently able to due inpatient procedures, but that I will call her once I am able to schedule.

## 2021-09-10 ENCOUNTER — TELEPHONE (OUTPATIENT)
Dept: ORTHOPEDIC SURGERY | Facility: CLINIC | Age: 81
End: 2021-09-10

## 2021-09-10 RX ORDER — CYCLOBENZAPRINE HCL 5 MG
5 TABLET ORAL 3 TIMES DAILY PRN
Qty: 30 TABLET | Refills: 0 | Status: SHIPPED | OUTPATIENT
Start: 2021-09-10 | End: 2021-10-01

## 2021-09-10 NOTE — TELEPHONE ENCOUNTER
Patient calling she is awaiting the process to proceed with left reverse shoulder arthroplasty at this point in time with Dr. Palmer she has completed her CT but on hold due to COVID. She is requesting a RX of Flexeril that she states her PCP Dr. Rai wrote for her in 06 of 2019.      DX:  1. Nontraumatic complete tear of left rotator cuff    2. Glenohumeral arthritis, left    3. Biceps tendinitis of left upper extremity       Dr. Palmer patient-he is out on JC.    Please advise.

## 2021-09-12 PROBLEM — M75.122 NONTRAUMATIC COMPLETE TEAR OF LEFT ROTATOR CUFF: Status: ACTIVE | Noted: 2021-09-12

## 2021-09-12 RX ORDER — ACETAMINOPHEN 325 MG/1
1000 TABLET ORAL ONCE
Status: CANCELLED | OUTPATIENT
Start: 2021-09-12 | End: 2021-09-12

## 2021-09-12 RX ORDER — MELOXICAM 7.5 MG/1
15 TABLET ORAL ONCE
Status: CANCELLED | OUTPATIENT
Start: 2021-09-12 | End: 2021-09-12

## 2021-09-12 RX ORDER — PREGABALIN 150 MG/1
150 CAPSULE ORAL ONCE
Status: CANCELLED | OUTPATIENT
Start: 2021-09-12 | End: 2021-09-12

## 2021-09-17 DIAGNOSIS — E03.9 ACQUIRED HYPOTHYROIDISM: ICD-10-CM

## 2021-09-17 RX ORDER — LEVOTHYROXINE SODIUM 0.07 MG/1
75 TABLET ORAL DAILY
Qty: 90 TABLET | Refills: 3 | Status: SHIPPED | OUTPATIENT
Start: 2021-09-17 | End: 2022-09-16

## 2021-09-23 ENCOUNTER — TRANSCRIBE ORDERS (OUTPATIENT)
Dept: ADMINISTRATIVE | Facility: HOSPITAL | Age: 81
End: 2021-09-23

## 2021-09-23 DIAGNOSIS — U07.1 COVID-19: Primary | ICD-10-CM

## 2021-09-23 PROBLEM — M19.012 GLENOHUMERAL ARTHRITIS, LEFT: Status: ACTIVE | Noted: 2021-09-23

## 2021-09-23 PROBLEM — M75.22 BICEPS TENDINITIS OF LEFT UPPER EXTREMITY: Status: ACTIVE | Noted: 2021-09-23

## 2021-09-30 ENCOUNTER — APPOINTMENT (OUTPATIENT)
Dept: PREADMISSION TESTING | Facility: HOSPITAL | Age: 81
End: 2021-09-30

## 2021-10-01 ENCOUNTER — PRE-ADMISSION TESTING (OUTPATIENT)
Dept: PREADMISSION TESTING | Facility: HOSPITAL | Age: 81
End: 2021-10-01

## 2021-10-01 VITALS
WEIGHT: 131.6 LBS | DIASTOLIC BLOOD PRESSURE: 68 MMHG | HEIGHT: 61 IN | OXYGEN SATURATION: 99 % | HEART RATE: 66 BPM | SYSTOLIC BLOOD PRESSURE: 144 MMHG | RESPIRATION RATE: 16 BRPM | BODY MASS INDEX: 24.84 KG/M2

## 2021-10-01 DIAGNOSIS — M75.122 NONTRAUMATIC COMPLETE TEAR OF LEFT ROTATOR CUFF: ICD-10-CM

## 2021-10-01 DIAGNOSIS — M75.22 BICEPS TENDINITIS OF LEFT UPPER EXTREMITY: ICD-10-CM

## 2021-10-01 DIAGNOSIS — M19.012 GLENOHUMERAL ARTHRITIS, LEFT: ICD-10-CM

## 2021-10-01 LAB
ABO GROUP BLD: NORMAL
ANION GAP SERPL CALCULATED.3IONS-SCNC: 9 MMOL/L (ref 5–15)
APTT PPP: 34.1 SECONDS (ref 24.3–38.1)
BACTERIA UR QL AUTO: ABNORMAL /HPF
BASOPHILS # BLD AUTO: 0.04 10*3/MM3 (ref 0–0.2)
BASOPHILS NFR BLD AUTO: 0.6 % (ref 0–1.5)
BILIRUB UR QL STRIP: NEGATIVE
BLD GP AB SCN SERPL QL: NEGATIVE
BUN SERPL-MCNC: 12 MG/DL (ref 8–23)
BUN/CREAT SERPL: 12.1 (ref 7–25)
CALCIUM SPEC-SCNC: 9.6 MG/DL (ref 8.6–10.5)
CHLORIDE SERPL-SCNC: 92 MMOL/L (ref 98–107)
CLARITY UR: CLEAR
CO2 SERPL-SCNC: 32 MMOL/L (ref 22–29)
COLOR UR: YELLOW
CREAT SERPL-MCNC: 0.99 MG/DL (ref 0.57–1)
DEPRECATED RDW RBC AUTO: 39.5 FL (ref 37–54)
EOSINOPHIL # BLD AUTO: 0.08 10*3/MM3 (ref 0–0.4)
EOSINOPHIL NFR BLD AUTO: 1.2 % (ref 0.3–6.2)
ERYTHROCYTE [DISTWIDTH] IN BLOOD BY AUTOMATED COUNT: 11.8 % (ref 12.3–15.4)
GFR SERPL CREATININE-BSD FRML MDRD: 54 ML/MIN/1.73
GLUCOSE SERPL-MCNC: 100 MG/DL (ref 65–99)
GLUCOSE UR STRIP-MCNC: NEGATIVE MG/DL
HCT VFR BLD AUTO: 35.6 % (ref 34–46.6)
HGB BLD-MCNC: 12.2 G/DL (ref 12–15.9)
HGB UR QL STRIP.AUTO: ABNORMAL
HYALINE CASTS UR QL AUTO: ABNORMAL /LPF
IMM GRANULOCYTES # BLD AUTO: 0.01 10*3/MM3 (ref 0–0.05)
IMM GRANULOCYTES NFR BLD AUTO: 0.2 % (ref 0–0.5)
INR PPP: 1.06 (ref 0.9–1.1)
KETONES UR QL STRIP: NEGATIVE
LEUKOCYTE ESTERASE UR QL STRIP.AUTO: ABNORMAL
LYMPHOCYTES # BLD AUTO: 2.11 10*3/MM3 (ref 0.7–3.1)
LYMPHOCYTES NFR BLD AUTO: 32.7 % (ref 19.6–45.3)
MCH RBC QN AUTO: 31.4 PG (ref 26.6–33)
MCHC RBC AUTO-ENTMCNC: 34.3 G/DL (ref 31.5–35.7)
MCV RBC AUTO: 91.5 FL (ref 79–97)
MONOCYTES # BLD AUTO: 0.71 10*3/MM3 (ref 0.1–0.9)
MONOCYTES NFR BLD AUTO: 11 % (ref 5–12)
NEUTROPHILS NFR BLD AUTO: 3.51 10*3/MM3 (ref 1.7–7)
NEUTROPHILS NFR BLD AUTO: 54.3 % (ref 42.7–76)
NITRITE UR QL STRIP: NEGATIVE
NRBC BLD AUTO-RTO: 0 /100 WBC (ref 0–0.2)
PH UR STRIP.AUTO: 7 [PH] (ref 4.5–8)
PLATELET # BLD AUTO: 237 10*3/MM3 (ref 140–450)
PMV BLD AUTO: 9.9 FL (ref 6–12)
POTASSIUM SERPL-SCNC: 3.1 MMOL/L (ref 3.5–5.2)
PROT UR QL STRIP: NEGATIVE
PROTHROMBIN TIME: 13.8 SECONDS (ref 12.1–15)
RBC # BLD AUTO: 3.89 10*6/MM3 (ref 3.77–5.28)
RBC # UR: ABNORMAL /HPF
REF LAB TEST METHOD: ABNORMAL
RH BLD: POSITIVE
SODIUM SERPL-SCNC: 133 MMOL/L (ref 136–145)
SP GR UR STRIP: 1.02 (ref 1–1.03)
SQUAMOUS #/AREA URNS HPF: ABNORMAL /HPF
T&S EXPIRATION DATE: NORMAL
UROBILINOGEN UR QL STRIP: ABNORMAL
WBC # BLD AUTO: 6.46 10*3/MM3 (ref 3.4–10.8)
WBC UR QL AUTO: ABNORMAL /HPF

## 2021-10-01 PROCEDURE — 86901 BLOOD TYPING SEROLOGIC RH(D): CPT | Performed by: ORTHOPAEDIC SURGERY

## 2021-10-01 PROCEDURE — 36415 COLL VENOUS BLD VENIPUNCTURE: CPT

## 2021-10-01 PROCEDURE — 80048 BASIC METABOLIC PNL TOTAL CA: CPT | Performed by: ORTHOPAEDIC SURGERY

## 2021-10-01 PROCEDURE — 85730 THROMBOPLASTIN TIME PARTIAL: CPT | Performed by: ORTHOPAEDIC SURGERY

## 2021-10-01 PROCEDURE — 85610 PROTHROMBIN TIME: CPT | Performed by: ORTHOPAEDIC SURGERY

## 2021-10-01 PROCEDURE — 86850 RBC ANTIBODY SCREEN: CPT | Performed by: ORTHOPAEDIC SURGERY

## 2021-10-01 PROCEDURE — 86900 BLOOD TYPING SEROLOGIC ABO: CPT | Performed by: ORTHOPAEDIC SURGERY

## 2021-10-01 PROCEDURE — 87081 CULTURE SCREEN ONLY: CPT | Performed by: ORTHOPAEDIC SURGERY

## 2021-10-01 PROCEDURE — 85025 COMPLETE CBC W/AUTO DIFF WBC: CPT | Performed by: ORTHOPAEDIC SURGERY

## 2021-10-01 PROCEDURE — 81001 URINALYSIS AUTO W/SCOPE: CPT | Performed by: ORTHOPAEDIC SURGERY

## 2021-10-01 RX ORDER — CYCLOBENZAPRINE HCL 5 MG
5 TABLET ORAL 3 TIMES DAILY PRN
COMMUNITY
End: 2022-10-03

## 2021-10-01 RX ORDER — ASPIRIN 325 MG
325 TABLET ORAL DAILY
COMMUNITY

## 2021-10-01 NOTE — DISCHARGE INSTRUCTIONS
PRE-ADMISSION TESTING INSTRUCTIONS FOR TOTAL JOINT PATIENTS    Take these medications the morning of surgery with a small sip of water: atenolol and levothyroxine      No aspirin, advil, aleve, ibuprofen, naproxen, diet pills, decongestants, or vitamin/herbal supplements for a week prior to your surgery. Stop your aspirin and all vitamins/supplements a week prior to surgery    Do not take any insulin or diabetes medications the morning of surgery.      Start your Bactroban ointment on _Sunday October 10, 2021__________.  You will need to apply the ointment with a clean Q-tip 3 times a day in both sides of your nose for 5 days and the morning of surgery.    General Instructions:    • Do not eat solid food after midnight the night before surgery.  No gum, mints, or hard candy after midnight the night before surgery.  • You may drink clear liquids the day of surgery up until 2 hours before your arrival time.  • Clear liquids are liquids you can see through. Nothing RED in color.    Plain water    Sports drinks  Sodas     Gelatin (Jell-O)  Fruit juices without pulp such as white grape juice and apple juice  Popsicles that contain no fruit or yogurt  Tea or coffee (no cream or milk added)    • It is beneficial for you to have a clear drink that contains carbohydrates just before you leave your house and before your fasting time begins.  We suggest a 20 ounce bottle of Gatorade or Powerade for non-diabetic patients or a 20 ounce bottle of G2 or Powerade Zero for diabetic patients.     • Patients who avoid smoking, chewing tobacco and alcohol for 4 weeks prior to surgery have a reduced risk of post-operative complications.  If at all possible, quit smoking as many days before surgery as you can.    • Do not smoke, use chewing tobacco or drink alcohol after midnight the day of surgery.    • Bring your C-PAP/ BI-PAP machine if you use one.  • Wear clean comfortable clothes and socks.  • Do not wear contact lenses, lotion,  deodorant, or make-up.  Bring a case for your glasses if applicable.   • You may brush your teeth the morning of surgery.  • You may wear dentures/partials, do not put adhesive/glue on them.  • Leave all other jewelry and valuables at home.  • NOTIFY YOUR SURGEON IF YOU BECOME ILL, HAVE A FEVER, PRODUCTIVE COUGH, OR CANNOT BE HERE THE DAY OF SURGERY.      Preventing a Surgical Site Infection:    • Shower the night before and on the morning of surgery using the chlorhexidine soap you were given.  Use a clean washcloth with the soap.  Place clean sheets on your bed after showering the night before surgery. Do not use the CHG soap on your hair, face, or private areas. Wash your body gently for five (5) minutes. Do not scrub your skin.  Dry with a clean towel and dress in clean clothing.    • Do not shave the surgical area for 10 days-2 weeks prior to surgery  because the razor can irritate skin and make it easier to develop an infection.  • Make sure you, your family, and all healthcare providers clean their hands with soap and water or an alcohol based hand  before caring for you or your wound.      Day of surgery:    Your surgeon’s office will advise you of your arrival time for the day of surgery.    Upon arrival, a Pre-op nurse and Anesthesia provider will review your health history, obtain vital signs, and answer questions you may have.  The only belongings needed at this time will be your home medications and if applicable your C-PAP/BI-PAP machine.  If you are staying overnight your family can leave the rest of your belongings in the car and bring them to your room later.  A Pre-op nurse will start an IV and you may receive medication in preparation for surgery, including something to help you relax.  Your family will be able to see you in the Pre-op area.  While you are in surgery your family should notify the waiting room  if they leave the waiting room area and provide a contact phone  number.    If you have any questions, you can call the Pre-Admission Department at [phone] or your surgeon's office.    Please be aware that surgery does come with discomfort.  We want to make every effort to control your discomfort so please discuss any uncontrolled symptoms with your nurse.   Your doctor will most likely have prescribed pain medications.       Please leave all luggage in the car the morning of surgery.  You will be transported to your hospital room following the recovery period.  Your family can get your luggage at that time.      You may receive a survey regarding the care you received. Your feedback is very important and will be used to collect the necessary data to help us to continue to provide excellent care.

## 2021-10-01 NOTE — DISCHARGE INSTRUCTIONS
Total Shoulder Joint Replacement Discharge Instructions:    I. ACTIVITIES:  1. Exercises:  ? Complete exercise program as taught by the hospital physical therapist 2 times per day  ? Wear sling when in bed and when out of bed (except when doing exercises)  ? Exercise program will be advanced by the physical therapist  ? During the day be up ambulating every 2 hours (while awake) for short distances  ? Complete the ankle pump exercises at least 10 times per hour (while awake)  ? Elevate operative arm when in bed and for at least 30 minutes during the day.   ? Use cold packs 20-30 minutes approximately 5 times per day. This should be done before and after completing your exercises and at any time you are experiencing pain/ stiffness in your operative extremity.    2. Activities of Daily Living:  ? No tub baths, hot tubs, or swimming pools for 4 weeks  ? May shower and let water run over the adhesive dressing on post-operative day #7 if no drainage. If dressing does come off, cover incision with waterproof band-aids while showering until first post-op visit in the office.     II. RESTRICTIONS  ? No pushing, pulling, lifting, or weight bearing on operative extremity  ? Avoid pushing yourself up out of a chair with the operative extremity  ? Continue to follow shoulder precautions as instructed by hospital physical therapist  ? Your surgeon will discuss with you when you will be able to drive again.  ? First week stay inside on even terrain. May go up and down stairs one stair at a time utilizing the hand rail  ? After one week, you may venture outside.    III. PRECAUTIONS:  ? Everyone that comes near you should wash their hands  ? No elective dental, genital-urinary, or colon procedures or surgical procedures for 12 weeks after surgery unless absolutely necessary.  ?  If dental work or surgical procedure is deemed absolutely necessary, you will need to contact your surgeon as you will need to take antibiotics 1 hour  prior to any dental work (including teeth cleanings).  ? Please discuss with your surgeon prophylactic antibiotics as the length of time this intervention will be necessary for you varies with each patient’s health history and situation.  ? Avoid sick people. If you must be around someone who is ill, they should wear a mask.  ? Avoid visits to the Emergency Room or Urgent Care unless you are having a life threatening event.     IV. INCISION CARE:  ? Keep clear adhesive dressing and gauze in place until follow up visit. If this dressing does come off, then cover with dry gauze and paper tape daily.Wash your hands prior to dressing changes  ? No creams or ointments to the incisionMay remove dressing once the incision is free of drainage  ? Do not touch or pick at the incision  ? Check dressing every day and notify surgeon immediately if any of the following signs or symptoms are noted:  o Increase in redness  o Increase in swelling around the incision and of the entire extremity  o Increase in pain  o Drainage oozing from the incision  o Pulling apart of the edges of the incision  o Increase in overall body temperature (greater than 100.5 degrees)  ? Any visible sutures or staples will be removed at your 2 week follow up as long as the incision is healing appropriately.     V. MEDICATIONS:     1. Stool Softeners: You will be at greater risk of constipation after surgery due to being less mobile and the pain medications.   ? Take stool softeners as instructed by your surgeon while on pain medications. Over the counter Colace 100 mg 1-2 capsules twice daily.   ? If stools become too loose or too frequent, please decreases the dosage or stop the stool softener.  ? If constipation occurs despite use of stool softeners, you are to continue the stool softeners and add a laxative (Milk of Magnesia 1 ounce daily as needed)  ? Drink plenty of fluids, and eat fruits and vegetables during your recovery time    2. Pain  Medications utilized after surgery are narcotics and the law requires that the following information be given to all patients that are prescribed narcotics:  ? CLASSIFICATION: Pain medications are called Opioids and are narcotics  ? LEGALITIES: It is illegal to share narcotics with others and to drive within 24 hours of taking narcotics  ? POTENTIAL SIDE EFFECTS: Potential side effects of opioids include: nausea, vomiting, itching, dizziness, drowsiness, dry mouth, constipation, and difficulty urinating.  ? POTENTIAL ADVERSE EFFECTS:   o Opioid tolerance can develop with use of pain medications and this simply means that it requires more and more of the medication to control pain; however, this is seen more in patients that use opioids for longer periods of time.  o Opioid dependence can develop with use of Opioids and this simply means that to stop the medication can cause withdrawal symptoms; however, this is seen with patients that use Opioids for longer periods of time.  o Opioid addiction can develop with use of Opioids and the incidence of this is very unlikely in patients who take the medications as ordered and stop the medications as instructed.  o Opioid overdose can be dangerous, but is unlikely when the medication is taken as ordered and stopped when ordered. It is important not to mix opioids with alcohol or with and type of sedative such as Benadryl as this can lead to over sedation and respiratory difficulty.  ? DOSAGE:   o Pain medications will need to be taken consistently for the first week to decrease pain and promote adequate pain relief and participation in physical therapy.  o After the initial surgical pain begins to resolve, you may begin to decrease the pain medication. By the end of 6 weeks, you should be off of pain medications.  o Refills will not be given by the office during evening hours, on weekends, or after 12 weeks post-op.  o To seek refills on pain medications during the initial 6  week post-operative period, you must call the office 48 hours in advance to request the refill. The office will then notify you when to  the prescription. DO NOT wait until you are out of the medication to request a refill.    VI. FOLLOW-UP VISITS:  ? You will need to follow up in the office with Dr Palmer in 2 weeks. Please call [phone] to schedule this appointment.  ? You will need to follow up with your primary care physician within 4 weeks.  ? If you have any concerns or suspected complications prior to your follow up visit, please call your surgeons office. Do not wait until your appointment time if you suspect complications. These will need to be addressed in the office promptly.

## 2021-10-01 NOTE — PAT
Pt here for PAT visit.  Pre-op tests completed, chg soap given, and instructions reviewed.  Instructed clears until 2 hrs prior to arrival time, voiced understanding. Gatorade given and ERAS handsouts reviewed, voiced understanding. Seeing PCP for medical clearance. COVID test 10/13.

## 2021-10-03 LAB — MRSA SPEC QL CULT: NORMAL

## 2021-10-05 ENCOUNTER — OFFICE VISIT (OUTPATIENT)
Dept: FAMILY MEDICINE CLINIC | Facility: CLINIC | Age: 81
End: 2021-10-05

## 2021-10-05 VITALS
DIASTOLIC BLOOD PRESSURE: 68 MMHG | HEIGHT: 61 IN | SYSTOLIC BLOOD PRESSURE: 122 MMHG | BODY MASS INDEX: 24.73 KG/M2 | TEMPERATURE: 97.1 F | WEIGHT: 131 LBS | OXYGEN SATURATION: 97 % | HEART RATE: 67 BPM

## 2021-10-05 DIAGNOSIS — E78.49 OTHER HYPERLIPIDEMIA: ICD-10-CM

## 2021-10-05 DIAGNOSIS — I10 ESSENTIAL HYPERTENSION: Primary | ICD-10-CM

## 2021-10-05 DIAGNOSIS — M25.512 ACUTE PAIN OF LEFT SHOULDER: ICD-10-CM

## 2021-10-05 DIAGNOSIS — E03.9 ACQUIRED HYPOTHYROIDISM: ICD-10-CM

## 2021-10-05 PROCEDURE — 99214 OFFICE O/P EST MOD 30 MIN: CPT | Performed by: INTERNAL MEDICINE

## 2021-10-05 NOTE — PROGRESS NOTES
Subjective   Nguyen Zhang is a 81 y.o. female.     Chief Complaint   Patient presents with   • Pre-op Exam   Hypertension, hyperlipidemia, hypothyroidism    History of Present Illness   Seen here follow-up for preop clearance for left shoulder revision.  She has history of hypertension, hypothyroidism, hyperlipidemia.  Denies any chest pain shortness of breath.  Went to the list of medications she is on.  She is on hydrochlorothiazide and a small amount of potassium.  Reviewed the labs from the hospital her potassium was 3.1.  She had the stress Cardiolite in 2019, echo that was normal.  She also had a Holter monitor EKG done in 2020 they were normal.  Has no chest pain shortness of breath.    The following portions of the patient's history were reviewed and updated as appropriate: allergies, current medications, past family history, past medical history, past social history, past surgical history and problem list.    Review of Systems   Constitutional: Negative for activity change, appetite change, fatigue and fever.   HENT: Negative.    Eyes: Negative for blurred vision and double vision.   Respiratory: Negative.  Negative for shortness of breath.    Cardiovascular: Negative for chest pain, palpitations and leg swelling.   Gastrointestinal: Negative.    Genitourinary: Negative for hematuria.   Musculoskeletal: Positive for arthralgias.   Neurological: Negative for dizziness, syncope, light-headedness and headache.   Psychiatric/Behavioral: Negative for agitation, behavioral problems and decreased concentration.       Allergies   Allergen Reactions   • Ace Inhibitors Cough   • Codeine Nausea And Vomiting       Current Outpatient Medications on File Prior to Visit   Medication Sig Dispense Refill   • Acetaminophen (TYLENOL ARTHRITIS PAIN PO) Take 500 mg by mouth Daily.     • amLODIPine (NORVASC) 5 MG tablet TAKE 1 TABLET BY MOUTH EVERY EVENING. (Patient taking differently: Take 5 mg by mouth Every Evening.) 30  tablet 11   • aspirin 325 MG tablet Take 325 mg by mouth Daily. Takes daily w/tylenol for pain     • atenolol (TENORMIN) 50 MG tablet TAKE 1 TABLET BY MOUTH 2 TIMES A DAY. (Patient taking differently: Take 50 mg by mouth 2 (two) times a day.) 60 tablet 3   • Bioflavonoid Products (DOROTEO C PO) Take 500 mg by mouth Daily.     • Cholecalciferol (VITAMIN D3) 1000 units capsule Take 2 capsules by mouth Daily.     • cyclobenzaprine (FLEXERIL) 5 MG tablet Take 5 mg by mouth 3 (Three) Times a Day As Needed for Muscle Spasms.     • hydroCHLOROthiazide (HYDRODIURIL) 25 MG tablet TAKE 1 TABLET BY MOUTH DAILY. (Patient taking differently: Take 25 mg by mouth Daily.) 30 tablet 5   • levothyroxine (SYNTHROID, LEVOTHROID) 75 MCG tablet TAKE 1 TABLET BY MOUTH DAILY. 90 tablet 3   • losartan (COZAAR) 100 MG tablet TAKE 1 TABLET BY MOUTH DAILY. (Patient taking differently: Take 100 mg by mouth Daily With Dinner.) 90 tablet 3   • multivitamin-minerals (CENTRUM) tablet Take 1 tablet by mouth Daily.     • NIACIN PO Take 500 mg by mouth Daily.     • potassium chloride 10 MEQ CR tablet TAKE 1 TABLET BY MOUTH DAILY. (Patient taking differently: Take 10 mEq by mouth Daily.) 90 tablet 0   • vitamin E 400 UNIT capsule Take 400 Units by mouth Daily.     • ALPRAZolam (Xanax) 0.25 MG tablet Take 1 tablet by mouth At Night As Needed for Anxiety. (Patient taking differently: Take 0.25 mg by mouth At Night As Needed for Anxiety or Sleep.) 10 tablet 0     No current facility-administered medications on file prior to visit.       Family History   Problem Relation Age of Onset   • Heart attack Father    • Heart disease Father    • Breast cancer Neg Hx    • Malig Hyperthermia Neg Hx        Past Medical History:   Diagnosis Date   • Arthritis    • Hyperlipidemia    • Hypertension    • Hypothyroid    • Neck pain    • PONV (postoperative nausea and vomiting)    • Shoulder pain, left     sched total shoulder   • Spinal headache        Past Surgical  "History:   Procedure Laterality Date   • COLONOSCOPY     • HYSTERECTOMY     • REPLACEMENT TOTAL KNEE Right    • SHOULDER SURGERY Right     tendon repair   • TONSILLECTOMY         Social History     Socioeconomic History   • Marital status:      Spouse name: Not on file   • Number of children: Not on file   • Years of education: Not on file   • Highest education level: Not on file   Tobacco Use   • Smoking status: Never Smoker   • Smokeless tobacco: Never Used   Vaping Use   • Vaping Use: Never used   Substance and Sexual Activity   • Alcohol use: No   • Drug use: No   • Sexual activity: Defer       Patient Active Problem List   Diagnosis   • Precordial chest pain   • PVCs (premature ventricular contractions)   • Essential hypertension   • Acquired hypothyroidism   • Low serum potassium level   • Other hyperlipidemia   • Acute pain of left shoulder   • Osteoarthritis of multiple joints   • Nontraumatic complete tear of left rotator cuff   • Glenohumeral arthritis, left   • Biceps tendinitis of left upper extremity       /68 (BP Location: Left arm, Patient Position: Sitting, Cuff Size: Adult)   Pulse 67   Temp 97.1 °F (36.2 °C) (Temporal)   Ht 154 cm (60.63\")   Wt 59.4 kg (131 lb)   SpO2 97%   BMI 25.06 kg/m²   Body mass index is 25.06 kg/m².    Objective   Physical Exam  Constitutional:       General: She is not in acute distress.     Appearance: She is well-developed. She is not diaphoretic.   HENT:      Head: Normocephalic and atraumatic.      Right Ear: External ear normal.      Left Ear: External ear normal.   Eyes:      Conjunctiva/sclera: Conjunctivae normal.      Pupils: Pupils are equal, round, and reactive to light.   Neck:      Thyroid: No thyromegaly.      Vascular: No JVD.      Trachea: No tracheal deviation.   Cardiovascular:      Rate and Rhythm: Normal rate and regular rhythm.      Heart sounds: Normal heart sounds. No murmur heard.   No friction rub. No gallop.    Pulmonary:      " Effort: Pulmonary effort is normal. No respiratory distress.      Breath sounds: Normal breath sounds. No stridor. No wheezing or rales.   Chest:      Chest wall: No tenderness.   Abdominal:      General: Bowel sounds are normal. There is no distension.      Palpations: Abdomen is soft. There is no mass.      Tenderness: There is no abdominal tenderness. There is no guarding or rebound.      Hernia: No hernia is present.   Musculoskeletal:      Cervical back: Normal range of motion and neck supple.   Lymphadenopathy:      Cervical: No cervical adenopathy.   Skin:     General: Skin is warm and dry.      Coloration: Skin is not pale.      Findings: No erythema or rash.   Neurological:      General: No focal deficit present.      Mental Status: She is alert and oriented to person, place, and time. Mental status is at baseline.      Cranial Nerves: No cranial nerve deficit.      Sensory: No sensory deficit.      Motor: No weakness.      Coordination: Coordination normal.      Gait: Gait normal.      Deep Tendon Reflexes: Reflexes normal.      Comments: Cranial nerves II through XII grossly intact, DTR 2+, motor 5/5   Psychiatric:         Behavior: Behavior normal.         Thought Content: Thought content normal.         Judgment: Judgment normal.           Assessment/Plan   Diagnoses and all orders for this visit:    1. Essential hypertension (Primary)  -     Cancel: Basic Metabolic Panel; Future  -     Basic Metabolic Panel; Future    2. Other hyperlipidemia    3. Acquired hypothyroidism    4. Acute pain of left shoulder    Continue all current medication.  Patient will take extra potassium today and tomorrow.  She is going to hospital for COVID-19 testing next week she will get it BMP done at the hospital.  She is on higher dose of aspirin.  I will leave up to Ortho when to stop the aspirin.  She is on the amlodipine, Tylenol, the Tenormin, hydrochlorothiazide, Synthroid, potassium.  Patient is medically no added risk  for surgery.  Proceed with left shoulder revision.  EHR dragon/transcription disclaimer:  Part of this note are created by electronic transcription/translation of spoken language to printed text and thus may lead to erroneous, or at times, nonsensical words or phrases inadvertently transcribed.  Although I have reviewed for such errors, some may still exist.

## 2021-10-07 ENCOUNTER — OFFICE VISIT (OUTPATIENT)
Dept: ORTHOPEDIC SURGERY | Facility: CLINIC | Age: 81
End: 2021-10-07

## 2021-10-07 VITALS — HEIGHT: 61 IN | BODY MASS INDEX: 24.73 KG/M2 | WEIGHT: 131 LBS

## 2021-10-07 DIAGNOSIS — M75.122 NONTRAUMATIC COMPLETE TEAR OF LEFT ROTATOR CUFF: Primary | ICD-10-CM

## 2021-10-07 DIAGNOSIS — M19.012 GLENOHUMERAL ARTHRITIS, LEFT: ICD-10-CM

## 2021-10-07 PROCEDURE — S0260 H&P FOR SURGERY: HCPCS | Performed by: ORTHOPAEDIC SURGERY

## 2021-10-07 NOTE — H&P (VIEW-ONLY)
History & Physical       Patient: Nguyen Zhang    YOB: 1940    Medical Record Number: [id number]    Surgeon:  Dr. Scout Palmer    Chief Complaints:   Chief Complaint   Patient presents with   • Left Shoulder - Follow-up         History of Present Illness: 81 y.o. female presents with   Chief Complaint   Patient presents with   • Left Shoulder - Follow-up   . Onset of symptoms was months ago and has been progressively worsening despite more conservative treatment measures.  Symptoms are associated with ability to move, lift, push, pull, and perform activities of daily living with affected extremity. Symptoms are aggravated by overhead motion, lifting, and pushing as well as ROM necessary for activities of daily living.   Symptoms improve with rest, ice and elevation only minimally.      Allergies:   Allergies   Allergen Reactions   • Ace Inhibitors Cough   • Codeine Nausea And Vomiting       Medications:   Home Medications:  Current Outpatient Medications on File Prior to Visit   Medication Sig   • Acetaminophen (TYLENOL ARTHRITIS PAIN PO) Take 500 mg by mouth Daily.   • ALPRAZolam (Xanax) 0.25 MG tablet Take 1 tablet by mouth At Night As Needed for Anxiety. (Patient taking differently: Take 0.25 mg by mouth At Night As Needed for Anxiety or Sleep.)   • amLODIPine (NORVASC) 5 MG tablet TAKE 1 TABLET BY MOUTH EVERY EVENING. (Patient taking differently: Take 5 mg by mouth Every Evening.)   • aspirin 325 MG tablet Take 325 mg by mouth Daily. Takes daily w/tylenol for pain   • atenolol (TENORMIN) 50 MG tablet TAKE 1 TABLET BY MOUTH 2 TIMES A DAY. (Patient taking differently: Take 50 mg by mouth 2 (two) times a day.)   • Bioflavonoid Products (DOROTEO C PO) Take 500 mg by mouth Daily.   • Cholecalciferol (VITAMIN D3) 1000 units capsule Take 2 capsules by mouth Daily.   • cyclobenzaprine (FLEXERIL) 5 MG tablet Take 5 mg by mouth 3 (Three) Times a Day As Needed for Muscle Spasms.   •  hydroCHLOROthiazide (HYDRODIURIL) 25 MG tablet TAKE 1 TABLET BY MOUTH DAILY. (Patient taking differently: Take 25 mg by mouth Daily.)   • levothyroxine (SYNTHROID, LEVOTHROID) 75 MCG tablet TAKE 1 TABLET BY MOUTH DAILY.   • losartan (COZAAR) 100 MG tablet TAKE 1 TABLET BY MOUTH DAILY. (Patient taking differently: Take 100 mg by mouth Daily With Dinner.)   • multivitamin-minerals (CENTRUM) tablet Take 1 tablet by mouth Daily.   • NIACIN PO Take 500 mg by mouth Daily.   • potassium chloride 10 MEQ CR tablet TAKE 1 TABLET BY MOUTH DAILY. (Patient taking differently: Take 10 mEq by mouth Daily.)   • vitamin E 400 UNIT capsule Take 400 Units by mouth Daily.     No current facility-administered medications on file prior to visit.     Current Medications:  Scheduled Meds:  Continuous Infusions:No current facility-administered medications for this visit.    PRN Meds:.    I have reviewed the patient's medical history in detail and updated the computerized patient record.  Review and summarization of old records include:    Past Medical History:   Diagnosis Date   • Arthritis    • Hyperlipidemia    • Hypertension    • Hypothyroid    • Neck pain    • PONV (postoperative nausea and vomiting)    • Shoulder pain, left     sched total shoulder   • Spinal headache         Past Surgical History:   Procedure Laterality Date   • COLONOSCOPY     • HYSTERECTOMY     • REPLACEMENT TOTAL KNEE Right    • SHOULDER SURGERY Right     tendon repair   • TONSILLECTOMY          Social History     Occupational History   • Not on file   Tobacco Use   • Smoking status: Never Smoker   • Smokeless tobacco: Never Used   Vaping Use   • Vaping Use: Never used   Substance and Sexual Activity   • Alcohol use: No   • Drug use: No   • Sexual activity: Defer      Social History     Social History Narrative   • Not on file        Family History   Problem Relation Age of Onset   • Heart attack Father    • Heart disease Father    • Breast cancer Neg Charlee Byrd  Hyperthermia Neg Hx        ROS: 14 point review of systems was performed and was negative except for documented findings in HPI and today's encounter.     Allergies:   Allergies   Allergen Reactions   • Ace Inhibitors Cough   • Codeine Nausea And Vomiting     Constitutional:  Denies fever, shaking or chills   Eyes:  Denies change in visual acuity   HENT:  Denies nasal congestion or sore throat   Respiratory:  Denies cough or shortness of breath   Cardiovascular:  Denies chest pain or severe LE edema   GI:  Denies abdominal pain, nausea, vomiting, bloody stools or diarrhea   Musculoskeletal:  Denies numbness tingling or loss of motor function except as outlined above in history of present illness.  : Denies painful urination or hematuria  Integument:  Denies rash, lesion or ulceration   Neurologic:  Denies headache or focal weakness  Endocrine:  Denies lymphadenopathy  Psych:  Denies confusion or change in mental status   Hem:  Denies active bleeding    Physical Exam: 81 y.o. female  Body mass index is 25.06 kg/m².  There were no vitals filed for this visit.  Vital signs reviewed.   General Appearance:    Alert, cooperative, in no acute distress                  Eyes: conjunctiva clear  ENT: external ears and nose atraumatic  CV: no peripheral edema  Resp: normal respiratory effort  Skin: no rashes or wounds; normal turgor  Psych: mood and affect appropriate  Lymph: no nodes appreciated  Neuro: gross sensation intact  Vascular:  Palpable peripheral pulse in noted extremity  Musculoskeletal Extremities:   Left Shoulder-  Tenderness  located anterior glenohumeral joint line.   FF-       Active- 70 degrees  Passive- 160 degrees  Strength- 3/5  ER-      Active- 35 degrees  Strength- 4-/5  IR        T12  Strength- 4+/5  Empty can test- Positive.      Drop arm test- Positive   Ext rotation lag sign- Positive   Gab - Mildly positive     Deltoid firing- Positive, all 3 components     Neer's sign- Positive.   Munoz-  Positive.      Brisk cap refill to all digits, 2+ palpable radial pulse     Positive sensation to light touch palmar, dorsal aspects of small and index fingers and anatomic snuffbox left hand. Positive sensation, proximal lateral arm.     Debilities/Disabilities Identified: None      Diagnostic Tests:  Pre-Admission Testing on 10/01/2021   Component Date Value Ref Range Status   • ABO Type 10/01/2021 O   Final   • RH type 10/01/2021 Positive   Final   • Antibody Screen 10/01/2021 Negative   Final   • T&S Expiration Date 10/01/2021 10/15/2021 11:59:00 PM   Final   • Color, UA 10/01/2021 Yellow  Yellow, Straw Final   • Appearance, UA 10/01/2021 Clear  Clear Final   • pH, UA 10/01/2021 7.0  4.5 - 8.0 Final   • Specific Gravity, UA 10/01/2021 1.020  1.003 - 1.030 Final   • Glucose, UA 10/01/2021 Negative  Negative Final   • Ketones, UA 10/01/2021 Negative  Negative Final   • Bilirubin, UA 10/01/2021 Negative  Negative Final   • Blood, UA 10/01/2021 Trace* Negative Final   • Protein, UA 10/01/2021 Negative  Negative Final   • Leuk Esterase, UA 10/01/2021 Trace* Negative Final   • Nitrite, UA 10/01/2021 Negative  Negative Final   • Urobilinogen, UA 10/01/2021 0.2 E.U./dL  0.2 - 1.0 E.U./dL Final   • PTT 10/01/2021 34.1  24.3 - 38.1 seconds Final   • Protime 10/01/2021 13.8  12.1 - 15.0 Seconds Final   • INR 10/01/2021 1.06  0.90 - 1.10 Final   • Glucose 10/01/2021 100* 65 - 99 mg/dL Final   • BUN 10/01/2021 12  8 - 23 mg/dL Final   • Creatinine 10/01/2021 0.99  0.57 - 1.00 mg/dL Final   • Sodium 10/01/2021 133* 136 - 145 mmol/L Final   • Potassium 10/01/2021 3.1* 3.5 - 5.2 mmol/L Final   • Chloride 10/01/2021 92* 98 - 107 mmol/L Final   • CO2 10/01/2021 32.0* 22.0 - 29.0 mmol/L Final   • Calcium 10/01/2021 9.6  8.6 - 10.5 mg/dL Final   • eGFR Non African Amer 10/01/2021 54* >60 mL/min/1.73 Final   • BUN/Creatinine Ratio 10/01/2021 12.1  7.0 - 25.0 Final   • Anion Gap 10/01/2021 9.0  5.0 - 15.0 mmol/L Final   • MRSA Screen  Cx 10/01/2021 No Methicillin Resistant Staphylococcus aureus isolated   Final   • WBC 10/01/2021 6.46  3.40 - 10.80 10*3/mm3 Final   • RBC 10/01/2021 3.89  3.77 - 5.28 10*6/mm3 Final   • Hemoglobin 10/01/2021 12.2  12.0 - 15.9 g/dL Final   • Hematocrit 10/01/2021 35.6  34.0 - 46.6 % Final   • MCV 10/01/2021 91.5  79.0 - 97.0 fL Final   • MCH 10/01/2021 31.4  26.6 - 33.0 pg Final   • MCHC 10/01/2021 34.3  31.5 - 35.7 g/dL Final   • RDW 10/01/2021 11.8* 12.3 - 15.4 % Final   • RDW-SD 10/01/2021 39.5  37.0 - 54.0 fl Final   • MPV 10/01/2021 9.9  6.0 - 12.0 fL Final   • Platelets 10/01/2021 237  140 - 450 10*3/mm3 Final   • Neutrophil % 10/01/2021 54.3  42.7 - 76.0 % Final   • Lymphocyte % 10/01/2021 32.7  19.6 - 45.3 % Final   • Monocyte % 10/01/2021 11.0  5.0 - 12.0 % Final   • Eosinophil % 10/01/2021 1.2  0.3 - 6.2 % Final   • Basophil % 10/01/2021 0.6  0.0 - 1.5 % Final   • Immature Grans % 10/01/2021 0.2  0.0 - 0.5 % Final   • Neutrophils, Absolute 10/01/2021 3.51  1.70 - 7.00 10*3/mm3 Final   • Lymphocytes, Absolute 10/01/2021 2.11  0.70 - 3.10 10*3/mm3 Final   • Monocytes, Absolute 10/01/2021 0.71  0.10 - 0.90 10*3/mm3 Final   • Eosinophils, Absolute 10/01/2021 0.08  0.00 - 0.40 10*3/mm3 Final   • Basophils, Absolute 10/01/2021 0.04  0.00 - 0.20 10*3/mm3 Final   • Immature Grans, Absolute 10/01/2021 0.01  0.00 - 0.05 10*3/mm3 Final   • nRBC 10/01/2021 0.0  0.0 - 0.2 /100 WBC Final   • RBC, UA 10/01/2021 3-5* None Seen /HPF Final   • WBC, UA 10/01/2021 3-5* None Seen /HPF Final   • Bacteria, UA 10/01/2021 None Seen  None Seen /HPF Final   • Squamous Epithelial Cells, UA 10/01/2021 0-2  None Seen, 0-2 /HPF Final   • Hyaline Casts, UA 10/01/2021 0-2  None Seen /LPF Final   • Methodology 10/01/2021 Manual Light Microscopy   Final     No results found.    Assessment:  Patient Active Problem List   Diagnosis   • Precordial chest pain   • PVCs (premature ventricular contractions)   • Essential hypertension   • Acquired  hypothyroidism   • Low serum potassium level   • Other hyperlipidemia   • Acute pain of left shoulder   • Osteoarthritis of multiple joints   • Nontraumatic complete tear of left rotator cuff   • Glenohumeral arthritis, left   • Biceps tendinitis of left upper extremity       Plan: Patient wishes to proceed with reverse shoulder arthroplasty at this point in time.  I discussed with with the patient the specific indication of a reverse shoulder arthroplasty particularly for shoulder pain as well as for pseudoparalysis, and reviewed anatomy of the shoulder as well as a model of the implant.  I reviewed details of the procedure as well as risks, benefits, and alternatives with the risks including but not limited to neurovascular damage, bleeding, infection, periprosthetic fracture, chronic pain, instability, loosening of implant, failure of implant, loss of motion, weakness, stiffness, DVT, pulmonary embolus, death, stroke, complex regional pain syndrome, myocardial infarction, need for additional procedures.  Patient understood all these had all questions answered today prior to consenting to proceed with surgery.  Patient has been medically optimized by primary care physician. No guarantees were given in regards to results of the surgery.      Nguyen Zhang was given the opportunity to ask and have all questions answered today.  The patient voiced understanding of the risks, benefits, and alternative forms of treatment that were discussed and the patient consents to proceed with surgery.     Patient's blood clot history is negative.    Plan for DVT prophylaxis is ASA    Patient's MRSA infection history is negative.    Patient's skin infection history is negative.    Discharge Plan: POD 1-2 to home    Date: 10/7/2021  Scout Palmer MD      Dictated utilizing Dragon dictation

## 2021-10-07 NOTE — PROGRESS NOTES
"Subjective:     Patient ID: Nguyen Zhang is a 81 y.o. female.    Chief Complaint:    History of Present Illness  Nguyen Zhang {presents/returns:[id number]} to clinic today for evaluation of ***     Social History     Occupational History   • Not on file   Tobacco Use   • Smoking status: Never Smoker   • Smokeless tobacco: Never Used   Vaping Use   • Vaping Use: Never used   Substance and Sexual Activity   • Alcohol use: No   • Drug use: No   • Sexual activity: Defer      Past Medical History:   Diagnosis Date   • Arthritis    • Hyperlipidemia    • Hypertension    • Hypothyroid    • Neck pain    • PONV (postoperative nausea and vomiting)    • Shoulder pain, left     sched total shoulder   • Spinal headache      Past Surgical History:   Procedure Laterality Date   • COLONOSCOPY     • HYSTERECTOMY     • REPLACEMENT TOTAL KNEE Right    • SHOULDER SURGERY Right     tendon repair   • TONSILLECTOMY         Family History   Problem Relation Age of Onset   • Heart attack Father    • Heart disease Father    • Breast cancer Neg Hx    • Malig Hyperthermia Neg Hx          Review of Systems        Objective:  Vitals:    10/07/21 0921   Weight: 59.4 kg (131 lb)   Height: 154 cm (60.63\")         10/07/21  0921   Weight: 59.4 kg (131 lb)     Body mass index is 25.06 kg/m².  ***      Ortho Exam     ***  Imaging:  ***  Assessment:      No diagnosis found.       Plan:          1. Discussed treatment options at length with patient at today's visit. ***  2. Follow up: ***      Nguyen Zhang *** was in agreement with plan and had all questions answered.     Orders:  No orders of the defined types were placed in this encounter.      Medications:  No orders of the defined types were placed in this encounter.      Followup:  No follow-ups on file.    There are no diagnoses linked to this encounter.      Dictated utilizing Dragon dictation   "

## 2021-10-07 NOTE — PROGRESS NOTES
CC: f/u left shoulder pain, DJD     Nguyen Zhang presented to clinic today for preoperative history and physical visit in anticipation of upcoming scheduled left reverse total shoulder arthroplasty.     Discussed treatment options at length with patient today in regards to left shoulder. She has failed conservative treatment at this point in time and would like to proceed with shoulder arthroplasty. I discussed with the patient the specific indication of a reverse shoulder arthroplasty particularly for shoulder pain as well as for pseudoparalysis, and reviewed anatomy of the shoulder as well as a model of the implant. I reviewed details of the procedure as well as risks, benefits, and alternatives with the risks including but not limited to neurovascular damage, bleeding, infection, periprosthetic fracture, chronic pain, instability, loosening of implant, failure of implant, loss of motion, weakness, stiffness, DVT, pulmonary embolus, death, stroke, complex regional pain syndrome, myocardial infarction, need for additional procedures. Patient understood all these and had all questions answered today. No guarantees given in regards to results from the surgery.     Patient has been medically optimized.     Postoperative DVT prophylaxis will be with aspirin.  Patient does not have a history of DVT or pulmonary embolus.     All remaining questions answered today.

## 2021-10-07 NOTE — H&P
History & Physical       Patient: Nguyen Zhang    YOB: 1940    Medical Record Number: [id number]    Surgeon:  Dr. Scout Palmer    Chief Complaints:   Chief Complaint   Patient presents with   • Left Shoulder - Follow-up         History of Present Illness: 81 y.o. female presents with   Chief Complaint   Patient presents with   • Left Shoulder - Follow-up   . Onset of symptoms was months ago and has been progressively worsening despite more conservative treatment measures.  Symptoms are associated with ability to move, lift, push, pull, and perform activities of daily living with affected extremity. Symptoms are aggravated by overhead motion, lifting, and pushing as well as ROM necessary for activities of daily living.   Symptoms improve with rest, ice and elevation only minimally.      Allergies:   Allergies   Allergen Reactions   • Ace Inhibitors Cough   • Codeine Nausea And Vomiting       Medications:   Home Medications:  Current Outpatient Medications on File Prior to Visit   Medication Sig   • Acetaminophen (TYLENOL ARTHRITIS PAIN PO) Take 500 mg by mouth Daily.   • ALPRAZolam (Xanax) 0.25 MG tablet Take 1 tablet by mouth At Night As Needed for Anxiety. (Patient taking differently: Take 0.25 mg by mouth At Night As Needed for Anxiety or Sleep.)   • amLODIPine (NORVASC) 5 MG tablet TAKE 1 TABLET BY MOUTH EVERY EVENING. (Patient taking differently: Take 5 mg by mouth Every Evening.)   • aspirin 325 MG tablet Take 325 mg by mouth Daily. Takes daily w/tylenol for pain   • atenolol (TENORMIN) 50 MG tablet TAKE 1 TABLET BY MOUTH 2 TIMES A DAY. (Patient taking differently: Take 50 mg by mouth 2 (two) times a day.)   • Bioflavonoid Products (DOROTEO C PO) Take 500 mg by mouth Daily.   • Cholecalciferol (VITAMIN D3) 1000 units capsule Take 2 capsules by mouth Daily.   • cyclobenzaprine (FLEXERIL) 5 MG tablet Take 5 mg by mouth 3 (Three) Times a Day As Needed for Muscle Spasms.   •  hydroCHLOROthiazide (HYDRODIURIL) 25 MG tablet TAKE 1 TABLET BY MOUTH DAILY. (Patient taking differently: Take 25 mg by mouth Daily.)   • levothyroxine (SYNTHROID, LEVOTHROID) 75 MCG tablet TAKE 1 TABLET BY MOUTH DAILY.   • losartan (COZAAR) 100 MG tablet TAKE 1 TABLET BY MOUTH DAILY. (Patient taking differently: Take 100 mg by mouth Daily With Dinner.)   • multivitamin-minerals (CENTRUM) tablet Take 1 tablet by mouth Daily.   • NIACIN PO Take 500 mg by mouth Daily.   • potassium chloride 10 MEQ CR tablet TAKE 1 TABLET BY MOUTH DAILY. (Patient taking differently: Take 10 mEq by mouth Daily.)   • vitamin E 400 UNIT capsule Take 400 Units by mouth Daily.     No current facility-administered medications on file prior to visit.     Current Medications:  Scheduled Meds:  Continuous Infusions:No current facility-administered medications for this visit.    PRN Meds:.    I have reviewed the patient's medical history in detail and updated the computerized patient record.  Review and summarization of old records include:    Past Medical History:   Diagnosis Date   • Arthritis    • Hyperlipidemia    • Hypertension    • Hypothyroid    • Neck pain    • PONV (postoperative nausea and vomiting)    • Shoulder pain, left     sched total shoulder   • Spinal headache         Past Surgical History:   Procedure Laterality Date   • COLONOSCOPY     • HYSTERECTOMY     • REPLACEMENT TOTAL KNEE Right    • SHOULDER SURGERY Right     tendon repair   • TONSILLECTOMY          Social History     Occupational History   • Not on file   Tobacco Use   • Smoking status: Never Smoker   • Smokeless tobacco: Never Used   Vaping Use   • Vaping Use: Never used   Substance and Sexual Activity   • Alcohol use: No   • Drug use: No   • Sexual activity: Defer      Social History     Social History Narrative   • Not on file        Family History   Problem Relation Age of Onset   • Heart attack Father    • Heart disease Father    • Breast cancer Neg Charlee Byrd  Hyperthermia Neg Hx        ROS: 14 point review of systems was performed and was negative except for documented findings in HPI and today's encounter.     Allergies:   Allergies   Allergen Reactions   • Ace Inhibitors Cough   • Codeine Nausea And Vomiting     Constitutional:  Denies fever, shaking or chills   Eyes:  Denies change in visual acuity   HENT:  Denies nasal congestion or sore throat   Respiratory:  Denies cough or shortness of breath   Cardiovascular:  Denies chest pain or severe LE edema   GI:  Denies abdominal pain, nausea, vomiting, bloody stools or diarrhea   Musculoskeletal:  Denies numbness tingling or loss of motor function except as outlined above in history of present illness.  : Denies painful urination or hematuria  Integument:  Denies rash, lesion or ulceration   Neurologic:  Denies headache or focal weakness  Endocrine:  Denies lymphadenopathy  Psych:  Denies confusion or change in mental status   Hem:  Denies active bleeding    Physical Exam: 81 y.o. female  Body mass index is 25.06 kg/m².  There were no vitals filed for this visit.  Vital signs reviewed.   General Appearance:    Alert, cooperative, in no acute distress                  Eyes: conjunctiva clear  ENT: external ears and nose atraumatic  CV: no peripheral edema  Resp: normal respiratory effort  Skin: no rashes or wounds; normal turgor  Psych: mood and affect appropriate  Lymph: no nodes appreciated  Neuro: gross sensation intact  Vascular:  Palpable peripheral pulse in noted extremity  Musculoskeletal Extremities:   Left Shoulder-  Tenderness  located anterior glenohumeral joint line.   FF-       Active- 70 degrees  Passive- 160 degrees  Strength- 3/5  ER-      Active- 35 degrees  Strength- 4-/5  IR        T12  Strength- 4+/5  Empty can test- Positive.      Drop arm test- Positive   Ext rotation lag sign- Positive   Gab - Mildly positive     Deltoid firing- Positive, all 3 components     Neer's sign- Positive.   Munoz-  Positive.      Brisk cap refill to all digits, 2+ palpable radial pulse     Positive sensation to light touch palmar, dorsal aspects of small and index fingers and anatomic snuffbox left hand. Positive sensation, proximal lateral arm.     Debilities/Disabilities Identified: None      Diagnostic Tests:  Pre-Admission Testing on 10/01/2021   Component Date Value Ref Range Status   • ABO Type 10/01/2021 O   Final   • RH type 10/01/2021 Positive   Final   • Antibody Screen 10/01/2021 Negative   Final   • T&S Expiration Date 10/01/2021 10/15/2021 11:59:00 PM   Final   • Color, UA 10/01/2021 Yellow  Yellow, Straw Final   • Appearance, UA 10/01/2021 Clear  Clear Final   • pH, UA 10/01/2021 7.0  4.5 - 8.0 Final   • Specific Gravity, UA 10/01/2021 1.020  1.003 - 1.030 Final   • Glucose, UA 10/01/2021 Negative  Negative Final   • Ketones, UA 10/01/2021 Negative  Negative Final   • Bilirubin, UA 10/01/2021 Negative  Negative Final   • Blood, UA 10/01/2021 Trace* Negative Final   • Protein, UA 10/01/2021 Negative  Negative Final   • Leuk Esterase, UA 10/01/2021 Trace* Negative Final   • Nitrite, UA 10/01/2021 Negative  Negative Final   • Urobilinogen, UA 10/01/2021 0.2 E.U./dL  0.2 - 1.0 E.U./dL Final   • PTT 10/01/2021 34.1  24.3 - 38.1 seconds Final   • Protime 10/01/2021 13.8  12.1 - 15.0 Seconds Final   • INR 10/01/2021 1.06  0.90 - 1.10 Final   • Glucose 10/01/2021 100* 65 - 99 mg/dL Final   • BUN 10/01/2021 12  8 - 23 mg/dL Final   • Creatinine 10/01/2021 0.99  0.57 - 1.00 mg/dL Final   • Sodium 10/01/2021 133* 136 - 145 mmol/L Final   • Potassium 10/01/2021 3.1* 3.5 - 5.2 mmol/L Final   • Chloride 10/01/2021 92* 98 - 107 mmol/L Final   • CO2 10/01/2021 32.0* 22.0 - 29.0 mmol/L Final   • Calcium 10/01/2021 9.6  8.6 - 10.5 mg/dL Final   • eGFR Non African Amer 10/01/2021 54* >60 mL/min/1.73 Final   • BUN/Creatinine Ratio 10/01/2021 12.1  7.0 - 25.0 Final   • Anion Gap 10/01/2021 9.0  5.0 - 15.0 mmol/L Final   • MRSA Screen  Cx 10/01/2021 No Methicillin Resistant Staphylococcus aureus isolated   Final   • WBC 10/01/2021 6.46  3.40 - 10.80 10*3/mm3 Final   • RBC 10/01/2021 3.89  3.77 - 5.28 10*6/mm3 Final   • Hemoglobin 10/01/2021 12.2  12.0 - 15.9 g/dL Final   • Hematocrit 10/01/2021 35.6  34.0 - 46.6 % Final   • MCV 10/01/2021 91.5  79.0 - 97.0 fL Final   • MCH 10/01/2021 31.4  26.6 - 33.0 pg Final   • MCHC 10/01/2021 34.3  31.5 - 35.7 g/dL Final   • RDW 10/01/2021 11.8* 12.3 - 15.4 % Final   • RDW-SD 10/01/2021 39.5  37.0 - 54.0 fl Final   • MPV 10/01/2021 9.9  6.0 - 12.0 fL Final   • Platelets 10/01/2021 237  140 - 450 10*3/mm3 Final   • Neutrophil % 10/01/2021 54.3  42.7 - 76.0 % Final   • Lymphocyte % 10/01/2021 32.7  19.6 - 45.3 % Final   • Monocyte % 10/01/2021 11.0  5.0 - 12.0 % Final   • Eosinophil % 10/01/2021 1.2  0.3 - 6.2 % Final   • Basophil % 10/01/2021 0.6  0.0 - 1.5 % Final   • Immature Grans % 10/01/2021 0.2  0.0 - 0.5 % Final   • Neutrophils, Absolute 10/01/2021 3.51  1.70 - 7.00 10*3/mm3 Final   • Lymphocytes, Absolute 10/01/2021 2.11  0.70 - 3.10 10*3/mm3 Final   • Monocytes, Absolute 10/01/2021 0.71  0.10 - 0.90 10*3/mm3 Final   • Eosinophils, Absolute 10/01/2021 0.08  0.00 - 0.40 10*3/mm3 Final   • Basophils, Absolute 10/01/2021 0.04  0.00 - 0.20 10*3/mm3 Final   • Immature Grans, Absolute 10/01/2021 0.01  0.00 - 0.05 10*3/mm3 Final   • nRBC 10/01/2021 0.0  0.0 - 0.2 /100 WBC Final   • RBC, UA 10/01/2021 3-5* None Seen /HPF Final   • WBC, UA 10/01/2021 3-5* None Seen /HPF Final   • Bacteria, UA 10/01/2021 None Seen  None Seen /HPF Final   • Squamous Epithelial Cells, UA 10/01/2021 0-2  None Seen, 0-2 /HPF Final   • Hyaline Casts, UA 10/01/2021 0-2  None Seen /LPF Final   • Methodology 10/01/2021 Manual Light Microscopy   Final     No results found.    Assessment:  Patient Active Problem List   Diagnosis   • Precordial chest pain   • PVCs (premature ventricular contractions)   • Essential hypertension   • Acquired  hypothyroidism   • Low serum potassium level   • Other hyperlipidemia   • Acute pain of left shoulder   • Osteoarthritis of multiple joints   • Nontraumatic complete tear of left rotator cuff   • Glenohumeral arthritis, left   • Biceps tendinitis of left upper extremity       Plan: Patient wishes to proceed with reverse shoulder arthroplasty at this point in time.  I discussed with with the patient the specific indication of a reverse shoulder arthroplasty particularly for shoulder pain as well as for pseudoparalysis, and reviewed anatomy of the shoulder as well as a model of the implant.  I reviewed details of the procedure as well as risks, benefits, and alternatives with the risks including but not limited to neurovascular damage, bleeding, infection, periprosthetic fracture, chronic pain, instability, loosening of implant, failure of implant, loss of motion, weakness, stiffness, DVT, pulmonary embolus, death, stroke, complex regional pain syndrome, myocardial infarction, need for additional procedures.  Patient understood all these had all questions answered today prior to consenting to proceed with surgery.  Patient has been medically optimized by primary care physician. No guarantees were given in regards to results of the surgery.      Nguyen Zhang was given the opportunity to ask and have all questions answered today.  The patient voiced understanding of the risks, benefits, and alternative forms of treatment that were discussed and the patient consents to proceed with surgery.     Patient's blood clot history is negative.    Plan for DVT prophylaxis is ASA    Patient's MRSA infection history is negative.    Patient's skin infection history is negative.    Discharge Plan: POD 1-2 to home    Date: 10/7/2021  Scout Palmer MD      Dictated utilizing Dragon dictation

## 2021-10-12 DIAGNOSIS — Z01.818 PREOP EXAMINATION: Primary | ICD-10-CM

## 2021-10-13 ENCOUNTER — LAB (OUTPATIENT)
Dept: LAB | Facility: HOSPITAL | Age: 81
End: 2021-10-13

## 2021-10-13 DIAGNOSIS — I10 ESSENTIAL HYPERTENSION: ICD-10-CM

## 2021-10-13 DIAGNOSIS — Z01.818 PREOP EXAMINATION: ICD-10-CM

## 2021-10-13 LAB
ANION GAP SERPL CALCULATED.3IONS-SCNC: 13.7 MMOL/L (ref 5–15)
BUN SERPL-MCNC: 12 MG/DL (ref 8–23)
BUN/CREAT SERPL: 15.8 (ref 7–25)
CALCIUM SPEC-SCNC: 10.2 MG/DL (ref 8.6–10.5)
CHLORIDE SERPL-SCNC: 93 MMOL/L (ref 98–107)
CO2 SERPL-SCNC: 30.3 MMOL/L (ref 22–29)
CREAT SERPL-MCNC: 0.76 MG/DL (ref 0.57–1)
GFR SERPL CREATININE-BSD FRML MDRD: 73 ML/MIN/1.73
GLUCOSE SERPL-MCNC: 102 MG/DL (ref 65–99)
POTASSIUM SERPL-SCNC: 3.6 MMOL/L (ref 3.5–5.2)
SARS-COV-2 RNA PNL SPEC NAA+PROBE: NOT DETECTED
SODIUM SERPL-SCNC: 137 MMOL/L (ref 136–145)

## 2021-10-13 PROCEDURE — C9803 HOPD COVID-19 SPEC COLLECT: HCPCS

## 2021-10-13 PROCEDURE — 87635 SARS-COV-2 COVID-19 AMP PRB: CPT | Performed by: ORTHOPAEDIC SURGERY

## 2021-10-13 PROCEDURE — 80048 BASIC METABOLIC PNL TOTAL CA: CPT

## 2021-10-14 ENCOUNTER — ANESTHESIA EVENT (OUTPATIENT)
Dept: PERIOP | Facility: HOSPITAL | Age: 81
End: 2021-10-14

## 2021-10-14 RX ORDER — FAMOTIDINE 10 MG/ML
20 INJECTION, SOLUTION INTRAVENOUS
Status: CANCELLED | OUTPATIENT
Start: 2021-10-14

## 2021-10-14 RX ORDER — DEXAMETHASONE SODIUM PHOSPHATE 4 MG/ML
4 INJECTION, SOLUTION INTRA-ARTICULAR; INTRALESIONAL; INTRAMUSCULAR; INTRAVENOUS; SOFT TISSUE ONCE AS NEEDED
Status: CANCELLED | OUTPATIENT
Start: 2021-10-14

## 2021-10-14 RX ORDER — ONDANSETRON 2 MG/ML
4 INJECTION INTRAMUSCULAR; INTRAVENOUS ONCE AS NEEDED
Status: CANCELLED | OUTPATIENT
Start: 2021-10-14

## 2021-10-15 ENCOUNTER — ANESTHESIA (OUTPATIENT)
Dept: PERIOP | Facility: HOSPITAL | Age: 81
End: 2021-10-15

## 2021-10-15 ENCOUNTER — HOSPITAL ENCOUNTER (OUTPATIENT)
Facility: HOSPITAL | Age: 81
Discharge: HOME | End: 2021-10-16
Attending: ORTHOPAEDIC SURGERY | Admitting: ORTHOPAEDIC SURGERY

## 2021-10-15 ENCOUNTER — APPOINTMENT (OUTPATIENT)
Dept: GENERAL RADIOLOGY | Facility: HOSPITAL | Age: 81
End: 2021-10-15

## 2021-10-15 DIAGNOSIS — M75.22 BICEPS TENDINITIS OF LEFT UPPER EXTREMITY: ICD-10-CM

## 2021-10-15 DIAGNOSIS — M75.122 NONTRAUMATIC COMPLETE TEAR OF LEFT ROTATOR CUFF: ICD-10-CM

## 2021-10-15 DIAGNOSIS — Z96.612 S/P REVERSE TOTAL SHOULDER ARTHROPLASTY, LEFT: Primary | ICD-10-CM

## 2021-10-15 DIAGNOSIS — M19.012 GLENOHUMERAL ARTHRITIS, LEFT: ICD-10-CM

## 2021-10-15 LAB — POTASSIUM SERPL-SCNC: 3.2 MMOL/L (ref 3.5–5.2)

## 2021-10-15 PROCEDURE — 25010000002 MIDAZOLAM PER 1MG: Performed by: NURSE ANESTHETIST, CERTIFIED REGISTERED

## 2021-10-15 PROCEDURE — C1776 JOINT DEVICE (IMPLANTABLE): HCPCS | Performed by: ORTHOPAEDIC SURGERY

## 2021-10-15 PROCEDURE — 84132 ASSAY OF SERUM POTASSIUM: CPT | Performed by: NURSE ANESTHETIST, CERTIFIED REGISTERED

## 2021-10-15 PROCEDURE — G0378 HOSPITAL OBSERVATION PER HR: HCPCS

## 2021-10-15 PROCEDURE — 25010000002 VANCOMYCIN 1 G RECONSTITUTED SOLUTION: Performed by: ORTHOPAEDIC SURGERY

## 2021-10-15 PROCEDURE — 25010000002 PROPOFOL 10 MG/ML EMULSION: Performed by: NURSE ANESTHETIST, CERTIFIED REGISTERED

## 2021-10-15 PROCEDURE — 23472 RECONSTRUCT SHOULDER JOINT: CPT | Performed by: SPECIALIST/TECHNOLOGIST, OTHER

## 2021-10-15 PROCEDURE — C1713 ANCHOR/SCREW BN/BN,TIS/BN: HCPCS | Performed by: ORTHOPAEDIC SURGERY

## 2021-10-15 PROCEDURE — 73020 X-RAY EXAM OF SHOULDER: CPT

## 2021-10-15 PROCEDURE — C1889 IMPLANT/INSERT DEVICE, NOC: HCPCS | Performed by: ORTHOPAEDIC SURGERY

## 2021-10-15 PROCEDURE — 25010000002 SUCCINYLCHOLINE PER 20 MG: Performed by: NURSE ANESTHETIST, CERTIFIED REGISTERED

## 2021-10-15 PROCEDURE — 25010000003 BUPIVACAINE LIPOSOME 1.3 % SUSPENSION: Performed by: ORTHOPAEDIC SURGERY

## 2021-10-15 PROCEDURE — 25010000002 DEXAMETHASONE PER 1 MG: Performed by: NURSE ANESTHETIST, CERTIFIED REGISTERED

## 2021-10-15 PROCEDURE — 25010000002 PHENYLEPHRINE 10 MG/ML SOLUTION 1 ML VIAL: Performed by: NURSE ANESTHETIST, CERTIFIED REGISTERED

## 2021-10-15 PROCEDURE — 23472 RECONSTRUCT SHOULDER JOINT: CPT | Performed by: ORTHOPAEDIC SURGERY

## 2021-10-15 PROCEDURE — 94799 UNLISTED PULMONARY SVC/PX: CPT

## 2021-10-15 PROCEDURE — 25010000002 ONDANSETRON PER 1 MG: Performed by: NURSE ANESTHETIST, CERTIFIED REGISTERED

## 2021-10-15 PROCEDURE — 25010000002 NEOSTIGMINE 10 MG/10ML SOLUTION: Performed by: NURSE ANESTHETIST, CERTIFIED REGISTERED

## 2021-10-15 PROCEDURE — C9290 INJ, BUPIVACAINE LIPOSOME: HCPCS | Performed by: ORTHOPAEDIC SURGERY

## 2021-10-15 PROCEDURE — 25010000002 PHENYLEPHRINE 10 MG/ML SOLUTION: Performed by: NURSE ANESTHETIST, CERTIFIED REGISTERED

## 2021-10-15 PROCEDURE — 25010000002 VANCOMYCIN 750 MG RECONSTITUTED SOLUTION: Performed by: ORTHOPAEDIC SURGERY

## 2021-10-15 PROCEDURE — 76942 ECHO GUIDE FOR BIOPSY: CPT | Performed by: ORTHOPAEDIC SURGERY

## 2021-10-15 DEVICE — SMARTSET GMV HIGH PERFORMANCE GENTAMICIN MEDIUM VISCOSITY BONE CEMENT 40G
Type: IMPLANTABLE DEVICE | Site: SHOULDER | Status: FUNCTIONAL
Brand: SMARTSET

## 2021-10-15 DEVICE — SCRW COMPRNSV CNTRL 6.5X25MM REUS: Type: IMPLANTABLE DEVICE | Site: SHOULDER | Status: FUNCTIONAL

## 2021-10-15 DEVICE — BONE PREPARATION KIT
Type: IMPLANTABLE DEVICE | Site: SHOULDER | Status: FUNCTIONAL
Brand: BIOPREP

## 2021-10-15 DEVICE — BASE PLT AUG W/ADAPT SM: Type: IMPLANTABLE DEVICE | Site: SHOULDER | Status: FUNCTIONAL

## 2021-10-15 DEVICE — SCRW FIX LK HEX 4.75X15MM: Type: IMPLANTABLE DEVICE | Site: SHOULDER | Status: FUNCTIONAL

## 2021-10-15 DEVICE — SUT FW #2 W/TPR NDL 1/2 CIR 38IN 97CM 26.5MM BLU: Type: IMPLANTABLE DEVICE | Site: SHOULDER | Status: FUNCTIONAL

## 2021-10-15 DEVICE — TOTL SHLDER REV: Type: IMPLANTABLE DEVICE | Site: SHOULDER | Status: FUNCTIONAL

## 2021-10-15 DEVICE — DEV CONTRL TISS STRATAFIX SPIRAL MNCRYL UD 3/0 PLS 45CM: Type: IMPLANTABLE DEVICE | Site: SHOULDER | Status: FUNCTIONAL

## 2021-10-15 DEVICE — IMPLANTABLE DEVICE: Type: IMPLANTABLE DEVICE | Site: SHOULDER | Status: FUNCTIONAL

## 2021-10-15 DEVICE — GLENOSPHERE VERSA DIAL FIX STD 36MM: Type: IMPLANTABLE DEVICE | Site: SHOULDER | Status: FUNCTIONAL

## 2021-10-15 DEVICE — LINER HUM/SHLDR TRABECULARMETAL REV POLY 60DEG 36MM PLS0: Type: IMPLANTABLE DEVICE | Site: SHOULDER | Status: FUNCTIONAL

## 2021-10-15 RX ORDER — PREGABALIN 75 MG/1
150 CAPSULE ORAL ONCE
Status: COMPLETED | OUTPATIENT
Start: 2021-10-15 | End: 2021-10-15

## 2021-10-15 RX ORDER — POTASSIUM CHLORIDE 750 MG/1
10 TABLET, FILM COATED, EXTENDED RELEASE ORAL DAILY
Status: DISCONTINUED | OUTPATIENT
Start: 2021-10-15 | End: 2021-10-16 | Stop reason: HOSPADM

## 2021-10-15 RX ORDER — ATENOLOL 50 MG/1
50 TABLET ORAL 2 TIMES DAILY
Status: DISCONTINUED | OUTPATIENT
Start: 2021-10-15 | End: 2021-10-16 | Stop reason: HOSPADM

## 2021-10-15 RX ORDER — SODIUM CHLORIDE 0.9 % (FLUSH) 0.9 %
10 SYRINGE (ML) INJECTION EVERY 12 HOURS SCHEDULED
Status: DISCONTINUED | OUTPATIENT
Start: 2021-10-15 | End: 2021-10-15 | Stop reason: HOSPADM

## 2021-10-15 RX ORDER — SODIUM CHLORIDE 0.9 % (FLUSH) 0.9 %
10 SYRINGE (ML) INJECTION AS NEEDED
Status: DISCONTINUED | OUTPATIENT
Start: 2021-10-15 | End: 2021-10-15 | Stop reason: HOSPADM

## 2021-10-15 RX ORDER — LOSARTAN POTASSIUM 50 MG/1
100 TABLET ORAL DAILY
Status: DISCONTINUED | OUTPATIENT
Start: 2021-10-16 | End: 2021-10-16 | Stop reason: HOSPADM

## 2021-10-15 RX ORDER — FAMOTIDINE 10 MG/ML
20 INJECTION, SOLUTION INTRAVENOUS
Status: COMPLETED | OUTPATIENT
Start: 2021-10-15 | End: 2021-10-15

## 2021-10-15 RX ORDER — HYDROCODONE BITARTRATE AND ACETAMINOPHEN 7.5; 325 MG/1; MG/1
2 TABLET ORAL EVERY 4 HOURS PRN
Status: DISCONTINUED | OUTPATIENT
Start: 2021-10-15 | End: 2021-10-16 | Stop reason: HOSPADM

## 2021-10-15 RX ORDER — VANCOMYCIN HYDROCHLORIDE 1 G/20ML
INJECTION, POWDER, LYOPHILIZED, FOR SOLUTION INTRAVENOUS AS NEEDED
Status: DISCONTINUED | OUTPATIENT
Start: 2021-10-15 | End: 2021-10-15 | Stop reason: HOSPADM

## 2021-10-15 RX ORDER — ONDANSETRON 2 MG/ML
4 INJECTION INTRAMUSCULAR; INTRAVENOUS ONCE AS NEEDED
Status: DISCONTINUED | OUTPATIENT
Start: 2021-10-15 | End: 2021-10-15 | Stop reason: HOSPADM

## 2021-10-15 RX ORDER — MELOXICAM 7.5 MG/1
15 TABLET ORAL ONCE
Status: COMPLETED | OUTPATIENT
Start: 2021-10-15 | End: 2021-10-15

## 2021-10-15 RX ORDER — ACETAMINOPHEN 500 MG
1000 TABLET ORAL ONCE
Status: COMPLETED | OUTPATIENT
Start: 2021-10-15 | End: 2021-10-15

## 2021-10-15 RX ORDER — GLYCOPYRROLATE 0.2 MG/ML
INJECTION INTRAMUSCULAR; INTRAVENOUS AS NEEDED
Status: DISCONTINUED | OUTPATIENT
Start: 2021-10-15 | End: 2021-10-15 | Stop reason: SURG

## 2021-10-15 RX ORDER — MIDAZOLAM HYDROCHLORIDE 2 MG/2ML
0.5 INJECTION, SOLUTION INTRAMUSCULAR; INTRAVENOUS
Status: DISCONTINUED | OUTPATIENT
Start: 2021-10-15 | End: 2021-10-15 | Stop reason: HOSPADM

## 2021-10-15 RX ORDER — ONDANSETRON 4 MG/1
4 TABLET, FILM COATED ORAL EVERY 6 HOURS PRN
Status: DISCONTINUED | OUTPATIENT
Start: 2021-10-15 | End: 2021-10-16 | Stop reason: HOSPADM

## 2021-10-15 RX ORDER — LEVOTHYROXINE SODIUM 0.07 MG/1
75 TABLET ORAL EVERY MORNING
Status: DISCONTINUED | OUTPATIENT
Start: 2021-10-16 | End: 2021-10-16 | Stop reason: HOSPADM

## 2021-10-15 RX ORDER — HYDROCHLOROTHIAZIDE 25 MG/1
25 TABLET ORAL DAILY
Status: DISCONTINUED | OUTPATIENT
Start: 2021-10-16 | End: 2021-10-16 | Stop reason: HOSPADM

## 2021-10-15 RX ORDER — LIDOCAINE HYDROCHLORIDE 10 MG/ML
0.5 INJECTION, SOLUTION EPIDURAL; INFILTRATION; INTRACAUDAL; PERINEURAL ONCE AS NEEDED
Status: DISCONTINUED | OUTPATIENT
Start: 2021-10-15 | End: 2021-10-15 | Stop reason: HOSPADM

## 2021-10-15 RX ORDER — DEXMEDETOMIDINE HYDROCHLORIDE 100 UG/ML
INJECTION, SOLUTION INTRAVENOUS AS NEEDED
Status: DISCONTINUED | OUTPATIENT
Start: 2021-10-15 | End: 2021-10-15 | Stop reason: SURG

## 2021-10-15 RX ORDER — PHENYLEPHRINE HYDROCHLORIDE 10 MG/ML
INJECTION INTRAVENOUS AS NEEDED
Status: DISCONTINUED | OUTPATIENT
Start: 2021-10-15 | End: 2021-10-15 | Stop reason: SURG

## 2021-10-15 RX ORDER — CYCLOBENZAPRINE HCL 10 MG
5 TABLET ORAL 3 TIMES DAILY PRN
Status: DISCONTINUED | OUTPATIENT
Start: 2021-10-15 | End: 2021-10-16 | Stop reason: HOSPADM

## 2021-10-15 RX ORDER — FAMOTIDINE 20 MG/1
40 TABLET, FILM COATED ORAL DAILY
Status: DISCONTINUED | OUTPATIENT
Start: 2021-10-16 | End: 2021-10-16 | Stop reason: HOSPADM

## 2021-10-15 RX ORDER — PROPOFOL 10 MG/ML
VIAL (ML) INTRAVENOUS AS NEEDED
Status: DISCONTINUED | OUTPATIENT
Start: 2021-10-15 | End: 2021-10-15 | Stop reason: SURG

## 2021-10-15 RX ORDER — ROCURONIUM BROMIDE 10 MG/ML
INJECTION, SOLUTION INTRAVENOUS AS NEEDED
Status: DISCONTINUED | OUTPATIENT
Start: 2021-10-15 | End: 2021-10-15 | Stop reason: SURG

## 2021-10-15 RX ORDER — AMLODIPINE BESYLATE 5 MG/1
5 TABLET ORAL NIGHTLY
Status: DISCONTINUED | OUTPATIENT
Start: 2021-10-15 | End: 2021-10-16 | Stop reason: HOSPADM

## 2021-10-15 RX ORDER — MAGNESIUM HYDROXIDE 1200 MG/15ML
LIQUID ORAL AS NEEDED
Status: DISCONTINUED | OUTPATIENT
Start: 2021-10-15 | End: 2021-10-15 | Stop reason: HOSPADM

## 2021-10-15 RX ORDER — DEXAMETHASONE SODIUM PHOSPHATE 4 MG/ML
8 INJECTION, SOLUTION INTRA-ARTICULAR; INTRALESIONAL; INTRAMUSCULAR; INTRAVENOUS; SOFT TISSUE ONCE
Status: COMPLETED | OUTPATIENT
Start: 2021-10-15 | End: 2021-10-15

## 2021-10-15 RX ORDER — SUCCINYLCHOLINE CHLORIDE 20 MG/ML
INJECTION INTRAMUSCULAR; INTRAVENOUS AS NEEDED
Status: DISCONTINUED | OUTPATIENT
Start: 2021-10-15 | End: 2021-10-15 | Stop reason: SURG

## 2021-10-15 RX ORDER — SODIUM CHLORIDE, SODIUM LACTATE, POTASSIUM CHLORIDE, CALCIUM CHLORIDE 600; 310; 30; 20 MG/100ML; MG/100ML; MG/100ML; MG/100ML
100 INJECTION, SOLUTION INTRAVENOUS CONTINUOUS
Status: DISCONTINUED | OUTPATIENT
Start: 2021-10-15 | End: 2021-10-16 | Stop reason: HOSPADM

## 2021-10-15 RX ORDER — SODIUM CHLORIDE 9 MG/ML
40 INJECTION, SOLUTION INTRAVENOUS AS NEEDED
Status: DISCONTINUED | OUTPATIENT
Start: 2021-10-15 | End: 2021-10-15 | Stop reason: HOSPADM

## 2021-10-15 RX ORDER — SODIUM CHLORIDE 9 MG/ML
75 INJECTION, SOLUTION INTRAVENOUS CONTINUOUS
Status: DISCONTINUED | OUTPATIENT
Start: 2021-10-15 | End: 2021-10-16 | Stop reason: HOSPADM

## 2021-10-15 RX ORDER — MELOXICAM 7.5 MG/1
7.5 TABLET ORAL DAILY
Status: DISCONTINUED | OUTPATIENT
Start: 2021-10-16 | End: 2021-10-16 | Stop reason: HOSPADM

## 2021-10-15 RX ORDER — ONDANSETRON 2 MG/ML
4 INJECTION INTRAMUSCULAR; INTRAVENOUS EVERY 6 HOURS PRN
Status: DISCONTINUED | OUTPATIENT
Start: 2021-10-15 | End: 2021-10-16 | Stop reason: HOSPADM

## 2021-10-15 RX ORDER — SODIUM CHLORIDE, SODIUM LACTATE, POTASSIUM CHLORIDE, CALCIUM CHLORIDE 600; 310; 30; 20 MG/100ML; MG/100ML; MG/100ML; MG/100ML
9 INJECTION, SOLUTION INTRAVENOUS CONTINUOUS
Status: DISCONTINUED | OUTPATIENT
Start: 2021-10-15 | End: 2021-10-15

## 2021-10-15 RX ORDER — NEOSTIGMINE METHYLSULFATE 1 MG/ML
INJECTION, SOLUTION INTRAVENOUS AS NEEDED
Status: DISCONTINUED | OUTPATIENT
Start: 2021-10-15 | End: 2021-10-15 | Stop reason: SURG

## 2021-10-15 RX ORDER — ASPIRIN 325 MG
325 TABLET ORAL DAILY
Status: DISCONTINUED | OUTPATIENT
Start: 2021-10-16 | End: 2021-10-16 | Stop reason: HOSPADM

## 2021-10-15 RX ORDER — LIDOCAINE HYDROCHLORIDE 20 MG/ML
INJECTION, SOLUTION INFILTRATION; PERINEURAL AS NEEDED
Status: DISCONTINUED | OUTPATIENT
Start: 2021-10-15 | End: 2021-10-15 | Stop reason: SURG

## 2021-10-15 RX ORDER — HYDROCODONE BITARTRATE AND ACETAMINOPHEN 5; 325 MG/1; MG/1
1 TABLET ORAL EVERY 4 HOURS PRN
Status: DISCONTINUED | OUTPATIENT
Start: 2021-10-15 | End: 2021-10-16 | Stop reason: HOSPADM

## 2021-10-15 RX ORDER — ALPRAZOLAM 0.25 MG/1
0.25 TABLET ORAL NIGHTLY PRN
Status: DISCONTINUED | OUTPATIENT
Start: 2021-10-15 | End: 2021-10-16 | Stop reason: HOSPADM

## 2021-10-15 RX ORDER — BUPIVACAINE HYDROCHLORIDE 5 MG/ML
INJECTION, SOLUTION EPIDURAL; INTRACAUDAL
Status: COMPLETED | OUTPATIENT
Start: 2021-10-15 | End: 2021-10-15

## 2021-10-15 RX ORDER — ONDANSETRON 2 MG/ML
4 INJECTION INTRAMUSCULAR; INTRAVENOUS ONCE AS NEEDED
Status: COMPLETED | OUTPATIENT
Start: 2021-10-15 | End: 2021-10-15

## 2021-10-15 RX ADMIN — LIDOCAINE HYDROCHLORIDE 100 MG: 20 INJECTION, SOLUTION INFILTRATION; PERINEURAL at 09:28

## 2021-10-15 RX ADMIN — ROCURONIUM BROMIDE 20 MG: 10 INJECTION INTRAVENOUS at 10:14

## 2021-10-15 RX ADMIN — FAMOTIDINE 20 MG: 10 INJECTION, SOLUTION INTRAVENOUS at 08:36

## 2021-10-15 RX ADMIN — PHENYLEPHRINE HYDROCHLORIDE 200 MCG: 10 INJECTION INTRAVENOUS at 09:43

## 2021-10-15 RX ADMIN — PHENYLEPHRINE HYDROCHLORIDE 200 MCG: 10 INJECTION INTRAVENOUS at 10:45

## 2021-10-15 RX ADMIN — AMLODIPINE BESYLATE 5 MG: 5 TABLET ORAL at 20:16

## 2021-10-15 RX ADMIN — ONDANSETRON 4 MG: 2 INJECTION INTRAMUSCULAR; INTRAVENOUS at 08:36

## 2021-10-15 RX ADMIN — PHENYLEPHRINE HYDROCHLORIDE 200 MCG: 10 INJECTION INTRAVENOUS at 10:07

## 2021-10-15 RX ADMIN — PHENYLEPHRINE HYDROCHLORIDE 200 MCG: 10 INJECTION INTRAVENOUS at 10:32

## 2021-10-15 RX ADMIN — SODIUM CHLORIDE 1000 MG: 9 INJECTION, SOLUTION INTRAVENOUS at 09:39

## 2021-10-15 RX ADMIN — SODIUM CHLORIDE 750 MG: 900 INJECTION, SOLUTION INTRAVENOUS at 20:17

## 2021-10-15 RX ADMIN — PROPOFOL 150 MG: 10 INJECTION, EMULSION INTRAVENOUS at 09:31

## 2021-10-15 RX ADMIN — NEOSTIGMINE METHYLSULFATE 4 MG: 1 INJECTION INTRAVENOUS at 11:11

## 2021-10-15 RX ADMIN — MIDAZOLAM HYDROCHLORIDE 0.5 MG: 1 INJECTION, SOLUTION INTRAMUSCULAR; INTRAVENOUS at 08:41

## 2021-10-15 RX ADMIN — PHENYLEPHRINE HYDROCHLORIDE 100 MCG: 10 INJECTION INTRAVENOUS at 11:55

## 2021-10-15 RX ADMIN — BUPIVACAINE HYDROCHLORIDE 15 ML: 5 INJECTION, SOLUTION EPIDURAL; INTRACAUDAL; PERINEURAL at 09:09

## 2021-10-15 RX ADMIN — PHENYLEPHRINE HYDROCHLORIDE 200 MCG: 10 INJECTION INTRAVENOUS at 09:40

## 2021-10-15 RX ADMIN — SODIUM CHLORIDE, POTASSIUM CHLORIDE, SODIUM LACTATE AND CALCIUM CHLORIDE 9 ML/HR: 600; 310; 30; 20 INJECTION, SOLUTION INTRAVENOUS at 07:40

## 2021-10-15 RX ADMIN — PHENYLEPHRINE HYDROCHLORIDE 200 MCG: 10 INJECTION INTRAVENOUS at 09:52

## 2021-10-15 RX ADMIN — GLYCOPYRROLATE 0.6 MCG: 0.2 INJECTION INTRAMUSCULAR; INTRAVENOUS at 11:11

## 2021-10-15 RX ADMIN — ATENOLOL 50 MG: 50 TABLET ORAL at 20:17

## 2021-10-15 RX ADMIN — PHENYLEPHRINE HYDROCHLORIDE 1 MCG/KG/MIN: 10 INJECTION INTRAVENOUS at 10:52

## 2021-10-15 RX ADMIN — DEXAMETHASONE SODIUM PHOSPHATE 8 MG: 4 INJECTION, SOLUTION INTRAMUSCULAR; INTRAVENOUS at 08:38

## 2021-10-15 RX ADMIN — POTASSIUM CHLORIDE 10 MEQ: 750 TABLET, EXTENDED RELEASE ORAL at 16:27

## 2021-10-15 RX ADMIN — PREGABALIN 150 MG: 75 CAPSULE ORAL at 07:40

## 2021-10-15 RX ADMIN — SODIUM CHLORIDE 75 ML/HR: 9 INJECTION, SOLUTION INTRAVENOUS at 16:27

## 2021-10-15 RX ADMIN — SODIUM CHLORIDE 750 MG: 900 INJECTION, SOLUTION INTRAVENOUS at 08:23

## 2021-10-15 RX ADMIN — PHENYLEPHRINE HYDROCHLORIDE 100 MCG: 10 INJECTION INTRAVENOUS at 09:49

## 2021-10-15 RX ADMIN — MELOXICAM 15 MG: 7.5 TABLET ORAL at 07:40

## 2021-10-15 RX ADMIN — ACETAMINOPHEN 1000 MG: 500 TABLET, FILM COATED ORAL at 07:40

## 2021-10-15 RX ADMIN — ROCURONIUM BROMIDE 5 MG: 10 INJECTION INTRAVENOUS at 10:31

## 2021-10-15 RX ADMIN — DEXMEDETOMIDINE 10 MCG: 100 INJECTION, SOLUTION, CONCENTRATE INTRAVENOUS at 09:09

## 2021-10-15 RX ADMIN — SUCCINYLCHOLINE CHLORIDE 200 MG: 20 INJECTION, SOLUTION INTRAMUSCULAR; INTRAVENOUS at 09:31

## 2021-10-15 RX ADMIN — SODIUM CHLORIDE, POTASSIUM CHLORIDE, SODIUM LACTATE AND CALCIUM CHLORIDE 100 ML/HR: 600; 310; 30; 20 INJECTION, SOLUTION INTRAVENOUS at 14:31

## 2021-10-15 RX ADMIN — PHENYLEPHRINE HYDROCHLORIDE 200 MCG: 10 INJECTION INTRAVENOUS at 10:05

## 2021-10-15 NOTE — PLAN OF CARE
Goal Outcome Evaluation:  Plan of Care Reviewed With: patient           Outcome Summary: PT admitted to the floor after ortho surgery; pt doing well, no pain, VSS. Pts daughter will be staying overnight with the patient due to a HX of confusion after anesthia per family.

## 2021-10-15 NOTE — ANESTHESIA POSTPROCEDURE EVALUATION
Patient: Nguyen Zhang    Procedure Summary     Date: 10/15/21 Room / Location:  [facility] /  [facility]    Anesthesia Start: 0925 Anesthesia Stop: 1220    Procedure: TOTAL SHOULDER REVERSE ARTHROPLASTY (Left Shoulder) Diagnosis:       Nontraumatic complete tear of left rotator cuff      Glenohumeral arthritis, left      Biceps tendinitis of left upper extremity      (Nontraumatic complete tear of left rotator cuff [M75.122])      (Glenohumeral arthritis, left [M19.012])      (Biceps tendinitis of left upper extremity [M75.22])    Surgeons: Scout Palmer MD Provider: Maryam Diaz CRNA    Anesthesia Type: general with block ASA Status: 2          Anesthesia Type: general with block    Vitals  Vitals Value Taken Time   /60 10/15/21 1330   Temp 98.2 °F (36.8 °C) 10/15/21 1218   Pulse 62 10/15/21 1339   Resp 18 10/15/21 1320   SpO2 95 % 10/15/21 1339   Vitals shown include unvalidated device data.        Post Anesthesia Care and Evaluation    Patient location during evaluation: PACU  Patient participation: complete - patient participated  Level of consciousness: awake  Pain management: adequate  Airway patency: patent  Anesthetic complications: No anesthetic complications  PONV Status: none  Cardiovascular status: acceptable  Respiratory status: acceptable  Hydration status: acceptable

## 2021-10-15 NOTE — OP NOTE
Date of Surgery: 10/15/2021    PREOPERATIVE DIAGNOSES: Left shoulder rotator cuff arthropathy with massive rotator cuff tear.     POSTOPERATIVE DIAGNOSES: Left shoulder rotator cuff arthropathy with massive rotator cuff tear.     PROCEDURE PERFORMED: Left reverse total shoulder arthroplasty.     SURGEON: Scout Palmer MD     ASSISTANT: Walker Gutiérrez CSA-retraction, irrigation, closure, dressing application    ANESTHESIA: General endotracheal anesthesia with regional block.     ESTIMATED BLOOD LOSS:<500ml     DRAIN: None.     COMPLICATION: None.     SPECIMEN: None.     FLUID: Per Anesthesia    URINE OUTPUT: Not recorded.     IMPLANTS: Kevin reverse shoulder 10 mm anatomic humeral stem x 130 mm length with a +0 mm offset polyethylene liner,  Biomet mini comprehensive baseplate +0 lateral offset, small augment placed posterior superior, with 25 mm central screw and 3 peripheral screws measuring 15 mm each. 36 mm glenosphere with 1.5 inferior offset.     FINDINGS: Examination under anesthesia: Passive forward flexion to 140; passive external rotation to 20'; no anterior, posterior, or inferior laxity noted; no open wounds, lacerations, or abrasions over the left shoulder; 2+ radial pulse left wrist.     INDICATIONS: The patient is a pleasant 81 y.o. year-old female who had significant pain over the last several years to the left shoulder. Given the patient's persistence of pain as well as failure of improvement with conservative treatment, including but not limited to physical therapy, injections, activity modification, anti-inflammatory medications, wished to proceed with the above-mentioned procedure.     INFORMED CONSENT: Patient was explained details of the procedure, as well as risks, benefits, and alternatives as documented on the history and physical, and had all questions answered prior to signing the operative consent form. No guarantees were given in regards to results of the surgery.     DESCRIPTION OF  PROCEDURE: The patient was seen, evaluated, and cleared for surgery by Anesthesia. Was met in the preoperative holding area. Operative site was marked, consent was reviewed, history and physical was updated, and preoperative labs were reviewed. Regional block was then placed per Anesthesia and the patient was taken to the operating room and placed in a supine position on the beach chair table. After successful intubation per Anesthesia, the patient was elevated into a slightly seated position approximately 30 degrees of elevation. Head was secured with a facemask. Neck was noted to be in normal anatomic alignment. The patient was secured to the table with a waist strap and all bony prominences were well-padded. Examination under anesthesia was carried out at this point in time. The left arm was then sterilely prepped and draped in a standard fashion.     A formal timeout was completed, including confirmation of history and physical, operative consent, surgical site, patient identification number, and preoperative antibiotic administration. The procedure was then begun with a 10 cm longitudinal incision over the deltopectoral interval heading towards the coracoid proximally with incision through the skin only with a #15 blade followed by subcutaneous dissection with Metzenbaum scissors. Deltopectoral interval was identified at this point in time. Interval was opened, the cephalic vein was protected, and a retractor was placed with adhesions being bluntly dissected in the subacromial and subdeltoid spaces. Brown retractor was placed at this point in time. The three sisters vessels were ligated along the medial neck and biceps tendon was noted to be torn and absent from the bicipital groove at this point in time.   The bicipital groove was opened, identifying the lesser tuberosity where the subscapularis was noted to be partially torn. This was released from the lesser tuberosity and the proximal humerus was externally  rotated and dislocated at this time.    Inferior capsule was released from the humeral neck around to the midaxillary line and full exposure of the proximal humerus was obtained at this time. Attention was then turned to preparation of the proximal humerus with the opening reamer, progressing in stepwise fashion to a size 11 mm reamer at the bicipital groove. Once a scratch fit was noted with a 11 mm reamer, it was left in place at this point in time. Cutting guide was then positioned in 30 degrees of retroversion and confirmed to appropriately reproduce patient's native retroversion. Cutting guide was then pinned into position at this point in time, reamer was removed, and an oscillating saw was used in standard fashion to make the proximal humerus cut. Osteophytes were then removed at this point in time. The metaphyseal reamer followed by the conical reamer were then used and a size 11 trial stem was placed.     Attention was then turned to glenoid exposure with posterior and anterior glenoid retractors being placed at this point in time. The biceps tendon stump was then identified at this point in time and used to chauncey the 12-o'clock location, followed by marking of the 3, 6, and 9-o'clock locations on the glenoid face. Labrum was excised 360' from the margins of the glenoid at this time. The [employer] signature patient specific guide was placed on the glenoid at this point in time followed by placement of central wire. A one-step reamer was then used over the wire and advanced until reaming matched the preoperative patient specific plan and achieved at least 50% contact on the glenoid surface.   was placed at this point in time confirming appropriate offset for a small augment.  De-rotation peg was drilled inferiorly at this point followed by placement of augmented reamer guide with removal of central wire, placement of central screw for stabilization, and reaming over the augment reaming guide. Smooth  glenoid surface achieved at the augmented site with reaming at this time.  Augmented reaming guide and central screw removed at this point in time followed by replacement of the central wire. The augmented baseplate was then opened on the back table, placed on the impactor, and impacted into position onto the glenoid at this time with good fit noted.  Central wire was removed, depth gauge used to assess the length, and then screw of 25 mm in length placed of the central hole with good bi-cortical bite noted at this time.  4 peripheral screws were drilled in bi-cortical fashion utilizing the locking guide, measured, and screw length of 15 mm placed superior, 15 mm placed inferior, and 15 mm placed posterior with good bi-cortical bite noted.  Baseplate was then thoroughly irrigated with Betadine lavage followed by normal saline at this time.    Size 36 Glenosphere was assembled on the back table with 1.5 inferior offset.  Glenosphere was then brought to the surgical site, placed onto the baseplate, and impacted into position with a secure fit of the Tapia taper noted with no evidence of disengagement on pulling with a right angle from all sides.     Attention was then returned to the proximal humerus where the trial stem was removed.  Given very poor quality of the bone we elected to proceed with cementing the humeral stem into position and thus elected to utilize a 10 mm stem to allow for appropriate cement mantle.  Cement restrictor was placed distally. The canal was thoroughly irrigated at this point in time with Betadine lavage followed by normal saline. The final 10 mm humeral stem implant was opened on the back table.  Cement was appropriately prepared in standard fashion and then injected the humeral canal using third-generation cement technique. Humeral stem was then inserted into position in the humeral canal with appropriate version as noted with the version bars.  Cement was allowed to appropriately cured  in standard fashion and extraneous cement removed with fingers at this time. Trial liners were placed at this time, the shoulder was reduced, and noted to be stable throughout range of motion, with appropriate tension on the strap muscles. Full motion was achieved and dislocation had to be performed with 1 fingertip snugly fit in the joint space. The shoulder was then dislocated once again and trial liner was removed. Final implant was opened on the back table. Surrounding tissue was thoroughly irrigated. Then 20 mL of Exparel injection was injected in the periosteal layers on both the glenoid and humeral sides and the implant liner was impacted into position at this point and the shoulder was reduced and noted to be stable throughout range of motion once again. The joint was then thoroughly irrigated once again with normal saline, followed by a Betadine wash, followed by normal saline once again.  1 g of vancomycin powder was placed in the deep and subcutaneous tissue at this time.    Attention was then turned to closure of the wound with 0 Vicryl used for closure of the deltopectoral layer, 2-0 and 3-0 Stratafix for subcutaneous closure, followed by mesh and glue for skin closure. The wound was dressed with Telfa, 4 x 4, Tegaderm, ABD pad, and Medipore tape. The patient was placed in a sling with an abduction pillow to the operative side.     At the end of the procedure, all lap, needle, and sponge counts were correct x2. The patient had brisk capillary refill to all digits of the operative extremity. Compartments were soft and easily compressible at the end of the procedure.     DISPOSITION: The patient was extubated per Anesthesia and taken to the recovery room in stable condition. Will be admitted to the Orthopedic service for pain control and kept in a sling for 4 weeks with range of motion of the elbow, wrist, and hand initiated within the 1st week as well as gentle pendulum exercises of shoulder. Results of  the procedure were discussed immediately postoperatively with the patient's family, and they had all questions answered at that time.     Scout Palmer M.D.*

## 2021-10-15 NOTE — INTERVAL H&P NOTE
H&P reviewed. The patient was examined and there are no changes to the H&P.    Vitals:    10/15/21 0701 10/15/21 0720   BP:  166/96   BP Location:  Right arm   Patient Position:  Lying   Pulse:  67   Resp:  16   Temp: 97.9 °F (36.6 °C)    TempSrc: Oral    SpO2:  97%   Weight: 58.3 kg (128 lb 9.6 oz)

## 2021-10-15 NOTE — ANESTHESIA PREPROCEDURE EVALUATION
Anesthesia Evaluation     Patient summary reviewed and Nursing notes reviewed   history of anesthetic complications: PONV  NPO Solid Status: > 8 hours  NPO Liquid Status: > 2 hours           Airway   Mallampati: II  TM distance: >3 FB  Neck ROM: full  No difficulty expected  Dental    (+) upper dentures    Pulmonary - negative pulmonary ROS    breath sounds clear to auscultation  Cardiovascular   Exercise tolerance: good (4-7 METS)    ECG reviewed  PT is on anticoagulation therapy  Beta blocker given within 24 hours of surgery  Rhythm: regular  Rate: normal    (+) hypertension well controlled 2 medications or greater, hyperlipidemia,     ROS comment: Narrative & Impression    HEART RATE= 65  bpm  RR Interval= 920  ms  TN Interval= 184  ms  P Horizontal Axis= -11  deg  P Front Axis= 59  deg  QRSD Interval= 77  ms  QT Interval= 404  ms  QRS Axis= 51  deg  T Wave Axis= 34  deg  - ABNORMAL ECG -  Sinus rhythm  Probable left atrial enlargement  Non-specific STT wave change - slightly more prominent  Electronically Signed By: Lamont Priest ([facility]) 16-Oct-2020 08:17:03  Date and Time of Study: 2020-10-16 06:47:59    Interpretation Summary    · Left ventricular systolic function is normal. Calculated EF = 59.0%. Estimated EF was in agreement with the calculated EF. Normal left ventricular cavity size noted. All left ventricular wall segments contract normally. Left ventricular wall thickness is consistent with borderline concentric hypertrophy. Left ventricular diastolic dysfunction is noted (grade I) consistent with impaired relaxation.  · There is a small pericardial effusion anterior to the right ventricle.    Interpretation Summary    · Left ventricular ejection fraction is normal (Calculated EF = 70%).  · Myocardial perfusion imaging indicates a normal myocardial perfusion study with no evidence of ischemia.  · Impressions are consistent with a low risk study.  · Overall, the patient's exercise capacity was moderately  impaired.  · Blood pressure demonstrated a hypertensive response to stress.        Neuro/Psych  (+) dizziness/light headedness, numbness (FROM BACK PROBLEMS PT STATES SHE HAD A NERVE BLOCK PUT IN AND (pt).  LEFT ARM PAIN), psychiatric history Anxiety,     GI/Hepatic/Renal/Endo    (+)   thyroid problem hypothyroidism    Musculoskeletal     (+) neck pain, neck stiffness,   Abdominal    Substance History      OB/GYN          Other   arthritis,                      Anesthesia Plan    ASA 2     general with block     intravenous induction     Anesthetic plan, all risks, benefits, and alternatives have been provided, discussed and informed consent has been obtained with: patient.  Use of blood products discussed with patient  Consented to blood products.

## 2021-10-16 ENCOUNTER — READMISSION MANAGEMENT (OUTPATIENT)
Dept: CALL CENTER | Facility: HOSPITAL | Age: 81
End: 2021-10-16

## 2021-10-16 VITALS
OXYGEN SATURATION: 96 % | BODY MASS INDEX: 24.28 KG/M2 | SYSTOLIC BLOOD PRESSURE: 180 MMHG | TEMPERATURE: 98 F | WEIGHT: 128.6 LBS | RESPIRATION RATE: 18 BRPM | DIASTOLIC BLOOD PRESSURE: 85 MMHG | HEIGHT: 61 IN | HEART RATE: 72 BPM

## 2021-10-16 PROBLEM — M75.122 NONTRAUMATIC COMPLETE TEAR OF LEFT ROTATOR CUFF: Status: RESOLVED | Noted: 2021-09-12 | Resolved: 2021-10-16

## 2021-10-16 PROBLEM — M75.22 BICEPS TENDINITIS OF LEFT UPPER EXTREMITY: Status: RESOLVED | Noted: 2021-09-23 | Resolved: 2021-10-16

## 2021-10-16 PROBLEM — M19.012 GLENOHUMERAL ARTHRITIS, LEFT: Status: RESOLVED | Noted: 2021-09-23 | Resolved: 2021-10-16

## 2021-10-16 LAB
ANION GAP SERPL CALCULATED.3IONS-SCNC: 10 MMOL/L (ref 5–15)
BUN SERPL-MCNC: 11 MG/DL (ref 8–23)
BUN/CREAT SERPL: 14.1 (ref 7–25)
CALCIUM SPEC-SCNC: 8.9 MG/DL (ref 8.6–10.5)
CHLORIDE SERPL-SCNC: 96 MMOL/L (ref 98–107)
CO2 SERPL-SCNC: 25 MMOL/L (ref 22–29)
CREAT SERPL-MCNC: 0.78 MG/DL (ref 0.57–1)
DEPRECATED RDW RBC AUTO: 40.4 FL (ref 37–54)
ERYTHROCYTE [DISTWIDTH] IN BLOOD BY AUTOMATED COUNT: 12 % (ref 12.3–15.4)
GFR SERPL CREATININE-BSD FRML MDRD: 71 ML/MIN/1.73
GLUCOSE SERPL-MCNC: 119 MG/DL (ref 65–99)
HCT VFR BLD AUTO: 35.7 % (ref 34–46.6)
HGB BLD-MCNC: 12 G/DL (ref 12–15.9)
MCH RBC QN AUTO: 31.2 PG (ref 26.6–33)
MCHC RBC AUTO-ENTMCNC: 33.6 G/DL (ref 31.5–35.7)
MCV RBC AUTO: 92.7 FL (ref 79–97)
PLATELET # BLD AUTO: 166 10*3/MM3 (ref 140–450)
PMV BLD AUTO: 10.4 FL (ref 6–12)
POTASSIUM SERPL-SCNC: 3.7 MMOL/L (ref 3.5–5.2)
RBC # BLD AUTO: 3.85 10*6/MM3 (ref 3.77–5.28)
SODIUM SERPL-SCNC: 131 MMOL/L (ref 136–145)
WBC # BLD AUTO: 10.47 10*3/MM3 (ref 3.4–10.8)

## 2021-10-16 PROCEDURE — 99024 POSTOP FOLLOW-UP VISIT: CPT | Performed by: ORTHOPAEDIC SURGERY

## 2021-10-16 PROCEDURE — 80048 BASIC METABOLIC PNL TOTAL CA: CPT | Performed by: ORTHOPAEDIC SURGERY

## 2021-10-16 PROCEDURE — G0378 HOSPITAL OBSERVATION PER HR: HCPCS

## 2021-10-16 PROCEDURE — 97165 OT EVAL LOW COMPLEX 30 MIN: CPT

## 2021-10-16 PROCEDURE — 85027 COMPLETE CBC AUTOMATED: CPT | Performed by: ORTHOPAEDIC SURGERY

## 2021-10-16 PROCEDURE — 97161 PT EVAL LOW COMPLEX 20 MIN: CPT

## 2021-10-16 RX ORDER — HYDROCODONE BITARTRATE AND ACETAMINOPHEN 7.5; 325 MG/1; MG/1
1 TABLET ORAL EVERY 4 HOURS PRN
Qty: 42 TABLET | Refills: 0 | Status: SHIPPED | OUTPATIENT
Start: 2021-10-16 | End: 2021-10-22

## 2021-10-16 RX ORDER — ONDANSETRON 4 MG/1
4 TABLET, FILM COATED ORAL EVERY 6 HOURS PRN
Qty: 24 TABLET | Refills: 1 | Status: SHIPPED | OUTPATIENT
Start: 2021-10-16 | End: 2021-10-27

## 2021-10-16 RX ORDER — MELOXICAM 7.5 MG/1
7.5 TABLET ORAL DAILY
Qty: 30 TABLET | Refills: 3 | Status: SHIPPED | OUTPATIENT
Start: 2021-10-16

## 2021-10-16 RX ADMIN — ASPIRIN 325 MG ORAL TABLET 325 MG: 325 PILL ORAL at 08:07

## 2021-10-16 RX ADMIN — LEVOTHYROXINE SODIUM 75 MCG: 75 TABLET ORAL at 08:07

## 2021-10-16 RX ADMIN — MELOXICAM 7.5 MG: 7.5 TABLET ORAL at 08:08

## 2021-10-16 RX ADMIN — HYDROCODONE BITARTRATE AND ACETAMINOPHEN 2 TABLET: 7.5; 325 TABLET ORAL at 05:47

## 2021-10-16 RX ADMIN — LOSARTAN POTASSIUM 100 MG: 50 TABLET, FILM COATED ORAL at 08:08

## 2021-10-16 RX ADMIN — FAMOTIDINE 40 MG: 20 TABLET ORAL at 08:07

## 2021-10-16 RX ADMIN — HYDROCHLOROTHIAZIDE 25 MG: 25 TABLET ORAL at 08:07

## 2021-10-16 RX ADMIN — POTASSIUM CHLORIDE 10 MEQ: 750 TABLET, EXTENDED RELEASE ORAL at 08:08

## 2021-10-16 RX ADMIN — ATENOLOL 50 MG: 50 TABLET ORAL at 08:08

## 2021-10-16 NOTE — DISCHARGE SUMMARY
Discharge Summary    Patient name: Nguyen Zhang    Medical record number: [id number]    Admission date: 10/15/2021  Discharge date:  10/16/2021    Attending physician: Spencer    Primary care physician: Chelsie Rai MD    Referring physician:     Consulting physician(s):    Condition on discharge:     Primary Diagnoses: Massive left rotator cuff tear and arthropathy    Secondary Diagnoses:     Operative Procedure: Left total reverse shoulder    Hospital Course: The patient is a very pleasant 81 y.o. female that was admitted to the hospital with painful left shoulder secondary to a massive left rotator cuff tear that failed respond to nonoperative management.  After proper evaluation the patient taken to the operating room for left total reverse shoulder.  Postoperatively she has done well.  Postop day 1 she is alert and oriented x3 resting comfortably pain well controlled oral medication.  Patient had good distal pulses no motor or sensory deficit and dressing was dry.    Discharge medications:      Discharge Medications      New Medications      Instructions Start Date   HYDROcodone-acetaminophen 7.5-325 MG per tablet  Commonly known as: NORCO   1 tablet, Oral, Every 4 Hours PRN      meloxicam 7.5 MG tablet  Commonly known as: MOBIC   7.5 mg, Oral, Daily      ondansetron 4 MG tablet  Commonly known as: ZOFRAN   4 mg, Oral, Every 6 Hours PRN         Changes to Medications      Instructions Start Date   ALPRAZolam 0.25 MG tablet  Commonly known as: Xanax  What changed: reasons to take this   0.25 mg, Oral, Nightly PRN      atenolol 50 MG tablet  Commonly known as: TENORMIN  What changed: when to take this   TAKE 1 TABLET BY MOUTH 2 TIMES A DAY.      losartan 100 MG tablet  Commonly known as: COZAAR  What changed: when to take this   TAKE 1 TABLET BY MOUTH DAILY.         Continue These Medications      Instructions Start Date   amLODIPine 5 MG tablet  Commonly known as: NORVASC   TAKE 1 TABLET BY MOUTH EVERY  Deep from lab EVENING.      aspirin 325 MG tablet   325 mg, Oral, Daily, Takes daily w/tylenol for pain       cyclobenzaprine 5 MG tablet  Commonly known as: FLEXERIL   5 mg, Oral, 3 Times Daily PRN      DOROTEO C PO   500 mg, Oral, Daily      hydroCHLOROthiazide 25 MG tablet  Commonly known as: HYDRODIURIL   TAKE 1 TABLET BY MOUTH DAILY.      levothyroxine 75 MCG tablet  Commonly known as: SYNTHROID, LEVOTHROID   75 mcg, Oral, Daily      multivitamin-minerals tablet tablet  Generic drug: multivitamin with minerals   1 tablet, Oral, Daily      NIACIN PO   500 mg, Oral, Daily      potassium chloride 10 MEQ CR tablet   TAKE 1 TABLET BY MOUTH DAILY.      TYLENOL ARTHRITIS PAIN PO   500 mg, Oral, Daily      Vitamin D3 25 MCG (1000 UT) capsule   2 capsules, Oral, Daily      vitamin E 400 UNIT capsule   400 Units, Oral, Daily             Discharge instructions: Patient instructed to take pain medication as needed for pain.  She may remove the shoulder sling for bathing but otherwise to wear at all times.  Keep dressing dry.      Follow-up appointment: Has scheduled appointment Dr. Palmer in 2 weeks.  Answered all questions

## 2021-10-16 NOTE — PROGRESS NOTES
Orthopedic Progress Note   Chief Complaint: Status post left total reverse shoulder    Subjective     Interval History: Patient is postop day 1.  She is alert and oriented resting comfortably this morning.  Is normal sensation left upper extremity.  Pain controlled oral medication.  Dressing is dry          Objective     Vital Signs  Temp:  [97.5 °F (36.4 °C)-98.2 °F (36.8 °C)] 98 °F (36.7 °C)  Heart Rate:  [61-81] 72  Resp:  [14-20] 18  BP: (105-180)/(58-87) 180/85  Body mass index is 24.6 kg/m².    Intake/Output Summary (Last 24 hours) at 10/16/2021 0732  Last data filed at 10/16/2021 0626  Gross per 24 hour   Intake 498.33 ml   Output 2400 ml   Net -1901.67 ml     No intake/output data recorded.       Physical Exam:   General: patient awake, alert and cooperative   Cardiovascular: regular rhythm and rate   Pulm: clear to auscultation bilaterally   Abdomen: Benign.  Soft bowel sounds   Extremities: Left upper extremity has no motor or sensory deficit she has good capillary refill.  Her dressing is dry.   Neurologic: Normal mood and behavior     Results Review:     I reviewed the patient's new clinical results.      WBC No results found for: WBCS   HGB Hemoglobin   Date Value Ref Range Status   10/16/2021 12.0 12.0 - 15.9 g/dL Final      HCT Hematocrit   Date Value Ref Range Status   10/16/2021 35.7 34.0 - 46.6 % Final      Platlets No results found for: LABPLAT     PT/INR:  No results found for: PROTIME/No results found for: INR    Sodium Sodium   Date Value Ref Range Status   10/16/2021 131 (L) 136 - 145 mmol/L Final   10/13/2021 137 136 - 145 mmol/L Final      Potassium Potassium   Date Value Ref Range Status   10/16/2021 3.7 3.5 - 5.2 mmol/L Final   10/15/2021 3.2 (L) 3.5 - 5.2 mmol/L Final   10/13/2021 3.6 3.5 - 5.2 mmol/L Final      Chloride Chloride   Date Value Ref Range Status   10/16/2021 96 (L) 98 - 107 mmol/L Final   10/13/2021 93 (L) 98 - 107 mmol/L Final      Bicarbonate No results found for:  PLASMABICARB   BUN BUN   Date Value Ref Range Status   10/16/2021 11 8 - 23 mg/dL Final   10/13/2021 12 8 - 23 mg/dL Final      Creatinine Creatinine   Date Value Ref Range Status   10/16/2021 0.78 0.57 - 1.00 mg/dL Final   10/13/2021 0.76 0.57 - 1.00 mg/dL Final      Calcium Calcium   Date Value Ref Range Status   10/16/2021 8.9 8.6 - 10.5 mg/dL Final   10/13/2021 10.2 8.6 - 10.5 mg/dL Final      Magnesium  AST  ALT  Bilirubin, Total  AlkPhos  Albumin    Amylase  Lipase    Radiology: No results found for: MG  No components found for: AST.*  No components found for: ALT.*  No results found for: BILIRUBIN    No components found for: ALKPHOS.*  No components found for: ALBUMIN.*      No components found for: AMYLASE.*  No components found for: LIPASE.*            Imaging Results (Most Recent)     Procedure Component Value Units Date/Time    XR Shoulder 1 View Left [[id number]] Collected: 10/15/21 1300     Updated: 10/15/21 1303    Narrative:      XR SHOULDER 1 VW LEFT-: 10/15/2021 12:35 PM     INDICATION:   Status post left shoulder replacement.     COMPARISON:   08/25/2021.     FINDINGS:   A single AP view of the shoulder was obtained. There is a new left  shoulder prosthesis. There is no fracture or dislocation..     This report was finalized on 10/15/2021 1:01 PM by Dr. Waqar Alvares MD.       US Sonosite Portable [[id number]] Resulted: 10/15/21 0705     Updated: 10/15/21 0705    Narrative:      This procedure was auto-finalized with no dictation required.             lactated ringers, 100 mL/hr, Last Rate: Stopped (10/15/21 1630)  sodium chloride, 75 mL/hr, Last Rate: 75 mL/hr (10/16/21 0523)          Assessment/Plan     Patient Active Problem List   Diagnosis Code   • Precordial chest pain R07.2   • PVCs (premature ventricular contractions) I49.3   • Essential hypertension I10   • Acquired hypothyroidism E03.9   • Low serum potassium level E87.6   • Other hyperlipidemia E78.49   • Acute pain of left shoulder M25.512    • Osteoarthritis of multiple joints M15.9   • Nontraumatic complete tear of left rotator cuff M75.122   • Glenohumeral arthritis, left M19.012   • Biceps tendinitis of left upper extremity M75.22       Discharge home this morning with pain medication.  Follow-up in the office with Dr. Palmer per his instructions.  Answered all patient's questions      Sulaiman Jarrett MD  10/16/21  07:32 EDT          Dictated utilizing Dragon dictation

## 2021-10-16 NOTE — PLAN OF CARE
Goal Outcome Evaluation:  Plan of Care Reviewed With: patient           Outcome Summary: OT evaluation completed. Patient reports she has had R shoulder surgery in the past and is familar with exercises and one hand ADL techniques. Patient performed supine to sit modified independent and sit to stand and functional mobility with straight cane and CGA. Educated patient on ADL management and finger/wrist exercises.Patient would  benefit from OT home health at discharge.

## 2021-10-16 NOTE — PLAN OF CARE
Goal Outcome Evaluation:  Plan of Care Reviewed With: patient           Outcome Summary: Patient VSS. Patient denied pain this shift. Sling to arm in place. Family at patient bedside. Resting quietly in bed at this time.

## 2021-10-16 NOTE — CASE MANAGEMENT/SOCIAL WORK
Continued Stay Note  JONATHAN Adler     Patient Name: Nguyen Zhang  MRN: [id number]  Today's Date: 10/16/2021    Admit Date: 10/15/2021     Discharge Plan     Row Name 10/16/21 1252       Plan    Final Discharge Disposition Code 01 - home or self-care    Final Note D/C home    Row Name 10/16/21 1022       Plan    Plan Comments Received call [facility] PT Dulce Maria that patient could benefit from home health for PT and OT.  Called [facility] and left voicemail for referral.  Will continue to follow               Discharge Codes    No documentation.               Expected Discharge Date and Time     Expected Discharge Date Expected Discharge Time    Oct 16, 2021             Corrie Marina RN

## 2021-10-16 NOTE — CASE MANAGEMENT/SOCIAL WORK
Continued Stay Note  JONATHAN Adler     Patient Name: Nguyen Zhang  MRN: [id number]  Today's Date: 10/16/2021    Admit Date: 10/15/2021     Discharge Plan     Row Name 10/16/21 1342       Plan    Plan Comments [facility] cannot take patient due to staffing.    Row Name 10/16/21 1252       Plan    Final Discharge Disposition Code 01 - home or self-care    Final Note D/C home               Discharge Codes    No documentation.               Expected Discharge Date and Time     Expected Discharge Date Expected Discharge Time    Oct 16, 2021             Corrie Marina RN

## 2021-10-16 NOTE — CASE MANAGEMENT/SOCIAL WORK
Continued Stay Note  JONATHAN Adler     Patient Name: Nguyen Zhang  MRN: [id number]  Today's Date: 10/16/2021    Admit Date: 10/15/2021     Discharge Plan     Row Name 10/16/21 1022       Plan    Plan Comments Received call from PT Dulce Maria that patient could benefit from home health for PT and OT.  Called [facility] and left voicemail for referral.  Will continue to follow               Discharge Codes    No documentation.               Expected Discharge Date and Time     Expected Discharge Date Expected Discharge Time    Oct 16, 2021             Corrie Marina RN

## 2021-10-16 NOTE — THERAPY EVALUATION
Patient Name: Nguyen Zhang  : 1940                   OT Evaluation  MRN: [id number]                              Today's Date: 10/16/2021       Admit Date: 10/15/2021    Visit Dx:     ICD-10-CM ICD-9-CM   1. S/P reverse total shoulder arthroplasty, left  Z96.612 V43.61   2. Nontraumatic complete tear of left rotator cuff  M75.122 727.61   3. Glenohumeral arthritis, left  M19.012 716.91   4. Biceps tendinitis of left upper extremity  M75.22 726.12     Patient Active Problem List   Diagnosis   • Precordial chest pain   • PVCs (premature ventricular contractions)   • Essential hypertension   • Acquired hypothyroidism   • Low serum potassium level   • Other hyperlipidemia   • Acute pain of left shoulder   • Osteoarthritis of multiple joints     Past Medical History:   Diagnosis Date   • Arthritis    • Hyperlipidemia    • Hypertension    • Hypothyroid    • Neck pain    • PONV (postoperative nausea and vomiting)    • Shoulder pain, left     sched total shoulder   • Spinal headache      Past Surgical History:   Procedure Laterality Date   • COLONOSCOPY     • HYSTERECTOMY     • REPLACEMENT TOTAL KNEE Right    • SHOULDER SURGERY Right     tendon repair   • TONSILLECTOMY        General Information     Row Name 10/16/21 0947          OT Time and Intention    Document Type discharge evaluation/summary  -EN     Mode of Treatment occupational therapy  -EN     Row Name 10/16/21 0947          General Information    Patient Profile Reviewed yes  -EN     Prior Level of Function independent:  Patient lives with spouse and reports she uses a cane at baseline for mobility. Patient reports she had surgery on her L shoulder several years ago and is familar with exercises.Reports she has pull on pants and slip on shoes and assist from spouse(adls)  -EN     Existing Precautions/Restrictions fall; non-weight bearing; other (see comments)  NWB L UE, abduction sling  -EN     Barriers to Rehab --  Pt reports chronic  "\"lightheadnedness when I stand up and take off walking\". Ed. on slowly standing and letting it resolve before walking.  -EN     Row Name 10/16/21 0947          Living Environment    Lives With spouse  -EN     Row Name 10/16/21 0947          Cognition    Orientation Status (Cognition) oriented x 4  -EN           User Key  (r) = Recorded By, (t) = Taken By, (c) = Cosigned By    Initials Name Provider Type    EN Monique Rutherford OTR Occupational Therapist                 Mobility/ADL's     Row Name 10/16/21 0952          Bed Mobility    Bed Mobility supine-sit  -EN     Supine-Sit [place] (Bed Mobility) modified independence  -EN     Assistive Device (Bed Mobility) head of bed elevated  -EN     Row Name 10/16/21 0952          Transfers    Transfers sit-stand transfer  -EN     Sit-Stand [place] (Transfers) contact guard  -EN     Row Name 10/16/21 0952          Sit-Stand Transfer    Assistive Device (Sit-Stand Transfers) cane, straight  -EN     Row Name 10/16/21 0952          Functional Mobility    Functional Mobility- Ind. Level contact guard assist  -EN     Functional Mobility- Device straight cane  -EN     Functional Mobility-Distance (Feet) 144  -EN     Row Name 10/16/21 0952          Activities of Daily Living    BADL Assessment/Intervention --  Educated on one handed bathing and dressing. Reports she has large shirts, elastic pants, slip on shoes and assist from spouse as needed.  -EN     Row Name 10/16/21 0952          Mobility    Extremity Weight-bearing Status Left upper extremity  -EN     Right Upper Extremity (Weight-bearing Status) non weight-bearing (NWB)  -EN           User Key  (r) = Recorded By, (t) = Taken By, (c) = Cosigned By    Initials Name Provider Type    EN Monique Rutherford OTR Occupational Therapist               Obj/Interventions     Row Name 10/16/21 0954          Range of Motion Comprehensive    Comment, General Range of Motion L hand and wrist WFL, R UE WFL (right hand " dominant)  -EN     [facility] Name 10/16/21 0954          Strength Comprehensive (MMT)    Comment, General Manual Muscle Testing (MMT) Assessment R UE wfl, L deferred  -[facility] Name 10/16/21 0954          Therapeutic Exercise    Therapeutic Exercise other (see comments)  ed on finger/wrist ROM  -EN           User Key  (r) = Recorded By, (t) = Taken By, (c) = Cosigned By    Initials Name Provider Type    Monique Hare OTR Occupational Therapist               Goals/Plan    No documentation.                Clinical Impression     Row Name 10/16/21 0956          Pain Assessment    Additional Documentation Pain Scale: Numbers Pre/Post-Treatment (Group)  -EN     [facility] Name 10/16/21 0956          Pain Scale: Numbers Pre/Post-Treatment    Pretreatment Pain Rating 0/10 - no pain  -EN     Posttreatment Pain Rating 0/10 - no pain  -EN     [facility] Name 10/16/21 0956          Plan of Care Review    Plan of Care Reviewed With patient  -EN     Outcome Summary OT evaluation completed. Patient reports she has had R shoulder surgery in the past and is familar with exercises and one hand ADL techniques. Patient performed supine to sit modified independent and sit to stand and functional mobility with straight cane and CGA. Educated patient on ADL management and finger/wrist exercises.Patient would  benefit from OT home health at discharge.  -[facility] Name 10/16/21 0956          Therapy Assessment/Plan (OT)    Therapy Frequency (OT) evaluation only  pt discharging home today  -[facility] Name 10/16/21 0956          Therapy Plan Review/Discharge Plan (OT)    Equipment Needs Upon Discharge (OT) --  pt has a straight cane  -EN     [facility] Name 10/16/21 0956          Positioning and Restraints    Pre-Treatment Position in bed  -EN     Post Treatment Position chair  -EN     In Chair call light within reach; encouraged to call for assist  -EN           User Key  (r) = Recorded By, (t) = Taken By, (c) = Cosigned By    Initials Name Provider  Type    EN Monique Rutherford OTR Occupational Therapist               Outcome Measures     Row Name 10/16/21 1003          How much help from another is currently needed...    Putting on and taking off regular lower body clothing? 3  -EN     Bathing (including washing, rinsing, and drying) 3  -EN     Toileting (which includes using toilet bed pan or urinal) 3  -EN     Putting on and taking off regular upper body clothing 3  -EN     Taking care of personal grooming (such as brushing teeth) 3  -EN     Eating meals 4  -EN     AM-PAC 6 Clicks Score (OT) 19  -EN     Row Name 10/16/21 1003          Functional Assessment    Outcome Measure Options AM-PAC 6 Clicks Daily Activity (OT)  -EN           User Key  (r) = Recorded By, (t) = Taken By, (c) = Cosigned By    Initials Name Provider Type    EN Monique Rutherford OTR Occupational Therapist                Occupational Therapy Education                 Title: PT OT SLP Therapies (Resolved)     Topic: Occupational Therapy (Resolved)     Point: ADL training (Resolved)     Description:   Instruct learner(s) on proper safety adaptation and remediation techniques during self care or transfers.   Instruct in proper use of assistive devices.              Learning Progress Summary           Patient Acceptance, E, VU by WESLEY at 10/16/2021 1004    Comment: HEP, ADL retraining                   Point: Home exercise program (Resolved)     Description:   Instruct learner(s) on appropriate technique for monitoring, assisting and/or progressing therapeutic exercises/activities.              Learning Progress Summary           Patient Acceptance, E, VU by WESLEY at 10/16/2021 1004    Comment: HEP, ADL retraining                               User Key     Initials Effective Dates Name Provider Type Discipline    EN 06/16/21 -  Monique Rutherford OTR Occupational Therapist OT              OT Recommendation and Plan  Therapy Frequency (OT): evaluation only (pt discharging home  today)  Plan of Care Review  Plan of Care Reviewed With: patient  Outcome Summary: OT evaluation completed. Patient reports she has had R shoulder surgery in the past and is familar with exercises and one hand ADL techniques. Patient performed supine to sit modified independent and sit to stand and functional mobility with straight cane and CGA. Educated patient on ADL management and finger/wrist exercises.Patient would  benefit from OT home health at discharge.     Time Calculation:    Time Calculation- OT     Row Name 10/16/21 1006             Time Calculation- OT    OT Start Time 0913  -EN      OT Stop Time 0924  -EN      OT Time Calculation (min) 11 min  -EN            User Key  (r) = Recorded By, (t) = Taken By, (c) = Cosigned By    Initials Name Provider Type    Monique Hare OTR Occupational Therapist              Therapy Charges for Today     Code Description Service Date Service Provider Modifiers Qty    [id number]  OT EVAL LOW COMPLEXITY 1 10/16/2021 Monique Rutherford OTR GO 1               LD Perdomo  10/16/2021

## 2021-10-16 NOTE — THERAPY DISCHARGE NOTE
Patient Name: Nguyen Zhang  : 1940    MRN: [id number]                              Today's Date: 10/16/2021       Admit Date: 10/15/2021    Visit Dx:     ICD-10-CM ICD-9-CM   1. S/P reverse total shoulder arthroplasty, left  Z96.612 V43.61   2. Nontraumatic complete tear of left rotator cuff  M75.122 727.61   3. Glenohumeral arthritis, left  M19.012 716.91   4. Biceps tendinitis of left upper extremity  M75.22 726.12     Patient Active Problem List   Diagnosis   • Precordial chest pain   • PVCs (premature ventricular contractions)   • Essential hypertension   • Acquired hypothyroidism   • Low serum potassium level   • Other hyperlipidemia   • Acute pain of left shoulder   • Osteoarthritis of multiple joints     Past Medical History:   Diagnosis Date   • Arthritis    • Hyperlipidemia    • Hypertension    • Hypothyroid    • Neck pain    • PONV (postoperative nausea and vomiting)    • Shoulder pain, left     sched total shoulder   • Spinal headache      Past Surgical History:   Procedure Laterality Date   • COLONOSCOPY     • HYSTERECTOMY     • REPLACEMENT TOTAL KNEE Right    • SHOULDER SURGERY Right     tendon repair   • TONSILLECTOMY        General Information     Row Name 10/16/21 1006          Physical Therapy Time and Intention    Document Type discharge evaluation/summary  -BP     Mode of Treatment physical therapy  -BP     Row Name 10/16/21 1006          General Information    Patient Profile Reviewed yes  Patient admitted s/p L TSA. Patient reports she had her right shoulder replaced 7 years ago  -BP     Prior Level of Function independent:; all household mobility; gait; transfer; ADL's; bed mobility  Patient reports independence with mobility and ADL's at baseline without use of an AD.  -BP     Existing Precautions/Restrictions fall; non-weight bearing; other (see comments)  R shoulder  -BP     Barriers to Rehab --  Patient with chronic lightheadedness following transfers. Patient educated on rest  time between transfers prior to initiating gait.  -BP     Row Name 10/16/21 1006          Living Environment    Lives With spouse  -BP     Row Name 10/16/21 1006          Home Main Entrance    Number of Stairs, Main Entrance none  -BP     Row Name 10/16/21 1006          Stairs Within Home, Primary    Stairs, Within Home, Primary one story  -BP     Row Name 10/16/21 1006          Cognition    Orientation Status (Cognition) oriented x 4  -BP     Row Name 10/16/21 1006          Safety Issues, Functional Mobility    Impairments Affecting Function (Mobility) balance  -BP     Comment, Safety Issues/Impairments (Mobility) Patient with chronic balance deficits, she is aware and compensates well.  -BP           User Key  (r) = Recorded By, (t) = Taken By, (c) = Cosigned By    Initials Name Provider Type    BP Bethany Lopez, PT Physical Therapist               Mobility     Row Name 10/16/21 1009          Bed Mobility    Bed Mobility supine-sit  -BP     Supine-Sit [place] (Bed Mobility) modified independence  -BP     Assistive Device (Bed Mobility) head of bed elevated  -BP     Row Name 10/16/21 1009          Transfers    Comment (Transfers) verbal cues for hand placement  -BP     Row Name 10/16/21 1009          Sit-Stand Transfer    Sit-Stand [place] (Transfers) contact guard  -BP     Assistive Device (Sit-Stand Transfers) cane, straight  -BP     Row Name 10/16/21 1009          Gait/Stairs (Locomotion)    [place] Level (Gait) contact guard  -BP     Assistive Device (Gait) cane, straight  -BP     Distance in Feet (Gait) 144  -BP     Deviations/Abnormal Patterns (Gait) base of support, narrow; rachael decreased; stride length decreased  -BP     Bilateral Gait Deviations forward flexed posture  -BP     Comment (Gait/Stairs) Patient initially demonstrates shuffled gait with posterior lean, following approximately 20 feet, gait speed and stride length improved. No loss of balance noted.  -BP     Row Name  10/16/21 1009          Mobility    Right Upper Extremity (Weight-bearing Status) non weight-bearing (NWB)  -           User Key  (r) = Recorded By, (t) = Taken By, (c) = Cosigned By    Initials Name Provider Type    Bethany Wheatley, PT Physical Therapist               Obj/Interventions     Row Name 10/16/21 1011          Range of Motion Comprehensive    Comment, General Range of Motion B LE AROM WFL.  -     Row Name 10/16/21 1011          Strength Comprehensive (MMT)    Comment, General Manual Muscle Testing (MMT) Assessment B LE strength functional  -     Row Name 10/16/21 1011          Balance    Comment, Balance independent sitting balance. SBA for static standing  -     Row Name 10/16/21 1011          Sensory Assessment (Somatosensory)    Sensory Assessment (Somatosensory) --  Patient denies numbness and tingling B LE's  -BP           User Key  (r) = Recorded By, (t) = Taken By, (c) = Cosigned By    Initials Name Provider Type    Bethany Wheatley, PT Physical Therapist               Goals/Plan    No documentation.                Clinical Impression     [facility] Name 10/16/21 1012          Pain    Additional Documentation Pain Scale: Numbers Pre/Post-Treatment (Group)  -BP     Row Name 10/16/21 1012          Pain Scale: Numbers Pre/Post-Treatment    Pretreatment Pain Rating 0/10 - no pain  -BP     Posttreatment Pain Rating 0/10 - no pain  -BP     Row Name 10/16/21 1012          Plan of Care Review    Plan of Care Reviewed With patient  -BP     Outcome Summary PT Evaluation Complete: patient performs supine to sit transfer with modified independence, sit to/from stand transfers with SBA and gait x 144 feet with CGA and use of straight cane. Patient initailly unsteady with short stride length and shuffled pattern. Following approximately 20 feet, gait speed and mechanics improved. Patient to be discharged home today. Recommend home health PT for mobility upon return home. Patient agreeable. Discussed  with . No further inpatient PT recommendations or needs at this time.  -BP     Row Name 10/16/21 1012          Therapy Assessment/Plan (PT)    Criteria for Skilled Interventions Met (PT) no problems identified which require skilled intervention  -BP     Row Name 10/16/21 1012          Positioning and Restraints    Pre-Treatment Position in bed  -BP     Post Treatment Position chair  -BP     In Chair call light within reach; reclined; encouraged to call for assist; with family/caregiver  -BP           User Key  (r) = Recorded By, (t) = Taken By, (c) = Cosigned By    Initials Name Provider Type    Bethany Wheatley, PT Physical Therapist               Outcome Measures     Row Name 10/16/21 1015          How much help from another person do you currently need...    Turning from your back to your side while in flat bed without using bedrails? 4  -BP     Moving from lying on back to sitting on the side of a flat bed without bedrails? 4  -BP     Moving to and from a bed to a chair (including a wheelchair)? 3  -BP     Standing up from a chair using your arms (e.g., wheelchair, bedside chair)? 3  -BP     Climbing 3-5 steps with a railing? 3  -BP     To walk in hospital room? 3  -BP     AM-PAC 6 Clicks Score (PT) 20  -BP     Row Name 10/16/21 1015 10/16/21 1003       Functional Assessment    Outcome Measure Options AM-PAC 6 Clicks Basic Mobility (PT)  -BP AM-PAC 6 Clicks Daily Activity (OT)  -EN          User Key  (r) = Recorded By, (t) = Taken By, (c) = Cosigned By    Initials Name Provider Type    Monique Hare OTMACARIO Occupational Therapist    Bethany Wheatley, PT Physical Therapist                PT Recommendation and Plan     Plan of Care Reviewed With: patient  Outcome Summary: PT Evaluation Complete: patient performs supine to sit transfer with modified independence, sit to/from stand transfers with SBA and gait x 144 feet with CGA and use of straight cane. Patient initailly unsteady with  short stride length and shuffled pattern. Following approximately 20 feet, gait speed and mechanics improved. Patient to be discharged home today. Recommend home health PT for mobility upon return home. Patient agreeable. Discussed with . No further inpatient PT recommendations or needs at this time.     Time Calculation:    PT Charges     Row Name 10/16/21 1016             Time Calculation    Start Time 0914  -BP      Stop Time 0923  -BP      Time Calculation (min) 9 min  -BP      PT Received On 10/16/21  -BP            User Key  (r) = Recorded By, (t) = Taken By, (c) = Cosigned By    Initials Name Provider Type    Bethany Wheatley, TY Physical Therapist              Therapy Charges for Today     Code Description Service Date Service Provider Modifiers Qty    53536321214 HC PT EVAL LOW COMPLEXITY 1 10/16/2021 Bethany Lopez PT GP 1          PT G-Codes  Outcome Measure Options: AM-PAC 6 Clicks Basic Mobility (PT)  AM-PAC 6 Clicks Score (PT): 20  AM-PAC 6 Clicks Score (OT): 19    PT Discharge Summary  Anticipated Discharge Disposition (PT): home with home health  Reason for Discharge: Discharge from facility    Bethany Lopez PT  10/16/2021

## 2021-10-16 NOTE — PLAN OF CARE
Goal Outcome Evaluation:  Plan of Care Reviewed With: patient           Outcome Summary: PT Evaluation Complete: patient performs supine to sit transfer with modified independence, sit to/from stand transfers with SBA and gait x 144 feet with CGA and use of straight cane. Patient initailly unsteady with short stride length and shuffled pattern. Following approximately 20 feet, gait speed and mechanics improved. Patient to be discharged home today. Recommend home health PT for mobility upon return home. Patient agreeable. Discussed with . No further inpatient PT recommendations or needs at this time.

## 2021-10-16 NOTE — OUTREACH NOTE
Prep Survey      Responses   [facility] patient discharged from? [place]   Is LACE score < 7 ? Yes   Emergency Room discharge w/ pulse ox? No   Eligibility [facility]   Date of Admission 10/15/21   Date of Discharge 10/16/21   Discharge Disposition Home or Self Care   Discharge diagnosis Left total reverse shoulder   Does the patient have one of the following disease processes/diagnoses(primary or secondary)? Total Joint Replacement   Does the patient have Home health ordered? Yes   What is the Home health agency?   [facility]    Is there a DME ordered? No   Prep survey completed? Yes          Lydia Newman RN

## 2021-10-18 ENCOUNTER — TRANSITIONAL CARE MANAGEMENT TELEPHONE ENCOUNTER (OUTPATIENT)
Dept: CALL CENTER | Facility: HOSPITAL | Age: 81
End: 2021-10-18

## 2021-10-18 NOTE — OUTREACH NOTE
Case Management Call Center Follow-up      Responses   [facility] Call Center Tracking Reason? No Home Health   Has the Call Center Case Management Follow-up issue been resolved? No   Follow-up Comments I put in referrals for other  companies. I am awaiting their decision .          Shannon Epley, RN    10/18/2021, 16:19 EDT

## 2021-10-18 NOTE — DISCHARGE PLACEMENT REQUEST
"Nguyen Zhang (81 y.o. Female)             Date of Birth Social Security Number Address Home Phone MRN    1940  [address] [place] [id number] [phone] [id number]    Latter-day Marital Status             Sabianism        Admission Date Admission Type Admitting Provider Attending Provider Department, Room/Bed    10/15/21 Elective Scout Palmer MD  [facility] MED SURG, 1403/1    Discharge Date Discharge Disposition Discharge Destination          10/16/2021 Home or Self Care              Attending Provider: (none)   Allergies: Ace Inhibitors, Codeine    Isolation: None   Infection: None   Code Status: Prior   Advance Care Planning Activity    Ht: 154 cm (60.63\")   Wt: 58.3 kg (128 lb 9.6 oz)    Admission Cmt: None   Principal Problem: None                Active Insurance as of 10/15/2021     Primary Coverage     Payor Plan Insurance Group Employer/Plan Group    ANTHEM MEDICARE REPLACEMENT ANTH MEDICARE ADVANTAGE [id number]     Payor Plan Address Payor Plan Phone Number Payor Plan Fax Number Effective Dates    [address] [phone]  1/1/2017 - None Entered    [facility] [address]       Subscriber Name Subscriber Birth Date Member ID       NGUYEN ZAHNG 1940 [id number]                 Emergency Contacts      (Rel.) Home Phone Work Phone Mobile Phone    Gordon Zhang (Spouse) -- -- [phone]    AníbalAngelique (Daughter) -- -- [phone]              "

## 2021-10-18 NOTE — OUTREACH NOTE
Call Center TCM Note      Responses   [facility] patient discharged from? [place]   Does the patient have one of the following disease processes/diagnoses(primary or secondary)? Total Joint Replacement   Joint surgery performed? Shoulder   TCM attempt successful? Yes   Call start time 1400   Call end time 1409   Has the patient been back in either the hospital or Emergency Department since discharge? No   Discharge diagnosis Left total reverse shoulder   Is patient permission given to speak with other caregiver? No   Does the patient have all medications related to this admission filled (includes all antibiotics, pain medications, etc.) Yes   Is the patient taking all medications as directed (includes completed medication regime)? Yes   Is the patient able to teach back alternate methods of pain control? Ice,  Shoulder-elevate above heart/ keep in sling as advised   Does the patient have a follow up appointment with their surgeon? Yes  [10/28/21]   Has the patient kept scheduled appointments due by today? N/A   Comments PCP Chelsie Ria MD. Patient has previously scheduled appt on 11/3/21 that she wishes to keep. Declines earlier appt.    What is the Home health agency?   [facility],  discharge planning note from hospital reports [facility] cannot take due to staffing.    Has home health visited the patient within 72 hours of discharge? Call prior to 72 hours   Home health comments Message routed to hospital case management.    DME comments LUE in sling as advised.    Psychosocial issues? No   Has the patient began therapy sessions (either in the home or as an out patient)? No   Does the patient have a wound vac in place? N/A   Has the patient fallen since discharge? No   Notified Case Management Home Health   Did the patient receive a copy of their discharge instructions? Yes   Nursing interventions Reviewed instructions with patient   What is the patient's perception of their functional status since  discharge? Improving   Is the patient able to teach back signs and symptoms of infection? Blisters around incision,  Temp >100.4 for 24h or longer,  Incisional drainage,  Increased swelling or redness around incision (not associated with surgical edema)   Is the patient able to teach back how to prevent infection? Check incision daily   Is the patient able to teach back home safety measures? Modifications to reach items,  Modifications with ADLs such as dressing, cooking, toileting   Did the patient implement home safety suggestions from pre-surgery classes if attended? Yes   If the patient is a current smoker, are they able to teach back resources for cessation? Not a smoker   Is the patient/caregiver able to teach back the hierarchy of who to call/visit for symptoms/problems? PCP, Specialist, Home health nurse, Urgent Care, ED, 911 Yes   TCM call completed? Yes          Corrie Tamayo RN    10/18/2021, 14:09 EDT

## 2021-10-19 NOTE — DISCHARGE PLACEMENT REQUEST
"Nguyen Zhang (81 y.o. Female)             Date of Birth Social Security Number Address Home Phone MRN    1940  [address] [place] [id number] [phone] [id number]    [employer]vent Marital Status             Amish        Admission Date Admission Type Admitting Provider Attending Provider Department, Room/Bed    10/15/21 Elective Scout Palmer MD  [facility] MED SURG, 1403/1    Discharge Date Discharge Disposition Discharge Destination          10/16/2021 Home or Self Care              Attending Provider: (none)   Allergies: Ace Inhibitors, Codeine    Isolation: None   Infection: None   Code Status: Prior   Advance Care Planning Activity    Ht: 154 cm (60.63\")   Wt: 58.3 kg (128 lb 9.6 oz)    Admission Cmt: None   Principal Problem: None                Active Insurance as of 10/15/2021     Primary Coverage     Payor Plan Insurance Group Employer/Plan Group    ANTHEM MEDICARE REPLACEMENT ANTH MEDICARE ADVANTAGE [id number]     Payor Plan Address Payor Plan Phone Number Payor Plan Fax Number Effective Dates    [address] [phone]  1/1/2017 - None Entered    [facility] [address]       Subscriber Name Subscriber Birth Date Member ID       NGUYEN ZHANG 1940 [id number]                 Emergency Contacts      (Rel.) Home Phone Work Phone Mobile Phone    Gordon Zhang (Spouse) -- -- [phone]    AníbalAngelique (Daughter) -- -- [phone]          "

## 2021-10-19 NOTE — DISCHARGE PLACEMENT REQUEST
"Nguyen Zhang (81 y.o. Female)             Date of Birth Social Security Number Address Home Phone MRN    1940  [address] [place] [id number] [phone] [id number]    Buddhism Marital Status             Presybeterian        Admission Date Admission Type Admitting Provider Attending Provider Department, Room/Bed    10/15/21 Elective Scout Palmer MD  [facility] MED SURG, 1403/1    Discharge Date Discharge Disposition Discharge Destination          10/16/2021 Home or Self Care              Attending Provider: (none)   Allergies: Ace Inhibitors, Codeine    Isolation: None   Infection: None   Code Status: Prior   Advance Care Planning Activity    Ht: 154 cm (60.63\")   Wt: 58.3 kg (128 lb 9.6 oz)    Admission Cmt: None   Principal Problem: None                Active Insurance as of 10/15/2021     Primary Coverage     Payor Plan Insurance Group Employer/Plan Group    ANTHEM MEDICARE REPLACEMENT ANTH MEDICARE ADVANTAGE [id number]     Payor Plan Address Payor Plan Phone Number Payor Plan Fax Number Effective Dates    [address] [phone]  1/1/2017 - None Entered    [facility] [address]       Subscriber Name Subscriber Birth Date Member ID       NGUYEN ZHANG 1940 [id number]                 Emergency Contacts      (Rel.) Home Phone Work Phone Mobile Phone    Gordon Zhang (Spouse) -- -- [phone]    AníbalAngelique (Daughter) -- -- [phone]        "

## 2021-10-20 NOTE — OUTREACH NOTE
Case Management Call Center Follow-up      Responses   [facility] Call Center Tracking Reason? No Home Health   Has the Call Center Case Management Follow-up issue been resolved? Yes   Follow-up Comments There is no  companie that can take her at this time due to staffing or insurance.  I called the patient to explain and she stated she did not think she needed them anyway.  She gets her cast off in 2 weeks and has outpatient therapy already set up at that time.  I will sign off.          Shannon Epley, RN    10/20/2021, 11:01 EDT

## 2021-10-27 ENCOUNTER — OFFICE VISIT (OUTPATIENT)
Dept: ORTHOPEDIC SURGERY | Facility: CLINIC | Age: 81
End: 2021-10-27

## 2021-10-27 VITALS — BODY MASS INDEX: 24.17 KG/M2 | WEIGHT: 128 LBS | HEIGHT: 61 IN

## 2021-10-27 DIAGNOSIS — Z96.612 S/P REVERSE TOTAL SHOULDER ARTHROPLASTY, LEFT: Primary | ICD-10-CM

## 2021-10-27 PROCEDURE — 99024 POSTOP FOLLOW-UP VISIT: CPT | Performed by: ORTHOPAEDIC SURGERY

## 2021-10-27 NOTE — PROGRESS NOTES
Name: Nguyen Zhang  MRN: [id number]  Diagnosis: s/p left reverse TSA-10/15/2021  Interval History: Nguyen Zhang returns for her 2 week postoperative visit.  She is doing well. Pain is controlled with pain medication and is  improving. She denies any wound problem, fevers, or chills.    Physical Examination: Her left shoulder  was examined out of her dressing. Incision is clean, dry and intact.  There is no active drainage, erythema, or evidence of infection. Distal  motor and sensory exam is grossly intact. Hand is well perfused.    Assessment/Plan:  Nguyen Zhang is recovering from surgery as expected. We will send to therapy for work on ROM per protocol. Discontinue sling at 4 weeks postop. Starting PT on Tuesday. She is to follow up in clinic in 4 weeks with xrays.  Scout Palmer MD

## 2021-11-02 DIAGNOSIS — Z96.612 S/P REVERSE TOTAL SHOULDER ARTHROPLASTY, LEFT: Primary | ICD-10-CM

## 2021-11-03 ENCOUNTER — OFFICE VISIT (OUTPATIENT)
Dept: FAMILY MEDICINE CLINIC | Facility: CLINIC | Age: 81
End: 2021-11-03

## 2021-11-03 VITALS
DIASTOLIC BLOOD PRESSURE: 74 MMHG | HEART RATE: 69 BPM | TEMPERATURE: 97.1 F | OXYGEN SATURATION: 98 % | BODY MASS INDEX: 24.17 KG/M2 | HEIGHT: 61 IN | WEIGHT: 128 LBS | SYSTOLIC BLOOD PRESSURE: 122 MMHG

## 2021-11-03 DIAGNOSIS — I10 ESSENTIAL HYPERTENSION: ICD-10-CM

## 2021-11-03 DIAGNOSIS — M15.9 PRIMARY OSTEOARTHRITIS INVOLVING MULTIPLE JOINTS: ICD-10-CM

## 2021-11-03 DIAGNOSIS — E78.49 OTHER HYPERLIPIDEMIA: ICD-10-CM

## 2021-11-03 DIAGNOSIS — E87.6 LOW SERUM POTASSIUM LEVEL: ICD-10-CM

## 2021-11-03 DIAGNOSIS — E03.9 ACQUIRED HYPOTHYROIDISM: ICD-10-CM

## 2021-11-03 DIAGNOSIS — Z23 FLU VACCINE NEED: Primary | ICD-10-CM

## 2021-11-03 PROCEDURE — G0008 ADMIN INFLUENZA VIRUS VAC: HCPCS | Performed by: INTERNAL MEDICINE

## 2021-11-03 PROCEDURE — 90662 IIV NO PRSV INCREASED AG IM: CPT | Performed by: INTERNAL MEDICINE

## 2021-11-03 PROCEDURE — 99214 OFFICE O/P EST MOD 30 MIN: CPT | Performed by: INTERNAL MEDICINE

## 2021-11-03 NOTE — PROGRESS NOTES
Subjective   Nguyen Zhang is a 81 y.o. female.     Chief Complaint   Patient presents with   • 4 WK F/U   Hypertension, hyper lipidemia, hypothyroidism.  DJD.    History of Present Illness   Patient here follow-up for hypertension, hyperlipidemia, hypothyroidism, DJD.  Just had left shoulder surgery.  She is recouping well.  No chest pain shortness of breath.  Went to the list of medications she is taking.  No chest pain shortness of breath.  The following portions of the patient's history were reviewed and updated as appropriate: allergies, current medications, past family history, past medical history, past social history, past surgical history and problem list.    Review of Systems   Constitutional: Negative for activity change, appetite change, fatigue and fever.   Eyes: Negative for blurred vision and double vision.   Respiratory: Negative.  Negative for shortness of breath.    Cardiovascular: Negative for chest pain, palpitations and leg swelling.   Gastrointestinal: Negative.    Musculoskeletal: Positive for arthralgias.   Neurological: Negative for dizziness, syncope, light-headedness and headache.   Psychiatric/Behavioral: Negative for self-injury and suicidal ideas.       Allergies   Allergen Reactions   • Ace Inhibitors Cough   • Codeine Nausea And Vomiting       Current Outpatient Medications on File Prior to Visit   Medication Sig Dispense Refill   • Acetaminophen (TYLENOL ARTHRITIS PAIN PO) Take 500 mg by mouth Daily.     • ALPRAZolam (Xanax) 0.25 MG tablet Take 1 tablet by mouth At Night As Needed for Anxiety. (Patient taking differently: Take 0.25 mg by mouth At Night As Needed for Anxiety or Sleep.) 10 tablet 0   • amLODIPine (NORVASC) 5 MG tablet TAKE 1 TABLET BY MOUTH EVERY EVENING. (Patient taking differently: Take 5 mg by mouth Every Evening.) 30 tablet 11   • aspirin 325 MG tablet Take 325 mg by mouth Daily. Takes daily w/tylenol for pain     • atenolol (TENORMIN) 50 MG tablet TAKE 1  TABLET BY MOUTH 2 TIMES A DAY. (Patient taking differently: Take 50 mg by mouth 2 (two) times a day.) 60 tablet 3   • Bioflavonoid Products (DOROTEO C PO) Take 500 mg by mouth Daily.     • Cholecalciferol (VITAMIN D3) 1000 units capsule Take 2 capsules by mouth Daily.     • cyclobenzaprine (FLEXERIL) 5 MG tablet Take 5 mg by mouth 3 (Three) Times a Day As Needed for Muscle Spasms.     • hydroCHLOROthiazide (HYDRODIURIL) 25 MG tablet TAKE 1 TABLET BY MOUTH DAILY. (Patient taking differently: Take 25 mg by mouth Daily.) 30 tablet 5   • levothyroxine (SYNTHROID, LEVOTHROID) 75 MCG tablet TAKE 1 TABLET BY MOUTH DAILY. 90 tablet 3   • losartan (COZAAR) 100 MG tablet TAKE 1 TABLET BY MOUTH DAILY. (Patient taking differently: Take 100 mg by mouth Daily With Dinner.) 90 tablet 3   • meloxicam (MOBIC) 7.5 MG tablet Take 1 tablet by mouth Daily. 30 tablet 3   • multivitamin-minerals (CENTRUM) tablet Take 1 tablet by mouth Daily.     • NIACIN PO Take 500 mg by mouth Daily.     • potassium chloride 10 MEQ CR tablet TAKE 1 TABLET BY MOUTH DAILY. (Patient taking differently: Take 10 mEq by mouth Daily.) 90 tablet 0   • vitamin E 400 UNIT capsule Take 400 Units by mouth Daily.       No current facility-administered medications on file prior to visit.       Family History   Problem Relation Age of Onset   • Heart attack Father    • Heart disease Father    • Breast cancer Neg Hx    • Malig Hyperthermia Neg Hx        Past Medical History:   Diagnosis Date   • Arthritis    • Hyperlipidemia    • Hypertension    • Hypothyroid    • Neck pain    • PONV (postoperative nausea and vomiting)    • Shoulder pain, left     sched total shoulder   • Spinal headache        Past Surgical History:   Procedure Laterality Date   • COLONOSCOPY     • HYSTERECTOMY     • REPLACEMENT TOTAL KNEE Right    • SHOULDER SURGERY Right     tendon repair   • TONSILLECTOMY     • TOTAL SHOULDER ARTHROPLASTY W/ DISTAL CLAVICLE EXCISION Left 10/15/2021    Procedure: TOTAL  "SHOULDER REVERSE ARTHROPLASTY;  Surgeon: Scout Palmer MD;  Location: [facility];  Service: Orthopedics;  Laterality: Left;       Social History     Socioeconomic History   • Marital status:    Tobacco Use   • Smoking status: Never Smoker   • Smokeless tobacco: Never Used   Vaping Use   • Vaping Use: Never used   Substance and Sexual Activity   • Alcohol use: No   • Drug use: No   • Sexual activity: Defer       Patient Active Problem List   Diagnosis   • Precordial chest pain   • PVCs (premature ventricular contractions)   • Essential hypertension   • Acquired hypothyroidism   • Low serum potassium level   • Other hyperlipidemia   • Acute pain of left shoulder   • Osteoarthritis of multiple joints       /74 (BP Location: Left arm, Patient Position: Sitting, Cuff Size: Adult)   Pulse 69   Temp 97.1 °F (36.2 °C) (Temporal)   Ht 154 cm (60.63\")   Wt 58.1 kg (128 lb)   SpO2 98%   BMI 24.48 kg/m²   Body mass index is 24.48 kg/m².    Objective   Physical Exam  Vitals and nursing note reviewed.   Constitutional:       Appearance: She is well-developed.   Eyes:      Pupils: Pupils are equal, round, and reactive to light.   Neck:      Thyroid: No thyromegaly.      Vascular: No JVD.      Trachea: No tracheal deviation.   Cardiovascular:      Rate and Rhythm: Normal rate and regular rhythm.      Heart sounds: No murmur heard.      Pulmonary:      Effort: Pulmonary effort is normal. No respiratory distress.      Breath sounds: Normal breath sounds. No wheezing.   Abdominal:      General: Bowel sounds are normal. There is no distension.      Palpations: Abdomen is soft. There is no mass.      Tenderness: There is no abdominal tenderness. There is no right CVA tenderness, left CVA tenderness, guarding or rebound.      Hernia: No hernia is present.   Musculoskeletal:      Cervical back: Normal range of motion and neck supple.   Lymphadenopathy:      Cervical: No cervical adenopathy.   Neurological:      " Mental Status: She is alert and oriented to person, place, and time.           Assessment/Plan   Diagnoses and all orders for this visit:    1. Flu vaccine need (Primary)  -     Fluzone High-Dose 65+yrs    2. Essential hypertension  -     CBC & Differential  -     Comprehensive Metabolic Panel  -     Lipid Panel With / Chol / HDL Ratio  -     TSH  -     T4, Free    3. Other hyperlipidemia  -     CBC & Differential  -     Comprehensive Metabolic Panel  -     Lipid Panel With / Chol / HDL Ratio  -     TSH  -     T4, Free    4. Acquired hypothyroidism  -     CBC & Differential  -     Comprehensive Metabolic Panel  -     Lipid Panel With / Chol / HDL Ratio  -     TSH  -     T4, Free    5. Low serum potassium level  -     CBC & Differential  -     Comprehensive Metabolic Panel  -     Lipid Panel With / Chol / HDL Ratio  -     TSH  -     T4, Free    6. Primary osteoarthritis involving multiple joints  -     CBC & Differential  -     Comprehensive Metabolic Panel  -     Lipid Panel With / Chol / HDL Ratio  -     TSH  -     T4, Free    Continue Synthroid, Tenormin, aspirin, amlodipine, hydrochlorothiazide.  Continue losartan, potassium.  Diet and exercise.  Check blood pressure at home.  All her problems are chronic and stable.  Potassium is already improving.  I will see her back in 3 months time.  EHR dragon/transcription disclaimer:  Part of this note are created by electronic transcription/translation of spoken language to printed text and thus may lead to erroneous, or at times, nonsensical words or phrases inadvertently transcribed.  Although I have reviewed for such errors, some may still exist.

## 2021-11-04 LAB
ALBUMIN SERPL-MCNC: 4.6 G/DL (ref 3.7–4.7)
ALBUMIN/GLOB SERPL: 1.9 {RATIO} (ref 1.2–2.2)
ALP SERPL-CCNC: 78 IU/L (ref 44–121)
ALT SERPL-CCNC: 12 IU/L (ref 0–32)
AST SERPL-CCNC: 19 IU/L (ref 0–40)
BASOPHILS # BLD AUTO: 0.1 X10E3/UL (ref 0–0.2)
BASOPHILS NFR BLD AUTO: 1 %
BILIRUB SERPL-MCNC: 0.6 MG/DL (ref 0–1.2)
BUN SERPL-MCNC: 10 MG/DL (ref 8–27)
BUN/CREAT SERPL: 12 (ref 12–28)
CALCIUM SERPL-MCNC: 10 MG/DL (ref 8.7–10.3)
CHLORIDE SERPL-SCNC: 91 MMOL/L (ref 96–106)
CHOLEST SERPL-MCNC: 224 MG/DL (ref 100–199)
CHOLEST/HDLC SERPL: 4.1 RATIO (ref 0–4.4)
CO2 SERPL-SCNC: 27 MMOL/L (ref 20–29)
CREAT SERPL-MCNC: 0.84 MG/DL (ref 0.57–1)
EOSINOPHIL # BLD AUTO: 0.1 X10E3/UL (ref 0–0.4)
EOSINOPHIL NFR BLD AUTO: 1 %
ERYTHROCYTE [DISTWIDTH] IN BLOOD BY AUTOMATED COUNT: 12.1 % (ref 11.7–15.4)
GLOBULIN SER CALC-MCNC: 2.4 G/DL (ref 1.5–4.5)
GLUCOSE SERPL-MCNC: 93 MG/DL (ref 65–99)
HCT VFR BLD AUTO: 35.7 % (ref 34–46.6)
HDLC SERPL-MCNC: 55 MG/DL
HGB BLD-MCNC: 11.7 G/DL (ref 11.1–15.9)
IMM GRANULOCYTES # BLD AUTO: 0 X10E3/UL (ref 0–0.1)
IMM GRANULOCYTES NFR BLD AUTO: 0 %
LDLC SERPL CALC-MCNC: 145 MG/DL (ref 0–99)
LYMPHOCYTES # BLD AUTO: 1.4 X10E3/UL (ref 0.7–3.1)
LYMPHOCYTES NFR BLD AUTO: 23 %
MCH RBC QN AUTO: 30.7 PG (ref 26.6–33)
MCHC RBC AUTO-ENTMCNC: 32.8 G/DL (ref 31.5–35.7)
MCV RBC AUTO: 94 FL (ref 79–97)
MONOCYTES # BLD AUTO: 0.7 X10E3/UL (ref 0.1–0.9)
MONOCYTES NFR BLD AUTO: 12 %
NEUTROPHILS # BLD AUTO: 3.9 X10E3/UL (ref 1.4–7)
NEUTROPHILS NFR BLD AUTO: 63 %
PLATELET # BLD AUTO: 327 X10E3/UL (ref 150–450)
POTASSIUM SERPL-SCNC: 3.9 MMOL/L (ref 3.5–5.2)
PROT SERPL-MCNC: 7 G/DL (ref 6–8.5)
RBC # BLD AUTO: 3.81 X10E6/UL (ref 3.77–5.28)
SODIUM SERPL-SCNC: 133 MMOL/L (ref 134–144)
T4 FREE SERPL-MCNC: 1.89 NG/DL (ref 0.82–1.77)
TRIGL SERPL-MCNC: 134 MG/DL (ref 0–149)
TSH SERPL DL<=0.005 MIU/L-ACNC: 2.03 UIU/ML (ref 0.45–4.5)
VLDLC SERPL CALC-MCNC: 24 MG/DL (ref 5–40)
WBC # BLD AUTO: 6.1 X10E3/UL (ref 3.4–10.8)

## 2021-11-26 RX ORDER — POTASSIUM CHLORIDE 750 MG/1
TABLET, FILM COATED, EXTENDED RELEASE ORAL
Qty: 90 TABLET | Refills: 3 | Status: SHIPPED | OUTPATIENT
Start: 2021-11-26 | End: 2022-11-28

## 2021-12-01 ENCOUNTER — OFFICE VISIT (OUTPATIENT)
Dept: ORTHOPEDIC SURGERY | Facility: CLINIC | Age: 81
End: 2021-12-01

## 2021-12-01 VITALS — BODY MASS INDEX: 24.17 KG/M2 | WEIGHT: 128 LBS | HEIGHT: 61 IN

## 2021-12-01 DIAGNOSIS — Z96.612 S/P REVERSE TOTAL SHOULDER ARTHROPLASTY, LEFT: Primary | ICD-10-CM

## 2021-12-01 PROCEDURE — 73030 X-RAY EXAM OF SHOULDER: CPT | Performed by: ORTHOPAEDIC SURGERY

## 2021-12-01 PROCEDURE — 99024 POSTOP FOLLOW-UP VISIT: CPT | Performed by: ORTHOPAEDIC SURGERY

## 2021-12-01 NOTE — PROGRESS NOTES
Name: Nguyen Zhang  MRN: [id number]  Diagnosis: s/p left reverse TSA 10/15/2021    Interval History: Nguyen Zhang returns for her 6 week postoperative visit.  She notes she is doing much better. The patient states she has been doing physical therapy. The patient inquires about resuming her water aerobics. She states she uses weights in the water, after she gets comfortable. She reports she is having minimal pain compared to before the surgery.    Physical Examination:   Left shoulder-    Anterior incision is healing well.      FF-   Active- 120    Passive- 125    Strength- 3/5      ER-      Active- 40    Strength- 3/5      Belly Press- 3/5 strength      Positive deltoid firing all three components.    Brisk cap refill to all digits, positive 2+ radial pulse    Positive sensation to light touch.      Radiographic review:   Left Shoulder X-Ray  Indication: Status post shoulder arthroplasty  AP, scapular Y, and axillary lateral views    Findings:  Shoulder arthroplasty components appear to be stable in position, well-seated, overall acceptable alignment.  No evidence of osteolysis, loosening, periprosthetic fracture, or reactive bone formation.    Compared to prior postoperative x-rays from hospital    Assessment/Plan:    1. Discussed treatment options at length with patient at today's visit.   2. The patient is happy with her progress. She will continue to work on range of motion and strengthening and continue to work  with physical therapy.  3. Follow-up in 6 weeks for reassessment.    Scout Palmer MD    Transcribed from ambient dictation for Scout Palmer MD by Libertad Winston.  12/01/21   13:08 EST    Patient verbalized consent to the visit recording.  I have personally performed the services described in this document as transcribed by the above individual, and it is both accurate and complete.  Scout Palmer MD  12/15/2021  22:13 EST

## 2021-12-29 RX ORDER — HYDROCHLOROTHIAZIDE 25 MG/1
TABLET ORAL
Qty: 30 TABLET | Refills: 3 | Status: SHIPPED | OUTPATIENT
Start: 2021-12-29 | End: 2022-04-26

## 2021-12-29 RX ORDER — ATENOLOL 50 MG/1
TABLET ORAL
Qty: 60 TABLET | Refills: 5 | Status: SHIPPED | OUTPATIENT
Start: 2021-12-29 | End: 2022-07-19

## 2021-12-29 RX ORDER — LOSARTAN POTASSIUM 100 MG/1
TABLET ORAL
Qty: 90 TABLET | Refills: 3 | Status: SHIPPED | OUTPATIENT
Start: 2021-12-29 | End: 2022-12-30

## 2022-01-12 ENCOUNTER — OFFICE VISIT (OUTPATIENT)
Dept: ORTHOPEDIC SURGERY | Facility: CLINIC | Age: 82
End: 2022-01-12

## 2022-01-12 VITALS — BODY MASS INDEX: 24.17 KG/M2 | HEIGHT: 61 IN | WEIGHT: 128 LBS

## 2022-01-12 DIAGNOSIS — Z96.612 S/P REVERSE TOTAL SHOULDER ARTHROPLASTY, LEFT: Primary | ICD-10-CM

## 2022-01-12 PROCEDURE — 99024 POSTOP FOLLOW-UP VISIT: CPT | Performed by: ORTHOPAEDIC SURGERY

## 2022-01-12 RX ORDER — METHYLPREDNISOLONE 4 MG/1
TABLET ORAL
Qty: 1 EACH | Refills: 0 | Status: SHIPPED | OUTPATIENT
Start: 2022-01-12 | End: 2022-10-03

## 2022-01-12 NOTE — PROGRESS NOTES
Name: Nguyen Zhang  MRN: [id number]  Diagnosis: s/p left reverse TSA 10/15/2021     Interval History: Nguyen Zhang returns for her 12 week postoperative visit.    She states she is doing well and was previously doing physical therapy with Keron Pollack PT; however denies currently doing physical therapy. She adds he is currently doing water aerobics. She does report some tightness and pain when reaching across her body. She adds continuing to have some issues when she is dressing and washing.       Physical Examination:              Left shoulder-                          Anterior incision well-healed                             FF-   Active- 130 degrees    Strength- 4/5    ER-      Active- 45 degrees    Strength- 4-/5    Belly press strength- 4/5                               Positive deltoid firing all three components.                          Brisk cap refill to all digits, positive 2+ radial pulse                          Positive sensation to light touch.         Assessment/Plan:     1.  Discussed treatment options at length with patient at today's visit. I have sent in a prescription for a Medrol Dosepak to help with residual tightness and pain.   2. She will follow up in 3 months with repeat x-rays of left shoulder.  3. She will continue with home exercises for stretching/range of motion, as tolerated.  4. All questions answered.  5. She denies being diabetic.     Transcribed from ambient dictation for Scout Palmer MD by Ana Maria Mcdonald.  01/12/22   16:00 EST    Patient verbalized consent to the visit recording.  I have personally performed the services described in this document as transcribed by the above individual, and it is both accurate and complete.  Scout Palmer MD  1/16/2022  20:48 EST

## 2022-02-09 ENCOUNTER — OFFICE VISIT (OUTPATIENT)
Dept: FAMILY MEDICINE CLINIC | Facility: CLINIC | Age: 82
End: 2022-02-09

## 2022-02-09 VITALS
BODY MASS INDEX: 24.55 KG/M2 | OXYGEN SATURATION: 96 % | DIASTOLIC BLOOD PRESSURE: 75 MMHG | SYSTOLIC BLOOD PRESSURE: 145 MMHG | HEART RATE: 64 BPM | TEMPERATURE: 97.1 F | HEIGHT: 61 IN | RESPIRATION RATE: 20 BRPM | WEIGHT: 130 LBS

## 2022-02-09 DIAGNOSIS — E03.9 ACQUIRED HYPOTHYROIDISM: ICD-10-CM

## 2022-02-09 DIAGNOSIS — I10 ESSENTIAL HYPERTENSION: Primary | ICD-10-CM

## 2022-02-09 DIAGNOSIS — M15.9 PRIMARY OSTEOARTHRITIS INVOLVING MULTIPLE JOINTS: ICD-10-CM

## 2022-02-09 DIAGNOSIS — E78.49 OTHER HYPERLIPIDEMIA: ICD-10-CM

## 2022-02-09 DIAGNOSIS — Z78.9 STATIN INTOLERANCE: ICD-10-CM

## 2022-02-09 PROCEDURE — 99214 OFFICE O/P EST MOD 30 MIN: CPT | Performed by: INTERNAL MEDICINE

## 2022-02-09 NOTE — PROGRESS NOTES
Subjective   Nguyen Zhang is a 81 y.o. female.     Chief Complaint   Patient presents with   • Labs Only     Blood work every 3 months   Hypertension, hyperlipidemia, hypothyroidism    History of Present Illness   Patient here follow-up for hypertension, hyperlipidemia, hypothyroidism.  Denies any chest pain or shortness of breath.  Blood pressure under control at home.  Continues to take all current medications.  The following portions of the patient's history were reviewed and updated as appropriate: allergies, current medications, past family history, past medical history, past social history, past surgical history and problem list.    Review of Systems   Constitutional: Negative for activity change, appetite change, fatigue and fever.   Eyes: Negative for blurred vision and double vision.   Respiratory: Negative.  Negative for shortness of breath.    Cardiovascular: Negative for chest pain, palpitations and leg swelling.   Gastrointestinal: Negative.    Genitourinary: Negative for hematuria.   Neurological: Negative for dizziness, syncope, light-headedness and headache.   Psychiatric/Behavioral: Negative for agitation, behavioral problems and decreased concentration.       Allergies   Allergen Reactions   • Ace Inhibitors Cough   • Codeine Nausea And Vomiting       Current Outpatient Medications on File Prior to Visit   Medication Sig Dispense Refill   • amLODIPine (NORVASC) 5 MG tablet TAKE 1 TABLET BY MOUTH EVERY EVENING. (Patient taking differently: Take 5 mg by mouth Every Evening.) 30 tablet 11   • aspirin 325 MG tablet Take 325 mg by mouth Daily. Takes daily w/tylenol for pain     • atenolol (TENORMIN) 50 MG tablet TAKE 1 TABLET BY MOUTH 2 TIMES A DAY. 60 tablet 5   • Cholecalciferol (VITAMIN D3) 1000 units capsule Take 2 capsules by mouth Daily.     • hydroCHLOROthiazide (HYDRODIURIL) 25 MG tablet TAKE 1 TABLET BY MOUTH DAILY. 30 tablet 3   • levothyroxine (SYNTHROID, LEVOTHROID) 75 MCG tablet TAKE  1 TABLET BY MOUTH DAILY. 90 tablet 3   • losartan (COZAAR) 100 MG tablet TAKE 1 TABLET BY MOUTH DAILY. 90 tablet 3   • meloxicam (MOBIC) 7.5 MG tablet Take 1 tablet by mouth Daily. 30 tablet 3   • multivitamin-minerals (CENTRUM) tablet Take 1 tablet by mouth Daily.     • potassium chloride 10 MEQ CR tablet TAKE 1 TABLET BY MOUTH DAILY. 90 tablet 3   • Acetaminophen (TYLENOL ARTHRITIS PAIN PO) Take 500 mg by mouth Daily.     • ALPRAZolam (Xanax) 0.25 MG tablet Take 1 tablet by mouth At Night As Needed for Anxiety. (Patient taking differently: Take 0.25 mg by mouth At Night As Needed for Anxiety or Sleep.) 10 tablet 0   • Bioflavonoid Products (DOROTEO C PO) Take 500 mg by mouth Daily.     • cyclobenzaprine (FLEXERIL) 5 MG tablet Take 5 mg by mouth 3 (Three) Times a Day As Needed for Muscle Spasms.     • methylPREDNISolone (MEDROL) 4 MG dose pack Take as directed on package instructions. 1 each 0   • NIACIN PO Take 500 mg by mouth Daily.     • vitamin E 400 UNIT capsule Take 400 Units by mouth Daily.       No current facility-administered medications on file prior to visit.       Family History   Problem Relation Age of Onset   • Heart attack Father    • Heart disease Father    • Breast cancer Neg Hx    • Malig Hyperthermia Neg Hx        Past Medical History:   Diagnosis Date   • Arthritis    • Hyperlipidemia    • Hypertension    • Hypothyroid    • Neck pain    • PONV (postoperative nausea and vomiting)    • Shoulder pain, left     sched total shoulder   • Spinal headache        Past Surgical History:   Procedure Laterality Date   • COLONOSCOPY     • HYSTERECTOMY     • REPLACEMENT TOTAL KNEE Right    • SHOULDER SURGERY Right     tendon repair   • TONSILLECTOMY     • TOTAL SHOULDER ARTHROPLASTY W/ DISTAL CLAVICLE EXCISION Left 10/15/2021    Procedure: TOTAL SHOULDER REVERSE ARTHROPLASTY;  Surgeon: Scout Palmer MD;  Location: [facility];  Service: Orthopedics;  Laterality: Left;       Social History  "    Socioeconomic History   • Marital status:    Tobacco Use   • Smoking status: Never Smoker   • Smokeless tobacco: Never Used   Vaping Use   • Vaping Use: Never used   Substance and Sexual Activity   • Alcohol use: No   • Drug use: No   • Sexual activity: Defer       Patient Active Problem List   Diagnosis   • Precordial chest pain   • PVCs (premature ventricular contractions)   • Essential hypertension   • Acquired hypothyroidism   • Low serum potassium level   • Other hyperlipidemia   • Acute pain of left shoulder   • Osteoarthritis of multiple joints   • Statin intolerance       /75   Pulse 64   Temp 97.1 °F (36.2 °C)   Resp 20   Ht 154 cm (60.63\")   Wt 59 kg (130 lb)   SpO2 96%   BMI 24.86 kg/m²   Body mass index is 24.86 kg/m².    Objective   Physical Exam  Vitals and nursing note reviewed.   Constitutional:       Appearance: She is well-developed.   Eyes:      Pupils: Pupils are equal, round, and reactive to light.   Neck:      Thyroid: No thyromegaly.      Vascular: No JVD.      Trachea: No tracheal deviation.   Cardiovascular:      Rate and Rhythm: Normal rate and regular rhythm.      Heart sounds: No murmur heard.      Pulmonary:      Effort: Pulmonary effort is normal. No respiratory distress.      Breath sounds: Normal breath sounds. No wheezing.   Abdominal:      General: Bowel sounds are normal.      Palpations: Abdomen is soft.   Musculoskeletal:      Cervical back: Normal range of motion and neck supple.   Lymphadenopathy:      Cervical: No cervical adenopathy.   Neurological:      General: No focal deficit present.      Mental Status: She is alert and oriented to person, place, and time. Mental status is at baseline.      Cranial Nerves: No cranial nerve deficit.      Sensory: No sensory deficit.      Motor: No weakness.      Coordination: Coordination normal.      Gait: Gait normal.      Deep Tendon Reflexes: Reflexes normal.   Psychiatric:         Mood and Affect: Mood normal. "         Behavior: Behavior normal.           Assessment/Plan   Diagnoses and all orders for this visit:    1. Essential hypertension (Primary)  -     Comprehensive Metabolic Panel  -     CBC & Differential  -     CK  -     Lipid Panel With / Chol / HDL Ratio  -     TSH    2. Other hyperlipidemia  -     Comprehensive Metabolic Panel  -     CBC & Differential  -     CK  -     Lipid Panel With / Chol / HDL Ratio  -     TSH    3. Acquired hypothyroidism  -     Comprehensive Metabolic Panel  -     CBC & Differential  -     CK  -     Lipid Panel With / Chol / HDL Ratio  -     TSH    4. Primary osteoarthritis involving multiple joints  -     Comprehensive Metabolic Panel  -     CBC & Differential  -     CK  -     Lipid Panel With / Chol / HDL Ratio  -     TSH    5. Statin intolerance    Continue diet exercise.  Check blood pressure.  Patient cannot tolerate any statins.  Continue aspirin high-dose, Norvasc, Tenormin, hydrochlorothiazide, Mobic, potassium, Cozaar.  Check blood pressure at home.  Continue diet and exercise.  Return in 3 months time.  All her problems are chronic and stable.  EHR dragon/transcription disclaimer:  Part of this note are created by electronic transcription/translation of spoken language to printed text and thus may lead to erroneous, or at times, nonsensical words or phrases inadvertently transcribed.  Although I have reviewed for such errors, some may still exist.

## 2022-02-10 LAB
ALBUMIN SERPL-MCNC: 5 G/DL (ref 3.7–4.7)
ALBUMIN/GLOB SERPL: 2.2 {RATIO} (ref 1.2–2.2)
ALP SERPL-CCNC: 81 IU/L (ref 44–121)
ALT SERPL-CCNC: 16 IU/L (ref 0–32)
AST SERPL-CCNC: 28 IU/L (ref 0–40)
BASOPHILS # BLD AUTO: 0.1 X10E3/UL (ref 0–0.2)
BASOPHILS NFR BLD AUTO: 1 %
BILIRUB SERPL-MCNC: 0.6 MG/DL (ref 0–1.2)
BUN SERPL-MCNC: 18 MG/DL (ref 8–27)
BUN/CREAT SERPL: 17 (ref 12–28)
CALCIUM SERPL-MCNC: 10 MG/DL (ref 8.7–10.3)
CHLORIDE SERPL-SCNC: 95 MMOL/L (ref 96–106)
CHOLEST SERPL-MCNC: 239 MG/DL (ref 100–199)
CHOLEST/HDLC SERPL: 3.7 RATIO (ref 0–4.4)
CK SERPL-CCNC: 150 U/L (ref 32–182)
CO2 SERPL-SCNC: 28 MMOL/L (ref 20–29)
CREAT SERPL-MCNC: 1.04 MG/DL (ref 0.57–1)
EOSINOPHIL # BLD AUTO: 0.1 X10E3/UL (ref 0–0.4)
EOSINOPHIL NFR BLD AUTO: 1 %
ERYTHROCYTE [DISTWIDTH] IN BLOOD BY AUTOMATED COUNT: 12.1 % (ref 11.7–15.4)
GLOBULIN SER CALC-MCNC: 2.3 G/DL (ref 1.5–4.5)
GLUCOSE SERPL-MCNC: 89 MG/DL (ref 65–99)
HCT VFR BLD AUTO: 36.4 % (ref 34–46.6)
HDLC SERPL-MCNC: 65 MG/DL
HGB BLD-MCNC: 12.8 G/DL (ref 11.1–15.9)
IMM GRANULOCYTES # BLD AUTO: 0 X10E3/UL (ref 0–0.1)
IMM GRANULOCYTES NFR BLD AUTO: 0 %
LDLC SERPL CALC-MCNC: 155 MG/DL (ref 0–99)
LYMPHOCYTES # BLD AUTO: 1.9 X10E3/UL (ref 0.7–3.1)
LYMPHOCYTES NFR BLD AUTO: 31 %
MCH RBC QN AUTO: 32.3 PG (ref 26.6–33)
MCHC RBC AUTO-ENTMCNC: 35.2 G/DL (ref 31.5–35.7)
MCV RBC AUTO: 92 FL (ref 79–97)
MONOCYTES # BLD AUTO: 0.7 X10E3/UL (ref 0.1–0.9)
MONOCYTES NFR BLD AUTO: 11 %
NEUTROPHILS # BLD AUTO: 3.3 X10E3/UL (ref 1.4–7)
NEUTROPHILS NFR BLD AUTO: 56 %
PLATELET # BLD AUTO: 233 X10E3/UL (ref 150–450)
POTASSIUM SERPL-SCNC: 3.4 MMOL/L (ref 3.5–5.2)
PROT SERPL-MCNC: 7.3 G/DL (ref 6–8.5)
RBC # BLD AUTO: 3.96 X10E6/UL (ref 3.77–5.28)
SODIUM SERPL-SCNC: 140 MMOL/L (ref 134–144)
TRIGL SERPL-MCNC: 108 MG/DL (ref 0–149)
TSH SERPL DL<=0.005 MIU/L-ACNC: 3.04 UIU/ML (ref 0.45–4.5)
VLDLC SERPL CALC-MCNC: 19 MG/DL (ref 5–40)
WBC # BLD AUTO: 6 X10E3/UL (ref 3.4–10.8)

## 2022-03-28 RX ORDER — AMLODIPINE BESYLATE 5 MG/1
TABLET ORAL
Qty: 30 TABLET | Refills: 0 | Status: SHIPPED | OUTPATIENT
Start: 2022-03-28 | End: 2022-04-26

## 2022-04-13 ENCOUNTER — OFFICE VISIT (OUTPATIENT)
Dept: ORTHOPEDIC SURGERY | Facility: CLINIC | Age: 82
End: 2022-04-13

## 2022-04-13 VITALS — WEIGHT: 130 LBS | BODY MASS INDEX: 24.55 KG/M2 | HEIGHT: 61 IN

## 2022-04-13 DIAGNOSIS — Z96.612 S/P REVERSE TOTAL SHOULDER ARTHROPLASTY, LEFT: Primary | ICD-10-CM

## 2022-04-13 PROCEDURE — 99212 OFFICE O/P EST SF 10 MIN: CPT | Performed by: ORTHOPAEDIC SURGERY

## 2022-04-13 PROCEDURE — 73030 X-RAY EXAM OF SHOULDER: CPT | Performed by: ORTHOPAEDIC SURGERY

## 2022-04-13 NOTE — PROGRESS NOTES
Subjective:     Patient ID: Nguyen Zhang is a 82 y.o. female.    Chief Complaint:  Fall status post left reverse total shoulder arthroplasty-10/15/2021    History of Present Illness     Patient verbalized consent to the visit recording.      Nguyen Zhang returns to clinic today for evaluation of status post left reverse total shoulder arthroplasty.    The patient is doing well overall but notes mild irritation as well as spasm over the anterior aspect of her left shoulder. There is some localizing some anterior tenderness, particularly with cross arm activities. She rates her pain a 2 to 3 out of 10. Her pain is recurrent on a very intermittent basis and is well controlled with Aspercreme as well as intermittent aspirin. She denies any numbness or tingling.     Social History     Occupational History   • Not on file   Tobacco Use   • Smoking status: Never Smoker   • Smokeless tobacco: Never Used   Vaping Use   • Vaping Use: Never used   Substance and Sexual Activity   • Alcohol use: No   • Drug use: No   • Sexual activity: Defer      Past Medical History:   Diagnosis Date   • Arthritis    • Hyperlipidemia    • Hypertension    • Hypothyroid    • Neck pain    • PONV (postoperative nausea and vomiting)    • Shoulder pain, left     sched total shoulder   • Spinal headache      Past Surgical History:   Procedure Laterality Date   • COLONOSCOPY     • HYSTERECTOMY     • REPLACEMENT TOTAL KNEE Right    • SHOULDER SURGERY Right     tendon repair   • TONSILLECTOMY     • TOTAL SHOULDER ARTHROPLASTY W/ DISTAL CLAVICLE EXCISION Left 10/15/2021    Procedure: TOTAL SHOULDER REVERSE ARTHROPLASTY;  Surgeon: Scout Palmer MD;  Location: [facility];  Service: Orthopedics;  Laterality: Left;       Family History   Problem Relation Age of Onset   • Heart attack Father    • Heart disease Father    • Breast cancer Neg Hx    • Malig Hyperthermia Neg Hx          Review of Systems        Objective:  Vitals:    04/13/22 0921  "  Weight: 59 kg (130 lb)   Height: 154 cm (60.63\")         04/13/22  0921   Weight: 59 kg (130 lb)     Body mass index is 24.86 kg/m².  General: No acute distress.  Resp: normal respiratory effort  Skin: no rashes or wounds; normal turgor  Psych: mood and affect appropriate; recent and remote memory intact          Ortho Exam     Left shoulder-  Anterior incision is well healed.  FF- Active- 155 degrees  Strength- 4/5  ER- Active- 45 degrees  Strength- 4-/5  Belly press test strength- 4/5  Positive deltoid and L3 components.    Imaging:  Left Shoulder X-Ray  Indication: Status post shoulder arthroplasty  AP, scapular Y, and axillary lateral views    Findings:  Shoulder arthroplasty components appear to be stable in position, well-seated, overall acceptable alignment.  No evidence of osteolysis, loosening, periprosthetic fracture, or reactive bone formation.    Compared to prior postoperative x-rays from office    Assessment:        1. S/P reverse total shoulder arthroplasty, left           Plan:          1.  I discussed treatment options at length with patient at today's visit.  2.  I encouraged her to continue with home exercises for stretching, strengthening, range of motion as tolerated.  3.  Follow-up in 6 months for repeat x-rays of the left shoulder.      Nguyen HANK Zhang was in agreement with plan and had all questions answered.     Orders:  Orders Placed This Encounter   Procedures   • XR Shoulder 2+ View Left       Medications:  No orders of the defined types were placed in this encounter.      Followup:  Return in about 6 months (around 10/13/2022) for xrays needed at follow up.    Diagnoses and all orders for this visit:    1. S/P reverse total shoulder arthroplasty, left (Primary)  -     XR Shoulder 2+ View Left        Dictated utilizing Dragon dictation     Transcribed from ambient dictation for Scout Palmer MD by Stephanie Tracy.  04/13/22   16:09 EDT  "

## 2022-04-26 RX ORDER — AMLODIPINE BESYLATE 5 MG/1
TABLET ORAL
Qty: 30 TABLET | Refills: 0 | Status: SHIPPED | OUTPATIENT
Start: 2022-04-26 | End: 2022-05-26

## 2022-04-26 RX ORDER — HYDROCHLOROTHIAZIDE 25 MG/1
TABLET ORAL
Qty: 30 TABLET | Refills: 0 | Status: SHIPPED | OUTPATIENT
Start: 2022-04-26 | End: 2022-05-26

## 2022-05-18 ENCOUNTER — OFFICE VISIT (OUTPATIENT)
Dept: FAMILY MEDICINE CLINIC | Facility: CLINIC | Age: 82
End: 2022-05-18

## 2022-05-18 VITALS
TEMPERATURE: 97.1 F | OXYGEN SATURATION: 95 % | HEART RATE: 65 BPM | RESPIRATION RATE: 16 BRPM | BODY MASS INDEX: 25.02 KG/M2 | DIASTOLIC BLOOD PRESSURE: 78 MMHG | HEIGHT: 61 IN | WEIGHT: 132.5 LBS | SYSTOLIC BLOOD PRESSURE: 120 MMHG

## 2022-05-18 DIAGNOSIS — Z78.9 STATIN INTOLERANCE: Primary | ICD-10-CM

## 2022-05-18 DIAGNOSIS — E78.49 OTHER HYPERLIPIDEMIA: ICD-10-CM

## 2022-05-18 DIAGNOSIS — I10 ESSENTIAL HYPERTENSION: ICD-10-CM

## 2022-05-18 DIAGNOSIS — E03.9 ACQUIRED HYPOTHYROIDISM: ICD-10-CM

## 2022-05-18 PROCEDURE — 99214 OFFICE O/P EST MOD 30 MIN: CPT | Performed by: INTERNAL MEDICINE

## 2022-05-18 NOTE — PROGRESS NOTES
Subjective   Nguyen Zhang is a 82 y.o. female.     Chief Complaint   Patient presents with   • Hypertension   Hyperlipidemia, hypothyroidism,    History of Present Illness   Patient here follow-up for hypertension hyperlipidemia, hypothyroidism, statin intolerance.  Denies any chest pain shortness of breath.  No nausea vomiting.  Went through the list of medications she is on.  The following portions of the patient's history were reviewed and updated as appropriate: allergies, current medications, past family history, past medical history, past social history, past surgical history and problem list.    Review of Systems   Constitutional: Negative for activity change, appetite change, fatigue and fever.   Eyes: Negative for blurred vision and double vision.   Respiratory: Negative.  Negative for shortness of breath.    Cardiovascular: Negative for chest pain, palpitations and leg swelling.   Gastrointestinal: Negative.    Neurological: Negative for dizziness, syncope, light-headedness and headache.   Psychiatric/Behavioral: Negative for agitation, behavioral problems, decreased concentration and depressed mood.       Allergies   Allergen Reactions   • Ace Inhibitors Cough   • Codeine Nausea And Vomiting       Current Outpatient Medications on File Prior to Visit   Medication Sig Dispense Refill   • ALPRAZolam (Xanax) 0.25 MG tablet Take 1 tablet by mouth At Night As Needed for Anxiety. (Patient taking differently: Take 0.25 mg by mouth At Night As Needed for Anxiety or Sleep.) 10 tablet 0   • amLODIPine (NORVASC) 5 MG tablet TAKE 1 TABLET BY MOUTH EVERY EVENING. 30 tablet 0   • aspirin 325 MG tablet Take 325 mg by mouth Daily. Takes daily w/tylenol for pain takes half a tablet     • atenolol (TENORMIN) 50 MG tablet TAKE 1 TABLET BY MOUTH 2 TIMES A DAY. 60 tablet 5   • Bioflavonoid Products (DOROTEO C PO) Take 500 mg by mouth Daily.     • Cholecalciferol (VITAMIN D3) 1000 units capsule Take 2 capsules by mouth  Daily.     • hydroCHLOROthiazide (HYDRODIURIL) 25 MG tablet TAKE 1 TABLET BY MOUTH DAILY. 30 tablet 0   • levothyroxine (SYNTHROID, LEVOTHROID) 75 MCG tablet TAKE 1 TABLET BY MOUTH DAILY. 90 tablet 3   • losartan (COZAAR) 100 MG tablet TAKE 1 TABLET BY MOUTH DAILY. 90 tablet 3   • meloxicam (MOBIC) 7.5 MG tablet Take 1 tablet by mouth Daily. 30 tablet 3   • multivitamin-minerals (CENTRUM) tablet Take 1 tablet by mouth Daily.     • NIACIN PO Take 500 mg by mouth Daily.     • potassium chloride 10 MEQ CR tablet TAKE 1 TABLET BY MOUTH DAILY. 90 tablet 3   • vitamin E 400 UNIT capsule Take 400 Units by mouth Daily.     • Acetaminophen (TYLENOL ARTHRITIS PAIN PO) Take 500 mg by mouth Daily.     • cyclobenzaprine (FLEXERIL) 5 MG tablet Take 5 mg by mouth 3 (Three) Times a Day As Needed for Muscle Spasms.     • methylPREDNISolone (MEDROL) 4 MG dose pack Take as directed on package instructions. 1 each 0     No current facility-administered medications on file prior to visit.       Family History   Problem Relation Age of Onset   • Heart attack Father    • Heart disease Father    • Breast cancer Neg Hx    • Malig Hyperthermia Neg Hx        Past Medical History:   Diagnosis Date   • Arthritis    • Hyperlipidemia    • Hypertension    • Hypothyroid    • Neck pain    • PONV (postoperative nausea and vomiting)    • Shoulder pain, left     sched total shoulder   • Spinal headache        Past Surgical History:   Procedure Laterality Date   • COLONOSCOPY     • HYSTERECTOMY     • REPLACEMENT TOTAL KNEE Right    • SHOULDER SURGERY Right     tendon repair   • TONSILLECTOMY     • TOTAL SHOULDER ARTHROPLASTY W/ DISTAL CLAVICLE EXCISION Left 10/15/2021    Procedure: TOTAL SHOULDER REVERSE ARTHROPLASTY;  Surgeon: Scout Palmer MD;  Location: [facility];  Service: Orthopedics;  Laterality: Left;       Social History     Socioeconomic History   • Marital status:    Tobacco Use   • Smoking status: Never Smoker   • Smokeless  "tobacco: Never Used   Vaping Use   • Vaping Use: Never used   Substance and Sexual Activity   • Alcohol use: No   • Drug use: No   • Sexual activity: Defer       Patient Active Problem List   Diagnosis   • Precordial chest pain   • PVCs (premature ventricular contractions)   • Essential hypertension   • Acquired hypothyroidism   • Low serum potassium level   • Other hyperlipidemia   • Acute pain of left shoulder   • Osteoarthritis of multiple joints   • Statin intolerance       /78 (BP Location: Left arm, Patient Position: Sitting, Cuff Size: Large Adult)   Pulse 65   Temp 97.1 °F (36.2 °C) (Temporal)   Resp 16   Ht 154 cm (60.63\")   Wt 60.1 kg (132 lb 8 oz)   SpO2 95%   BMI 25.34 kg/m²   Body mass index is 25.34 kg/m².    Objective   Physical Exam  Vitals and nursing note reviewed.   Constitutional:       Appearance: She is well-developed.   Eyes:      Pupils: Pupils are equal, round, and reactive to light.   Neck:      Thyroid: No thyromegaly.      Vascular: No JVD.      Trachea: No tracheal deviation.   Cardiovascular:      Rate and Rhythm: Normal rate and regular rhythm.      Heart sounds: No murmur heard.  Pulmonary:      Effort: Pulmonary effort is normal. No respiratory distress.      Breath sounds: Normal breath sounds. No wheezing.   Abdominal:      General: Bowel sounds are normal. There is no distension.      Palpations: Abdomen is soft. There is no mass.      Tenderness: There is no abdominal tenderness. There is no right CVA tenderness, left CVA tenderness, guarding or rebound.      Hernia: No hernia is present.   Musculoskeletal:      Cervical back: Normal range of motion and neck supple.   Lymphadenopathy:      Cervical: No cervical adenopathy.   Neurological:      General: No focal deficit present.      Mental Status: She is alert and oriented to person, place, and time.   Psychiatric:         Mood and Affect: Mood normal.         Behavior: Behavior normal.           Assessment & Plan "   Diagnoses and all orders for this visit:    1. Statin intolerance (Primary)  -     Comprehensive Metabolic Panel  -     CBC & Differential  -     Lipid Panel With / Chol / HDL Ratio  -     TSH    2. Essential hypertension  -     Comprehensive Metabolic Panel  -     CBC & Differential  -     Lipid Panel With / Chol / HDL Ratio  -     TSH    3. Other hyperlipidemia  -     Comprehensive Metabolic Panel  -     CBC & Differential  -     Lipid Panel With / Chol / HDL Ratio  -     TSH    4. Acquired hypothyroidism  -     Comprehensive Metabolic Panel  -     CBC & Differential  -     Lipid Panel With / Chol / HDL Ratio  -     TSH    Continue diet and exercise.  Check blood pressures regularly.  Continue taking aspirin, atenolol, Norvasc, hydrochlorothiazide.  Check blood pressure at home.  All problems are chronic and stable.  I will see her back in 3 months.  Awaiting labs.  EHR dragon/transcription disclaimer:  Part of this note are created by electronic transcription/translation of spoken language to printed text and thus may lead to erroneous, or at times, nonsensical words or phrases inadvertently transcribed.  Although I have reviewed for such errors, some may still exist.

## 2022-05-19 LAB
ALBUMIN SERPL-MCNC: 4.9 G/DL (ref 3.6–4.6)
ALBUMIN/GLOB SERPL: 2.5 {RATIO} (ref 1.2–2.2)
ALP SERPL-CCNC: 77 IU/L (ref 44–121)
ALT SERPL-CCNC: 16 IU/L (ref 0–32)
AST SERPL-CCNC: 26 IU/L (ref 0–40)
BASOPHILS # BLD AUTO: 0 X10E3/UL (ref 0–0.2)
BASOPHILS NFR BLD AUTO: 1 %
BILIRUB SERPL-MCNC: 0.6 MG/DL (ref 0–1.2)
BUN SERPL-MCNC: 11 MG/DL (ref 8–27)
BUN/CREAT SERPL: 13 (ref 12–28)
CALCIUM SERPL-MCNC: 9.4 MG/DL (ref 8.7–10.3)
CHLORIDE SERPL-SCNC: 88 MMOL/L (ref 96–106)
CHOLEST SERPL-MCNC: 213 MG/DL (ref 100–199)
CHOLEST/HDLC SERPL: 3.8 RATIO (ref 0–4.4)
CO2 SERPL-SCNC: 26 MMOL/L (ref 20–29)
CREAT SERPL-MCNC: 0.84 MG/DL (ref 0.57–1)
EGFRCR SERPLBLD CKD-EPI 2021: 70 ML/MIN/1.73
EOSINOPHIL # BLD AUTO: 0.1 X10E3/UL (ref 0–0.4)
EOSINOPHIL NFR BLD AUTO: 1 %
ERYTHROCYTE [DISTWIDTH] IN BLOOD BY AUTOMATED COUNT: 11.8 % (ref 11.7–15.4)
GLOBULIN SER CALC-MCNC: 2 G/DL (ref 1.5–4.5)
GLUCOSE SERPL-MCNC: 89 MG/DL (ref 65–99)
HCT VFR BLD AUTO: 36.4 % (ref 34–46.6)
HDLC SERPL-MCNC: 56 MG/DL
HGB BLD-MCNC: 12.3 G/DL (ref 11.1–15.9)
IMM GRANULOCYTES # BLD AUTO: 0 X10E3/UL (ref 0–0.1)
IMM GRANULOCYTES NFR BLD AUTO: 0 %
LDLC SERPL CALC-MCNC: 136 MG/DL (ref 0–99)
LYMPHOCYTES # BLD AUTO: 1.7 X10E3/UL (ref 0.7–3.1)
LYMPHOCYTES NFR BLD AUTO: 25 %
MCH RBC QN AUTO: 31.5 PG (ref 26.6–33)
MCHC RBC AUTO-ENTMCNC: 33.8 G/DL (ref 31.5–35.7)
MCV RBC AUTO: 93 FL (ref 79–97)
MONOCYTES # BLD AUTO: 0.7 X10E3/UL (ref 0.1–0.9)
MONOCYTES NFR BLD AUTO: 10 %
NEUTROPHILS # BLD AUTO: 4.2 X10E3/UL (ref 1.4–7)
NEUTROPHILS NFR BLD AUTO: 63 %
PLATELET # BLD AUTO: 239 X10E3/UL (ref 150–450)
POTASSIUM SERPL-SCNC: 3.6 MMOL/L (ref 3.5–5.2)
PROT SERPL-MCNC: 6.9 G/DL (ref 6–8.5)
RBC # BLD AUTO: 3.91 X10E6/UL (ref 3.77–5.28)
SODIUM SERPL-SCNC: 131 MMOL/L (ref 134–144)
TRIGL SERPL-MCNC: 117 MG/DL (ref 0–149)
TSH SERPL DL<=0.005 MIU/L-ACNC: 2.8 UIU/ML (ref 0.45–4.5)
VLDLC SERPL CALC-MCNC: 21 MG/DL (ref 5–40)
WBC # BLD AUTO: 6.7 X10E3/UL (ref 3.4–10.8)

## 2022-05-26 RX ORDER — HYDROCHLOROTHIAZIDE 25 MG/1
TABLET ORAL
Qty: 30 TABLET | Refills: 0 | Status: SHIPPED | OUTPATIENT
Start: 2022-05-26 | End: 2022-06-27

## 2022-05-26 RX ORDER — AMLODIPINE BESYLATE 5 MG/1
TABLET ORAL
Qty: 30 TABLET | Refills: 0 | Status: SHIPPED | OUTPATIENT
Start: 2022-05-26 | End: 2022-06-27

## 2022-06-16 ENCOUNTER — OFFICE VISIT (OUTPATIENT)
Dept: FAMILY MEDICINE CLINIC | Facility: CLINIC | Age: 82
End: 2022-06-16

## 2022-06-16 ENCOUNTER — TRANSCRIBE ORDERS (OUTPATIENT)
Dept: ADMINISTRATIVE | Facility: HOSPITAL | Age: 82
End: 2022-06-16

## 2022-06-16 VITALS
DIASTOLIC BLOOD PRESSURE: 70 MMHG | WEIGHT: 132.2 LBS | HEIGHT: 61 IN | OXYGEN SATURATION: 97 % | SYSTOLIC BLOOD PRESSURE: 120 MMHG | HEART RATE: 68 BPM | BODY MASS INDEX: 24.96 KG/M2 | TEMPERATURE: 97.3 F | RESPIRATION RATE: 12 BRPM

## 2022-06-16 DIAGNOSIS — Z13.6 ENCOUNTER FOR SCREENING FOR VASCULAR DISEASE: Primary | ICD-10-CM

## 2022-06-16 DIAGNOSIS — E03.9 ACQUIRED HYPOTHYROIDISM: ICD-10-CM

## 2022-06-16 DIAGNOSIS — Z12.31 VISIT FOR SCREENING MAMMOGRAM: Primary | ICD-10-CM

## 2022-06-16 PROCEDURE — 1170F FXNL STATUS ASSESSED: CPT | Performed by: INTERNAL MEDICINE

## 2022-06-16 PROCEDURE — G0439 PPPS, SUBSEQ VISIT: HCPCS | Performed by: INTERNAL MEDICINE

## 2022-06-16 PROCEDURE — 1159F MED LIST DOCD IN RCRD: CPT | Performed by: INTERNAL MEDICINE

## 2022-06-16 NOTE — PROGRESS NOTES
The ABCs of the Annual Wellness Visit  Initial Medicare Wellness Visit    Chief Complaint   Patient presents with   • Hypertension   • Anxiety   • Statin intolerance     Subjective   History of Present Illness:  Nguyen Zhang is a 82 y.o. female who presents for an Initial Medicare Wellness Visit.    The following portions of the patient's history were reviewed and   updated as appropriate: allergies, current medications, past family history, past medical history, past social history, past surgical history and problem list.     Compared to one year ago, the patient feels her physical   health is the same.    Compared to one year ago, the patient feels her mental   health is the same.    Recent Hospitalizations:  She was not admitted to the hospital during the last year.       Current Medical Providers:  Patient Care Team:  Chelsie Rai MD as PCP - General (Internal Medicine)    Outpatient Medications Prior to Visit   Medication Sig Dispense Refill   • Acetaminophen (TYLENOL ARTHRITIS PAIN PO) Take 500 mg by mouth Daily.     • ALPRAZolam (Xanax) 0.25 MG tablet Take 1 tablet by mouth At Night As Needed for Anxiety. (Patient taking differently: Take 0.25 mg by mouth At Night As Needed for Anxiety or Sleep.) 10 tablet 0   • amLODIPine (NORVASC) 5 MG tablet TAKE 1 TABLET BY MOUTH EVERY EVENING. 30 tablet 0   • aspirin 325 MG tablet Take 325 mg by mouth Daily. Takes daily w/tylenol for pain takes half a tablet     • atenolol (TENORMIN) 50 MG tablet TAKE 1 TABLET BY MOUTH 2 TIMES A DAY. 60 tablet 5   • Bioflavonoid Products (DOROTEO C PO) Take 500 mg by mouth Daily.     • Cholecalciferol (VITAMIN D3) 1000 units capsule Take 2 capsules by mouth Daily.     • cyclobenzaprine (FLEXERIL) 5 MG tablet Take 5 mg by mouth 3 (Three) Times a Day As Needed for Muscle Spasms.     • hydroCHLOROthiazide (HYDRODIURIL) 25 MG tablet TAKE 1 TABLET BY MOUTH DAILY. 30 tablet 0   • levothyroxine (SYNTHROID, LEVOTHROID) 75 MCG tablet  "TAKE 1 TABLET BY MOUTH DAILY. 90 tablet 3   • losartan (COZAAR) 100 MG tablet TAKE 1 TABLET BY MOUTH DAILY. 90 tablet 3   • meloxicam (MOBIC) 7.5 MG tablet Take 1 tablet by mouth Daily. 30 tablet 3   • methylPREDNISolone (MEDROL) 4 MG dose pack Take as directed on package instructions. 1 each 0   • multivitamin-minerals (CENTRUM) tablet Take 1 tablet by mouth Daily.     • NIACIN PO Take 500 mg by mouth Daily.     • potassium chloride 10 MEQ CR tablet TAKE 1 TABLET BY MOUTH DAILY. 90 tablet 3   • vitamin E 400 UNIT capsule Take 400 Units by mouth Daily.       No facility-administered medications prior to visit.       No opioid medication identified on active medication list. I have reviewed chart for other potential  high risk medication/s and harmful drug interactions in the elderly.          Aspirin is on active medication list. Aspirin use is indicated based on review of current medical condition/s. Pros and cons of this therapy have been discussed today. Benefits of this medication outweigh potential harm.  Patient has been encouraged to continue taking this medication.  .      Patient Active Problem List   Diagnosis   • Precordial chest pain   • PVCs (premature ventricular contractions)   • Essential hypertension   • Acquired hypothyroidism   • Low serum potassium level   • Other hyperlipidemia   • Acute pain of left shoulder   • Osteoarthritis of multiple joints   • Statin intolerance     Advance Care Planning  Advance Directive is not on file.  ACP discussion was held with the patient during this visit. Patient does not have an advance directive, information provided.          Objective       Vitals:    06/16/22 1011   BP: 120/70   BP Location: Left arm   Patient Position: Sitting   Cuff Size: Large Adult   Pulse: 68   Resp: 12   Temp: 97.3 °F (36.3 °C)   TempSrc: Temporal   SpO2: 97%   Weight: 60 kg (132 lb 3.2 oz)   Height: 154 cm (60.63\")  Comment: pt repoted     Estimated body mass index is 25.28 kg/m² as " "calculated from the following:    Height as of this encounter: 154 cm (60.63\").    Weight as of this encounter: 60 kg (132 lb 3.2 oz).    BMI is >= 25 and <30. (Overweight) The following options were offered after discussion;: none (medical contraindication)      Does the patient have evidence of cognitive impairment? No    Physical Exam  Lab Results   Component Value Date    CHLPL 213 (H) 05/18/2022    TRIG 117 05/18/2022    HDL 56 05/18/2022     (H) 05/18/2022    VLDL 21 05/18/2022          HEALTH RISK ASSESSMENT    Smoking Status:  Social History     Tobacco Use   Smoking Status Never Smoker   Smokeless Tobacco Never Used     Alcohol Consumption:  Social History     Substance and Sexual Activity   Alcohol Use No     Fall Risk Screen:    LY Fall Risk Assessment has not been completed.    Depression Screen:   PHQ-2/PHQ-9 Depression Screening 6/17/2021   Retired PHQ-9 Total Score 0   Retired Total Score 0       Health Habits and Functional and Cognitive Screening:  Functional & Cognitive Status 2/11/2020   Do you have difficulty preparing food and eating? No   Do you have difficulty bathing yourself, getting dressed or grooming yourself? No   Do you have difficulty using the toilet? No   Do you have difficulty moving around from place to place? No   Do you have trouble with steps or getting out of a bed or a chair? No   Current Diet Well Balanced Diet   Dental Exam Up to date   Eye Exam Up to date   Exercise (times per week) 3 times per week   Current Exercise Activities Include Walking   Do you need help using the phone?  No   Are you deaf or do you have serious difficulty hearing?  No   Do you need help with transportation? No   Do you need help shopping? No   Do you need help preparing meals?  No   Do you need help with housework?  No   Do you need help with laundry? No   Do you need help taking your medications? No   Do you need help managing money? No   Do you ever drive or ride in a car without " wearing a seat belt? No   Have you felt unusual stress, anger or loneliness in the last month? No   Who do you live with? Spouse   If you need help, do you have trouble finding someone available to you? No   Have you been bothered in the last four weeks by sexual problems? No   Do you have difficulty concentrating, remembering or making decisions? No       Age-appropriate Screening Schedule:  Refer to the list below for future screening recommendations based on patient's age, sex and/or medical conditions. Orders for these recommended tests are listed in the plan section. The patient has been provided with a written plan.    Health Maintenance   Topic Date Due   • DXA SCAN  Never done   • ZOSTER VACCINE (1 of 2) 11/12/2022 (Originally 2/18/1990)   • INFLUENZA VACCINE  10/01/2022   • LIPID PANEL  05/18/2023   • TDAP/TD VACCINES (3 - Tdap) 11/05/2029            Assessment & Plan   CMS Preventative Services Quick Reference  Risk Factors Identified During Encounter  Fall Risk-High or Moderate  The above risks/problems have been discussed with the patient.  Follow up actions/plans if indicated are seen below in the Assessment/Plan Section.  Pertinent information has been shared with the patient in the After Visit Summary.    There are no diagnoses linked to this encounter.    Follow Up:  Return for Next scheduled follow up.     An After Visit Summary and PPPS were made available to the patient.               Discussed with patient keep pathways lighted and clear.  Continue diet and exercise.  Continue medication.  Keep follow-up.  Flu shot every year, pneumonia shot every 5 to 10 years.  Tetanus every 5 to 10 years.  Mammography every year, colonoscopy as scheduled.  Keep regular follow-up

## 2022-06-23 ENCOUNTER — HOSPITAL ENCOUNTER (OUTPATIENT)
Dept: MAMMOGRAPHY | Facility: HOSPITAL | Age: 82
Discharge: HOME | End: 2022-06-23
Admitting: INTERNAL MEDICINE

## 2022-06-23 DIAGNOSIS — Z12.31 VISIT FOR SCREENING MAMMOGRAM: ICD-10-CM

## 2022-06-23 PROCEDURE — 77067 SCR MAMMO BI INCL CAD: CPT

## 2022-06-23 PROCEDURE — 77063 BREAST TOMOSYNTHESIS BI: CPT

## 2022-06-27 RX ORDER — HYDROCHLOROTHIAZIDE 25 MG/1
TABLET ORAL
Qty: 30 TABLET | Refills: 0 | Status: SHIPPED | OUTPATIENT
Start: 2022-06-27 | End: 2022-07-19

## 2022-06-27 RX ORDER — AMLODIPINE BESYLATE 5 MG/1
TABLET ORAL
Qty: 30 TABLET | Refills: 0 | Status: SHIPPED | OUTPATIENT
Start: 2022-06-27 | End: 2022-08-16

## 2022-07-19 RX ORDER — HYDROCHLOROTHIAZIDE 25 MG/1
TABLET ORAL
Qty: 30 TABLET | Refills: 0 | Status: SHIPPED | OUTPATIENT
Start: 2022-07-19 | End: 2022-08-26

## 2022-07-19 RX ORDER — ATENOLOL 50 MG/1
TABLET ORAL
Qty: 60 TABLET | Refills: 0 | Status: SHIPPED | OUTPATIENT
Start: 2022-07-19 | End: 2022-08-16

## 2022-08-16 RX ORDER — ATENOLOL 50 MG/1
TABLET ORAL
Qty: 60 TABLET | Refills: 0 | Status: SHIPPED | OUTPATIENT
Start: 2022-08-16 | End: 2022-08-26

## 2022-08-16 RX ORDER — AMLODIPINE BESYLATE 5 MG/1
TABLET ORAL
Qty: 30 TABLET | Refills: 0 | Status: SHIPPED | OUTPATIENT
Start: 2022-08-16 | End: 2022-09-15

## 2022-08-23 ENCOUNTER — OFFICE VISIT (OUTPATIENT)
Dept: FAMILY MEDICINE CLINIC | Facility: CLINIC | Age: 82
End: 2022-08-23

## 2022-08-23 VITALS
RESPIRATION RATE: 18 BRPM | SYSTOLIC BLOOD PRESSURE: 138 MMHG | TEMPERATURE: 97.8 F | DIASTOLIC BLOOD PRESSURE: 82 MMHG | OXYGEN SATURATION: 98 % | HEIGHT: 60 IN | HEART RATE: 69 BPM | BODY MASS INDEX: 25.52 KG/M2 | WEIGHT: 130 LBS

## 2022-08-23 DIAGNOSIS — E03.9 ACQUIRED HYPOTHYROIDISM: ICD-10-CM

## 2022-08-23 DIAGNOSIS — Z78.9 STATIN INTOLERANCE: Primary | ICD-10-CM

## 2022-08-23 DIAGNOSIS — E78.49 OTHER HYPERLIPIDEMIA: ICD-10-CM

## 2022-08-23 DIAGNOSIS — M15.9 PRIMARY OSTEOARTHRITIS INVOLVING MULTIPLE JOINTS: ICD-10-CM

## 2022-08-23 DIAGNOSIS — I10 ESSENTIAL HYPERTENSION: ICD-10-CM

## 2022-08-23 PROCEDURE — 99397 PER PM REEVAL EST PAT 65+ YR: CPT | Performed by: INTERNAL MEDICINE

## 2022-08-23 PROCEDURE — 1159F MED LIST DOCD IN RCRD: CPT | Performed by: INTERNAL MEDICINE

## 2022-08-23 PROCEDURE — 99214 OFFICE O/P EST MOD 30 MIN: CPT | Performed by: INTERNAL MEDICINE

## 2022-08-23 PROCEDURE — 1170F FXNL STATUS ASSESSED: CPT | Performed by: INTERNAL MEDICINE

## 2022-08-23 NOTE — PROGRESS NOTES
Subjective   Nguyen Zhang is a 82 y.o. female.     Chief Complaint   Patient presents with   • Hypertension     Due for AWV   • Hypothyroidism   Hyperlipidemia, DJD    History of Present Illness   Patient here follow-up for hypertension, hyperlipidemia, hypothyroidism, DJD.  Denies any chest pain or shortness of breath.  No nausea vomiting.  Went through the current medications.  Per patient blood pressure is okay at home.  The following portions of the patient's history were reviewed and updated as appropriate: allergies, current medications, past family history, past medical history, past social history, past surgical history and problem list.    Review of Systems   Constitutional: Negative for activity change, appetite change, fatigue and fever.   Eyes: Negative for blurred vision and double vision.   Respiratory: Negative for shortness of breath.    Cardiovascular: Negative for chest pain, palpitations and leg swelling.   Gastrointestinal: Negative.    Genitourinary: Negative for hematuria.   Musculoskeletal: Positive for arthralgias.   Neurological: Negative for dizziness, syncope, light-headedness and headache.   Psychiatric/Behavioral: Negative for sleep disturbance and stress.       Allergies   Allergen Reactions   • Ace Inhibitors Cough   • Codeine Nausea And Vomiting       Current Outpatient Medications on File Prior to Visit   Medication Sig Dispense Refill   • Acetaminophen (TYLENOL ARTHRITIS PAIN PO) Take 500 mg by mouth Daily.     • amLODIPine (NORVASC) 5 MG tablet TAKE 1 TABLET BY MOUTH EVERY EVENING. 30 tablet 0   • aspirin 325 MG tablet Take 325 mg by mouth Daily. Takes daily w/tylenol for pain takes half a tablet     • atenolol (TENORMIN) 50 MG tablet TAKE 1 TABLET BY MOUTH 2 TIMES A DAY. 60 tablet 0   • Bioflavonoid Products (DOROTEO C PO) Take 500 mg by mouth Daily.     • Cholecalciferol (VITAMIN D3) 1000 units capsule Take 2 capsules by mouth Daily.     • hydroCHLOROthiazide (HYDRODIURIL) 25  MG tablet TAKE 1 TABLET BY MOUTH DAILY. 30 tablet 0   • levothyroxine (SYNTHROID, LEVOTHROID) 75 MCG tablet TAKE 1 TABLET BY MOUTH DAILY. 90 tablet 3   • losartan (COZAAR) 100 MG tablet TAKE 1 TABLET BY MOUTH DAILY. 90 tablet 3   • methylPREDNISolone (MEDROL) 4 MG dose pack Take as directed on package instructions. 1 each 0   • multivitamin-minerals (CENTRUM) tablet Take 1 tablet by mouth Daily.     • NIACIN PO Take 500 mg by mouth Daily.     • potassium chloride 10 MEQ CR tablet TAKE 1 TABLET BY MOUTH DAILY. 90 tablet 3   • vitamin E 400 UNIT capsule Take 400 Units by mouth Daily.     • ALPRAZolam (Xanax) 0.25 MG tablet Take 1 tablet by mouth At Night As Needed for Anxiety. (Patient taking differently: Take 0.25 mg by mouth At Night As Needed for Anxiety or Sleep.) 10 tablet 0   • cyclobenzaprine (FLEXERIL) 5 MG tablet Take 5 mg by mouth 3 (Three) Times a Day As Needed for Muscle Spasms.     • meloxicam (MOBIC) 7.5 MG tablet Take 1 tablet by mouth Daily. 30 tablet 3     No current facility-administered medications on file prior to visit.       Family History   Problem Relation Age of Onset   • Heart attack Father    • Heart disease Father    • Breast cancer Neg Hx    • Malig Hyperthermia Neg Hx        Past Medical History:   Diagnosis Date   • Arthritis    • Hyperlipidemia    • Hypertension    • Hypothyroid    • Neck pain    • PONV (postoperative nausea and vomiting)    • Shoulder pain, left     sched total shoulder   • Spinal headache        Past Surgical History:   Procedure Laterality Date   • COLONOSCOPY     • HYSTERECTOMY     • REPLACEMENT TOTAL KNEE Right    • SHOULDER SURGERY Right     tendon repair   • TONSILLECTOMY     • TOTAL SHOULDER ARTHROPLASTY W/ DISTAL CLAVICLE EXCISION Left 10/15/2021    Procedure: TOTAL SHOULDER REVERSE ARTHROPLASTY;  Surgeon: Scout Palmer MD;  Location: [facility];  Service: Orthopedics;  Laterality: Left;       Social History     Socioeconomic History   • Marital status:  "   Tobacco Use   • Smoking status: Never Smoker   • Smokeless tobacco: Never Used   Vaping Use   • Vaping Use: Never used   Substance and Sexual Activity   • Alcohol use: No   • Drug use: No   • Sexual activity: Defer       Patient Active Problem List   Diagnosis   • Precordial chest pain   • PVCs (premature ventricular contractions)   • Essential hypertension   • Acquired hypothyroidism   • Low serum potassium level   • Other hyperlipidemia   • Acute pain of left shoulder   • Osteoarthritis of multiple joints   • Statin intolerance       /82 (BP Location: Left arm, Patient Position: Sitting, Cuff Size: Adult)   Pulse 69   Temp 97.8 °F (36.6 °C)   Resp 18   Ht 152.4 cm (60\")   Wt 59 kg (130 lb)   SpO2 98%   BMI 25.39 kg/m²   Body mass index is 25.39 kg/m².    Objective   Physical Exam  Vitals and nursing note reviewed.   Constitutional:       Appearance: She is well-developed.   Eyes:      Pupils: Pupils are equal, round, and reactive to light.   Neck:      Thyroid: No thyromegaly.      Vascular: No JVD.      Trachea: No tracheal deviation.   Cardiovascular:      Rate and Rhythm: Normal rate and regular rhythm.      Heart sounds: No murmur heard.  Pulmonary:      Effort: Pulmonary effort is normal. No respiratory distress.      Breath sounds: Normal breath sounds. No wheezing.   Abdominal:      General: Bowel sounds are normal. There is no distension.      Palpations: Abdomen is soft. There is no mass.      Tenderness: There is no abdominal tenderness. There is no right CVA tenderness, left CVA tenderness, guarding or rebound.      Hernia: No hernia is present.   Musculoskeletal:      Cervical back: Normal range of motion and neck supple.   Lymphadenopathy:      Cervical: No cervical adenopathy.   Neurological:      General: No focal deficit present.      Mental Status: She is alert and oriented to person, place, and time. Mental status is at baseline.   Psychiatric:         Mood and Affect: Mood " normal.         Behavior: Behavior normal.           Assessment & Plan   Diagnoses and all orders for this visit:    1. Statin intolerance (Primary)  -     Lipid Panel With / Chol / HDL Ratio  -     Comprehensive Metabolic Panel  -     TSH  -     T4, Free    2. Essential hypertension  -     Lipid Panel With / Chol / HDL Ratio  -     Comprehensive Metabolic Panel  -     TSH  -     T4, Free    3. Other hyperlipidemia  -     Lipid Panel With / Chol / HDL Ratio  -     Comprehensive Metabolic Panel  -     TSH  -     T4, Free    4. Acquired hypothyroidism  -     Lipid Panel With / Chol / HDL Ratio  -     Comprehensive Metabolic Panel  -     TSH  -     T4, Free    5. Primary osteoarthritis involving multiple joints  -     Lipid Panel With / Chol / HDL Ratio  -     Comprehensive Metabolic Panel  -     TSH  -     T4, Free    Continue diet and exercise.  Check blood pressure at home.  Has no problems with Synthroid.  Taking Norvasc, aspirin, Tenormin.  Cannot tolerate the statins.  All her problems are chronic and stable.  I will see her back in 3 months.  Awaiting labs.  EHR dragon/transcription disclaimer:  Part of this note are created by electronic transcription/translation of spoken language to printed text and thus may lead to erroneous, or at times, nonsensical words or phrases inadvertently transcribed.  Although I have reviewed for such errors, some may still exist.

## 2022-08-23 NOTE — PROGRESS NOTES
The ABCs of the Annual Wellness Visit  Initial Medicare Wellness Visit    Chief Complaint   Patient presents with   • Hypertension     Due for AWV   • Hypothyroidism     Subjective   History of Present Illness:  Nguyen Zhang is a 82 y.o. female who presents for an Initial Medicare Wellness Visit.    The following portions of the patient's history were reviewed and   updated as appropriate: allergies, current medications, past family history, past medical history, past social history, past surgical history and problem list.     Compared to one year ago, the patient feels her physical   health is the same.    Compared to one year ago, the patient feels her mental   health is the same.    Recent Hospitalizations:  She was not admitted to the hospital during the last year.       Current Medical Providers:  Patient Care Team:  Chelsie Rai MD as PCP - General (Internal Medicine)    Outpatient Medications Prior to Visit   Medication Sig Dispense Refill   • Acetaminophen (TYLENOL ARTHRITIS PAIN PO) Take 500 mg by mouth Daily.     • amLODIPine (NORVASC) 5 MG tablet TAKE 1 TABLET BY MOUTH EVERY EVENING. 30 tablet 0   • aspirin 325 MG tablet Take 325 mg by mouth Daily. Takes daily w/tylenol for pain takes half a tablet     • atenolol (TENORMIN) 50 MG tablet TAKE 1 TABLET BY MOUTH 2 TIMES A DAY. 60 tablet 0   • Bioflavonoid Products (DOROTEO C PO) Take 500 mg by mouth Daily.     • Cholecalciferol (VITAMIN D3) 1000 units capsule Take 2 capsules by mouth Daily.     • hydroCHLOROthiazide (HYDRODIURIL) 25 MG tablet TAKE 1 TABLET BY MOUTH DAILY. 30 tablet 0   • levothyroxine (SYNTHROID, LEVOTHROID) 75 MCG tablet TAKE 1 TABLET BY MOUTH DAILY. 90 tablet 3   • losartan (COZAAR) 100 MG tablet TAKE 1 TABLET BY MOUTH DAILY. 90 tablet 3   • methylPREDNISolone (MEDROL) 4 MG dose pack Take as directed on package instructions. 1 each 0   • multivitamin-minerals (CENTRUM) tablet Take 1 tablet by mouth Daily.     • NIACIN PO Take 500  "mg by mouth Daily.     • potassium chloride 10 MEQ CR tablet TAKE 1 TABLET BY MOUTH DAILY. 90 tablet 3   • vitamin E 400 UNIT capsule Take 400 Units by mouth Daily.     • ALPRAZolam (Xanax) 0.25 MG tablet Take 1 tablet by mouth At Night As Needed for Anxiety. (Patient taking differently: Take 0.25 mg by mouth At Night As Needed for Anxiety or Sleep.) 10 tablet 0   • cyclobenzaprine (FLEXERIL) 5 MG tablet Take 5 mg by mouth 3 (Three) Times a Day As Needed for Muscle Spasms.     • meloxicam (MOBIC) 7.5 MG tablet Take 1 tablet by mouth Daily. 30 tablet 3     No facility-administered medications prior to visit.       No opioid medication identified on active medication list. I have reviewed chart for other potential  high risk medication/s and harmful drug interactions in the elderly.          Aspirin is on active medication list. Aspirin use is indicated based on review of current medical condition/s. Pros and cons of this therapy have been discussed today. Benefits of this medication outweigh potential harm.  Patient has been encouraged to continue taking this medication.  .      Patient Active Problem List   Diagnosis   • Precordial chest pain   • PVCs (premature ventricular contractions)   • Essential hypertension   • Acquired hypothyroidism   • Low serum potassium level   • Other hyperlipidemia   • Acute pain of left shoulder   • Osteoarthritis of multiple joints   • Statin intolerance     Advance Care Planning  Advance Directive is not on file.  ACP discussion was held with the patient during this visit. Patient has an advance directive (not in EMR), copy requested.          Objective       Vitals:    08/23/22 0827   BP: 138/82   BP Location: Left arm   Patient Position: Sitting   Cuff Size: Adult   Pulse: 69   Resp: 18   Temp: 97.8 °F (36.6 °C)   SpO2: 98%   Weight: 59 kg (130 lb)   Height: 152.4 cm (60\")     Estimated body mass index is 25.39 kg/m² as calculated from the following:    Height as of this " "encounter: 152.4 cm (60\").    Weight as of this encounter: 59 kg (130 lb).    BMI is >= 25 and <30. (Overweight) The following options were offered after discussion;: exercise counseling/recommendations      Does the patient have evidence of cognitive impairment? No    Physical Exam          HEALTH RISK ASSESSMENT    Smoking Status:  Social History     Tobacco Use   Smoking Status Never Smoker   Smokeless Tobacco Never Used     Alcohol Consumption:  Social History     Substance and Sexual Activity   Alcohol Use No     [facility] Risk Screen:    [facility]    Depression Screen:   PHQ-2/PHQ-9 Depression Screening 6/17/2021   Retired PHQ-9 Total Score 0   Retired Total Score 0       Health Habits and Functional and Cognitive Screening:  Functional & Cognitive Status 2/11/2020   Do you have difficulty preparing food and eating? No   Do you have difficulty bathing yourself, getting dressed or grooming yourself? No   Do you have difficulty using the toilet? No   Do you have difficulty moving around from place to place? No   Do you have trouble with steps or getting out of a bed or a chair? No   Current Diet Well Balanced Diet   Dental Exam Up to date   Eye Exam Up to date   Exercise (times per week) 3 times per week   Current Exercise Activities Include Walking   Do you need help using the phone?  No   Are you deaf or do you have serious difficulty hearing?  No   Do you need help with transportation? No   Do you need help shopping? No   Do you need help preparing meals?  No   Do you need help with housework?  No   Do you need help with laundry? No   Do you need help taking your medications? No   Do you need help managing money? No   Do you ever drive or ride in a car without wearing a seat belt? No   Have you felt unusual stress, anger or loneliness in the last month? No   Who do you live with? Spouse   If you need help, do you have trouble finding someone available to you? No   Have you " been bothered in the last four weeks by sexual problems? No   Do you have difficulty concentrating, remembering or making decisions? No       Age-appropriate Screening Schedule:  Refer to the list below for future screening recommendations based on patient's age, sex and/or medical conditions. Orders for these recommended tests are listed in the plan section. The patient has been provided with a written plan.    Health Maintenance   Topic Date Due   • DXA SCAN  Never done   • ZOSTER VACCINE (1 of 2) 11/12/2022 (Originally 2/18/1990)   • INFLUENZA VACCINE  10/01/2022   • LIPID PANEL  05/18/2023   • TDAP/TD VACCINES (3 - Tdap) 11/05/2029            Assessment & Plan   CMS Preventative Services Quick Reference  Risk Factors Identified During Encounter  Fall Risk-High or Moderate  The above risks/problems have been discussed with the patient.  Follow up actions/plans if indicated are seen below in the Assessment/Plan Section.  Pertinent information has been shared with the patient in the After Visit Summary.    Diagnoses and all orders for this visit:    1. Statin intolerance (Primary)  -     Lipid Panel With / Chol / HDL Ratio  -     Comprehensive Metabolic Panel  -     TSH  -     T4, Free    2. Essential hypertension  -     Lipid Panel With / Chol / HDL Ratio  -     Comprehensive Metabolic Panel  -     TSH  -     T4, Free    3. Other hyperlipidemia  -     Lipid Panel With / Chol / HDL Ratio  -     Comprehensive Metabolic Panel  -     TSH  -     T4, Free    4. Acquired hypothyroidism  -     Lipid Panel With / Chol / HDL Ratio  -     Comprehensive Metabolic Panel  -     TSH  -     T4, Free    5. Primary osteoarthritis involving multiple joints  -     Lipid Panel With / Chol / HDL Ratio  -     Comprehensive Metabolic Panel  -     TSH  -     T4, Free        Follow Up:  Return in about 3 months (around 11/23/2022).     An After Visit Summary and PPPS were made available to the patient.               Continue diet and  exercise.  Continue taking all current medicines.  Fall precautions.  Keep pathways lighted and clear.  She has reached a window not to get colonoscopies.  Vaccination status prescribed, flu shot every year, tetanus and pneumonia every 5 to 10 years.  Recommend shingles vaccinations.

## 2022-08-24 LAB
ALBUMIN SERPL-MCNC: 4.9 G/DL (ref 3.6–4.6)
ALBUMIN/GLOB SERPL: 2 {RATIO} (ref 1.2–2.2)
ALP SERPL-CCNC: 86 IU/L (ref 44–121)
ALT SERPL-CCNC: 18 IU/L (ref 0–32)
AST SERPL-CCNC: 28 IU/L (ref 0–40)
BILIRUB SERPL-MCNC: 0.5 MG/DL (ref 0–1.2)
BUN SERPL-MCNC: 12 MG/DL (ref 8–27)
BUN/CREAT SERPL: 14 (ref 12–28)
CALCIUM SERPL-MCNC: 10 MG/DL (ref 8.7–10.3)
CHLORIDE SERPL-SCNC: 95 MMOL/L (ref 96–106)
CHOLEST SERPL-MCNC: 206 MG/DL (ref 100–199)
CHOLEST/HDLC SERPL: 3.4 RATIO (ref 0–4.4)
CO2 SERPL-SCNC: 27 MMOL/L (ref 20–29)
CREAT SERPL-MCNC: 0.85 MG/DL (ref 0.57–1)
EGFRCR-CYS SERPLBLD CKD-EPI 2021: 69 ML/MIN/1.73
GLOBULIN SER CALC-MCNC: 2.4 G/DL (ref 1.5–4.5)
GLUCOSE SERPL-MCNC: 92 MG/DL (ref 65–99)
HDLC SERPL-MCNC: 61 MG/DL
LDLC SERPL CALC-MCNC: 126 MG/DL (ref 0–99)
POTASSIUM SERPL-SCNC: 4 MMOL/L (ref 3.5–5.2)
PROT SERPL-MCNC: 7.3 G/DL (ref 6–8.5)
SODIUM SERPL-SCNC: 138 MMOL/L (ref 134–144)
T4 FREE SERPL-MCNC: 1.61 NG/DL (ref 0.82–1.77)
TRIGL SERPL-MCNC: 109 MG/DL (ref 0–149)
TSH SERPL DL<=0.005 MIU/L-ACNC: 2.57 UIU/ML (ref 0.45–4.5)
VLDLC SERPL CALC-MCNC: 19 MG/DL (ref 5–40)

## 2022-08-26 ENCOUNTER — PATIENT ROUNDING (BHMG ONLY) (OUTPATIENT)
Dept: FAMILY MEDICINE CLINIC | Facility: CLINIC | Age: 82
End: 2022-08-26

## 2022-08-26 RX ORDER — HYDROCHLOROTHIAZIDE 25 MG/1
TABLET ORAL
Qty: 30 TABLET | Refills: 0 | Status: SHIPPED | OUTPATIENT
Start: 2022-08-26 | End: 2022-09-27

## 2022-08-26 RX ORDER — ATENOLOL 50 MG/1
TABLET ORAL
Qty: 60 TABLET | Refills: 0 | Status: SHIPPED | OUTPATIENT
Start: 2022-08-26 | End: 2022-10-18

## 2022-08-26 NOTE — PROGRESS NOTES
A [employer] message has been sent to the patient for Patient Rounding with [facility] [place]

## 2022-09-15 RX ORDER — AMLODIPINE BESYLATE 5 MG/1
TABLET ORAL
Qty: 30 TABLET | Refills: 0 | Status: SHIPPED | OUTPATIENT
Start: 2022-09-15 | End: 2022-10-18

## 2022-09-16 DIAGNOSIS — E03.9 ACQUIRED HYPOTHYROIDISM: ICD-10-CM

## 2022-09-16 RX ORDER — LEVOTHYROXINE SODIUM 0.07 MG/1
75 TABLET ORAL DAILY
Qty: 90 TABLET | Refills: 0 | Status: SHIPPED | OUTPATIENT
Start: 2022-09-16 | End: 2022-11-28

## 2022-09-27 RX ORDER — HYDROCHLOROTHIAZIDE 25 MG/1
TABLET ORAL
Qty: 30 TABLET | Refills: 0 | Status: SHIPPED | OUTPATIENT
Start: 2022-09-27 | End: 2022-10-27

## 2022-10-17 ENCOUNTER — OFFICE VISIT (OUTPATIENT)
Dept: FAMILY MEDICINE CLINIC | Facility: CLINIC | Age: 82
End: 2022-10-17

## 2022-10-17 VITALS
HEART RATE: 74 BPM | SYSTOLIC BLOOD PRESSURE: 110 MMHG | BODY MASS INDEX: 25.13 KG/M2 | DIASTOLIC BLOOD PRESSURE: 72 MMHG | TEMPERATURE: 97.8 F | WEIGHT: 128 LBS | OXYGEN SATURATION: 98 % | HEIGHT: 60 IN | RESPIRATION RATE: 16 BRPM

## 2022-10-17 DIAGNOSIS — M54.50 ACUTE RIGHT-SIDED LOW BACK PAIN WITHOUT SCIATICA: Primary | ICD-10-CM

## 2022-10-17 DIAGNOSIS — E03.9 ACQUIRED HYPOTHYROIDISM: ICD-10-CM

## 2022-10-17 PROCEDURE — 99213 OFFICE O/P EST LOW 20 MIN: CPT | Performed by: INTERNAL MEDICINE

## 2022-10-17 NOTE — PROGRESS NOTES
Subjective   Nguyen Zhang is a 82 y.o. female.     Chief Complaint   Patient presents with   • Hypothyroidism   Low back pain    History of Present Illness   Patient here follow-up for low back pain.  Reports that 3 weeks ago she pulled her back.  Pain is in the right low back.  No radiation no numbness.  She was seen in the ER had x-rays done did not show anything acute.  She was giving muscle relaxant, steroids.  Reports he is somewhat better but still having nagging pain.  Wants to go for physical therapy.  Declined pain medications.  The following portions of the patient's history were reviewed and updated as appropriate: allergies, current medications, past family history, past medical history, past social history, past surgical history and problem list.    Review of Systems   Constitutional: Negative for activity change, appetite change, fatigue and fever.   Eyes: Negative for blurred vision and double vision.   Respiratory: Negative.  Negative for shortness of breath.    Cardiovascular: Negative for chest pain, palpitations and leg swelling.   Gastrointestinal: Negative.    Musculoskeletal: Positive for back pain.   Neurological: Negative for dizziness, syncope, light-headedness and headache.       Allergies   Allergen Reactions   • Ace Inhibitors Cough   • Codeine Nausea And Vomiting       Current Outpatient Medications on File Prior to Visit   Medication Sig Dispense Refill   • Acetaminophen (TYLENOL ARTHRITIS PAIN PO) Take 500 mg by mouth Daily.     • ALPRAZolam (Xanax) 0.25 MG tablet Take 1 tablet by mouth At Night As Needed for Anxiety. (Patient taking differently: Take 1 tablet by mouth At Night As Needed for Anxiety or Sleep.) 10 tablet 0   • amLODIPine (NORVASC) 5 MG tablet TAKE 1 TABLET BY MOUTH EVERY EVENING. 30 tablet 0   • aspirin 325 MG tablet Take 325 mg by mouth Daily. Takes daily w/tylenol for pain takes half a tablet     • atenolol (TENORMIN) 50 MG tablet TAKE 1 TABLET BY MOUTH 2 TIMES  A DAY. 60 tablet 0   • Bioflavonoid Products (DOROTEO C PO) Take 500 mg by mouth Daily.     • Cholecalciferol (VITAMIN D3) 1000 units capsule Take 2 capsules by mouth Daily.     • hydroCHLOROthiazide (HYDRODIURIL) 25 MG tablet TAKE 1 TABLET BY MOUTH DAILY. 30 tablet 0   • levothyroxine (SYNTHROID, LEVOTHROID) 75 MCG tablet TAKE 1 TABLET BY MOUTH DAILY. 90 tablet 0   • losartan (COZAAR) 100 MG tablet TAKE 1 TABLET BY MOUTH DAILY. 90 tablet 3   • meloxicam (MOBIC) 7.5 MG tablet Take 1 tablet by mouth Daily. 30 tablet 3   • methylPREDNISolone (MEDROL) 4 MG dose pack Take as directed on package instructions. 21 tablet 0   • multivitamin-minerals (CENTRUM) tablet Take 1 tablet by mouth Daily.     • NIACIN PO Take 500 mg by mouth Daily.     • potassium chloride 10 MEQ CR tablet TAKE 1 TABLET BY MOUTH DAILY. 90 tablet 3   • vitamin E 400 UNIT capsule Take 400 Units by mouth Daily.       No current facility-administered medications on file prior to visit.       Family History   Problem Relation Age of Onset   • Heart attack Father    • Heart disease Father    • Breast cancer Neg Hx    • Malig Hyperthermia Neg Hx        Past Medical History:   Diagnosis Date   • Arthritis    • Hyperlipidemia    • Hypertension    • Hypothyroid    • Neck pain    • PONV (postoperative nausea and vomiting)    • Shoulder pain, left     sched total shoulder   • Spinal headache        Past Surgical History:   Procedure Laterality Date   • COLONOSCOPY     • HYSTERECTOMY     • REPLACEMENT TOTAL KNEE Right    • SHOULDER SURGERY Right     tendon repair   • TONSILLECTOMY     • TOTAL SHOULDER ARTHROPLASTY W/ DISTAL CLAVICLE EXCISION Left 10/15/2021    Procedure: TOTAL SHOULDER REVERSE ARTHROPLASTY;  Surgeon: Scout Palmer MD;  Location: [facility];  Service: Orthopedics;  Laterality: Left;       Social History     Socioeconomic History   • Marital status:    Tobacco Use   • Smoking status: Never   • Smokeless tobacco: Never   Vaping Use   •  "Vaping Use: Never used   Substance and Sexual Activity   • Alcohol use: No   • Drug use: No   • Sexual activity: Defer       Patient Active Problem List   Diagnosis   • Precordial chest pain   • PVCs (premature ventricular contractions)   • Essential hypertension   • Acquired hypothyroidism   • Low serum potassium level   • Other hyperlipidemia   • Acute pain of left shoulder   • Osteoarthritis of multiple joints   • Statin intolerance       /72 (BP Location: Left arm, Patient Position: Sitting, Cuff Size: Large Adult)   Pulse 74   Temp 97.8 °F (36.6 °C)   Resp 16   Ht 152.4 cm (60\")   Wt 58.1 kg (128 lb)   SpO2 98%   BMI 25.00 kg/m²   Body mass index is 25 kg/m².    Objective   Physical Exam  Vitals and nursing note reviewed.   Constitutional:       Appearance: She is well-developed.   Eyes:      Pupils: Pupils are equal, round, and reactive to light.   Neck:      Thyroid: No thyromegaly.      Vascular: No JVD.      Trachea: No tracheal deviation.   Cardiovascular:      Rate and Rhythm: Normal rate and regular rhythm.      Heart sounds: No murmur heard.  Pulmonary:      Effort: Pulmonary effort is normal. No respiratory distress.      Breath sounds: Normal breath sounds. No wheezing.   Abdominal:      General: Bowel sounds are normal.      Palpations: Abdomen is soft.   Musculoskeletal:         General: Tenderness present.      Cervical back: Normal range of motion and neck supple.      Comments: Tender in L1-L5 right paraspinal muscles.  DTR 2+, motor 5/5.   Lymphadenopathy:      Cervical: No cervical adenopathy.   Neurological:      General: No focal deficit present.      Mental Status: She is alert and oriented to person, place, and time.           Assessment & Plan   Diagnoses and all orders for this visit:    1. Acute right-sided low back pain without sciatica (Primary)  -     Ambulatory Referral to Physical Therapy Evaluate and treat    2. Acquired hypothyroidism    Continue diet and exercise.  " Continue taking muscle relaxant.  Physical therapy referral.  Return for regular appointment.  Medications reviewed.  Earlier if no better.  Continue Synthroid.  EHR dragon/transcription disclaimer:  Part of this note are created by electronic transcription/translation of spoken language to printed text and thus may lead to erroneous, or at times, nonsensical words or phrases inadvertently transcribed.  Although I have reviewed for such errors, some may still exist.

## 2022-10-18 RX ORDER — ATENOLOL 50 MG/1
TABLET ORAL
Qty: 60 TABLET | Refills: 0 | Status: SHIPPED | OUTPATIENT
Start: 2022-10-18 | End: 2022-11-15

## 2022-10-18 RX ORDER — AMLODIPINE BESYLATE 5 MG/1
TABLET ORAL
Qty: 30 TABLET | Refills: 0 | Status: SHIPPED | OUTPATIENT
Start: 2022-10-18 | End: 2022-11-15

## 2022-10-27 RX ORDER — HYDROCHLOROTHIAZIDE 25 MG/1
TABLET ORAL
Qty: 30 TABLET | Refills: 0 | Status: SHIPPED | OUTPATIENT
Start: 2022-10-27 | End: 2022-11-15

## 2022-10-28 ENCOUNTER — APPOINTMENT (OUTPATIENT)
Dept: CARDIOLOGY | Facility: HOSPITAL | Age: 82
End: 2022-10-28

## 2022-10-31 ENCOUNTER — APPOINTMENT (OUTPATIENT)
Dept: CARDIOLOGY | Facility: HOSPITAL | Age: 82
End: 2022-10-31

## 2022-11-15 RX ORDER — AMLODIPINE BESYLATE 5 MG/1
TABLET ORAL
Qty: 30 TABLET | Refills: 0 | Status: SHIPPED | OUTPATIENT
Start: 2022-11-15 | End: 2022-12-12

## 2022-11-15 RX ORDER — ATENOLOL 50 MG/1
TABLET ORAL
Qty: 60 TABLET | Refills: 0 | Status: SHIPPED | OUTPATIENT
Start: 2022-11-15 | End: 2022-12-12

## 2022-11-15 RX ORDER — HYDROCHLOROTHIAZIDE 25 MG/1
TABLET ORAL
Qty: 30 TABLET | Refills: 0 | Status: SHIPPED | OUTPATIENT
Start: 2022-11-15 | End: 2022-12-22

## 2022-11-28 DIAGNOSIS — E03.9 ACQUIRED HYPOTHYROIDISM: ICD-10-CM

## 2022-11-28 RX ORDER — POTASSIUM CHLORIDE 750 MG/1
TABLET, FILM COATED, EXTENDED RELEASE ORAL
Qty: 90 TABLET | Refills: 0 | Status: SHIPPED | OUTPATIENT
Start: 2022-11-28 | End: 2023-02-24

## 2022-11-28 RX ORDER — LEVOTHYROXINE SODIUM 0.07 MG/1
75 TABLET ORAL DAILY
Qty: 90 TABLET | Refills: 0 | Status: SHIPPED | OUTPATIENT
Start: 2022-11-28 | End: 2023-03-13

## 2022-11-30 ENCOUNTER — OFFICE VISIT (OUTPATIENT)
Dept: FAMILY MEDICINE CLINIC | Facility: CLINIC | Age: 82
End: 2022-11-30

## 2022-11-30 VITALS
RESPIRATION RATE: 16 BRPM | OXYGEN SATURATION: 97 % | WEIGHT: 130 LBS | BODY MASS INDEX: 25.52 KG/M2 | SYSTOLIC BLOOD PRESSURE: 128 MMHG | HEART RATE: 68 BPM | DIASTOLIC BLOOD PRESSURE: 78 MMHG | HEIGHT: 60 IN | TEMPERATURE: 97.6 F

## 2022-11-30 DIAGNOSIS — Z78.9 STATIN INTOLERANCE: Primary | ICD-10-CM

## 2022-11-30 DIAGNOSIS — E78.49 OTHER HYPERLIPIDEMIA: ICD-10-CM

## 2022-11-30 DIAGNOSIS — E03.9 ACQUIRED HYPOTHYROIDISM: ICD-10-CM

## 2022-11-30 DIAGNOSIS — I10 ESSENTIAL HYPERTENSION: ICD-10-CM

## 2022-11-30 PROCEDURE — 99214 OFFICE O/P EST MOD 30 MIN: CPT | Performed by: INTERNAL MEDICINE

## 2022-11-30 PROCEDURE — G0008 ADMIN INFLUENZA VIRUS VAC: HCPCS | Performed by: INTERNAL MEDICINE

## 2022-11-30 PROCEDURE — 90662 IIV NO PRSV INCREASED AG IM: CPT | Performed by: INTERNAL MEDICINE

## 2022-11-30 NOTE — PROGRESS NOTES
Subjective   Nguyen Zhang is a 82 y.o. female.     Chief Complaint   Patient presents with   • Hypothyroidism     Hypertension hyperlipidemia statin intolerance  History of Present Illness   Patient here follow-up for hypothyroidism, hypertension, hyperlipidemia, statin intolerance.  Denies any chest pain shortness of breath.  No nausea vomiting.  Taking all current medications.  Reports blood pressure stable at home.  The following portions of the patient's history were reviewed and updated as appropriate: allergies, current medications, past family history, past medical history, past social history, past surgical history and problem list.    Review of Systems   Constitutional: Negative for activity change, appetite change, fatigue and fever.   Eyes: Negative for blurred vision and double vision.   Respiratory: Negative.  Negative for shortness of breath.    Cardiovascular: Negative for chest pain, palpitations and leg swelling.   Gastrointestinal: Negative.    Genitourinary: Negative for hematuria.   Neurological: Negative for dizziness, syncope, light-headedness and headache.   Psychiatric/Behavioral: Negative for agitation, behavioral problems and suicidal ideas.       Allergies   Allergen Reactions   • Ace Inhibitors Cough   • Codeine Nausea And Vomiting       Current Outpatient Medications on File Prior to Visit   Medication Sig Dispense Refill   • Acetaminophen (TYLENOL ARTHRITIS PAIN PO) Take 500 mg by mouth Daily.     • amLODIPine (NORVASC) 5 MG tablet TAKE 1 TABLET BY MOUTH EVERY EVENING. 30 tablet 0   • aspirin 325 MG tablet Take 325 mg by mouth Daily. Takes daily w/tylenol for pain takes half a tablet     • atenolol (TENORMIN) 50 MG tablet TAKE 1 TABLET BY MOUTH 2 TIMES A DAY. 60 tablet 0   • Bioflavonoid Products (DOROTEO C PO) Take 500 mg by mouth Daily.     • Cholecalciferol (VITAMIN D3) 1000 units capsule Take 2 capsules by mouth Daily.     • hydroCHLOROthiazide (HYDRODIURIL) 25 MG tablet TAKE 1  TABLET BY MOUTH DAILY. 30 tablet 0   • levothyroxine (SYNTHROID, LEVOTHROID) 75 MCG tablet TAKE 1 TABLET BY MOUTH DAILY. 90 tablet 0   • losartan (COZAAR) 100 MG tablet TAKE 1 TABLET BY MOUTH DAILY. 90 tablet 3   • meloxicam (MOBIC) 7.5 MG tablet Take 1 tablet by mouth Daily. 30 tablet 3   • multivitamin-minerals (CENTRUM) tablet Take 1 tablet by mouth Daily.     • NIACIN PO Take 500 mg by mouth Daily.     • potassium chloride 10 MEQ CR tablet TAKE 1 TABLET BY MOUTH DAILY. 90 tablet 0   • vitamin E 400 UNIT capsule Take 400 Units by mouth Daily.     • ALPRAZolam (Xanax) 0.25 MG tablet Take 1 tablet by mouth At Night As Needed for Anxiety. (Patient taking differently: Take 0.25 mg by mouth At Night As Needed for Anxiety or Sleep.) 10 tablet 0   • methylPREDNISolone (MEDROL) 4 MG dose pack Take as directed on package instructions. 21 tablet 0     No current facility-administered medications on file prior to visit.       Family History   Problem Relation Age of Onset   • Heart attack Father    • Heart disease Father    • Breast cancer Neg Hx    • Malig Hyperthermia Neg Hx        Past Medical History:   Diagnosis Date   • Arthritis    • Hyperlipidemia    • Hypertension    • Hypothyroid    • Neck pain    • PONV (postoperative nausea and vomiting)    • Shoulder pain, left     sched total shoulder   • Spinal headache        Past Surgical History:   Procedure Laterality Date   • COLONOSCOPY     • HYSTERECTOMY     • REPLACEMENT TOTAL KNEE Right    • SHOULDER SURGERY Right     tendon repair   • TONSILLECTOMY     • TOTAL SHOULDER ARTHROPLASTY W/ DISTAL CLAVICLE EXCISION Left 10/15/2021    Procedure: TOTAL SHOULDER REVERSE ARTHROPLASTY;  Surgeon: Scout Palmer MD;  Location: [facility];  Service: Orthopedics;  Laterality: Left;       Social History     Socioeconomic History   • Marital status:    Tobacco Use   • Smoking status: Never   • Smokeless tobacco: Never   Vaping Use   • Vaping Use: Never used   Substance  "and Sexual Activity   • Alcohol use: No   • Drug use: No   • Sexual activity: Defer       Patient Active Problem List   Diagnosis   • Precordial chest pain   • PVCs (premature ventricular contractions)   • Essential hypertension   • Acquired hypothyroidism   • Low serum potassium level   • Other hyperlipidemia   • Acute pain of left shoulder   • Osteoarthritis of multiple joints   • Statin intolerance       /78 (BP Location: Left arm, Patient Position: Sitting, Cuff Size: Large Adult)   Pulse 68   Temp 97.6 °F (36.4 °C)   Resp 16   Ht 152.4 cm (60\")   Wt 59 kg (130 lb)   SpO2 97%   BMI 25.39 kg/m²   Body mass index is 25.39 kg/m².    Objective   Physical Exam  Vitals and nursing note reviewed.   Constitutional:       Appearance: She is well-developed.   Eyes:      Pupils: Pupils are equal, round, and reactive to light.   Neck:      Thyroid: No thyromegaly.      Vascular: No JVD.      Trachea: No tracheal deviation.   Cardiovascular:      Rate and Rhythm: Normal rate and regular rhythm.      Heart sounds: No murmur heard.  Pulmonary:      Effort: Pulmonary effort is normal. No respiratory distress.      Breath sounds: Normal breath sounds. No wheezing.   Abdominal:      General: Bowel sounds are normal. There is no distension.      Palpations: Abdomen is soft. There is no mass.      Tenderness: There is no abdominal tenderness. There is no right CVA tenderness, left CVA tenderness, guarding or rebound.      Hernia: No hernia is present.   Musculoskeletal:      Cervical back: Normal range of motion and neck supple.   Lymphadenopathy:      Cervical: No cervical adenopathy.   Neurological:      Mental Status: She is alert and oriented to person, place, and time.   Psychiatric:         Mood and Affect: Mood normal.         Behavior: Behavior normal.           Assessment & Plan   Diagnoses and all orders for this visit:    1. Statin intolerance (Primary)  -     Comprehensive Metabolic Panel  -     Lipid Panel " With / Chol / HDL Ratio  -     TSH  -     T4, Free    2. Essential hypertension  -     Comprehensive Metabolic Panel  -     Lipid Panel With / Chol / HDL Ratio  -     TSH  -     T4, Free    3. Other hyperlipidemia  -     Comprehensive Metabolic Panel  -     Lipid Panel With / Chol / HDL Ratio  -     TSH  -     T4, Free    4. Acquired hypothyroidism  -     Comprehensive Metabolic Panel  -     Lipid Panel With / Chol / HDL Ratio  -     TSH  -     T4, Free    Other orders  -     Fluzone High-Dose 65+yrs (9908-3934)    Continue diet and exercise.  Check blood pressure at home.  Continue taking potassium, Nexium, losartan, hydrochlorothiazide, Tenormin, high-dose aspirin, Norvasc.  Patient seems to be taking meloxicam she is not sure why told her to make sure there is a difference of at least 2 hours between aspirin and meloxicam.  Increased risk for GI bleeding.  All her problems are chronic and stable.  Unfortunately she cannot tolerate statins.  I will see her back in 3 months.  EHR dragon/transcription disclaimer:  Part of this note are created by electronic transcription/translation of spoken language to printed text and thus may lead to erroneous, or at times, nonsensical words or phrases inadvertently transcribed.  Although I have reviewed for such errors, some may still exist.

## 2022-12-01 LAB
ALBUMIN SERPL-MCNC: 4.6 G/DL (ref 3.5–5.2)
ALBUMIN/GLOB SERPL: 2.3 G/DL
ALP SERPL-CCNC: 73 U/L (ref 39–117)
ALT SERPL-CCNC: 14 U/L (ref 1–33)
AST SERPL-CCNC: 29 U/L (ref 1–32)
BILIRUB SERPL-MCNC: 0.6 MG/DL (ref 0–1.2)
BUN SERPL-MCNC: 10 MG/DL (ref 8–23)
BUN/CREAT SERPL: 11.5 (ref 7–25)
CALCIUM SERPL-MCNC: 9.4 MG/DL (ref 8.6–10.5)
CHLORIDE SERPL-SCNC: 90 MMOL/L (ref 98–107)
CHOLEST SERPL-MCNC: 220 MG/DL (ref 0–200)
CHOLEST/HDLC SERPL: 3.61 {RATIO}
CO2 SERPL-SCNC: 29.2 MMOL/L (ref 22–29)
CREAT SERPL-MCNC: 0.87 MG/DL (ref 0.57–1)
EGFRCR SERPLBLD CKD-EPI 2021: 67 ML/MIN/1.73
GLOBULIN SER CALC-MCNC: 2 GM/DL
GLUCOSE SERPL-MCNC: 92 MG/DL (ref 65–99)
HDLC SERPL-MCNC: 61 MG/DL (ref 40–60)
LDLC SERPL CALC-MCNC: 135 MG/DL (ref 0–100)
POTASSIUM SERPL-SCNC: 3.4 MMOL/L (ref 3.5–5.2)
PROT SERPL-MCNC: 6.6 G/DL (ref 6–8.5)
SODIUM SERPL-SCNC: 133 MMOL/L (ref 136–145)
T4 FREE SERPL-MCNC: 1.52 NG/DL (ref 0.93–1.7)
TRIGL SERPL-MCNC: 137 MG/DL (ref 0–150)
TSH SERPL DL<=0.005 MIU/L-ACNC: 2.74 UIU/ML (ref 0.27–4.2)
VLDLC SERPL CALC-MCNC: 24 MG/DL (ref 5–40)

## 2022-12-12 RX ORDER — AMLODIPINE BESYLATE 5 MG/1
TABLET ORAL
Qty: 30 TABLET | Refills: 0 | Status: SHIPPED | OUTPATIENT
Start: 2022-12-12 | End: 2023-01-13

## 2022-12-12 RX ORDER — ATENOLOL 50 MG/1
TABLET ORAL
Qty: 60 TABLET | Refills: 0 | Status: SHIPPED | OUTPATIENT
Start: 2022-12-12 | End: 2023-01-13

## 2022-12-22 RX ORDER — HYDROCHLOROTHIAZIDE 25 MG/1
TABLET ORAL
Qty: 30 TABLET | Refills: 0 | Status: SHIPPED | OUTPATIENT
Start: 2022-12-22 | End: 2023-01-26

## 2022-12-30 RX ORDER — LOSARTAN POTASSIUM 100 MG/1
TABLET ORAL
Qty: 90 TABLET | Refills: 0 | Status: SHIPPED | OUTPATIENT
Start: 2022-12-30 | End: 2023-04-06

## 2023-01-13 RX ORDER — ATENOLOL 50 MG/1
TABLET ORAL
Qty: 60 TABLET | Refills: 0 | Status: SHIPPED | OUTPATIENT
Start: 2023-01-13 | End: 2023-02-13

## 2023-01-13 RX ORDER — AMLODIPINE BESYLATE 5 MG/1
TABLET ORAL
Qty: 30 TABLET | Refills: 0 | Status: SHIPPED | OUTPATIENT
Start: 2023-01-13 | End: 2023-02-13

## 2023-01-26 RX ORDER — HYDROCHLOROTHIAZIDE 25 MG/1
TABLET ORAL
Qty: 30 TABLET | Refills: 0 | Status: SHIPPED | OUTPATIENT
Start: 2023-01-26 | End: 2023-02-24

## 2023-02-13 ENCOUNTER — TELEPHONE (OUTPATIENT)
Dept: FAMILY MEDICINE CLINIC | Facility: CLINIC | Age: 83
End: 2023-02-13

## 2023-02-13 RX ORDER — AMLODIPINE BESYLATE 5 MG/1
TABLET ORAL
Qty: 30 TABLET | Refills: 0 | Status: SHIPPED | OUTPATIENT
Start: 2023-02-13 | End: 2023-03-13

## 2023-02-13 RX ORDER — ATENOLOL 50 MG/1
TABLET ORAL
Qty: 60 TABLET | Refills: 0 | Status: SHIPPED | OUTPATIENT
Start: 2023-02-13 | End: 2023-03-13

## 2023-02-13 NOTE — TELEPHONE ENCOUNTER
Caller: Nguyen Zhang    Relationship: Self    Best call back number: [phone]    What is the medical concern/diagnosis: RINGING IN HER EARS     What specialty or service is being requested: EAR SPECIALIST   What is the provider, practice or medical service name: [facility]     What is the office location: [place]    What is the office phone number: [phone]    Any additional details:

## 2023-02-14 DIAGNOSIS — H93.19 TINNITUS, UNSPECIFIED LATERALITY: Primary | ICD-10-CM

## 2023-02-20 DIAGNOSIS — F41.9 ANXIETY: ICD-10-CM

## 2023-02-23 RX ORDER — ALPRAZOLAM 0.25 MG/1
0.25 TABLET ORAL NIGHTLY PRN
Qty: 10 TABLET | Refills: 0 | OUTPATIENT
Start: 2023-02-23

## 2023-02-23 NOTE — TELEPHONE ENCOUNTER
Per Fredi, refill not appropriate. This was a temporary medication. Refill request likely came from pharmacy automatically.

## 2023-02-24 RX ORDER — POTASSIUM CHLORIDE 750 MG/1
TABLET, FILM COATED, EXTENDED RELEASE ORAL
Qty: 90 TABLET | Refills: 0 | Status: SHIPPED | OUTPATIENT
Start: 2023-02-24

## 2023-02-24 RX ORDER — HYDROCHLOROTHIAZIDE 25 MG/1
TABLET ORAL
Qty: 30 TABLET | Refills: 0 | Status: SHIPPED | OUTPATIENT
Start: 2023-02-24 | End: 2023-03-30

## 2023-03-03 ENCOUNTER — TRANSCRIBE ORDERS (OUTPATIENT)
Dept: ADMINISTRATIVE | Facility: HOSPITAL | Age: 83
End: 2023-03-03
Payer: MEDICARE

## 2023-03-03 DIAGNOSIS — Z13.6 ENCOUNTER FOR SCREENING FOR VASCULAR DISEASE: Primary | ICD-10-CM

## 2023-03-08 ENCOUNTER — OFFICE VISIT (OUTPATIENT)
Dept: FAMILY MEDICINE CLINIC | Facility: CLINIC | Age: 83
End: 2023-03-08
Payer: MEDICARE

## 2023-03-08 VITALS
OXYGEN SATURATION: 97 % | WEIGHT: 129 LBS | SYSTOLIC BLOOD PRESSURE: 138 MMHG | TEMPERATURE: 97.5 F | HEIGHT: 60 IN | BODY MASS INDEX: 25.32 KG/M2 | HEART RATE: 62 BPM | RESPIRATION RATE: 16 BRPM | DIASTOLIC BLOOD PRESSURE: 80 MMHG

## 2023-03-08 DIAGNOSIS — M15.9 PRIMARY OSTEOARTHRITIS INVOLVING MULTIPLE JOINTS: ICD-10-CM

## 2023-03-08 DIAGNOSIS — E78.49 OTHER HYPERLIPIDEMIA: ICD-10-CM

## 2023-03-08 DIAGNOSIS — Z78.9 STATIN INTOLERANCE: Primary | ICD-10-CM

## 2023-03-08 DIAGNOSIS — I10 ESSENTIAL HYPERTENSION: ICD-10-CM

## 2023-03-08 DIAGNOSIS — E03.9 ACQUIRED HYPOTHYROIDISM: ICD-10-CM

## 2023-03-08 PROCEDURE — 99214 OFFICE O/P EST MOD 30 MIN: CPT | Performed by: INTERNAL MEDICINE

## 2023-03-08 PROCEDURE — 3079F DIAST BP 80-89 MM HG: CPT | Performed by: INTERNAL MEDICINE

## 2023-03-08 PROCEDURE — 1159F MED LIST DOCD IN RCRD: CPT | Performed by: INTERNAL MEDICINE

## 2023-03-08 PROCEDURE — 3075F SYST BP GE 130 - 139MM HG: CPT | Performed by: INTERNAL MEDICINE

## 2023-03-08 NOTE — PROGRESS NOTES
Subjective   Nguyen Zhang is a 83 y.o. female.     Chief Complaint   Patient presents with   • Hypertension   Hyperlipidemia, thyroid , DJD    History of Present Illness   Patient here follow-up for hypertension, hyperlipidemia, hypothyroidism, DJD.  Denies any chest pain shortness of breath.  Taking current medication.  She is on Amoxil and Medrol Dosepak for bronchitis and sinusitis, getting better  The following portions of the patient's history were reviewed and updated as appropriate: allergies, current medications, past family history, past medical history, past social history, past surgical history and problem list.    Review of Systems   Constitutional: Negative for activity change, appetite change, fatigue and fever.   HENT: Positive for sinus pressure.    Eyes: Negative for blurred vision and double vision.   Respiratory: Positive for cough. Negative for apnea, choking, chest tightness, shortness of breath, wheezing and stridor.    Cardiovascular: Negative for chest pain, palpitations and leg swelling.   Gastrointestinal: Negative.    Genitourinary: Negative for hematuria.   Neurological: Negative for dizziness, syncope, light-headedness and headache.   Psychiatric/Behavioral: Negative for suicidal ideas. The patient is not nervous/anxious.        Allergies   Allergen Reactions   • Ace Inhibitors Cough   • Codeine Nausea And Vomiting       Current Outpatient Medications on File Prior to Visit   Medication Sig Dispense Refill   • Acetaminophen (TYLENOL ARTHRITIS PAIN PO) Take 500 mg by mouth Daily.     • amLODIPine (NORVASC) 5 MG tablet TAKE 1 TABLET BY MOUTH EVERY EVENING. 30 tablet 0   • aspirin 325 MG tablet Take 1 tablet by mouth Daily. Takes daily w/tylenol for pain takes half a tablet     • atenolol (TENORMIN) 50 MG tablet TAKE 1 TABLET BY MOUTH 2 TIMES A DAY. 60 tablet 0   • Bioflavonoid Products (DOROTEO C PO) Take 500 mg by mouth Daily.     • Cholecalciferol (VITAMIN D3) 1000 units capsule Take  2 capsules by mouth Daily.     • hydroCHLOROthiazide (HYDRODIURIL) 25 MG tablet TAKE 1 TABLET BY MOUTH DAILY. 30 tablet 0   • levothyroxine (SYNTHROID, LEVOTHROID) 75 MCG tablet TAKE 1 TABLET BY MOUTH DAILY. 90 tablet 0   • losartan (COZAAR) 100 MG tablet TAKE 1 TABLET BY MOUTH DAILY. 90 tablet 0   • meloxicam (MOBIC) 7.5 MG tablet Take 1 tablet by mouth Daily. 30 tablet 3   • methylPREDNISolone (MEDROL) 4 MG dose pack Take as directed on package instructions. 21 tablet 0   • multivitamin-minerals (CENTRUM) tablet Take 1 tablet by mouth Daily.     • NIACIN PO Take 500 mg by mouth Daily.     • potassium chloride 10 MEQ CR tablet TAKE 1 TABLET BY MOUTH DAILY. 90 tablet 0   • vitamin E 400 UNIT capsule Take 1 capsule by mouth Daily.     • ALPRAZolam (Xanax) 0.25 MG tablet Take 1 tablet by mouth At Night As Needed for Anxiety. (Patient taking differently: Take 1 tablet by mouth At Night As Needed for Anxiety or Sleep.) 10 tablet 0     No current facility-administered medications on file prior to visit.       Family History   Problem Relation Age of Onset   • Heart attack Father    • Heart disease Father    • Breast cancer Neg Hx    • Malig Hyperthermia Neg Hx        Past Medical History:   Diagnosis Date   • Arthritis    • Hyperlipidemia    • Hypertension    • Hypothyroid    • Neck pain    • PONV (postoperative nausea and vomiting)    • Shoulder pain, left     sched total shoulder   • Spinal headache        Past Surgical History:   Procedure Laterality Date   • COLONOSCOPY     • HYSTERECTOMY     • REPLACEMENT TOTAL KNEE Right    • SHOULDER SURGERY Right     tendon repair   • TONSILLECTOMY     • TOTAL SHOULDER ARTHROPLASTY W/ DISTAL CLAVICLE EXCISION Left 10/15/2021    Procedure: TOTAL SHOULDER REVERSE ARTHROPLASTY;  Surgeon: Scout Palmer MD;  Location: [facility];  Service: Orthopedics;  Laterality: Left;       Social History     Socioeconomic History   • Marital status:    Tobacco Use   • Smoking status:  "Never   • Smokeless tobacco: Never   Vaping Use   • Vaping Use: Never used   Substance and Sexual Activity   • Alcohol use: No   • Drug use: No   • Sexual activity: Defer       Patient Active Problem List   Diagnosis   • Precordial chest pain   • PVCs (premature ventricular contractions)   • Essential hypertension   • Acquired hypothyroidism   • Low serum potassium level   • Other hyperlipidemia   • Acute pain of left shoulder   • Osteoarthritis of multiple joints   • Statin intolerance       /80 (BP Location: Left arm, Patient Position: Sitting, Cuff Size: Adult)   Pulse 62   Temp 97.5 °F (36.4 °C) (Temporal)   Resp 16   Ht 152.4 cm (60\")   Wt 58.5 kg (129 lb)   SpO2 97%   BMI 25.19 kg/m²   Body mass index is 25.19 kg/m².    Objective   Physical Exam  Vitals and nursing note reviewed.   Constitutional:       Appearance: She is well-developed.   Eyes:      Pupils: Pupils are equal, round, and reactive to light.   Neck:      Thyroid: No thyromegaly.      Vascular: No JVD.      Trachea: No tracheal deviation.   Cardiovascular:      Rate and Rhythm: Normal rate and regular rhythm.      Heart sounds: No murmur heard.  Pulmonary:      Effort: Pulmonary effort is normal. No respiratory distress.      Breath sounds: Normal breath sounds. No wheezing.   Abdominal:      General: Bowel sounds are normal. There is no distension.      Palpations: Abdomen is soft. There is no mass.      Tenderness: There is no abdominal tenderness. There is no right CVA tenderness, left CVA tenderness, guarding or rebound.      Hernia: No hernia is present.   Musculoskeletal:      Cervical back: Normal range of motion and neck supple.   Lymphadenopathy:      Cervical: No cervical adenopathy.   Neurological:      Mental Status: She is alert and oriented to person, place, and time.   Psychiatric:         Mood and Affect: Mood normal.         Behavior: Behavior normal.           Assessment & Plan   Diagnoses and all orders for this " visit:    1. Statin intolerance (Primary)  -     CBC & Differential  -     Comprehensive Metabolic Panel  -     Cancel: CK  -     Lipid Panel With / Chol / HDL Ratio  -     TSH  -     T4, Free    2. Essential hypertension  -     CBC & Differential  -     Comprehensive Metabolic Panel  -     Cancel: CK  -     Lipid Panel With / Chol / HDL Ratio  -     TSH  -     T4, Free    3. Other hyperlipidemia  -     CBC & Differential  -     Comprehensive Metabolic Panel  -     Cancel: CK  -     Lipid Panel With / Chol / HDL Ratio  -     TSH  -     T4, Free    4. Acquired hypothyroidism  -     CBC & Differential  -     Comprehensive Metabolic Panel  -     Cancel: CK  -     Lipid Panel With / Chol / HDL Ratio  -     TSH  -     T4, Free    5. Primary osteoarthritis involving multiple joints  -     CBC & Differential  -     Comprehensive Metabolic Panel  -     Cancel: CK  -     Lipid Panel With / Chol / HDL Ratio  -     TSH  -     T4, Free    Continue diet and exercise.  Continue aspirin higher dose, Norvasc, hydrochlorothiazide, Cozaar, potassium.  She is also taking meloxicam but is cutting the use.  All problems are chronic and stable.  Check blood pressure at home.  Awaiting labs.  Return in 3 months  EHR dragon/transcription disclaimer:  Part of this note are created by electronic transcription/translation of spoken language to printed text and thus may lead to erroneous, or at times, nonsensical words or phrases inadvertently transcribed.  Although I have reviewed for such errors, some may still exist.

## 2023-03-09 DIAGNOSIS — E87.6 HYPOKALEMIA: Primary | ICD-10-CM

## 2023-03-09 LAB
ALBUMIN SERPL-MCNC: 4.8 G/DL (ref 3.5–5.2)
ALBUMIN/GLOB SERPL: 1.9 G/DL
ALP SERPL-CCNC: 83 U/L (ref 39–117)
ALT SERPL-CCNC: 16 U/L (ref 1–33)
AST SERPL-CCNC: 24 U/L (ref 1–32)
BASOPHILS # BLD AUTO: 0.02 10*3/MM3 (ref 0–0.2)
BASOPHILS NFR BLD AUTO: 0.3 % (ref 0–1.5)
BILIRUB SERPL-MCNC: 0.7 MG/DL (ref 0–1.2)
BUN SERPL-MCNC: 9 MG/DL (ref 8–23)
BUN/CREAT SERPL: 9.4 (ref 7–25)
CALCIUM SERPL-MCNC: 10 MG/DL (ref 8.6–10.5)
CHLORIDE SERPL-SCNC: 90 MMOL/L (ref 98–107)
CHOLEST SERPL-MCNC: 237 MG/DL (ref 0–200)
CHOLEST/HDLC SERPL: 3.65 {RATIO}
CK SERPL-CCNC: 89 U/L (ref 20–180)
CO2 SERPL-SCNC: 31.3 MMOL/L (ref 22–29)
CREAT SERPL-MCNC: 0.96 MG/DL (ref 0.57–1)
EGFRCR SERPLBLD CKD-EPI 2021: 59.2 ML/MIN/1.73
EOSINOPHIL # BLD AUTO: 0.07 10*3/MM3 (ref 0–0.4)
EOSINOPHIL NFR BLD AUTO: 0.9 % (ref 0.3–6.2)
ERYTHROCYTE [DISTWIDTH] IN BLOOD BY AUTOMATED COUNT: 11.8 % (ref 12.3–15.4)
GLOBULIN SER CALC-MCNC: 2.5 GM/DL
GLUCOSE SERPL-MCNC: 88 MG/DL (ref 65–99)
HCT VFR BLD AUTO: 36.9 % (ref 34–46.6)
HDLC SERPL-MCNC: 65 MG/DL (ref 40–60)
HGB BLD-MCNC: 12.9 G/DL (ref 12–15.9)
IMM GRANULOCYTES # BLD AUTO: 0.02 10*3/MM3 (ref 0–0.05)
IMM GRANULOCYTES NFR BLD AUTO: 0.3 % (ref 0–0.5)
LDLC SERPL CALC-MCNC: 149 MG/DL (ref 0–100)
LYMPHOCYTES # BLD AUTO: 1.73 10*3/MM3 (ref 0.7–3.1)
LYMPHOCYTES NFR BLD AUTO: 22.5 % (ref 19.6–45.3)
MCH RBC QN AUTO: 31.7 PG (ref 26.6–33)
MCHC RBC AUTO-ENTMCNC: 35 G/DL (ref 31.5–35.7)
MCV RBC AUTO: 90.7 FL (ref 79–97)
MONOCYTES # BLD AUTO: 0.76 10*3/MM3 (ref 0.1–0.9)
MONOCYTES NFR BLD AUTO: 9.9 % (ref 5–12)
NEUTROPHILS # BLD AUTO: 5.1 10*3/MM3 (ref 1.7–7)
NEUTROPHILS NFR BLD AUTO: 66.1 % (ref 42.7–76)
NRBC BLD AUTO-RTO: 0 /100 WBC (ref 0–0.2)
PLATELET # BLD AUTO: 271 10*3/MM3 (ref 140–450)
POTASSIUM SERPL-SCNC: 3.3 MMOL/L (ref 3.5–5.2)
PROT SERPL-MCNC: 7.3 G/DL (ref 6–8.5)
RBC # BLD AUTO: 4.07 10*6/MM3 (ref 3.77–5.28)
SODIUM SERPL-SCNC: 134 MMOL/L (ref 136–145)
T4 FREE SERPL-MCNC: 1.73 NG/DL (ref 0.93–1.7)
TRIGL SERPL-MCNC: 128 MG/DL (ref 0–150)
TSH SERPL DL<=0.005 MIU/L-ACNC: 2.23 UIU/ML (ref 0.27–4.2)
VLDLC SERPL CALC-MCNC: 23 MG/DL (ref 5–40)
WBC # BLD AUTO: 7.7 10*3/MM3 (ref 3.4–10.8)

## 2023-03-13 DIAGNOSIS — E03.9 ACQUIRED HYPOTHYROIDISM: ICD-10-CM

## 2023-03-13 RX ORDER — LEVOTHYROXINE SODIUM 0.07 MG/1
75 TABLET ORAL DAILY
Qty: 90 TABLET | Refills: 0 | Status: SHIPPED | OUTPATIENT
Start: 2023-03-13

## 2023-03-13 RX ORDER — ATENOLOL 50 MG/1
TABLET ORAL
Qty: 60 TABLET | Refills: 0 | Status: SHIPPED | OUTPATIENT
Start: 2023-03-13

## 2023-03-13 RX ORDER — AMLODIPINE BESYLATE 5 MG/1
TABLET ORAL
Qty: 30 TABLET | Refills: 0 | Status: SHIPPED | OUTPATIENT
Start: 2023-03-13

## 2023-03-30 RX ORDER — HYDROCHLOROTHIAZIDE 25 MG/1
TABLET ORAL
Qty: 30 TABLET | Refills: 0 | Status: SHIPPED | OUTPATIENT
Start: 2023-03-30

## 2023-04-06 RX ORDER — LOSARTAN POTASSIUM 100 MG/1
TABLET ORAL
Qty: 90 TABLET | Refills: 3 | Status: SHIPPED | OUTPATIENT
Start: 2023-04-06

## 2023-04-12 RX ORDER — ATENOLOL 50 MG/1
TABLET ORAL
Qty: 60 TABLET | Refills: 0 | Status: SHIPPED | OUTPATIENT
Start: 2023-04-12

## 2023-04-12 RX ORDER — AMLODIPINE BESYLATE 5 MG/1
TABLET ORAL
Qty: 30 TABLET | Refills: 0 | Status: SHIPPED | OUTPATIENT
Start: 2023-04-12

## 2023-04-17 RX ORDER — POTASSIUM CHLORIDE 750 MG/1
TABLET, FILM COATED, EXTENDED RELEASE ORAL
Qty: 90 TABLET | Refills: 0 | Status: SHIPPED | OUTPATIENT
Start: 2023-04-17 | End: 2023-04-24 | Stop reason: SDUPTHER

## 2023-04-20 ENCOUNTER — TELEPHONE (OUTPATIENT)
Dept: FAMILY MEDICINE CLINIC | Facility: CLINIC | Age: 83
End: 2023-04-20
Payer: MEDICARE

## 2023-04-24 RX ORDER — POTASSIUM CHLORIDE 750 MG/1
10 TABLET, FILM COATED, EXTENDED RELEASE ORAL 2 TIMES DAILY
Qty: 180 TABLET | Refills: 3 | Status: SHIPPED | OUTPATIENT
Start: 2023-04-24

## 2023-04-28 RX ORDER — HYDROCHLOROTHIAZIDE 25 MG/1
TABLET ORAL
Qty: 30 TABLET | Refills: 0 | Status: SHIPPED | OUTPATIENT
Start: 2023-04-28

## 2023-05-15 RX ORDER — ATENOLOL 50 MG/1
TABLET ORAL
Qty: 60 TABLET | Refills: 0 | Status: SHIPPED | OUTPATIENT
Start: 2023-05-15

## 2023-05-15 RX ORDER — AMLODIPINE BESYLATE 5 MG/1
TABLET ORAL
Qty: 30 TABLET | Refills: 0 | Status: SHIPPED | OUTPATIENT
Start: 2023-05-15

## 2023-05-31 RX ORDER — HYDROCHLOROTHIAZIDE 25 MG/1
TABLET ORAL
Qty: 30 TABLET | Refills: 5 | Status: SHIPPED | OUTPATIENT
Start: 2023-05-31

## 2023-06-01 ENCOUNTER — TRANSCRIBE ORDERS (OUTPATIENT)
Dept: ADMINISTRATIVE | Facility: HOSPITAL | Age: 83
End: 2023-06-01

## 2023-06-01 DIAGNOSIS — Z12.31 BREAST CANCER SCREENING BY MAMMOGRAM: Primary | ICD-10-CM

## 2023-06-12 DIAGNOSIS — E03.9 ACQUIRED HYPOTHYROIDISM: ICD-10-CM

## 2023-06-12 RX ORDER — LEVOTHYROXINE SODIUM 0.07 MG/1
75 TABLET ORAL DAILY
Qty: 90 TABLET | Refills: 0 | Status: SHIPPED | OUTPATIENT
Start: 2023-06-12

## 2023-06-12 RX ORDER — ATENOLOL 50 MG/1
TABLET ORAL
Qty: 60 TABLET | Refills: 0 | Status: SHIPPED | OUTPATIENT
Start: 2023-06-12 | End: 2023-06-15 | Stop reason: HOSPADM

## 2023-06-12 RX ORDER — AMLODIPINE BESYLATE 5 MG/1
TABLET ORAL
Qty: 30 TABLET | Refills: 0 | Status: SHIPPED | OUTPATIENT
Start: 2023-06-12

## 2023-06-14 ENCOUNTER — APPOINTMENT (OUTPATIENT)
Dept: GENERAL RADIOLOGY | Facility: HOSPITAL | Age: 83
End: 2023-06-14
Payer: MEDICARE

## 2023-06-14 ENCOUNTER — OFFICE VISIT (OUTPATIENT)
Dept: FAMILY MEDICINE CLINIC | Facility: CLINIC | Age: 83
End: 2023-06-14
Payer: MEDICARE

## 2023-06-14 ENCOUNTER — HOSPITAL ENCOUNTER (OUTPATIENT)
Facility: HOSPITAL | Age: 83
Setting detail: OBSERVATION
Discharge: HOME | End: 2023-06-15
Attending: EMERGENCY MEDICINE | Admitting: HOSPITALIST
Payer: MEDICARE

## 2023-06-14 VITALS
RESPIRATION RATE: 16 BRPM | HEART RATE: 63 BPM | TEMPERATURE: 97.5 F | WEIGHT: 123 LBS | OXYGEN SATURATION: 97 % | BODY MASS INDEX: 24.15 KG/M2 | HEIGHT: 60 IN | DIASTOLIC BLOOD PRESSURE: 78 MMHG | SYSTOLIC BLOOD PRESSURE: 142 MMHG

## 2023-06-14 DIAGNOSIS — E03.9 ACQUIRED HYPOTHYROIDISM: ICD-10-CM

## 2023-06-14 DIAGNOSIS — I25.9 CHEST PAIN DUE TO MYOCARDIAL ISCHEMIA, UNSPECIFIED ISCHEMIC CHEST PAIN TYPE: Primary | ICD-10-CM

## 2023-06-14 DIAGNOSIS — E78.49 OTHER HYPERLIPIDEMIA: ICD-10-CM

## 2023-06-14 DIAGNOSIS — I20.0 UNSTABLE ANGINA: Primary | ICD-10-CM

## 2023-06-14 DIAGNOSIS — I10 ESSENTIAL HYPERTENSION: ICD-10-CM

## 2023-06-14 DIAGNOSIS — Z78.9 STATIN INTOLERANCE: ICD-10-CM

## 2023-06-14 DIAGNOSIS — I20.0 UNSTABLE ANGINA PECTORIS: ICD-10-CM

## 2023-06-14 LAB
ALBUMIN SERPL-MCNC: 4.9 G/DL (ref 3.5–5.2)
ALBUMIN/GLOB SERPL: 1.4 G/DL
ALP SERPL-CCNC: 88 U/L (ref 39–117)
ALT SERPL W P-5'-P-CCNC: 16 U/L (ref 1–33)
ANION GAP SERPL CALCULATED.3IONS-SCNC: 11.9 MMOL/L (ref 5–15)
APTT PPP: 31 SECONDS (ref 24.3–38.1)
AST SERPL-CCNC: 26 U/L (ref 1–32)
BACTERIA UR QL AUTO: NORMAL /HPF
BASOPHILS # BLD AUTO: 0.05 10*3/MM3 (ref 0–0.2)
BASOPHILS NFR BLD AUTO: 0.6 % (ref 0–1.5)
BILIRUB SERPL-MCNC: 0.7 MG/DL (ref 0–1.2)
BILIRUB UR QL STRIP: NEGATIVE
BUN SERPL-MCNC: 9 MG/DL (ref 8–23)
BUN/CREAT SERPL: 11.1 (ref 7–25)
CALCIUM SPEC-SCNC: 10.1 MG/DL (ref 8.6–10.5)
CHLORIDE SERPL-SCNC: 91 MMOL/L (ref 98–107)
CHOLEST SERPL-MCNC: 212 MG/DL (ref 0–200)
CLARITY UR: CLEAR
CO2 SERPL-SCNC: 30.1 MMOL/L (ref 22–29)
COLOR UR: YELLOW
CREAT SERPL-MCNC: 0.81 MG/DL (ref 0.57–1)
DEPRECATED RDW RBC AUTO: 40.3 FL (ref 37–54)
EGFRCR SERPLBLD CKD-EPI 2021: 72.6 ML/MIN/1.73
EOSINOPHIL # BLD AUTO: 0.07 10*3/MM3 (ref 0–0.4)
EOSINOPHIL NFR BLD AUTO: 0.8 % (ref 0.3–6.2)
ERYTHROCYTE [DISTWIDTH] IN BLOOD BY AUTOMATED COUNT: 11.6 % (ref 12.3–15.4)
GEN 5 2HR TROPONIN T REFLEX: <6 NG/L
GLOBULIN UR ELPH-MCNC: 3.4 GM/DL
GLUCOSE SERPL-MCNC: 108 MG/DL (ref 65–99)
GLUCOSE UR STRIP-MCNC: NEGATIVE MG/DL
HCT VFR BLD AUTO: 41.3 % (ref 34–46.6)
HDLC SERPL-MCNC: 60 MG/DL (ref 40–60)
HGB BLD-MCNC: 13.8 G/DL (ref 12–15.9)
HGB UR QL STRIP.AUTO: ABNORMAL
HYALINE CASTS UR QL AUTO: NORMAL /LPF
IMM GRANULOCYTES # BLD AUTO: 0.01 10*3/MM3 (ref 0–0.05)
IMM GRANULOCYTES NFR BLD AUTO: 0.1 % (ref 0–0.5)
INR PPP: 0.95 (ref 0.9–1.1)
KETONES UR QL STRIP: NEGATIVE
LDLC SERPL CALC-MCNC: 134 MG/DL (ref 0–100)
LDLC/HDLC SERPL: 2.19 {RATIO}
LEUKOCYTE ESTERASE UR QL STRIP.AUTO: NEGATIVE
LYMPHOCYTES # BLD AUTO: 2.36 10*3/MM3 (ref 0.7–3.1)
LYMPHOCYTES NFR BLD AUTO: 28.6 % (ref 19.6–45.3)
MCH RBC QN AUTO: 31.1 PG (ref 26.6–33)
MCHC RBC AUTO-ENTMCNC: 33.4 G/DL (ref 31.5–35.7)
MCV RBC AUTO: 93 FL (ref 79–97)
MONOCYTES # BLD AUTO: 0.79 10*3/MM3 (ref 0.1–0.9)
MONOCYTES NFR BLD AUTO: 9.6 % (ref 5–12)
NEUTROPHILS NFR BLD AUTO: 4.96 10*3/MM3 (ref 1.7–7)
NEUTROPHILS NFR BLD AUTO: 60.3 % (ref 42.7–76)
NITRITE UR QL STRIP: NEGATIVE
NT-PROBNP SERPL-MCNC: 309.6 PG/ML (ref 0–1800)
PH UR STRIP.AUTO: 7 [PH] (ref 4.5–8)
PLATELET # BLD AUTO: 238 10*3/MM3 (ref 140–450)
PMV BLD AUTO: 9.7 FL (ref 6–12)
POTASSIUM SERPL-SCNC: 3.4 MMOL/L (ref 3.5–5.2)
PROT SERPL-MCNC: 8.3 G/DL (ref 6–8.5)
PROT UR QL STRIP: NEGATIVE
PROTHROMBIN TIME: 12.7 SECONDS (ref 12.1–15)
QT INTERVAL: 414 MS
RBC # BLD AUTO: 4.44 10*6/MM3 (ref 3.77–5.28)
RBC # UR STRIP: NORMAL /HPF
REF LAB TEST METHOD: NORMAL
SODIUM SERPL-SCNC: 133 MMOL/L (ref 136–145)
SP GR UR STRIP: 1.01 (ref 1–1.03)
SQUAMOUS #/AREA URNS HPF: NORMAL /HPF
TRIGL SERPL-MCNC: 102 MG/DL (ref 0–150)
TROPONIN T DELTA: NORMAL
TROPONIN T SERPL HS-MCNC: <6 NG/L
TSH SERPL DL<=0.05 MIU/L-ACNC: 0.66 UIU/ML (ref 0.27–4.2)
UROBILINOGEN UR QL STRIP: ABNORMAL
VLDLC SERPL-MCNC: 18 MG/DL (ref 5–40)
WBC # UR STRIP: NORMAL /HPF
WBC NRBC COR # BLD: 8.24 10*3/MM3 (ref 3.4–10.8)

## 2023-06-14 PROCEDURE — G0378 HOSPITAL OBSERVATION PER HR: HCPCS

## 2023-06-14 PROCEDURE — 96361 HYDRATE IV INFUSION ADD-ON: CPT

## 2023-06-14 PROCEDURE — 25010000002 ENOXAPARIN PER 10 MG: Performed by: HOSPITALIST

## 2023-06-14 PROCEDURE — 84484 ASSAY OF TROPONIN QUANT: CPT | Performed by: EMERGENCY MEDICINE

## 2023-06-14 PROCEDURE — 3077F SYST BP >= 140 MM HG: CPT | Performed by: INTERNAL MEDICINE

## 2023-06-14 PROCEDURE — 96372 THER/PROPH/DIAG INJ SC/IM: CPT

## 2023-06-14 PROCEDURE — 99214 OFFICE O/P EST MOD 30 MIN: CPT | Performed by: INTERNAL MEDICINE

## 2023-06-14 PROCEDURE — 3078F DIAST BP <80 MM HG: CPT | Performed by: INTERNAL MEDICINE

## 2023-06-14 PROCEDURE — 81001 URINALYSIS AUTO W/SCOPE: CPT | Performed by: EMERGENCY MEDICINE

## 2023-06-14 PROCEDURE — 36415 COLL VENOUS BLD VENIPUNCTURE: CPT

## 2023-06-14 PROCEDURE — 93000 ELECTROCARDIOGRAM COMPLETE: CPT | Performed by: INTERNAL MEDICINE

## 2023-06-14 PROCEDURE — 71046 X-RAY EXAM CHEST 2 VIEWS: CPT

## 2023-06-14 PROCEDURE — 99285 EMERGENCY DEPT VISIT HI MDM: CPT

## 2023-06-14 PROCEDURE — 85025 COMPLETE CBC W/AUTO DIFF WBC: CPT | Performed by: EMERGENCY MEDICINE

## 2023-06-14 PROCEDURE — 96365 THER/PROPH/DIAG IV INF INIT: CPT

## 2023-06-14 PROCEDURE — 80061 LIPID PANEL: CPT | Performed by: HOSPITALIST

## 2023-06-14 PROCEDURE — 80053 COMPREHEN METABOLIC PANEL: CPT | Performed by: EMERGENCY MEDICINE

## 2023-06-14 PROCEDURE — 85730 THROMBOPLASTIN TIME PARTIAL: CPT | Performed by: EMERGENCY MEDICINE

## 2023-06-14 PROCEDURE — 93005 ELECTROCARDIOGRAM TRACING: CPT | Performed by: EMERGENCY MEDICINE

## 2023-06-14 PROCEDURE — 84443 ASSAY THYROID STIM HORMONE: CPT | Performed by: HOSPITALIST

## 2023-06-14 PROCEDURE — 83880 ASSAY OF NATRIURETIC PEPTIDE: CPT | Performed by: EMERGENCY MEDICINE

## 2023-06-14 PROCEDURE — 85610 PROTHROMBIN TIME: CPT | Performed by: EMERGENCY MEDICINE

## 2023-06-14 RX ORDER — ENOXAPARIN SODIUM 100 MG/ML
1 INJECTION SUBCUTANEOUS ONCE
Status: COMPLETED | OUTPATIENT
Start: 2023-06-14 | End: 2023-06-14

## 2023-06-14 RX ORDER — ASPIRIN 81 MG/1
324 TABLET, CHEWABLE ORAL ONCE
Status: DISCONTINUED | OUTPATIENT
Start: 2023-06-14 | End: 2023-06-15 | Stop reason: HOSPADM

## 2023-06-14 RX ORDER — AMLODIPINE BESYLATE 5 MG/1
5 TABLET ORAL EVERY EVENING
Status: DISCONTINUED | OUTPATIENT
Start: 2023-06-14 | End: 2023-06-15 | Stop reason: HOSPADM

## 2023-06-14 RX ORDER — NITROGLYCERIN 0.4 MG/1
0.4 TABLET SUBLINGUAL
Status: DISCONTINUED | OUTPATIENT
Start: 2023-06-14 | End: 2023-06-15 | Stop reason: HOSPADM

## 2023-06-14 RX ORDER — LEVOTHYROXINE SODIUM 0.07 MG/1
75 TABLET ORAL
Status: DISCONTINUED | OUTPATIENT
Start: 2023-06-15 | End: 2023-06-15 | Stop reason: HOSPADM

## 2023-06-14 RX ORDER — SODIUM CHLORIDE 0.9 % (FLUSH) 0.9 %
10 SYRINGE (ML) INJECTION AS NEEDED
Status: DISCONTINUED | OUTPATIENT
Start: 2023-06-14 | End: 2023-06-15 | Stop reason: HOSPADM

## 2023-06-14 RX ORDER — MELATONIN
1000 DAILY
Status: DISCONTINUED | OUTPATIENT
Start: 2023-06-14 | End: 2023-06-15 | Stop reason: HOSPADM

## 2023-06-14 RX ORDER — ACETAMINOPHEN 325 MG/1
650 TABLET ORAL EVERY 6 HOURS PRN
Status: DISCONTINUED | OUTPATIENT
Start: 2023-06-14 | End: 2023-06-15 | Stop reason: HOSPADM

## 2023-06-14 RX ORDER — SODIUM CHLORIDE 9 MG/ML
75 INJECTION, SOLUTION INTRAVENOUS CONTINUOUS
Status: DISCONTINUED | OUTPATIENT
Start: 2023-06-14 | End: 2023-06-14

## 2023-06-14 RX ORDER — LOSARTAN POTASSIUM 50 MG/1
100 TABLET ORAL NIGHTLY
Status: DISCONTINUED | OUTPATIENT
Start: 2023-06-14 | End: 2023-06-15 | Stop reason: HOSPADM

## 2023-06-14 RX ADMIN — AMLODIPINE BESYLATE 5 MG: 5 TABLET ORAL at 20:21

## 2023-06-14 RX ADMIN — NITROGLYCERIN 1 INCH: 20 OINTMENT TOPICAL at 17:37

## 2023-06-14 RX ADMIN — SODIUM CHLORIDE 125 ML/HR: 9 INJECTION, SOLUTION INTRAVENOUS at 10:55

## 2023-06-14 RX ADMIN — NITROGLYCERIN 1 INCH: 20 OINTMENT TOPICAL at 11:03

## 2023-06-14 RX ADMIN — ENOXAPARIN SODIUM 60 MG: 60 INJECTION SUBCUTANEOUS at 20:21

## 2023-06-14 RX ADMIN — CHOLECALCIFEROL TAB 25 MCG (1000 UNIT) 1000 UNITS: 25 TAB at 20:21

## 2023-06-14 RX ADMIN — LOSARTAN POTASSIUM 100 MG: 50 TABLET, FILM COATED ORAL at 20:21

## 2023-06-14 NOTE — ED NOTES
Nursing report ED to floor  Nguyen Zhang  83 y.o.  female    HPI :   Chief Complaint   Patient presents with    Chest Pain     Pressure, mid sternal to throat, intermittent for 2 weeks       Admitting doctor:   Abdulkadir Stephen MD    Admitting diagnosis:   The encounter diagnosis was Unstable angina.    Code status:   Current Code Status       Date Active Code Status Order ID Comments User Context       Prior            Allergies:   Ace inhibitors and Codeine    Isolation:   No active isolations    Intake and Output  No intake or output data in the 24 hours ending 06/14/23 1534    Weight:       06/14/23  1011   Weight: 56.2 kg (124 lb)       Most recent vitals:   Vitals:    06/14/23 1138 06/14/23 1250 06/14/23 1336 06/14/23 1511   BP: 170/77 122/89 162/88 173/85   Pulse: 68 70 74 70   Resp: 16 16 16 16   Temp:       TempSrc:       SpO2: 98% 97% 99% 97%   Weight:       Height:           Active LDAs/IV Access:   Lines, Drains & Airways       Active LDAs       Name Placement date Placement time Site Days    Peripheral IV 06/14/23 1050 Right Antecubital 06/14/23  1050  Antecubital  less than 1                    Labs (abnormal labs have a star):   Labs Reviewed   COMPREHENSIVE METABOLIC PANEL - Abnormal; Notable for the following components:       Result Value    Glucose 108 (*)     Sodium 133 (*)     Potassium 3.4 (*)     Chloride 91 (*)     CO2 30.1 (*)     All other components within normal limits    Narrative:     GFR Normal >60  Chronic Kidney Disease <60  Kidney Failure <15    The GFR formula is only valid for adults with stable renal function between ages 18 and 70.   URINALYSIS W/ MICROSCOPIC IF INDICATED (NO CULTURE) - Abnormal; Notable for the following components:    Blood, UA Trace (*)     All other components within normal limits   CBC WITH AUTO DIFFERENTIAL - Abnormal; Notable for the following components:    RDW 11.6 (*)     All other components within normal limits   PROTIME-INR - Normal     Narrative:     Therapeutic Ranges for INR: 2.0-3.0 (PT 20-30)                              2.5-3.5 (PT 25-34)   APTT - Normal    Narrative:     PTT = The equivalent PTT values for the therapeutic range of heparin levels at 0.1 to 0.7 U/ml are 53 to 110 seconds.     TROPONIN - Normal    Narrative:     High Sensitive Troponin T Reference Range:  <10.0 ng/L- Negative Female for AMI  <15.0 ng/L- Negative Male for AMI  >=10 - Abnormal Female indicating possible myocardial injury.  >=15 - Abnormal Male indicating possible myocardial injury.   Clinicians would have to utilize clinical acumen, EKG, Troponin, and serial changes to determine if it is an Acute Myocardial Infarction or myocardial injury due to an underlying chronic condition.        BNP (IN-HOUSE) - Normal    Narrative:     Among patients with dyspnea, NT-proBNP is highly sensitive for the detection of acute congestive heart failure. In addition NT-proBNP of <300 pg/ml effectively rules out acute congestive heart failure with 99% negative predictive value.    Results may be falsely decreased if patient taking Biotin.     URINALYSIS, MICROSCOPIC ONLY   HIGH SENSITIVITIY TROPONIN T 2HR    Narrative:     High Sensitive Troponin T Reference Range:  <10.0 ng/L- Negative Female for AMI  <15.0 ng/L- Negative Male for AMI  >=10 - Abnormal Female indicating possible myocardial injury.  >=15 - Abnormal Male indicating possible myocardial injury.   Clinicians would have to utilize clinical acumen, EKG, Troponin, and serial changes to determine if it is an Acute Myocardial Infarction or myocardial injury due to an underlying chronic condition.        CBC AND DIFFERENTIAL    Narrative:     The following orders were created for panel order CBC & Differential.  Procedure                               Abnormality         Status                     ---------                               -----------         ------                     CBC Auto Differential[[id number]]         Abnormal            Final result                 Please view results for these tests on the individual orders.       EKG:   ECG 12 Lead Chest Pain   Preliminary Result   HEART RATE= 63  bpm   RR Interval= 956  ms   [place] Interval= 184  ms   P Horizontal Axis= 4  deg   P Front Axis= 66  deg   QRSD Interval= 80  ms   QT Interval= 414  ms   QRS Axis= 49  deg   T Wave Axis= 33  deg   - NORMAL ECG -   Sinus rhythm   Electronically Signed By:    Date and Time of Study: 2023-06-14 10:24:47          Meds given in ED:   Medications   sodium chloride 0.9 % flush 10 mL (has no administration in time range)   sodium chloride 0.9 % infusion (125 mL/hr Intravenous New Bag 6/14/23 1055)   aspirin chewable tablet 324 mg (324 mg Oral Not Given 6/14/23 1057)   nitroglycerin (NITROSTAT) ointment 1 inch (1 inch Topical Given 6/14/23 1103)       Imaging results:  XR Chest 2 View    Result Date: 6/14/2023  No active disease. Stable mild cardiomegaly.  This report was finalized on 6/14/2023 11:33 AM by Dr. Clovis Morales MD.       Ambulatory status:   - up ad subhash    Social issues:   Social History     Socioeconomic History    Marital status:    Tobacco Use    Smoking status: Never    Smokeless tobacco: Never   Vaping Use    Vaping Use: Never used   Substance and Sexual Activity    Alcohol use: No    Drug use: No    Sexual activity: Defer       NIH Stroke Scale:         Perla Villarreal RN  06/14/23 15:34 EDT

## 2023-06-14 NOTE — H&P
[facility]PITAL[facility]     Chelsie Rai MD    CHIEF COMPLAINT: Chest Pressure    HISTORY OF PRESENT ILLNESS:    Ms. Zhang is a very pleasant, 84 y/o  female who was referred by her PCP from the office today due to EKG changes and exertional chest pressure.  Ms. Zhang reports that for the last several weeks, she develops chest pressure with a sensation she's being strangled with any type of exertion.  She denies associated diaphoresis and nausea.  Her symptoms gradually subside at rest.  She denies having symptoms at rest, however, staff report her describing an episode occurring while she was driving home in her car.  That episode to ended w/ rest.  She denies lower extremity edema as well as orthopnea.  She reports no fever of chills.      Past Medical History:   Diagnosis Date    Arthritis     Hyperlipidemia     Hypertension     Hypothyroid     Neck pain     PONV (postoperative nausea and vomiting)     Shoulder pain, left     sched total shoulder    Spinal headache      Past Surgical History:   Procedure Laterality Date    COLONOSCOPY      HYSTERECTOMY      REPLACEMENT TOTAL KNEE Right     SHOULDER SURGERY Right     tendon repair    TONSILLECTOMY      TOTAL SHOULDER ARTHROPLASTY W/ DISTAL CLAVICLE EXCISION Left 10/15/2021    Procedure: TOTAL SHOULDER REVERSE ARTHROPLASTY;  Surgeon: Scout Palmer MD;  Location: [facility];  Service: Orthopedics;  Laterality: Left;     Family History   Problem Relation Age of Onset    Heart attack Father     Heart disease Father     Breast cancer Neg Hx     Malig Hyperthermia Neg Hx      Social History     Tobacco Use    Smoking status: Never    Smokeless tobacco: Never   Vaping Use    Vaping Use: Never used   Substance Use Topics    Alcohol use: No    Drug use: No     Medications Prior to Admission   Medication Sig Dispense Refill Last Dose    Acetaminophen (TYLENOL ARTHRITIS PAIN PO) Take 500 mg by mouth Daily.       ALPRAZolam  (Xanax) 0.25 MG tablet Take 1 tablet by mouth At Night As Needed for Anxiety. (Patient taking differently: Take 1 tablet by mouth At Night As Needed for Anxiety or Sleep.) 10 tablet 0     amLODIPine (NORVASC) 5 MG tablet TAKE 1 TABLET BY MOUTH EVERY EVENING. 30 tablet 0     aspirin 325 MG tablet Take 1 tablet by mouth Daily. Takes daily w/tylenol for pain takes half a tablet       atenolol (TENORMIN) 50 MG tablet TAKE 1 TABLET BY MOUTH 2 TIMES A DAY. 60 tablet 0     Bioflavonoid Products (DOROTEO C PO) Take 500 mg by mouth Daily.       Cholecalciferol (VITAMIN D3) 1000 units capsule Take 2 capsules by mouth Daily.       hydroCHLOROthiazide (HYDRODIURIL) 25 MG tablet TAKE 1 TABLET BY MOUTH DAILY. 30 tablet 5     levothyroxine (SYNTHROID, LEVOTHROID) 75 MCG tablet TAKE 1 TABLET BY MOUTH DAILY. 90 tablet 0     losartan (COZAAR) 100 MG tablet TAKE 1 TABLET BY MOUTH DAILY. 90 tablet 3     meloxicam (MOBIC) 7.5 MG tablet Take 1 tablet by mouth Daily. 30 tablet 3     methylPREDNISolone (MEDROL) 4 MG dose pack Take as directed on package instructions. (Patient not taking: Reported on 6/14/2023) 21 tablet 0     multivitamin-minerals (CENTRUM) tablet Take 1 tablet by mouth Daily. (Patient not taking: Reported on 6/14/2023)       NIACIN PO Take 500 mg by mouth Daily.       potassium chloride 10 MEQ CR tablet Take 1 tablet by mouth 2 (Two) Times a Day. 180 tablet 3     vitamin E 400 UNIT capsule Take 1 capsule by mouth Daily.        Allergies:  Ace inhibitors and Codeine    REVIEW OF SYSTEMS:  Please see the above history of present illness for pertinent positives and negatives.  The remainder of the patient's systems have been reviewed and are negative.     Vital Signs  Temp:  [97.5 °F (36.4 °C)-97.7 °F (36.5 °C)] 97.7 °F (36.5 °C)  Heart Rate:  [63-74] 70  Resp:  [16] 16  BP: (122-184)/() 173/85  Oxygen Therapy  SpO2: 98 %  Pulse Oximetry Type: Intermittent  Device (Oxygen Therapy): room air}  Body mass index is 24.22  "kg/m².  Flowsheet Rows      Flowsheet Row First Filed Value   Admission Height 152.4 cm (60\") Documented at 06/14/2023 1011   Admission Weight 56.2 kg (124 lb) Documented at 06/14/2023 1011               Physical Exam:  Physical Exam   Constitutional: Patient appears well developed and well nourished and in no acute distress.  Appears stated age.  HEENT:   Head: Normocephalic and atraumatic.   Eyes:  Pupils are equal, round, and EOM are intact. Sclerae are anicteric and noninjected.  Mouth and Throat: Patient has moist mucous membranes. Oropharynx is clear of any erythema or exudate.     Cardiovascular: Regular rate, regular rhythm, S1 normal and S2 normal.  Exam reveals no gallop and no friction rub.  No murmur heard.  Radial pulses are 2+ and symmetric.  Pulmonary/Chest: Lungs are clear to auscultation bilaterally. No respiratory distress. No wheezes. No rhonchi. No rales.   Abdominal: Soft. Bowel sounds are normal. There is no tenderness.   Musculoskeletal: Normal muscle tone  Extremities: No edema.   Neurological: Cranial nerves II-XII are grossly intact with no focal deficits.  Skin: Skin is warm. No rash noted. Nails show no clubbing.  No cyanosis or erythema.    Emotional Behavior:    Judgment and Insight: Normal   Mental Status:  Alertness  Normal   Memory:  Normal   Mood and Affect:         Depression  None               Anxiety  None    Debilities:   Physical Weakness  None   Handicaps  None   Disabilities  None   Agitation  None     Results Review:    I reviewed the patient's new clinical results.  Lab Results (most recent)       Procedure Component Value Units Date/Time    High Sensitivity Troponin T 2Hr [[id number]] Collected: 06/14/23 1257    Specimen: Blood Updated: 06/14/23 1333     HS Troponin T <6 ng/L      Troponin T Delta --     Comment: Unable to calculate.       Narrative:      High Sensitive Troponin T Reference Range:  <10.0 ng/L- Negative Female for AMI  <15.0 ng/L- Negative Male for " AMI  >=10 - Abnormal Female indicating possible myocardial injury.  >=15 - Abnormal Male indicating possible myocardial injury.   Clinicians would have to utilize clinical acumen, EKG, Troponin, and serial changes to determine if it is an Acute Myocardial Infarction or myocardial injury due to an underlying chronic condition.         Urinalysis, Microscopic Only - Urine, Clean Catch [[id number]] Collected: 06/14/23 1052    Specimen: Urine, Clean Catch Updated: 06/14/23 1215     RBC, UA None Seen /HPF      WBC, UA None Seen /HPF      Bacteria, UA None Seen /HPF      Squamous Epithelial Cells, UA 0-2 /HPF      Hyaline Casts, UA None Seen /LPF      Methodology Manual Light Microscopy    BNP [[id number]]  (Normal) Collected: 06/14/23 1052    Specimen: Blood Updated: 06/14/23 1129     proBNP 309.6 pg/mL     Narrative:      Among patients with dyspnea, NT-proBNP is highly sensitive for the detection of acute congestive heart failure. In addition NT-proBNP of <300 pg/ml effectively rules out acute congestive heart failure with 99% negative predictive value.    Results may be falsely decreased if patient taking Biotin.      High Sensitivity Troponin T [[id number]]  (Normal) Collected: 06/14/23 1052    Specimen: Blood Updated: 06/14/23 1122     HS Troponin T <6 ng/L     Narrative:      High Sensitive Troponin T Reference Range:  <10.0 ng/L- Negative Female for AMI  <15.0 ng/L- Negative Male for AMI  >=10 - Abnormal Female indicating possible myocardial injury.  >=15 - Abnormal Male indicating possible myocardial injury.   Clinicians would have to utilize clinical acumen, EKG, Troponin, and serial changes to determine if it is an Acute Myocardial Infarction or myocardial injury due to an underlying chronic condition.         Comprehensive Metabolic Panel [[id number]]  (Abnormal) Collected: 06/14/23 1052    Specimen: Blood Updated: 06/14/23 1119     Glucose 108 mg/dL      BUN 9 mg/dL      Creatinine 0.81 mg/dL      Sodium 133  mmol/L      Potassium 3.4 mmol/L      Chloride 91 mmol/L      CO2 30.1 mmol/L      Calcium 10.1 mg/dL      Total Protein 8.3 g/dL      Albumin 4.9 g/dL      ALT (SGPT) 16 U/L      AST (SGOT) 26 U/L      Alkaline Phosphatase 88 U/L      Total Bilirubin 0.7 mg/dL      Globulin 3.4 gm/dL      A/G Ratio 1.4 g/dL      BUN/Creatinine Ratio 11.1     Anion Gap 11.9 mmol/L      eGFR 72.6 mL/min/1.73     Narrative:      GFR Normal >60  Chronic Kidney Disease <60  Kidney Failure <15    The GFR formula is only valid for adults with stable renal function between ages 18 and 70.    aPTT [[id number]]  (Normal) Collected: 06/14/23 1052    Specimen: Blood Updated: 06/14/23 1110     PTT 31.0 seconds     Narrative:      PTT = The equivalent PTT values for the therapeutic range of heparin levels at 0.1 to 0.7 U/ml are 53 to 110 seconds.      Protime-INR [[id number]]  (Normal) Collected: 06/14/23 1052    Specimen: Blood Updated: 06/14/23 1110     Protime 12.7 Seconds      INR 0.95    Narrative:      Therapeutic Ranges for INR: 2.0-3.0 (PT 20-30)                              2.5-3.5 (PT 25-34)    Urinalysis With Microscopic If Indicated (No Culture) - Urine, Clean Catch [[id number]]  (Abnormal) Collected: 06/14/23 1052    Specimen: Urine, Clean Catch Updated: 06/14/23 1105     Color, UA Yellow     Appearance, UA Clear     pH, UA 7.0     Specific Gravity, UA 1.015     Glucose, UA Negative     Ketones, UA Negative     Bilirubin, UA Negative     Blood, UA Trace     Protein, UA Negative     Leuk Esterase, UA Negative     Nitrite, UA Negative     Urobilinogen, UA 0.2 E.U./dL    CBC & Differential [[id number]]  (Abnormal) Collected: 06/14/23 1052    Specimen: Blood Updated: 06/14/23 1100    Narrative:      The following orders were created for panel order CBC & Differential.  Procedure                               Abnormality         Status                     ---------                               -----------         ------                      CBC Auto Differential[[id number]]        Abnormal            Final result                 Please view results for these tests on the individual orders.    CBC Auto Differential [[id number]]  (Abnormal) Collected: 06/14/23 1052    Specimen: Blood Updated: 06/14/23 1100     WBC 8.24 10*3/mm3      RBC 4.44 10*6/mm3      Hemoglobin 13.8 g/dL      Hematocrit 41.3 %      MCV 93.0 fL      MCH 31.1 pg      MCHC 33.4 g/dL      RDW 11.6 %      RDW-SD 40.3 fl      MPV 9.7 fL      Platelets 238 10*3/mm3      Neutrophil % 60.3 %      Lymphocyte % 28.6 %      Monocyte % 9.6 %      Eosinophil % 0.8 %      Basophil % 0.6 %      Immature Grans % 0.1 %      Neutrophils, Absolute 4.96 10*3/mm3      Lymphocytes, Absolute 2.36 10*3/mm3      Monocytes, Absolute 0.79 10*3/mm3      Eosinophils, Absolute 0.07 10*3/mm3      Basophils, Absolute 0.05 10*3/mm3      Immature Grans, Absolute 0.01 10*3/mm3             Imaging Results (Most Recent)       Procedure Component Value Units Date/Time    XR Chest 2 View [[id number]] Collected: 06/14/23 1132     Updated: 06/14/23 1135    Narrative:      CHEST X-RAY, 06/14/2023         HISTORY:  83-year-old female in the ED noting chest pain. Abnormal EKG at her  physician's office. She notes some dizziness.     TECHNIQUE:  PA lateral upright chest series.     COMPARISON:  *  Chest x-ray, 11/16/2020.     FINDINGS:  Stable mild cardiomegaly. Pulmonary vascularity is normal. The lungs  appear clear. No visible pulmonary edema, focal infiltrate or pleural  effusion. Stable benign apical scarring. Left shoulder arthroplasty.  Spinal curvature.       Impression:      No active disease. Stable mild cardiomegaly.     This report was finalized on 6/14/2023 11:33 AM by Dr. Clovis Morales MD.             reviewed    ECG/EMG Results (most recent)       Procedure Component Value Units Date/Time    ECG 12 Lead Chest Pain [[id number]] Collected: 06/14/23 1024     Updated: 06/14/23 1026     QT Interval 414 ms      Narrative:      HEART RATE= 63  bpm  RR Interval= 956  ms  ND Interval= 184  ms  P Horizontal Axis= 4  deg  P Front Axis= 66  deg  QRSD Interval= 80  ms  QT Interval= 414  ms  QRS Axis= 49  deg  T Wave Axis= 33  deg  - NORMAL ECG -  Sinus rhythm  Electronically Signed By:   Date and Time of Study: 2023-06-14 10:24:47          Reviewed and compared to 5/2019 and 10/2020.  I do not have the EKG from the office.    Assessment & Plan     Chest Pressure: Symptoms are concerning for angina given exertional findings and pattern of radiation.  I do not have access to the EKG from the office, but there are described t-wave inversions in the inferior leads that have now resolved making me consider vasospasm.  I've reviewed her stress test from 2019 that was negative and low risk.  Last lipid profile was in March and LDL was a little above recommendations.  Will repeat.  I spoke to Dr. Griffith about my findings.  Will make NPO at midnight.  Troponins negative x2.  Hypothyroidism: Checking TSH.  Will resume home dose.  Hypertension: Not at goal on exam.  Will resume home regimen.    I discussed the patient's findings and my recommendations with patient.     Abdulkadir Stephen MD  06/14/23  16:03 EDT

## 2023-06-14 NOTE — PROGRESS NOTES
Subjective   Nguyen Zhang is a 83 y.o. female.     Chief Complaint   Patient presents with   • Hyperlipidemia   • Hypertension   Hypothyroidism    History of Present Illness   Patient came to the office for the routine visit follow-up for hypertension, hyperlipidemia, hypothyroidism.  Cannot tolerate statins.  Patient also has GERD.  Complains of chest tightness, gets annoyed when chest pain is mentioned, off-and-on when she exerts for last 2 weeks.  When she rests chest tightness goes away.  Complains of continued dizziness worse for last 2 weeks.  No relation to chest pain.  She has seen ENT few weeks ago work-up was negative.  No syncope or near syncopal episodes.  Complains of increased shakiness.  Patient had EKG done in the office showed T wave inversion in the inferior leads that is new from before.  No ST elevation.  No arrhythmias.  Getting down from table to the chair patient started having chest tightness.  Reports this goes from her midsternal to neck.  1 nitroglycerin given under the tongue with easing of chest tightness, not complete resolution.  Patient was very anxious.  Family notified  The following portions of the patient's history were reviewed and updated as appropriate: allergies, current medications, past family history, past medical history, past social history, past surgical history and problem list.    Review of Systems   Constitutional: Negative for activity change, appetite change, fatigue and fever.   Eyes: Negative for blurred vision and double vision.   Respiratory: Negative.  Negative for apnea and shortness of breath.    Cardiovascular: Positive for chest pain. Negative for palpitations and leg swelling.   Gastrointestinal: Negative.    Neurological: Positive for dizziness. Negative for syncope, light-headedness and headache.   Psychiatric/Behavioral: Negative for self-injury and suicidal ideas. The patient is nervous/anxious.        Allergies   Allergen Reactions   • Ace  Inhibitors Cough   • Codeine Nausea And Vomiting       Current Outpatient Medications on File Prior to Visit   Medication Sig Dispense Refill   • Acetaminophen (TYLENOL ARTHRITIS PAIN PO) Take 500 mg by mouth Daily.     • ALPRAZolam (Xanax) 0.25 MG tablet Take 1 tablet by mouth At Night As Needed for Anxiety. (Patient taking differently: Take 1 tablet by mouth At Night As Needed for Anxiety or Sleep.) 10 tablet 0   • amLODIPine (NORVASC) 5 MG tablet TAKE 1 TABLET BY MOUTH EVERY EVENING. 30 tablet 0   • aspirin 325 MG tablet Take 1 tablet by mouth Daily. Takes daily w/tylenol for pain takes half a tablet     • atenolol (TENORMIN) 50 MG tablet TAKE 1 TABLET BY MOUTH 2 TIMES A DAY. 60 tablet 0   • Bioflavonoid Products (DOROTEO C PO) Take 500 mg by mouth Daily.     • Cholecalciferol (VITAMIN D3) 1000 units capsule Take 2 capsules by mouth Daily.     • hydroCHLOROthiazide (HYDRODIURIL) 25 MG tablet TAKE 1 TABLET BY MOUTH DAILY. 30 tablet 5   • levothyroxine (SYNTHROID, LEVOTHROID) 75 MCG tablet TAKE 1 TABLET BY MOUTH DAILY. 90 tablet 0   • losartan (COZAAR) 100 MG tablet TAKE 1 TABLET BY MOUTH DAILY. 90 tablet 3   • meloxicam (MOBIC) 7.5 MG tablet Take 1 tablet by mouth Daily. 30 tablet 3   • NIACIN PO Take 500 mg by mouth Daily.     • potassium chloride 10 MEQ CR tablet Take 1 tablet by mouth 2 (Two) Times a Day. 180 tablet 3   • vitamin E 400 UNIT capsule Take 1 capsule by mouth Daily.     • methylPREDNISolone (MEDROL) 4 MG dose pack Take as directed on package instructions. (Patient not taking: Reported on 6/14/2023) 21 tablet 0   • multivitamin-minerals (CENTRUM) tablet Take 1 tablet by mouth Daily. (Patient not taking: Reported on 6/14/2023)       No current facility-administered medications on file prior to visit.       Family History   Problem Relation Age of Onset   • Heart attack Father    • Heart disease Father    • Breast cancer Neg Hx    • Malig Hyperthermia Neg Hx        Past Medical History:   Diagnosis Date  "  • Arthritis    • Hyperlipidemia    • Hypertension    • Hypothyroid    • Neck pain    • PONV (postoperative nausea and vomiting)    • Shoulder pain, left     sched total shoulder   • Spinal headache        Past Surgical History:   Procedure Laterality Date   • COLONOSCOPY     • HYSTERECTOMY     • REPLACEMENT TOTAL KNEE Right    • SHOULDER SURGERY Right     tendon repair   • TONSILLECTOMY     • TOTAL SHOULDER ARTHROPLASTY W/ DISTAL CLAVICLE EXCISION Left 10/15/2021    Procedure: TOTAL SHOULDER REVERSE ARTHROPLASTY;  Surgeon: Scout Palmer MD;  Location: [facility];  Service: Orthopedics;  Laterality: Left;       Social History     Socioeconomic History   • Marital status:    Tobacco Use   • Smoking status: Never   • Smokeless tobacco: Never   Vaping Use   • Vaping Use: Never used   Substance and Sexual Activity   • Alcohol use: No   • Drug use: No   • Sexual activity: Defer       Patient Active Problem List   Diagnosis   • Precordial chest pain   • PVCs (premature ventricular contractions)   • Essential hypertension   • Acquired hypothyroidism   • Low serum potassium level   • Other hyperlipidemia   • Acute pain of left shoulder   • Osteoarthritis of multiple joints   • Statin intolerance       /78   Pulse 63   Temp 97.5 °F (36.4 °C)   Resp 16   Ht 152.4 cm (60\")   Wt 55.8 kg (123 lb)   SpO2 97%   BMI 24.02 kg/m²   Body mass index is 24.02 kg/m².    Objective   Physical Exam  Vitals and nursing note reviewed.   Constitutional:       Appearance: She is well-developed.   Eyes:      Pupils: Pupils are equal, round, and reactive to light.   Neck:      Thyroid: No thyromegaly.      Vascular: No JVD.      Trachea: No tracheal deviation.   Cardiovascular:      Rate and Rhythm: Normal rate and regular rhythm.      Heart sounds: Murmur heard.   Pulmonary:      Effort: Pulmonary effort is normal. No respiratory distress.      Breath sounds: Normal breath sounds. No wheezing.   Abdominal:      General: " Bowel sounds are normal. There is no distension.      Palpations: Abdomen is soft. There is no mass.      Tenderness: There is no abdominal tenderness. There is no right CVA tenderness, left CVA tenderness, guarding or rebound.      Hernia: No hernia is present.   Musculoskeletal:      Cervical back: Normal range of motion and neck supple.   Lymphadenopathy:      Cervical: No cervical adenopathy.   Neurological:      General: No focal deficit present.      Mental Status: She is alert and oriented to person, place, and time.           Assessment & Plan   Diagnoses and all orders for this visit:    1. Chest pain due to myocardial ischemia, unspecified ischemic chest pain type (Primary)  -     ECG 12 Lead    2. Statin intolerance    3. Unstable angina pectoris    4. Essential hypertension    5. Other hyperlipidemia    6. Acquired hypothyroidism    Discussed with patient suspecting unstable angina.  Patient needs work-up done.  Her symptoms also masking anxiety.  Patient will be sent to the hospital by EMS.  I have discussed with Dr. Verdin in the ER at [facility].  EKG showed T wave inversion in the inferior leads that is new for from 2021 EKG.  No arrhythmias.  Patient was given 1 nitroglycerin with easing of the chest tightness, oxygen supplementation.  Further follow-up depending on her outcome in the hospital.  EHR dragon/transcription disclaimer:  Part of this note are created by electronic transcription/translation of spoken language to printed text and thus may lead to erroneous, or at times, nonsensical words or phrases inadvertently transcribed.  Although I have reviewed for such errors, some may still exist.

## 2023-06-14 NOTE — ED PROVIDER NOTES
"Subjective     History provided by:  Patient (Dr. Rai)  History of Present Illness    Chief complaint: #1 chest tightness, #2 dizziness.    Location: Chest tightness in the center and left chest radiating up to the throat.    Quality/Severity: The patient states her chest is tightness that comes and goes and radiates to the throat.  She also reports dizziness described as lightheadedness.    Timing/Onset: She states the chest tightness and dizziness have been going on about 2 weeks.    Modifying Factors: Chest tightness is present with exertion and resolves with rest.  Dizziness can occur with activity or at rest.    Associated symptoms: She states the dizziness is lightheadedness and she has times where she feels is going to pass out.  She denies associated shortness of breath or diaphoresis.  Denies a cough.    Narrative: The patient is an 83-year-old white female who was seen by her PCP Dr. Rai in his office earlier this morning.  She is complaining of chest tightness with exertion for 2 weeks and also complained of dizziness stating she felt like she was going to \"pass out\" while she was in the office.  Dr. Rai obtained an EKG that showed inverted T waves in the inferior leads.  He states she had a normal stress test in 2021.  The patient states she was seen by Dr. Priest back in 2021.  Dr. Rai administered nitroglycerin and oxygen and she was transferred here via EMS.  The patient states before 2 weeks ago she had never had similar chest tightness.    Past Medical History:   Diagnosis Date    Arthritis     Hyperlipidemia     Hypertension     Hypothyroid     Neck pain     PONV (postoperative nausea and vomiting)     Shoulder pain, left     sched total shoulder    Spinal headache      /85   Pulse 70   Temp 97.7 °F (36.5 °C) (Oral)   Resp 16   Ht 152.4 cm (60\")   Wt 56.2 kg (124 lb)   SpO2 97%   BMI 24.22 kg/m²   Review of Systems  Labs Reviewed   COMPREHENSIVE METABOLIC PANEL - " Abnormal; Notable for the following components:       Result Value    Glucose 108 (*)     Sodium 133 (*)     Potassium 3.4 (*)     Chloride 91 (*)     CO2 30.1 (*)     All other components within normal limits    Narrative:     GFR Normal >60  Chronic Kidney Disease <60  Kidney Failure <15    The GFR formula is only valid for adults with stable renal function between ages 18 and 70.   URINALYSIS W/ MICROSCOPIC IF INDICATED (NO CULTURE) - Abnormal; Notable for the following components:    Blood, UA Trace (*)     All other components within normal limits   CBC WITH AUTO DIFFERENTIAL - Abnormal; Notable for the following components:    RDW 11.6 (*)     All other components within normal limits   PROTIME-INR - Normal    Narrative:     Therapeutic Ranges for INR: 2.0-3.0 (PT 20-30)                              2.5-3.5 (PT 25-34)   APTT - Normal    Narrative:     PTT = The equivalent PTT values for the therapeutic range of heparin levels at 0.1 to 0.7 U/ml are 53 to 110 seconds.     TROPONIN - Normal    Narrative:     High Sensitive Troponin T Reference Range:  <10.0 ng/L- Negative Female for AMI  <15.0 ng/L- Negative Male for AMI  >=10 - Abnormal Female indicating possible myocardial injury.  >=15 - Abnormal Male indicating possible myocardial injury.   Clinicians would have to utilize clinical acumen, EKG, Troponin, and serial changes to determine if it is an Acute Myocardial Infarction or myocardial injury due to an underlying chronic condition.        BNP (IN-HOUSE) - Normal    Narrative:     Among patients with dyspnea, NT-proBNP is highly sensitive for the detection of acute congestive heart failure. In addition NT-proBNP of <300 pg/ml effectively rules out acute congestive heart failure with 99% negative predictive value.    Results may be falsely decreased if patient taking Biotin.     URINALYSIS, MICROSCOPIC ONLY   HIGH SENSITIVITIY TROPONIN T 2HR    Narrative:     High Sensitive Troponin T Reference  Range:  <10.0 ng/L- Negative Female for AMI  <15.0 ng/L- Negative Male for AMI  >=10 - Abnormal Female indicating possible myocardial injury.  >=15 - Abnormal Male indicating possible myocardial injury.   Clinicians would have to utilize clinical acumen, EKG, Troponin, and serial changes to determine if it is an Acute Myocardial Infarction or myocardial injury due to an underlying chronic condition.        CBC AND DIFFERENTIAL    Narrative:     The following orders were created for panel order CBC & Differential.  Procedure                               Abnormality         Status                     ---------                               -----------         ------                     CBC Auto Differential[[id number]]        Abnormal            Final result                 Please view results for these tests on the individual orders.       Past Medical History:   Diagnosis Date    Arthritis     Hyperlipidemia     Hypertension     Hypothyroid     Neck pain     PONV (postoperative nausea and vomiting)     Shoulder pain, left     sched total shoulder    Spinal headache        Allergies   Allergen Reactions    Ace Inhibitors Cough    Codeine Nausea And Vomiting       Past Surgical History:   Procedure Laterality Date    COLONOSCOPY      HYSTERECTOMY      REPLACEMENT TOTAL KNEE Right     SHOULDER SURGERY Right     tendon repair    TONSILLECTOMY      TOTAL SHOULDER ARTHROPLASTY W/ DISTAL CLAVICLE EXCISION Left 10/15/2021    Procedure: TOTAL SHOULDER REVERSE ARTHROPLASTY;  Surgeon: Scout Palmer MD;  Location: [facility];  Service: Orthopedics;  Laterality: Left;       Family History   Problem Relation Age of Onset    Heart attack Father     Heart disease Father     Breast cancer Neg Hx     Malig Hyperthermia Neg Hx        Social History     Socioeconomic History    Marital status:    Tobacco Use    Smoking status: Never    Smokeless tobacco: Never   Vaping Use    Vaping Use: Never used   Substance and Sexual  Activity    Alcohol use: No    Drug use: No    Sexual activity: Defer           Objective   Physical Exam  Vitals and nursing note reviewed.   Constitutional:       General: She is not in acute distress.     Appearance: She is well-developed. She is not diaphoretic.   HENT:      Head: Normocephalic and atraumatic.      Nose: Nose normal.      Mouth/Throat:      Mouth: Mucous membranes are moist.      Pharynx: Oropharynx is clear.   Eyes:      General: No scleral icterus.        Right eye: No discharge.         Left eye: No discharge.      Conjunctiva/sclera: Conjunctivae normal.      Pupils: Pupils are equal, round, and reactive to light.   Neck:      Thyroid: No thyromegaly.      Vascular: No JVD.   Cardiovascular:      Rate and Rhythm: Normal rate and regular rhythm.      Heart sounds: Normal heart sounds. No murmur heard.  Pulmonary:      Effort: Pulmonary effort is normal.      Breath sounds: Normal breath sounds. No wheezing, rhonchi or rales.   Chest:      Chest wall: No tenderness.   Abdominal:      General: Bowel sounds are normal. There is no distension.      Palpations: Abdomen is soft.      Tenderness: There is no abdominal tenderness.   Musculoskeletal:         General: No tenderness or deformity. Normal range of motion.      Cervical back: Normal range of motion and neck supple. No tenderness.      Right lower leg: No edema.      Left lower leg: No edema.   Lymphadenopathy:      Cervical: No cervical adenopathy.   Skin:     General: Skin is warm and dry.      Capillary Refill: Capillary refill takes less than 2 seconds.      Findings: No rash.   Neurological:      General: No focal deficit present.      Mental Status: She is alert and oriented to person, place, and time.      Cranial Nerves: No cranial nerve deficit.      Coordination: Coordination normal.      Comments: No focal motor sensory deficit   Psychiatric:         Mood and Affect: Mood normal.         Behavior: Behavior normal.         Thought  Content: Thought content normal.         Judgment: Judgment normal.       Procedures           ED Course  ED Course as of 06/14/23 1516   Wed Jun 14, 2023   1038 My interpretation of the patient's EKG tracing performed 10: 24 is normal sinus rhythm with a rate of 63, normal axis, normal conduction, no acute ST segment elevation or depression consistent with ischemia, no T wave abnormality, no ectopy, normal R wave transition, normal FL and QT intervals.  Normal EKG.  This tracing is unchanged in comparison to tracing 10/16/2020.  It is changed however in comparison to tracing performed earlier today at Dr. Rai's office at 09: 20 where she had inversion of the T waves in 3 and flattening of the T waves in aVF with a slight left axis.  The patient states she was having chest tightness at the time the EKG is being performed in Dr. Rai's office. [TP]   1039 The patient's chest x-ray was interpreted by me as no acute disease.  She is status post left shoulder replacement. [TP]   1500 The patient was administered aspirin 324 mg p.o.  An inch of Nitropaste was applied to the chest wall.  Due to the patient having chest pain with exertion that alleviates with rest and having an abnormal EKG at Dr. Rai's office that normalized after the administration of nitroglycerin, I am concerned the patient has unstable angina and requires admission. [TP]   1500 13:30 patient discussed with her cardiologist Dr. Priest who stated the patient should be admitted here for further evaluation and probable stress testing tomorrow. [TP]   1501 13:54 and again at 14:50 patient discussed with Dr. Stephen who agreed to admit the patient to observation. [TP]      ED Course User Index  [TP] Dorian Verdin MD                                           Medical Decision Making  My differential diagnosis for chest pain includes but is not limited to:  Muscle strain, costochondritis, myositis, pleurisy, rib fracture, intercostal neuritis,  herpes zoster, tumor, myocardial infarction, coronary syndrome, unstable angina, angina, aortic dissection, mitral valve prolapse, pericarditis, palpitations, pulmonary embolus, pneumonia, pneumothorax, lung cancer, GERD, esophagitis, esophageal spasm     Problems Addressed:  Unstable angina: complicated acute illness or injury    Amount and/or Complexity of Data Reviewed  Labs: ordered. Decision-making details documented in ED Course.  Radiology: ordered. Decision-making details documented in ED Course.  ECG/medicine tests: ordered and independent interpretation performed. Decision-making details documented in ED Course.  Discussion of management or test interpretation with external provider(s): Details documented in the ED course.    Risk  OTC drugs.  Prescription drug management.  Decision regarding hospitalization.        .edlabs  XR Chest 2 View   Final Result   No active disease. Stable mild cardiomegaly.       This report was finalized on 6/14/2023 11:33 AM by Dr. Clovis Morales MD.                 Medication List      No changes were made to your prescriptions during this visit.           Final diagnoses:   Unstable angina       ED Disposition  ED Disposition       ED Disposition   Decision to Admit    Condition   --    Comment   Level of Care: Telemetry [5]   Diagnosis: Unstable angina [005334]   Admitting Physician: DANIEL RAHMAN [[id number]]   Bed Request Comments: Unstable angina                 No follow-up provider specified.       Medication List      No changes were made to your prescriptions during this visit.            Dorian Verdin MD  06/14/23 1506       Dorian Verdin MD  06/14/23 3183

## 2023-06-14 NOTE — PLAN OF CARE
Goal Outcome Evaluation:           Progress: improving  Outcome Evaluation: pt sent to er following scheduled appointment with her primary care physician. pt admit to med surg for cardiac monitoring. scheduled nitro paste in place. LCC consulted. cardiac diet in place, NPO at midnight for LCC consult/tests. tele in place, nsr. pt up independent in room. no complaints at this time.

## 2023-06-15 ENCOUNTER — APPOINTMENT (OUTPATIENT)
Dept: NUCLEAR MEDICINE | Facility: HOSPITAL | Age: 83
End: 2023-06-15
Payer: MEDICARE

## 2023-06-15 ENCOUNTER — READMISSION MANAGEMENT (OUTPATIENT)
Dept: CALL CENTER | Facility: HOSPITAL | Age: 83
End: 2023-06-15
Payer: MEDICARE

## 2023-06-15 ENCOUNTER — APPOINTMENT (OUTPATIENT)
Dept: CARDIOLOGY | Facility: HOSPITAL | Age: 83
End: 2023-06-15
Payer: MEDICARE

## 2023-06-15 VITALS
RESPIRATION RATE: 18 BRPM | OXYGEN SATURATION: 97 % | HEART RATE: 80 BPM | WEIGHT: 124 LBS | SYSTOLIC BLOOD PRESSURE: 109 MMHG | HEIGHT: 60 IN | TEMPERATURE: 97.6 F | DIASTOLIC BLOOD PRESSURE: 62 MMHG | BODY MASS INDEX: 24.35 KG/M2

## 2023-06-15 LAB
BH CV REST NUCLEAR ISOTOPE DOSE: 11.7 MCI
BH CV STRESS BP STAGE 1: NORMAL
BH CV STRESS COMMENTS STAGE 1: NORMAL
BH CV STRESS DOSE REGADENOSON STAGE 1: 0.4
BH CV STRESS DURATION MIN STAGE 1: 0
BH CV STRESS DURATION SEC STAGE 1: 30
BH CV STRESS HR STAGE 1: 72
BH CV STRESS NUCLEAR ISOTOPE DOSE: 35.3 MCI
BH CV STRESS O2 STAGE 1: 96
BH CV STRESS PROTOCOL 1: NORMAL
BH CV STRESS RECOVERY BP: NORMAL MMHG
BH CV STRESS RECOVERY HR: 86 BPM
BH CV STRESS RECOVERY O2: 98 %
BH CV STRESS STAGE 1: 1
LV EF NUC BP: 97 %
MAXIMAL PREDICTED HEART RATE: 138 BPM
PERCENT MAX PREDICTED HR: 70.29 %
STRESS BASELINE BP: NORMAL MMHG
STRESS BASELINE HR: 75 BPM
STRESS O2 SAT REST: 96 %
STRESS PERCENT HR: 83 %
STRESS POST ESTIMATED WORKLOAD: 1 METS
STRESS POST EXERCISE DUR SEC: 30 SEC
STRESS POST O2 SAT PEAK: 99 %
STRESS POST PEAK BP: NORMAL MMHG
STRESS POST PEAK HR: 97 BPM
STRESS TARGET HR: 117 BPM

## 2023-06-15 PROCEDURE — 78452 HT MUSCLE IMAGE SPECT MULT: CPT

## 2023-06-15 PROCEDURE — 93017 CV STRESS TEST TRACING ONLY: CPT

## 2023-06-15 PROCEDURE — 25010000002 REGADENOSON 0.4 MG/5ML SOLUTION: Performed by: HOSPITALIST

## 2023-06-15 PROCEDURE — G0378 HOSPITAL OBSERVATION PER HR: HCPCS

## 2023-06-15 PROCEDURE — 0 TECHNETIUM SESTAMIBI: Performed by: HOSPITALIST

## 2023-06-15 PROCEDURE — A9500 TC99M SESTAMIBI: HCPCS | Performed by: HOSPITALIST

## 2023-06-15 RX ORDER — ISOSORBIDE MONONITRATE 30 MG/1
30 TABLET, EXTENDED RELEASE ORAL
Qty: 30 TABLET | Refills: 0 | Status: SHIPPED | OUTPATIENT
Start: 2023-06-16

## 2023-06-15 RX ORDER — ISOSORBIDE MONONITRATE 30 MG/1
30 TABLET, EXTENDED RELEASE ORAL
Status: DISCONTINUED | OUTPATIENT
Start: 2023-06-15 | End: 2023-06-15 | Stop reason: HOSPADM

## 2023-06-15 RX ORDER — ASPIRIN 81 MG/1
81 TABLET ORAL DAILY
Qty: 30 TABLET | Refills: 0 | Status: SHIPPED | OUTPATIENT
Start: 2023-06-15

## 2023-06-15 RX ORDER — REGADENOSON 0.08 MG/ML
0.4 INJECTION, SOLUTION INTRAVENOUS
Status: COMPLETED | OUTPATIENT
Start: 2023-06-15 | End: 2023-06-15

## 2023-06-15 RX ADMIN — LEVOTHYROXINE SODIUM 75 MCG: 75 TABLET ORAL at 05:55

## 2023-06-15 RX ADMIN — TECHNETIUM TC 99M SESTAMIBI 1 DOSE: 1 INJECTION INTRAVENOUS at 09:05

## 2023-06-15 RX ADMIN — ISOSORBIDE MONONITRATE 30 MG: 30 TABLET, EXTENDED RELEASE ORAL at 13:37

## 2023-06-15 RX ADMIN — REGADENOSON 0.4 MG: 0.08 INJECTION, SOLUTION INTRAVENOUS at 10:31

## 2023-06-15 RX ADMIN — TECHNETIUM TC 99M SESTAMIBI 1 DOSE: 1 INJECTION INTRAVENOUS at 10:31

## 2023-06-15 NOTE — OUTREACH NOTE
Prep Survey      Flowsheet Row Responses   [facility] facility patient discharged from? [place]   Is LACE score < 7 ? Yes   Eligibility [facility]   Date of Admission 06/14/23   Date of Discharge 06/15/23   Discharge Disposition Home or Self Care   Discharge diagnosis Unstable angina   Does the patient have one of the following disease processes/diagnoses(primary or secondary)? Other   Does the patient have Home health ordered? No   Is there a DME ordered? No   Prep survey completed? Yes            Rajwinder PANTOJA - Registered Nurse

## 2023-06-15 NOTE — CASE MANAGEMENT/SOCIAL WORK
Discharge Planning Assessment  JONATHAN Adler     Patient Name: Nguyen Zhang  MRN: [id number]  Today's Date: 6/15/2023    Admit Date: 6/14/2023    Plan: Home with    Discharge Needs Assessment       Row Name 06/15/23 1400       Living Environment    People in Home spouse    Name(s) of People in Home leah Leyva    Current Living Arrangements home    Duration at Residence 60 Y    Potentially Unsafe Housing Conditions none    Primary Care Provided by self    Provides Primary Care For no one    Caregiving Concerns no care giving concerns voiced by patient at this time    Family Caregiver if Needed spouse    Family Caregiver Names Gordon,     Quality of Family Relationships unable to assess    Able to Return to Prior Arrangements yes    Living Arrangement Comments Patient states she lives with  her  in a single story house with a basement and three steps to gain entry       Resource/Environmental Concerns    Resource/Environmental Concerns none    Transportation Concerns none       Food Insecurity    Within the past 12 months, you worried that your food would run out before you got the money to buy more. Never true    Within the past 12 months, the food you bought just didn't last and you didn't have money to get more. Never true       Transition Planning    Patient/Family Anticipates Transition to home with family    Patient/Family Anticipated Services at Transition none    Transportation Anticipated family or friend will provide  patient states that one of her children will be able to provide ride home at discharge       Discharge Needs Assessment    Readmission Within the Last 30 Days no previous admission in last 30 days    Current Outpatient/Agency/Support Group --  none    Equipment Currently Used at Home none    Concerns to be Addressed denies needs/concerns at this time;adjustment to diagnosis/illness;discharge planning    Concerns Comments pt voiced no discharge needs at this time.     Anticipated Changes Related to Illness none    Equipment Needed After Discharge none    Outpatient/Agency/Support Group Needs --  pt declined these services    Discharge Facility/Level of Care Needs --  pt declined these services    Provided Post Acute Provider List? Refused    Refused Provider List Comment pt declined    Patient's Choice of Community Agency(s) none    Current Discharge Risk --  none    Discharge Coordination/Progress Patient states she plans on returning home at discharge with her family to help if needed and voiced no discharge needs at this time.                   Discharge Plan       Row Name 06/15/23 1405       Plan    Plan Home with     Patient/Family in Agreement with Plan yes    Plan Comments Into room and introduced self and role of CM. Discussed discharge disposition with patient at bedside. Patient is currently sitting up in the chair and has no complaints. Patient confirms that the info on  her face sheet is correct and that she see's Dr. Rai as PCP. She states she uses [facility] in [place] and currently has no problem picking up or paying for her med's. She also states that she does have a living will and it is on file here. Patient states she lives with  her  in a single story home with a basement and three steps to gain entry and normally has no issues entering the home or maneuvering inside. She states she is independent with her ADL's, still helps on the farm and drives and one of her children will be able to provide ride home at discharge. She also states she currently does not use any DME at home and does not anticipate needing any equipment at discharge. Patient states she has used home health after knee and shoulder surgery but states she will not need this service at discharge and declines the need for any other services such as STR or LTC at this time. Patient states she plans on returning home at discharge with her family to help if needed and voiced  no discharge needs at this time. She had no other questions or concerns regarding discharge plans. Name and number placed on white board in room. CM will follow.                  Continued Care and Services - Admitted Since 6/14/2023    Coordination has not been started for this encounter.          Demographic Summary       Row Name 06/15/23 1400       General Information    Admission Type observation    Arrived From home    Required Notices Provided Observation Status Notice    Referral Source admission list    Reason for Consult discharge planning    Preferred Language English       Contact Information    Permission Granted to Share Info With                    Functional Status    No documentation.                  Psychosocial    No documentation.                  Abuse/Neglect    No documentation.                  Legal    No documentation.                  Substance Abuse    No documentation.                  Patient Forms    No documentation.                     Isatu Sanders RN

## 2023-06-15 NOTE — CONSULTS
Date of Hospital Visit: 2023  Date of consult: 06/15/23  Encounter Provider: Dev Schulte MD  Place of Service: [facility] CARDIOLOGY  Patient Name: Nguyen Zhang  :1940  Referral Provider: No ref. provider found    Chief complaint: Chest pain    Reason for consult: Chest pain    History of Present Illness    Ms. Zhang is an 83 years old lady with past medical history of hypertension, hyperlipidemia, hypothyroidism sent to the ER from PCP office for further evaluation treatment of chest pain.  ACS ruled out.  She started having exertional retrosternal chest pressure several weeks ago.  Per her description she reports having the discomfort when she does too many things and relieved with sitting down and resting.  It is of variable severity.  She has been having it almost every day.  She denies any significant shortness of breath, palpitations, presyncope or syncope or extremity swelling.  No prior coronary angiogram.  Stress test in the remote past.      Past Medical History:   Diagnosis Date   • Arthritis    • Hyperlipidemia    • Hypertension    • Hypothyroid    • Neck pain    • PONV (postoperative nausea and vomiting)    • Shoulder pain, left     sched total shoulder   • Spinal headache        Past Surgical History:   Procedure Laterality Date   • COLONOSCOPY     • HYSTERECTOMY     • REPLACEMENT TOTAL KNEE Right    • SHOULDER SURGERY Right     tendon repair   • TONSILLECTOMY     • TOTAL SHOULDER ARTHROPLASTY W/ DISTAL CLAVICLE EXCISION Left 10/15/2021    Procedure: TOTAL SHOULDER REVERSE ARTHROPLASTY;  Surgeon: Scout Palmer MD;  Location: [facility];  Service: Orthopedics;  Laterality: Left;       Medications Prior to Admission   Medication Sig Dispense Refill Last Dose   • Acetaminophen (TYLENOL ARTHRITIS PAIN PO) Take 500 mg by mouth Daily.   2023   • amLODIPine (NORVASC) 5 MG tablet TAKE 1 TABLET BY MOUTH EVERY EVENING. (Patient taking  differently: 1 tablet.) 30 tablet 0 6/13/2023   • aspirin 325 MG tablet Take 1 tablet by mouth Daily. Takes daily w/tylenol for pain takes half a tablet   6/14/2023   • atenolol (TENORMIN) 50 MG tablet TAKE 1 TABLET BY MOUTH 2 TIMES A DAY. (Patient taking differently: 1 tablet Every 12 (Twelve) Hours.) 60 tablet 0 6/14/2023   • Bioflavonoid Products (DOROTEO C PO) Take 500 mg by mouth Daily.   6/13/2023   • Cholecalciferol (VITAMIN D3) 1000 units capsule Take 2 capsules by mouth Daily.   6/13/2023   • hydroCHLOROthiazide (HYDRODIURIL) 25 MG tablet TAKE 1 TABLET BY MOUTH DAILY. (Patient taking differently: 1 tablet.) 30 tablet 5 6/14/2023   • levothyroxine (SYNTHROID, LEVOTHROID) 75 MCG tablet TAKE 1 TABLET BY MOUTH DAILY. 90 tablet 0 6/14/2023   • losartan (COZAAR) 100 MG tablet TAKE 1 TABLET BY MOUTH DAILY. (Patient taking differently: 1 tablet Every Night.) 90 tablet 3 6/13/2023   • multivitamin-minerals (CENTRUM) tablet Take 1 tablet by mouth Daily.   Past Month   • NIACIN PO Take 500 mg by mouth Daily.   6/13/2023   • potassium chloride 10 MEQ CR tablet Take 1 tablet by mouth 2 (Two) Times a Day. 180 tablet 3 6/14/2023   • vitamin E 400 UNIT capsule Take 1 capsule by mouth Daily.   6/13/2023       Current Meds  Scheduled Meds:amLODIPine, 5 mg, Oral, Q PM  aspirin, 324 mg, Oral, Once  cholecalciferol, 1,000 Units, Oral, Daily  levothyroxine, 75 mcg, Oral, Q AM  losartan, 100 mg, Oral, Nightly  nitroglycerin, 1 inch, Topical, TID - Nitrates      Continuous Infusions:   PRN Meds:.•  acetaminophen  •  nitroglycerin  •  [COMPLETED] Insert Peripheral IV **AND** sodium chloride    Allergies as of 06/14/2023 - Reviewed 06/14/2023   Allergen Reaction Noted   • Ace inhibitors Cough 05/08/2012   • Codeine Nausea And Vomiting 01/03/2019       Social History     Socioeconomic History   • Marital status:    Tobacco Use   • Smoking status: Never   • Smokeless tobacco: Never   Vaping Use   • Vaping Use: Never used  "  Substance and Sexual Activity   • Alcohol use: No   • Drug use: No   • Sexual activity: Defer       Family History   Problem Relation Age of Onset   • Heart attack Father    • Heart disease Father    • Breast cancer Neg Hx    • Malig Hyperthermia Neg Hx        REVIEW OF SYSTEMS:   All systems reviewed and pertinent positives include in HPI otherwise negative review of systems.       Objective:   Temp:  [97 °F (36.1 °C)-97.9 °F (36.6 °C)] 97 °F (36.1 °C)  Heart Rate:  [63-80] 73  Resp:  [16] 16  BP: (122-184)/() 143/77  Body mass index is 24.22 kg/m².  Flowsheet Rows    Flowsheet Row First Filed Value   Admission Height 152.4 cm (60\") Documented at 06/14/2023 1011   Admission Weight 56.2 kg (124 lb) Documented at 06/14/2023 1011        Vitals:    06/15/23 0601   BP: 143/77   Pulse: 73   Resp: 16   Temp: 97 °F (36.1 °C)   SpO2: 95%       General Appearance:    Alert, cooperative, in no acute distress   Head:    Normocephalic, without obvious abnormality, atraumatic   Eyes:            Lids and lashes normal, conjunctivae and sclerae normal, no   icterus, no pallor, corneas clear, PERRLA   Ears:    Ears appear intact with no abnormalities noted   Throat:   No oral lesions, no thrush, oral mucosa moist   Neck:   No adenopathy, supple, trachea midline, no thyromegaly, no   carotid bruit, no JVD   Back:     No kyphosis present, no scoliosis present, no skin lesions, erythema or scars, no tenderness to percussion or palpation, range of motion normal   Lungs:     Clear to auscultation,respirations regular, even and unlabored    Heart:    Regular rhythm and normal rate, normal S1 and S2, no murmur, no gallop, no rub, no click   Chest Wall:    No abnormalities observed   Abdomen:     Normal bowel sounds, no masses, no organomegaly, soft nontender, nondistended, no guarding, no rebound  tenderness   Extremities:   Moves all extremities well, no edema, no cyanosis, no redness   Pulses:   Pulses palpable and equal " bilaterally. Normal radial, carotid, femoral, dorsalis pedis and posterior tibial pulses bilaterally. Normal abdominal aorta   Skin:  Neurology:   Psychiatric:   No bleeding, bruising or rash   Normal speech and cranial nerve exam, no focal deficit   Alert and oriented x 3, normal mood and affect             Review of Data:      Results from last 7 days   Lab Units 06/14/23  1052   SODIUM mmol/L 133*   POTASSIUM mmol/L 3.4*   CHLORIDE mmol/L 91*   CO2 mmol/L 30.1*   BUN mg/dL 9   CREATININE mg/dL 0.81   CALCIUM mg/dL 10.1   BILIRUBIN mg/dL 0.7   ALK PHOS U/L 88   ALT (SGPT) U/L 16   AST (SGOT) U/L 26   GLUCOSE mg/dL 108*     Results from last 7 days   Lab Units 06/14/23  1257 06/14/23  1052   HSTROP T ng/L <6 <6     @LABRCNTbnp@  Results from last 7 days   Lab Units 06/14/23  1052   WBC 10*3/mm3 8.24   HEMOGLOBIN g/dL 13.8   HEMATOCRIT % 41.3   PLATELETS 10*3/mm3 238     Results from last 7 days   Lab Units 06/14/23  1052   INR  0.95   APTT seconds 31.0         @LABRCNTIP(chol,trig,hdl,ldl)    I personally viewed and interpreted the patient's EKG/Telemetry data  )   Unstable angina        Assessment and Plan:    1. Chest pain with qualities concerning for CAD and risk factors for CAD.  2. Essential hypertension- blood pressure running high since after admission.  3. Hyperlipidemia with history of statin intolerance with severe muscle cramping.  4. Hypothyroidism  Chest pain very concerning for angina/CAD.  She is getting myocardial perfusion study this morning.    Addendum: Surprisingly she has a normal myocardial perfusion study without evidence for ischemia.  Stress EKGs were also normal.  Optimize blood pressure control-added Imdur 30 mg p.o. daily.  Continue beta-blocker.  Discharge also on as needed sublingual nitroglycerin.  She will be followed in clinic in 2 weeks.    Dev Schulte MD  06/15/23  07:59 EDT.      Time spent in reviewing chart, discussion and examination:

## 2023-06-15 NOTE — DISCHARGE SUMMARY
Nguyen Zhang  1940  [id number]    Hospitalists Discharge Summary    Date of Admission: 6/14/2023  Date of Discharge:  6/15/2023    Primary Discharge Diagnoses:  Typical Chest Pain    Secondary Discharge Diagnoses:  Essential Hypertension  Hypothyroidism    History of Present Illness (taken from my H&P):  Ms. Zhang is a very pleasant, 82 y/o  female who was referred by her PCP from the office today due to EKG changes and exertional chest pressure. Ms. Zhang reports that for the last several weeks, she develops chest pressure with a sensation she's being strangled with any type of exertion. She denies associated diaphoresis and nausea. Her symptoms gradually subside at rest. She denies having symptoms at rest, however, staff report her describing an episode occurring while she was driving home in her car. That episode to ended w/ rest. She denies lower extremity edema as well as orthopnea. She reports no fever of chills.     Hospital Course:  Ms. Zhang was admitted to the Med/Surg unit.  To my surprise, her work-up was completely negative.    PCP  Patient Care Team:  Chelsie Rai MD as PCP - General (Internal Medicine)    Consults:   Consults       Date and Time Order Name Status Description    6/14/2023  4:03 PM Inpatient Cardiology Consult              Operations and Procedures Performed:       XR Chest 2 View    Result Date: 6/14/2023  Narrative: CHEST X-RAY, 06/14/2023     HISTORY: 83-year-old female in the ED noting chest pain. Abnormal EKG at her physician's office. She notes some dizziness.  TECHNIQUE: PA lateral upright chest series.  COMPARISON: *  Chest x-ray, 11/16/2020.  FINDINGS: Stable mild cardiomegaly. Pulmonary vascularity is normal. The lungs appear clear. No visible pulmonary edema, focal infiltrate or pleural effusion. Stable benign apical scarring. Left shoulder arthroplasty. Spinal curvature.      Impression: No active disease. Stable mild cardiomegaly.   This report was finalized on 6/14/2023 11:33 AM by Dr. Clovis Morales MD.      Stress Test With Myocardial Perfusion One Day    Result Date: 6/15/2023  Narrative:   Left ventricular ejection fraction is hyperdynamic (Calculated EF > 70%).   Myocardial perfusion imaging indicates a normal myocardial perfusion study with no evidence of ischemia.   Impressions are consistent with a low risk study.      Allergies:  is allergic to ace inhibitors and codeine.    Discharge Medications:     Discharge Medications        New Medications        Instructions Start Date   aspirin 81 MG EC tablet  Replaces: aspirin 325 MG tablet   81 mg, Oral, Daily      isosorbide mononitrate 30 MG 24 hr tablet  Commonly known as: IMDUR   30 mg, Oral, Every 24 Hours Scheduled   Start Date: June 16, 2023            Changes to Medications        Instructions Start Date   amLODIPine 5 MG tablet  Commonly known as: NORVASC  What changed:   how to take this  when to take this   TAKE 1 TABLET BY MOUTH EVERY EVENING.      losartan 100 MG tablet  Commonly known as: COZAAR  What changed:   how to take this  when to take this   TAKE 1 TABLET BY MOUTH DAILY.             Continue These Medications        Instructions Start Date   DOROTEO C PO   500 mg, Oral, Daily      levothyroxine 75 MCG tablet  Commonly known as: SYNTHROID, LEVOTHROID   75 mcg, Oral, Daily      multivitamin-minerals tablet tablet  Generic drug: multivitamin with minerals   1 tablet, Oral, Daily      NIACIN PO   500 mg, Oral, Daily      TYLENOL ARTHRITIS PAIN PO   500 mg, Oral, Daily      Vitamin D3 25 MCG (1000 UT) capsule   2,000 Units, Oral, Daily      vitamin E 400 UNIT capsule   400 Units, Oral, Daily             Stop These Medications      aspirin 325 MG tablet  Replaced by: aspirin 81 MG EC tablet     atenolol 50 MG tablet  Commonly known as: TENORMIN     hydroCHLOROthiazide 25 MG tablet  Commonly known as: HYDRODIURIL     potassium chloride 10 MEQ CR tablet              Last Lab  Results:   Lab Results (most recent)       Procedure Component Value Units Date/Time    TSH [[id number]]  (Normal) Collected: 06/14/23 1257    Specimen: Blood Updated: 06/14/23 1903     TSH 0.655 uIU/mL     Lipid Panel [[id number]]  (Abnormal) Collected: 06/14/23 1257    Specimen: Blood Updated: 06/14/23 1853     Total Cholesterol 212 mg/dL      Triglycerides 102 mg/dL      HDL Cholesterol 60 mg/dL      LDL Cholesterol  134 mg/dL      VLDL Cholesterol 18 mg/dL      LDL/HDL Ratio 2.19    Narrative:      Cholesterol Reference Ranges  (U.S. Department of Health and Human Services ATP III Classifications)    Desirable          <200 mg/dL  Borderline High    200-239 mg/dL  High Risk          >240 mg/dL      Triglyceride Reference Ranges  (U.S. Department of Health and Human Services ATP III Classifications)    Normal           <150 mg/dL  Borderline High  150-199 mg/dL  High             200-499 mg/dL  Very High        >500 mg/dL    HDL Reference Ranges  (U.S. Department of Health and Human Services ATP III Classifications)    Low     <40 mg/dl (major risk factor for CHD)  High    >60 mg/dl ('negative' risk factor for CHD)        LDL Reference Ranges  (U.S. Department of Health and Human Services ATP III Classifications)    Optimal          <100 mg/dL  Near Optimal     100-129 mg/dL  Borderline High  130-159 mg/dL  High             160-189 mg/dL  Very High        >189 mg/dL    High Sensitivity Troponin T 2Hr [[id number]] Collected: 06/14/23 1257    Specimen: Blood Updated: 06/14/23 1333     HS Troponin T <6 ng/L      Troponin T Delta --     Comment: Unable to calculate.       Narrative:      High Sensitive Troponin T Reference Range:  <10.0 ng/L- Negative Female for AMI  <15.0 ng/L- Negative Male for AMI  >=10 - Abnormal Female indicating possible myocardial injury.  >=15 - Abnormal Male indicating possible myocardial injury.   Clinicians would have to utilize clinical acumen, EKG, Troponin, and serial changes to determine  if it is an Acute Myocardial Infarction or myocardial injury due to an underlying chronic condition.         Urinalysis, Microscopic Only - Urine, Clean Catch [[id number]] Collected: 06/14/23 1052    Specimen: Urine, Clean Catch Updated: 06/14/23 1215     RBC, UA None Seen /HPF      WBC, UA None Seen /HPF      Bacteria, UA None Seen /HPF      Squamous Epithelial Cells, UA 0-2 /HPF      Hyaline Casts, UA None Seen /LPF      Methodology Manual Light Microscopy    BNP [[id number]]  (Normal) Collected: 06/14/23 1052    Specimen: Blood Updated: 06/14/23 1129     proBNP 309.6 pg/mL     Narrative:      Among patients with dyspnea, NT-proBNP is highly sensitive for the detection of acute congestive heart failure. In addition NT-proBNP of <300 pg/ml effectively rules out acute congestive heart failure with 99% negative predictive value.    Results may be falsely decreased if patient taking Biotin.      High Sensitivity Troponin T [[id number]]  (Normal) Collected: 06/14/23 1052    Specimen: Blood Updated: 06/14/23 1122     HS Troponin T <6 ng/L     Narrative:      High Sensitive Troponin T Reference Range:  <10.0 ng/L- Negative Female for AMI  <15.0 ng/L- Negative Male for AMI  >=10 - Abnormal Female indicating possible myocardial injury.  >=15 - Abnormal Male indicating possible myocardial injury.   Clinicians would have to utilize clinical acumen, EKG, Troponin, and serial changes to determine if it is an Acute Myocardial Infarction or myocardial injury due to an underlying chronic condition.         Comprehensive Metabolic Panel [[id number]]  (Abnormal) Collected: 06/14/23 1052    Specimen: Blood Updated: 06/14/23 1119     Glucose 108 mg/dL      BUN 9 mg/dL      Creatinine 0.81 mg/dL      Sodium 133 mmol/L      Potassium 3.4 mmol/L      Chloride 91 mmol/L      CO2 30.1 mmol/L      Calcium 10.1 mg/dL      Total Protein 8.3 g/dL      Albumin 4.9 g/dL      ALT (SGPT) 16 U/L      AST (SGOT) 26 U/L      Alkaline Phosphatase 88 U/L       Total Bilirubin 0.7 mg/dL      Globulin 3.4 gm/dL      A/G Ratio 1.4 g/dL      BUN/Creatinine Ratio 11.1     Anion Gap 11.9 mmol/L      eGFR 72.6 mL/min/1.73     Narrative:      GFR Normal >60  Chronic Kidney Disease <60  Kidney Failure <15    The GFR formula is only valid for adults with stable renal function between ages 18 and 70.    aPTT [[id number]]  (Normal) Collected: 06/14/23 1052    Specimen: Blood Updated: 06/14/23 1110     PTT 31.0 seconds     Narrative:      PTT = The equivalent PTT values for the therapeutic range of heparin levels at 0.1 to 0.7 U/ml are 53 to 110 seconds.      Protime-INR [[id number]]  (Normal) Collected: 06/14/23 1052    Specimen: Blood Updated: 06/14/23 1110     Protime 12.7 Seconds      INR 0.95    Narrative:      Therapeutic Ranges for INR: 2.0-3.0 (PT 20-30)                              2.5-3.5 (PT 25-34)    Urinalysis With Microscopic If Indicated (No Culture) - Urine, Clean Catch [[id number]]  (Abnormal) Collected: 06/14/23 1052    Specimen: Urine, Clean Catch Updated: 06/14/23 1105     Color, UA Yellow     Appearance, UA Clear     pH, UA 7.0     Specific Gravity, UA 1.015     Glucose, UA Negative     Ketones, UA Negative     Bilirubin, UA Negative     Blood, UA Trace     Protein, UA Negative     Leuk Esterase, UA Negative     Nitrite, UA Negative     Urobilinogen, UA 0.2 E.U./dL    CBC & Differential [[id number]]  (Abnormal) Collected: 06/14/23 1052    Specimen: Blood Updated: 06/14/23 1100    Narrative:      The following orders were created for panel order CBC & Differential.  Procedure                               Abnormality         Status                     ---------                               -----------         ------                     CBC Auto Differential[[id number]]        Abnormal            Final result                 Please view results for these tests on the individual orders.    CBC Auto Differential [[id number]]  (Abnormal) Collected: 06/14/23 1052     Specimen: Blood Updated: 06/14/23 1100     WBC 8.24 10*3/mm3      RBC 4.44 10*6/mm3      Hemoglobin 13.8 g/dL      Hematocrit 41.3 %      MCV 93.0 fL      MCH 31.1 pg      MCHC 33.4 g/dL      RDW 11.6 %      RDW-SD 40.3 fl      MPV 9.7 fL      Platelets 238 10*3/mm3      Neutrophil % 60.3 %      Lymphocyte % 28.6 %      Monocyte % 9.6 %      Eosinophil % 0.8 %      Basophil % 0.6 %      Immature Grans % 0.1 %      Neutrophils, Absolute 4.96 10*3/mm3      Lymphocytes, Absolute 2.36 10*3/mm3      Monocytes, Absolute 0.79 10*3/mm3      Eosinophils, Absolute 0.07 10*3/mm3      Basophils, Absolute 0.05 10*3/mm3      Immature Grans, Absolute 0.01 10*3/mm3           Imaging Results (Most Recent)       Procedure Component Value Units Date/Time    XR Chest 2 View [[id number]] Collected: 06/14/23 1132     Updated: 06/14/23 1135    Narrative:      CHEST X-RAY, 06/14/2023         HISTORY:  83-year-old female in the ED noting chest pain. Abnormal EKG at her  physician's office. She notes some dizziness.     TECHNIQUE:  PA lateral upright chest series.     COMPARISON:  *  Chest x-ray, 11/16/2020.     FINDINGS:  Stable mild cardiomegaly. Pulmonary vascularity is normal. The lungs  appear clear. No visible pulmonary edema, focal infiltrate or pleural  effusion. Stable benign apical scarring. Left shoulder arthroplasty.  Spinal curvature.       Impression:      No active disease. Stable mild cardiomegaly.     This report was finalized on 6/14/2023 11:33 AM by Dr. Clovis Morales MD.               PROCEDURES      Condition on Discharge: Stable    Physical Exam at Discharge  Vital Signs  Temp:  [97 °F (36.1 °C)-97.9 °F (36.6 °C)] 97.6 °F (36.4 °C)  Heart Rate:  [71-80] 80  Resp:  [16-18] 18  BP: (109-184)/(62-86) 109/62    Physical Exam:  Physical Exam   Constitutional: Patient appears well-developed and well-nourished and in no acute distress   Cardiovascular: Regular rate, regular rhythm, S1 normal and S2 normal.  Exam reveals no  gallop and no friction rub.  No murmur heard.  Pulmonary/Chest: Lungs are clear to auscultation bilaterally. No respiratory distress. No wheezes. No rhonchi. No rales.   Abdominal: Soft. Bowel sounds are normal. There is no tenderness.   Musculoskeletal: Normal Muscle tone  Extremities: No edema.   Neurological: Cranial nerves II-XII are grossly intact with no focal deficits.    Discharge Disposition  Home    Visiting Nurse:    No     Home PT/OT:  No     Home Safety Evaluation:  No     DME  None    Discharge Diet:      Dietary Orders (From admission, onward)       Start     Ordered    06/15/23 1245  Diet: Cardiac Diets; Healthy Heart (2-3 Na+); Texture: Regular Texture (IDDSI 7); Fluid Consistency: Thin (IDDSI 0)  Diet Effective Now        References:    Diet Order [place]   Question Answer Comment   Diets: Cardiac Diets    Cardiac Diet: Healthy Heart (2-3 Na+)    Texture: Regular Texture (IDDSI 7)    Fluid Consistency: Thin (IDDSI 0)        06/15/23 1244                    Activity at Discharge:  As tolerated    Follow-up Appointments  Future Appointments   Date Time Provider Department Center   6/28/2023  9:40 AM [facility] 1 BH [facility]   6/29/2023  1:30 PM Dev Schulte MD [facility]   7/14/2023  9:15 AM  [facility]  [facility]     Additional Instructions for the Follow-ups that You Need to Schedule       Discharge Follow-up with PCP   As directed       Currently Documented PCP:    Chelsie Rai MD    PCP Phone Number:    [phone]     Follow Up Details: Next week         Discharge Follow-up with Specified Provider: Dr. Schulte; 2 Weeks   As directed      To: Dr. Schulte    Follow Up: 2 Weeks                 Test Results Pending at Discharge  None     Abdulkadir Stephen MD  06/15/23  15:29 EDT    Time: <30 minutes

## 2023-06-15 NOTE — PLAN OF CARE
Goal Outcome Evaluation:  Plan of Care Reviewed With: patient      No c/o chest pain reported by patient.

## 2023-06-16 ENCOUNTER — TRANSITIONAL CARE MANAGEMENT TELEPHONE ENCOUNTER (OUTPATIENT)
Dept: CALL CENTER | Facility: HOSPITAL | Age: 83
End: 2023-06-16
Payer: MEDICARE

## 2023-06-16 NOTE — CASE MANAGEMENT/SOCIAL WORK
Case Management Discharge Note      Final Note: Discharged home.    Provided Post Acute Provider List?: Refused  Refused Provider List Comment: pt declined    Selected Continued Care - Discharged on 6/15/2023 Admission date: 6/14/2023 - Discharge disposition: Home or Self Care      Destination    No services have been selected for the patient.                Durable Medical Equipment    No services have been selected for the patient.                Dialysis/Infusion    No services have been selected for the patient.                Home Medical Care    No services have been selected for the patient.                Therapy    No services have been selected for the patient.                Community Resources    No services have been selected for the patient.                Community & DME    No services have been selected for the patient.                         Final Discharge Disposition Code: 01 - home or self-care

## 2023-06-16 NOTE — OUTREACH NOTE
Call Center TCM Note      Flowsheet Row Responses   [facility] patient discharged from? [place]   Does the patient have one of the following disease processes/diagnoses(primary or secondary)? Other   TCM attempt successful? Yes   Call start time 1528   Call end time 1533   Meds reviewed with patient/caregiver? Yes   Is the patient having any side effects they believe may be caused by any medication additions or changes? No   Does the patient have all medications ordered at discharge? Yes   Is the patient taking all medications as directed (includes completed medication regime)? Yes   Comments PCP appt 06/27/23. Reviewed multiple appts per AVS.   Does the patient have an appointment with their PCP within 7 days of discharge? Yes   Has home health visited the patient within 72 hours of discharge? N/A   Psychosocial issues? No   Did the patient receive a copy of their discharge instructions? Yes   Nursing interventions Reviewed instructions with patient   What is the patient's perception of their health status since discharge? Improving   Is the patient/caregiver able to teach back signs and symptoms related to disease process for when to call PCP? Yes   Is the patient/caregiver able to teach back signs and symptoms related to disease process for when to call 911? Yes   Is the patient/caregiver able to teach back the hierarchy of who to call/visit for symptoms/problems? PCP, Specialist, Home health nurse, Urgent Care, ED, 911 Yes   If the patient is a current smoker, are they able to teach back resources for cessation? Not a smoker   Additional teach back comments Encouraged to monitor BP/pulse/weight at home, and keep record for appts. States aware to report weight gain of 2# over 24 hours or 5# over one week to PCP.   TCM call completed? Yes   Wrap up additional comments Patient states is slowly improving. States still has slight SOA, but no further chest pressure. Denies any edema or concerns today. TCM  complete.   Call end time 1533   Would this patient benefit from a Referral to [facility] Social Work? No   Is the patient interested in additional calls from an ambulatory ?  NOTE:  applies to high risk patients requiring additional follow-up. No            Ghazal Garcia RN    6/16/2023, 15:34 EDT

## 2023-06-16 NOTE — OUTREACH NOTE
Call Center TCM Note      Flowsheet Row Responses   [facility] facility patient discharged from? [place]   Does the patient have one of the following disease processes/diagnoses(primary or secondary)? Other   TCM attempt successful? No   Unsuccessful attempts Attempt 1   Call Status Left message  [Left message with .]            Ghazal Garcia RN    6/16/2023, 12:49 EDT

## 2023-06-27 PROBLEM — I20.0 UNSTABLE ANGINA: Status: RESOLVED | Noted: 2023-06-14 | Resolved: 2023-06-27

## 2023-07-26 RX ORDER — ATENOLOL 50 MG/1
TABLET ORAL
Qty: 60 TABLET | Refills: 0 | Status: SHIPPED | OUTPATIENT
Start: 2023-07-26

## 2023-07-31 ENCOUNTER — TELEPHONE (OUTPATIENT)
Dept: ORTHOPEDIC SURGERY | Facility: CLINIC | Age: 83
End: 2023-07-31
Payer: MEDICARE

## 2023-08-02 ENCOUNTER — TELEPHONE (OUTPATIENT)
Dept: FAMILY MEDICINE CLINIC | Facility: CLINIC | Age: 83
End: 2023-08-02
Payer: MEDICARE

## 2023-08-04 ENCOUNTER — APPOINTMENT (OUTPATIENT)
Dept: GENERAL RADIOLOGY | Facility: HOSPITAL | Age: 83
End: 2023-08-04
Payer: MEDICARE

## 2023-08-04 ENCOUNTER — HOSPITAL ENCOUNTER (EMERGENCY)
Facility: HOSPITAL | Age: 83
Discharge: HOME | End: 2023-08-04
Attending: EMERGENCY MEDICINE
Payer: MEDICARE

## 2023-08-04 VITALS
SYSTOLIC BLOOD PRESSURE: 186 MMHG | RESPIRATION RATE: 16 BRPM | BODY MASS INDEX: 24.55 KG/M2 | DIASTOLIC BLOOD PRESSURE: 90 MMHG | TEMPERATURE: 98 F | WEIGHT: 130 LBS | HEART RATE: 67 BPM | OXYGEN SATURATION: 96 % | HEIGHT: 61 IN

## 2023-08-04 DIAGNOSIS — M79.671 RIGHT FOOT PAIN: Primary | ICD-10-CM

## 2023-08-04 DIAGNOSIS — M72.2 PLANTAR FASCIITIS: ICD-10-CM

## 2023-08-04 PROCEDURE — 73630 X-RAY EXAM OF FOOT: CPT

## 2023-08-04 PROCEDURE — 99283 EMERGENCY DEPT VISIT LOW MDM: CPT

## 2023-08-04 PROCEDURE — 63710000001 PREDNISONE PER 1 MG: Performed by: EMERGENCY MEDICINE

## 2023-08-04 RX ORDER — PREDNISONE 20 MG/1
TABLET ORAL
Qty: 9 TABLET | Refills: 0 | Status: SHIPPED | OUTPATIENT
Start: 2023-08-04

## 2023-08-04 RX ORDER — PREDNISONE 20 MG/1
60 TABLET ORAL ONCE
Status: COMPLETED | OUTPATIENT
Start: 2023-08-04 | End: 2023-08-04

## 2023-08-04 RX ADMIN — PREDNISONE 60 MG: 20 TABLET ORAL at 08:45

## 2023-08-04 NOTE — ED PROVIDER NOTES
Subjective   History of Present Illness  Patient presents complaining of right foot pain.  Patient says started gradually and is continued to get worse over the last 3 weeks.  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  Patient denies any known injury but says if she gets off of the [place] too fast and lands on her right foot becomes very painful.  Patient says sometimes she will just be sitting at rest and spontaneously have a sharp pain shoot into her foot that feels like a spasm.  No previous fractures or surgeries to the site.  Patient's been taking some Tylenol and aspirin but not getting any complete relief.  Patient is able to ambulate but says it is painful.  Patient has no radiation of the pain into her lower leg.  The pain is primarily on the plantar and medial aspect.  Patient has been using inserts to help with her arch.    Review of Systems   All other systems reviewed and are negative.    Past Medical History:   Diagnosis Date    Arthritis     Hyperlipidemia     Hypertension     Hypothyroid     Neck pain     PONV (postoperative nausea and vomiting)     Shoulder pain, left     sched total shoulder    Spinal headache        Allergies   Allergen Reactions    Ace Inhibitors Cough    Codeine Nausea And Vomiting       Past Surgical History:   Procedure Laterality Date    COLONOSCOPY      HYSTERECTOMY      REPLACEMENT TOTAL KNEE Right     SHOULDER SURGERY Right     tendon repair    TONSILLECTOMY      TOTAL SHOULDER ARTHROPLASTY W/ DISTAL CLAVICLE EXCISION Left 10/15/2021    Procedure: TOTAL SHOULDER REVERSE ARTHROPLASTY;  Surgeon: Scout Palmer MD;  Location: [facility];  Service: Orthopedics;  Laterality: Left;       Family History   Problem Relation Age of Onset    Heart attack Father     Heart disease Father     Breast cancer Neg Hx     Malig Hyperthermia Neg Hx        Social History     Socioeconomic History    Marital status:     Tobacco Use    Smoking status: Never    Smokeless tobacco: Never   Vaping Use    Vaping Use: Never used   Substance and Sexual Activity    Alcohol use: No    Drug use: No    Sexual activity: Defer           Objective   Physical Exam  Vitals and nursing note reviewed.   HENT:      Head: Normocephalic.   Eyes:      Conjunctiva/sclera: Conjunctivae normal.   Pulmonary:      Effort: Pulmonary effort is normal.   Musculoskeletal:         General: Normal range of motion.      Cervical back: Neck supple.      Comments: Right foot has no external signs of swelling, erythema, or ecchymosis.  Mildly tender to palpation over the medial arch.  No obvious deformity.  Capillary refills less than 3 seconds in all 5 toes.  +2 DP and PT arterial pulses in the right foot and ankle.   Skin:     General: Skin is warm and dry.      Capillary Refill: Capillary refill takes 2 to 3 seconds.   Neurological:      Mental Status: She is alert and oriented to person, place, and time.   Psychiatric:         Mood and Affect: Mood normal.         Behavior: Behavior normal.       Procedures           ED Course                                           Medical Decision Making  Ddx sprain, strain, tendinitis, Planter fasciitis, arthritis, dislocation, fracture.    XR Foot 3+ View Right    Result Date: 8/4/2023  Moderate hallux valgus otherwise normal.  This report was finalized on 8/4/2023 8:46 AM by Dr. Waqar Alvares MD.      0840 Pt seen again prior to d/c.  Imaging reviewed and are unremarkable.  Patient was sent home on a steroid burst and advised to follow-up with orthopedics/podiatry as needed. All questions personally answered at the bedside and all d/c instructions personally reviewed with pt.  Discussed the importance of close outpt. f/u and pt. understands this and agrees to do so.  Pt agrees to return to ED immediately for any new, persistent, or worsening symptoms.    EMR Dragon/Transcription disclaimer:  Much of this encounter  elevated HR and low BP note is an electronic transcription/translation of spoken language to printed text, aka voice recognition.  The electronic translation of spoken language may permit erroneous or at times nonsensical words or phrases to be inadvertently transcribed; although I have reviewed the note for such errors, some may still exist so please interpret based on surrounding text content.      Problems Addressed:  Plantar fasciitis: complicated acute illness or injury  Right foot pain: complicated acute illness or injury    Amount and/or Complexity of Data Reviewed  Radiology: ordered.    Risk  Prescription drug management.        Final diagnoses:   Right foot pain   Plantar fasciitis       ED Disposition  ED Disposition       ED Disposition   Discharge    Condition   Stable    Comment   --               Elpidio Hancock, MARY ELLEN  [address] [facility]  [address]  [place] [id number] [phone]    In 3 days  If symptoms worsen         Medication List        New Prescriptions      predniSONE 20 MG tablet  Commonly known as: DELTASONE  Take 3 tabs p.o. daily on day 1, then 2 tabs p.o. on days 2-3, then 1 tab p.o. on days 4-5.            Changed      losartan 100 MG tablet  Commonly known as: COZAAR  TAKE 1 TABLET BY MOUTH DAILY.  What changed:   how to take this  when to take this               Where to Get Your Medications        These medications were sent to [facility] - [place], [place] - [address] [place] [address] - [phone]  - [phone] [address] [address]      Phone: [phone]   predniSONE 20 MG tablet            Gabo Mckay MD  08/04/23 6145

## 2023-08-17 RX ORDER — AMLODIPINE BESYLATE 5 MG/1
TABLET ORAL
Qty: 30 TABLET | Refills: 0 | Status: SHIPPED | OUTPATIENT
Start: 2023-08-17

## 2023-08-28 RX ORDER — ATENOLOL 50 MG/1
TABLET ORAL
Qty: 60 TABLET | Refills: 0 | Status: SHIPPED | OUTPATIENT
Start: 2023-08-28

## 2023-09-11 DIAGNOSIS — E03.9 ACQUIRED HYPOTHYROIDISM: ICD-10-CM

## 2023-09-11 RX ORDER — AMLODIPINE BESYLATE 5 MG/1
TABLET ORAL
Qty: 30 TABLET | Refills: 0 | Status: SHIPPED | OUTPATIENT
Start: 2023-09-11

## 2023-09-11 RX ORDER — LEVOTHYROXINE SODIUM 0.07 MG/1
75 TABLET ORAL DAILY
Qty: 90 TABLET | Refills: 0 | Status: SHIPPED | OUTPATIENT
Start: 2023-09-11

## 2023-09-25 RX ORDER — ATENOLOL 50 MG/1
TABLET ORAL
Qty: 60 TABLET | Refills: 2 | Status: SHIPPED | OUTPATIENT
Start: 2023-09-25

## 2023-10-05 ENCOUNTER — OFFICE VISIT (OUTPATIENT)
Dept: CARDIOLOGY | Facility: CLINIC | Age: 83
End: 2023-10-05
Payer: MEDICARE

## 2023-10-05 VITALS
HEART RATE: 68 BPM | BODY MASS INDEX: 24.09 KG/M2 | DIASTOLIC BLOOD PRESSURE: 70 MMHG | OXYGEN SATURATION: 98 % | SYSTOLIC BLOOD PRESSURE: 128 MMHG | HEIGHT: 61 IN | WEIGHT: 127.6 LBS

## 2023-10-05 DIAGNOSIS — I10 ESSENTIAL HYPERTENSION: ICD-10-CM

## 2023-10-05 DIAGNOSIS — Z78.9 STATIN INTOLERANCE: Primary | ICD-10-CM

## 2023-10-05 NOTE — PROGRESS NOTES
PATIENTINFORMATION    Date of Office Visit: 10/05/2023  Encounter Provider: Dev Schulte MD  Place of Service: [facility] CARDIOLOGY  Patient Name: Nguyen Zhang  : 1940    Subjective:     Encounter Date:10/05/2023      Patient ID: Nguyen Zhang is a 83 y.o. female.    No chief complaint on file.    HPI  Ms. Zhang is a pleasant 83 years old lady who came to cardiology clinic for follow-up visit.  She denies any recurrence of chest pain since last clinic visit.  Blood pressure has been running normal during home monitoring.  She is fairly active and denies any limiting symptoms.  She feels lightheaded from time to time especially when changing position but that has not worsened.  No syncope.      ROS  All systems reviewed and negative except as noted in HPI.    Past Medical History:   Diagnosis Date    Arthritis     Hyperlipidemia     Hypertension     Hypothyroid     Neck pain     PONV (postoperative nausea and vomiting)     Shoulder pain, left     sched total shoulder    Spinal headache        Past Surgical History:   Procedure Laterality Date    COLONOSCOPY      HYSTERECTOMY      REPLACEMENT TOTAL KNEE Right     SHOULDER SURGERY Right     tendon repair    TONSILLECTOMY      TOTAL SHOULDER ARTHROPLASTY W/ DISTAL CLAVICLE EXCISION Left 10/15/2021    Procedure: TOTAL SHOULDER REVERSE ARTHROPLASTY;  Surgeon: Scout Palmer MD;  Location: [facility];  Service: Orthopedics;  Laterality: Left;       Social History     Socioeconomic History    Marital status:    Tobacco Use    Smoking status: Never    Smokeless tobacco: Never   Vaping Use    Vaping Use: Never used   Substance and Sexual Activity    Alcohol use: No    Drug use: No    Sexual activity: Defer       Family History   Problem Relation Age of Onset    Heart attack Father     Heart disease Father     Breast cancer Neg Hx     Malig Hyperthermia Neg Hx          Procedures       Objective:     /70    "Pulse 68   Ht 154.9 cm (61\")   Wt 57.9 kg (127 lb 9.6 oz)   SpO2 98%   BMI 24.11 kg/m²  Body mass index is 24.11 kg/m².     Constitutional:       General: Not in acute distress.     Appearance: Well-developed. Not diaphoretic.   Eyes:      Pupils: Pupils are equal, round, and reactive to light.   HENT:      Head: Normocephalic and atraumatic.   Neck:      Thyroid: No thyromegaly.   Pulmonary:      Effort: Pulmonary effort is normal. No respiratory distress.      Breath sounds: Normal breath sounds. No wheezing. No rales.   Chest:      Chest wall: Not tender to palpatation.   Cardiovascular:      Normal rate. Regular rhythm.      No gallop.    Pulses:     Intact distal pulses.   Edema:     Peripheral edema absent.   Abdominal:      General: Bowel sounds are normal. There is no distension.      Palpations: Abdomen is soft.      Tenderness: There is no guarding.   Musculoskeletal: Normal range of motion.         General: No deformity.      Cervical back: Normal range of motion and neck supple. Skin:     General: Skin is warm and dry.      Findings: No rash.   Neurological:      Mental Status: Alert and oriented to person, place, and time.      Cranial Nerves: No cranial nerve deficit.      Deep Tendon Reflexes: Reflexes are normal and symmetric.   Psychiatric:         Judgment: Judgment normal.       Review Of Data: I have reviewed pertinent recent labs, images and documents and pertinent findings included in HPI or assessment below.    Lipid Panel          11/30/2022    09:39 3/8/2023    08:54 6/14/2023    12:57   Lipid Panel   Total Cholesterol   212    Total Cholesterol 220  237     Triglycerides 137  128  102    HDL Cholesterol 61  65  60    VLDL Cholesterol 24  23  18    LDL Cholesterol  135  149  134    LDL/HDL Ratio   2.19          Assessment/Plan:            History of chest pain concerning for angina with hospital admission in June 2023-normal myocardial perfusion study  Resistant hypertension currently on " losartan, amlodipine, atenolol  Hyperlipidemia with history of statin intolerance with severe muscle cramping.  Hypothyroidism    Significantly improved blood pressure control.  Running normal during office visit today.  No recurrence of chest pain.  Continue current care.    She will return to office in 6 months or sooner with any concerning symptoms.    Diagnosis and plan of care discussed with patient and verbalized understanding.            Your medication list            Accurate as of October 5, 2023 11:49 AM. If you have any questions, ask your nurse or doctor.                CHANGE how you take these medications        Instructions Last Dose Given Next Dose Due   losartan 100 MG tablet  Commonly known as: COZAAR  What changed:   how to take this  when to take this      TAKE 1 TABLET BY MOUTH DAILY.              CONTINUE taking these medications        Instructions Last Dose Given Next Dose Due   amLODIPine 5 MG tablet  Commonly known as: NORVASC      TAKE 1 TABLET BY MOUTH EVERY EVENING.       aspirin 81 MG EC tablet      Take 1 tablet by mouth Daily.       atenolol 50 MG tablet  Commonly known as: TENORMIN      TAKE 1 TABLET BY MOUTH 2 TIMES A DAY.       DOROTEO C PO      Take 500 mg by mouth Daily.       isosorbide mononitrate 30 MG 24 hr tablet  Commonly known as: IMDUR      Take 1 tablet by mouth Daily.       levothyroxine 75 MCG tablet  Commonly known as: SYNTHROID, LEVOTHROID      TAKE 1 TABLET BY MOUTH DAILY.       multivitamin-minerals tablet tablet  Generic drug: multivitamin with minerals      Take 1 tablet by mouth Daily.       NIACIN PO      Take 500 mg by mouth Daily.       potassium chloride 10 MEQ CR capsule  Commonly known as: MICRO-K      Take 1 capsule by mouth Daily.       TYLENOL ARTHRITIS PAIN PO      Take 500 mg by mouth Daily.       Vitamin D3 25 MCG (1000 UT) capsule      Take 2 capsules by mouth Daily.       vitamin E 400 UNIT capsule      Take 1 capsule by mouth Daily.               STOP taking these medications      predniSONE 20 MG tablet  Commonly known as: DELTASONE  Stopped by: MD Dev Saleh MD  10/05/23  11:49 EDT

## 2023-10-06 RX ORDER — AMLODIPINE BESYLATE 5 MG/1
TABLET ORAL
Qty: 90 TABLET | Refills: 0 | Status: SHIPPED | OUTPATIENT
Start: 2023-10-06

## 2023-10-06 NOTE — TELEPHONE ENCOUNTER
Rx Refill Note  Requested Prescriptions     Pending Prescriptions Disp Refills    amLODIPine (NORVASC) 5 MG tablet [Pharmacy Med Name: AMLODIPINE BESYLATE 5 MG TAB] 30 tablet 0     Sig: TAKE 1 TABLET BY MOUTH EVERY EVENING.      Last office visit with prescribing clinician: 7/11/2023   Last telemedicine visit with prescribing clinician: Visit date not found   Next office visit with prescribing clinician: 10/31/2023

## 2023-10-25 ENCOUNTER — TELEPHONE (OUTPATIENT)
Dept: FAMILY MEDICINE CLINIC | Facility: CLINIC | Age: 83
End: 2023-10-25
Payer: MEDICARE

## 2023-10-25 NOTE — TELEPHONE ENCOUNTER
Patient called requesting labs be ordered so she can have them done before appointment.   Please advise

## 2023-10-27 ENCOUNTER — LAB (OUTPATIENT)
Dept: LAB | Facility: HOSPITAL | Age: 83
End: 2023-10-27
Payer: MEDICARE

## 2023-10-27 DIAGNOSIS — E78.49 OTHER HYPERLIPIDEMIA: ICD-10-CM

## 2023-10-27 DIAGNOSIS — E03.9 ACQUIRED HYPOTHYROIDISM: Primary | ICD-10-CM

## 2023-10-27 DIAGNOSIS — I10 ESSENTIAL HYPERTENSION: ICD-10-CM

## 2023-10-27 LAB
ALBUMIN SERPL-MCNC: 4.9 G/DL (ref 3.5–5.2)
ALBUMIN/GLOB SERPL: 1.6 G/DL
ALP SERPL-CCNC: 83 U/L (ref 39–117)
ALT SERPL W P-5'-P-CCNC: 17 U/L (ref 1–33)
ANION GAP SERPL CALCULATED.3IONS-SCNC: 13 MMOL/L (ref 5–15)
AST SERPL-CCNC: 32 U/L (ref 1–32)
BASOPHILS # BLD AUTO: 0.03 10*3/MM3 (ref 0–0.2)
BASOPHILS NFR BLD AUTO: 0.5 % (ref 0–1.5)
BILIRUB SERPL-MCNC: 0.5 MG/DL (ref 0–1.2)
BUN SERPL-MCNC: 10 MG/DL (ref 8–23)
BUN/CREAT SERPL: 10.3 (ref 7–25)
CALCIUM SPEC-SCNC: 9.9 MG/DL (ref 8.6–10.5)
CHLORIDE SERPL-SCNC: 98 MMOL/L (ref 98–107)
CHOLEST SERPL-MCNC: 228 MG/DL (ref 0–200)
CO2 SERPL-SCNC: 29 MMOL/L (ref 22–29)
CREAT SERPL-MCNC: 0.97 MG/DL (ref 0.57–1)
DEPRECATED RDW RBC AUTO: 43.3 FL (ref 37–54)
EGFRCR SERPLBLD CKD-EPI 2021: 58.5 ML/MIN/1.73
EOSINOPHIL # BLD AUTO: 0.08 10*3/MM3 (ref 0–0.4)
EOSINOPHIL NFR BLD AUTO: 1.3 % (ref 0.3–6.2)
ERYTHROCYTE [DISTWIDTH] IN BLOOD BY AUTOMATED COUNT: 12.3 % (ref 12.3–15.4)
GLOBULIN UR ELPH-MCNC: 3 GM/DL
GLUCOSE SERPL-MCNC: 83 MG/DL (ref 65–99)
HCT VFR BLD AUTO: 40.1 % (ref 34–46.6)
HDLC SERPL-MCNC: 66 MG/DL (ref 40–60)
HGB BLD-MCNC: 13.7 G/DL (ref 12–15.9)
IMM GRANULOCYTES # BLD AUTO: 0.02 10*3/MM3 (ref 0–0.05)
IMM GRANULOCYTES NFR BLD AUTO: 0.3 % (ref 0–0.5)
LDLC SERPL CALC-MCNC: 146 MG/DL (ref 0–100)
LDLC/HDLC SERPL: 2.17 {RATIO}
LYMPHOCYTES # BLD AUTO: 1.73 10*3/MM3 (ref 0.7–3.1)
LYMPHOCYTES NFR BLD AUTO: 28.8 % (ref 19.6–45.3)
MCH RBC QN AUTO: 32.5 PG (ref 26.6–33)
MCHC RBC AUTO-ENTMCNC: 34.2 G/DL (ref 31.5–35.7)
MCV RBC AUTO: 95.2 FL (ref 79–97)
MONOCYTES # BLD AUTO: 0.79 10*3/MM3 (ref 0.1–0.9)
MONOCYTES NFR BLD AUTO: 13.2 % (ref 5–12)
NEUTROPHILS NFR BLD AUTO: 3.35 10*3/MM3 (ref 1.7–7)
NEUTROPHILS NFR BLD AUTO: 55.9 % (ref 42.7–76)
NRBC BLD AUTO-RTO: 0 /100 WBC (ref 0–0.2)
PLATELET # BLD AUTO: 226 10*3/MM3 (ref 140–450)
PMV BLD AUTO: 10.7 FL (ref 6–12)
POTASSIUM SERPL-SCNC: 3.7 MMOL/L (ref 3.5–5.2)
PROT SERPL-MCNC: 7.9 G/DL (ref 6–8.5)
RBC # BLD AUTO: 4.21 10*6/MM3 (ref 3.77–5.28)
SODIUM SERPL-SCNC: 140 MMOL/L (ref 136–145)
TRIGL SERPL-MCNC: 94 MG/DL (ref 0–150)
TSH SERPL DL<=0.05 MIU/L-ACNC: 2.57 UIU/ML (ref 0.27–4.2)
VLDLC SERPL-MCNC: 16 MG/DL (ref 5–40)
WBC NRBC COR # BLD: 6 10*3/MM3 (ref 3.4–10.8)

## 2023-10-27 PROCEDURE — 84443 ASSAY THYROID STIM HORMONE: CPT | Performed by: INTERNAL MEDICINE

## 2023-10-27 PROCEDURE — 80061 LIPID PANEL: CPT | Performed by: INTERNAL MEDICINE

## 2023-10-27 PROCEDURE — 80053 COMPREHEN METABOLIC PANEL: CPT | Performed by: INTERNAL MEDICINE

## 2023-10-27 PROCEDURE — 85025 COMPLETE CBC W/AUTO DIFF WBC: CPT | Performed by: INTERNAL MEDICINE

## 2023-10-31 ENCOUNTER — OFFICE VISIT (OUTPATIENT)
Dept: FAMILY MEDICINE CLINIC | Facility: CLINIC | Age: 83
End: 2023-10-31
Payer: MEDICARE

## 2023-10-31 VITALS
DIASTOLIC BLOOD PRESSURE: 84 MMHG | HEIGHT: 61 IN | WEIGHT: 126.4 LBS | BODY MASS INDEX: 23.86 KG/M2 | OXYGEN SATURATION: 98 % | SYSTOLIC BLOOD PRESSURE: 132 MMHG | RESPIRATION RATE: 16 BRPM | HEART RATE: 68 BPM | TEMPERATURE: 97.9 F

## 2023-10-31 DIAGNOSIS — E03.9 ACQUIRED HYPOTHYROIDISM: ICD-10-CM

## 2023-10-31 DIAGNOSIS — Z78.9 STATIN INTOLERANCE: Primary | ICD-10-CM

## 2023-10-31 DIAGNOSIS — I10 ESSENTIAL HYPERTENSION: ICD-10-CM

## 2023-10-31 DIAGNOSIS — Z23 NEED FOR INFLUENZA VACCINATION: ICD-10-CM

## 2023-10-31 DIAGNOSIS — E78.49 OTHER HYPERLIPIDEMIA: ICD-10-CM

## 2023-10-31 DIAGNOSIS — M15.9 PRIMARY OSTEOARTHRITIS INVOLVING MULTIPLE JOINTS: ICD-10-CM

## 2023-10-31 NOTE — PROGRESS NOTES
The ABCs of the Annual Wellness Visit  Subsequent Medicare Wellness Visit    Subjective    Nguyen Zhang is a 83 y.o. female who presents for a Subsequent Medicare Wellness Visit.    The following portions of the patient's history were reviewed and   updated as appropriate: allergies, current medications, past family history, past medical history, past social history, past surgical history, and problem list.    Compared to one year ago, the patient feels her physical   health is the same.    Compared to one year ago, the patient feels her mental   health is the same.    Recent Hospitalizations:  This patient has had a [facility] admission record on file within the last 365 days.    Current Medical Providers:  Patient Care Team:  Chelsie Rai MD as PCP - General (Internal Medicine)    Outpatient Medications Prior to Visit   Medication Sig Dispense Refill    Acetaminophen (TYLENOL ARTHRITIS PAIN PO) Take 500 mg by mouth Daily.      amLODIPine (NORVASC) 5 MG tablet TAKE 1 TABLET BY MOUTH EVERY EVENING. 90 tablet 0    aspirin 81 MG EC tablet Take 1 tablet by mouth Daily. 30 tablet 0    atenolol (TENORMIN) 50 MG tablet TAKE 1 TABLET BY MOUTH 2 TIMES A DAY. 60 tablet 2    Bioflavonoid Products (DOROTEO C PO) Take 500 mg by mouth Daily.      Cholecalciferol (VITAMIN D3) 1000 units capsule Take 2 capsules by mouth Daily.      levothyroxine (SYNTHROID, LEVOTHROID) 75 MCG tablet TAKE 1 TABLET BY MOUTH DAILY. 90 tablet 0    losartan (COZAAR) 100 MG tablet TAKE 1 TABLET BY MOUTH DAILY. (Patient taking differently: 1 tablet Every Night.) 90 tablet 3    multivitamin-minerals (CENTRUM) tablet Take 1 tablet by mouth Daily.      NIACIN PO Take 500 mg by mouth Daily.      potassium chloride (MICRO-K) 10 MEQ CR capsule Take 1 capsule by mouth Daily.      vitamin E 400 UNIT capsule Take 1 capsule by mouth Daily.       No facility-administered medications prior to visit.       No opioid medication identified on active  "medication list. I have reviewed chart for other potential  high risk medication/s and harmful drug interactions in the elderly.        Aspirin is on active medication list. Aspirin use is indicated based on review of current medical condition/s. Pros and cons of this therapy have been discussed today. Benefits of this medication outweigh potential harm.  Patient has been encouraged to continue taking this medication.  .      Patient Active Problem List   Diagnosis    Precordial chest pain    PVCs (premature ventricular contractions)    Essential hypertension    Acquired hypothyroidism    Low serum potassium level    Other hyperlipidemia    Acute pain of left shoulder    Osteoarthritis of multiple joints    Statin intolerance     Advance Care Planning   Advance Care Planning     Advance Directive is on file.  ACP discussion was held with the patient during this visit. Patient has an advance directive in EMR which is still valid.      Objective    Vitals:    10/31/23 0916   BP: 132/84   BP Location: Left arm   Patient Position: Sitting   Cuff Size: Adult   Pulse: 68   Resp: 16   Temp: 97.9 °F (36.6 °C)   TempSrc: Oral   SpO2: 98%   Weight: 57.3 kg (126 lb 6.4 oz)   Height: 154.9 cm (61\")     Estimated body mass index is 23.88 kg/m² as calculated from the following:    Height as of this encounter: 154.9 cm (61\").    Weight as of this encounter: 57.3 kg (126 lb 6.4 oz).    BMI is within normal parameters. No other follow-up for BMI required.      Does the patient have evidence of cognitive impairment? No    Lab Results   Component Value Date    TRIG 94 10/27/2023    HDL 66 (H) 10/27/2023     (H) 10/27/2023    VLDL 16 10/27/2023        HEALTH RISK ASSESSMENT    Smoking Status:  Social History     Tobacco Use   Smoking Status Never   Smokeless Tobacco Never     Alcohol Consumption:  Social History     Substance and Sexual Activity   Alcohol Use No     Fall Risk Screen:    STEADI Fall Risk Assessment was completed, " and patient is at LOW risk for falls.Assessment completed on:10/31/2023    Depression Screening:      10/31/2023     9:00 AM   PHQ-2/PHQ-9 Depression Screening   Little Interest or Pleasure in Doing Things 0-->not at all   Feeling Down, Depressed or Hopeless 0-->not at all   PHQ-9: Brief Depression Severity Measure Score 0       Health Habits and Functional and Cognitive Screening:      10/31/2023     9:00 AM   Functional & Cognitive Status   Do you have difficulty preparing food and eating? No   Do you have difficulty bathing yourself, getting dressed or grooming yourself? No   Do you have difficulty using the toilet? No   Do you have difficulty moving around from place to place? No   Do you have trouble with steps or getting out of a bed or a chair? No   Current Diet Well Balanced Diet   Dental Exam Up to date   Eye Exam Up to date   Exercise (times per week) 2 times per week   Current Exercises Include Swim Aerobics Class;Walking   Do you need help using the phone?  No   Are you deaf or do you have serious difficulty hearing?  No   Do you need help to go to places out of walking distance? No   Do you need help shopping? No   Do you need help preparing meals?  No   Do you need help with housework?  Yes   Do you need help with laundry? Yes   Do you need help taking your medications? No   Do you need help managing money? No   Do you ever drive or ride in a car without wearing a seat belt? No   Have you felt unusual stress, anger or loneliness in the last month? No   Who do you live with? Spouse   If you need help, do you have trouble finding someone available to you? No   Do you have difficulty concentrating, remembering or making decisions? Yes       Age-appropriate Screening Schedule:  Refer to the list below for future screening recommendations based on patient's age, sex and/or medical conditions. Orders for these recommended tests are listed in the plan section. The patient has been provided with a written  "plan.    Health Maintenance   Topic Date Due    DXA SCAN  Never done    ANNUAL WELLNESS VISIT  Never done    INFLUENZA VACCINE  08/01/2023    Pneumococcal Vaccine 65+ (1 - PCV) 06/27/2024 (Originally 2/18/2006)    ZOSTER VACCINE (1 of 2) 06/27/2024 (Originally 2/18/1991)    LIPID PANEL  10/27/2024    TDAP/TD VACCINES (4 - Tdap) 11/05/2029    COVID-19 Vaccine  Completed                  CMS Preventative Services Quick Reference  Risk Factors Identified During Encounter  Fall Risk-High or Moderate: Discussed Fall Prevention in the home  The above risks/problems have been discussed with the patient.  Pertinent information has been shared with the patient in the After Visit Summary.  An After Visit Summary and PPPS were made available to the patient.    Follow Up:   Next Medicare Wellness visit to be scheduled in 1 year.       Additional E&M Note during same encounter follows:  Patient has multiple medical problems which are significant and separately identifiable that require additional work above and beyond the Medicare Wellness Visit.      Chief Complaint  Hypertension (3 month follow up ) and Medicare Wellness-subsequent    Subjective        HPI  Nguyen Zhang is also being seen today for      Objective   Vital Signs:  /84 (BP Location: Left arm, Patient Position: Sitting, Cuff Size: Adult)   Pulse 68   Temp 97.9 °F (36.6 °C) (Oral)   Resp 16   Ht 154.9 cm (61\")   Wt 57.3 kg (126 lb 6.4 oz)   SpO2 98%   BMI 23.88 kg/m²     Physical Exam                    Assessment and Plan   Diagnoses and all orders for this visit:    1. Statin intolerance (Primary)    2. Essential hypertension    3. Other hyperlipidemia    4. Acquired hypothyroidism    5. Primary osteoarthritis involving multiple joints    Discussed with patient fall precaution.  Keep pathways clear lighted.  Discussed with patient getting colonoscopy every 10 years.  Discussed patient getting vaccinations.  All problems are chronic and stable.  " Return in 3 months.  She has a living well.         Follow Up   Return in about 3 months (around 1/31/2024).  Patient was given instructions and counseling regarding her condition or for health maintenance advice. Please see specific information pulled into the AVS if appropriate.

## 2023-10-31 NOTE — PROGRESS NOTES
Subjective   Nguyen Zhang is a 83 y.o. female.     Chief Complaint   Patient presents with    Hypertension     3 month follow up     Medicare Wellness-subsequent       Hypertension  Pertinent negatives include no blurred vision, chest pain, palpitations or shortness of breath.      Here follow-up for hypertension, hyperlipidemia, thyroid, DJD.  Denies any chest pain shortness of breath.  No nausea vomiting.  Taking current medications.  Off hydrochlorothiazide.  The following portions of the patient's history were reviewed and updated as appropriate: allergies, current medications, past family history, past medical history, past social history, past surgical history and problem list.    Review of Systems   Constitutional:  Negative for activity change, appetite change, fatigue and fever.   Eyes:  Negative for blurred vision and double vision.   Respiratory: Negative.  Negative for shortness of breath.    Cardiovascular:  Negative for chest pain, palpitations and leg swelling.   Gastrointestinal: Negative.    Neurological:  Negative for dizziness, syncope, light-headedness and headache.       Allergies   Allergen Reactions    Ace Inhibitors Cough    Codeine Nausea And Vomiting       Current Outpatient Medications on File Prior to Visit   Medication Sig Dispense Refill    Acetaminophen (TYLENOL ARTHRITIS PAIN PO) Take 500 mg by mouth Daily.      amLODIPine (NORVASC) 5 MG tablet TAKE 1 TABLET BY MOUTH EVERY EVENING. 90 tablet 0    aspirin 81 MG EC tablet Take 1 tablet by mouth Daily. 30 tablet 0    atenolol (TENORMIN) 50 MG tablet TAKE 1 TABLET BY MOUTH 2 TIMES A DAY. 60 tablet 2    Bioflavonoid Products (DOROTEO C PO) Take 500 mg by mouth Daily.      Cholecalciferol (VITAMIN D3) 1000 units capsule Take 2 capsules by mouth Daily.      levothyroxine (SYNTHROID, LEVOTHROID) 75 MCG tablet TAKE 1 TABLET BY MOUTH DAILY. 90 tablet 0    losartan (COZAAR) 100 MG tablet TAKE 1 TABLET BY MOUTH DAILY. (Patient taking  differently: 1 tablet Every Night.) 90 tablet 3    multivitamin-minerals (CENTRUM) tablet Take 1 tablet by mouth Daily.      NIACIN PO Take 500 mg by mouth Daily.      potassium chloride (MICRO-K) 10 MEQ CR capsule Take 1 capsule by mouth Daily.      vitamin E 400 UNIT capsule Take 1 capsule by mouth Daily.       No current facility-administered medications on file prior to visit.       Family History   Problem Relation Age of Onset    Heart attack Father     Heart disease Father     Breast cancer Neg Hx     Malig Hyperthermia Neg Hx        Past Medical History:   Diagnosis Date    Arthritis     HL (hearing loss) March. 2023    Ringing in ears / hearing test confirmed loss of high tone sounds    Hyperlipidemia     Hypertension     Hypothyroid     Neck pain     PONV (postoperative nausea and vomiting)     Shoulder pain, left     sched total shoulder    Spinal headache        Past Surgical History:   Procedure Laterality Date    COLONOSCOPY      HYSTERECTOMY      JOINT REPLACEMENT  2010 and 2022    Knee and shoulder    REPLACEMENT TOTAL KNEE Right     SHOULDER SURGERY Right     tendon repair    TONSILLECTOMY      TOTAL SHOULDER ARTHROPLASTY W/ DISTAL CLAVICLE EXCISION Left 10/15/2021    Procedure: TOTAL SHOULDER REVERSE ARTHROPLASTY;  Surgeon: Scout Palmer MD;  Location: [facility];  Service: Orthopedics;  Laterality: Left;       Social History     Socioeconomic History    Marital status:    Tobacco Use    Smoking status: Never    Smokeless tobacco: Never   Vaping Use    Vaping Use: Never used   Substance and Sexual Activity    Alcohol use: No    Drug use: No    Sexual activity: Not Currently     Partners: Male     Birth control/protection: Pill, Hysterectomy       Patient Active Problem List   Diagnosis    Precordial chest pain    PVCs (premature ventricular contractions)    Essential hypertension    Acquired hypothyroidism    Low serum potassium level    Other hyperlipidemia    Acute pain of left  "shoulder    Osteoarthritis of multiple joints    Statin intolerance       /84 (BP Location: Left arm, Patient Position: Sitting, Cuff Size: Adult)   Pulse 68   Temp 97.9 °F (36.6 °C) (Oral)   Resp 16   Ht 154.9 cm (61\")   Wt 57.3 kg (126 lb 6.4 oz)   SpO2 98%   BMI 23.88 kg/m²   Body mass index is 23.88 kg/m².    Objective   Physical Exam  Constitutional:       Appearance: She is well-developed.   Eyes:      Extraocular Movements: Extraocular movements intact.      Pupils: Pupils are equal, round, and reactive to light.   Neck:      Thyroid: No thyromegaly.      Vascular: No JVD.      Trachea: No tracheal deviation.   Cardiovascular:      Rate and Rhythm: Normal rate and regular rhythm.      Heart sounds: No murmur heard.  Pulmonary:      Effort: Pulmonary effort is normal. No respiratory distress.      Breath sounds: Normal breath sounds. No wheezing.   Abdominal:      General: Bowel sounds are normal. There is no distension.      Palpations: Abdomen is soft. There is no mass.      Tenderness: There is no abdominal tenderness. There is no right CVA tenderness, left CVA tenderness, guarding or rebound.      Hernia: No hernia is present.   Musculoskeletal:      Cervical back: Normal range of motion and neck supple.   Lymphadenopathy:      Cervical: No cervical adenopathy.   Neurological:      Mental Status: She is alert and oriented to person, place, and time.   Psychiatric:         Mood and Affect: Mood normal.         Behavior: Behavior normal.           Assessment & Plan   Diagnoses and all orders for this visit:    1. Statin intolerance (Primary)    2. Essential hypertension    3. Other hyperlipidemia    4. Acquired hypothyroidism    5. Primary osteoarthritis involving multiple joints    Continue diet and exercise.  Reviewed all labs.  Discussed with patient he has option of using Repatha, does not want to start anything new at this time.  She is dealing with her 's newly diagnosed cancer.  " Continue Cozaar, Synthroid, amlodipine, aspirin.  All problems are chronic and stable.  She is also on Tenormin.  Return in 3 months time.  EHR dragon/transcription disclaimer:  Part of this note are created by electronic transcription/translation of spoken language to printed text and thus may lead to erroneous, or at times, nonsensical words or phrases inadvertently transcribed.  Although I have reviewed for such errors, some may still exist.

## 2023-11-13 RX ORDER — AMLODIPINE BESYLATE 5 MG/1
TABLET ORAL
Qty: 30 TABLET | Refills: 3 | Status: SHIPPED | OUTPATIENT
Start: 2023-11-13

## 2023-12-06 DIAGNOSIS — E03.9 ACQUIRED HYPOTHYROIDISM: ICD-10-CM

## 2023-12-06 RX ORDER — LEVOTHYROXINE SODIUM 0.07 MG/1
75 TABLET ORAL DAILY
Qty: 90 TABLET | Refills: 0 | Status: SHIPPED | OUTPATIENT
Start: 2023-12-06

## 2023-12-26 RX ORDER — ATENOLOL 50 MG/1
TABLET ORAL
Qty: 60 TABLET | Refills: 3 | Status: SHIPPED | OUTPATIENT
Start: 2023-12-26

## 2024-01-22 ENCOUNTER — TELEPHONE (OUTPATIENT)
Dept: FAMILY MEDICINE CLINIC | Facility: CLINIC | Age: 84
End: 2024-01-22
Payer: MEDICARE

## 2024-01-22 DIAGNOSIS — Z78.9 STATIN INTOLERANCE: Primary | ICD-10-CM

## 2024-01-22 DIAGNOSIS — I10 ESSENTIAL HYPERTENSION: ICD-10-CM

## 2024-01-22 DIAGNOSIS — E87.6 LOW SERUM POTASSIUM LEVEL: ICD-10-CM

## 2024-01-22 DIAGNOSIS — E78.49 OTHER HYPERLIPIDEMIA: ICD-10-CM

## 2024-01-22 NOTE — TELEPHONE ENCOUNTER
Caller: Nguyen Zhang    Relationship: Self    Best call back number: [phone]     What orders are you requesting (i.e. lab or imaging): LABS    In what timeframe would the patient need to come in: 01.23.24    Where will you receive your lab/imaging services: [facility]

## 2024-01-23 ENCOUNTER — LAB (OUTPATIENT)
Dept: LAB | Facility: HOSPITAL | Age: 84
End: 2024-01-23
Payer: MEDICARE

## 2024-01-23 DIAGNOSIS — Z78.9 STATIN INTOLERANCE: ICD-10-CM

## 2024-01-23 DIAGNOSIS — E87.6 LOW SERUM POTASSIUM LEVEL: ICD-10-CM

## 2024-01-23 DIAGNOSIS — I10 ESSENTIAL HYPERTENSION: ICD-10-CM

## 2024-01-23 DIAGNOSIS — E78.49 OTHER HYPERLIPIDEMIA: ICD-10-CM

## 2024-01-23 LAB
ALBUMIN SERPL-MCNC: 4.8 G/DL (ref 3.5–5.2)
ALBUMIN/GLOB SERPL: 1.8 G/DL
ALP SERPL-CCNC: 76 U/L (ref 39–117)
ALT SERPL W P-5'-P-CCNC: 19 U/L (ref 1–33)
ANION GAP SERPL CALCULATED.3IONS-SCNC: 14.6 MMOL/L (ref 5–15)
AST SERPL-CCNC: 30 U/L (ref 1–32)
BASOPHILS # BLD AUTO: 0.04 10*3/MM3 (ref 0–0.2)
BASOPHILS NFR BLD AUTO: 0.6 % (ref 0–1.5)
BILIRUB SERPL-MCNC: 0.6 MG/DL (ref 0–1.2)
BUN SERPL-MCNC: 19 MG/DL (ref 8–23)
BUN/CREAT SERPL: 18.3 (ref 7–25)
CALCIUM SPEC-SCNC: 9.7 MG/DL (ref 8.6–10.5)
CHLORIDE SERPL-SCNC: 100 MMOL/L (ref 98–107)
CHOLEST SERPL-MCNC: 222 MG/DL (ref 0–200)
CK SERPL-CCNC: 185 U/L (ref 20–180)
CO2 SERPL-SCNC: 26.4 MMOL/L (ref 22–29)
CREAT SERPL-MCNC: 1.04 MG/DL (ref 0.57–1)
DEPRECATED RDW RBC AUTO: 38.2 FL (ref 37–54)
EGFRCR SERPLBLD CKD-EPI 2021: 53.8 ML/MIN/1.73
EOSINOPHIL # BLD AUTO: 0.08 10*3/MM3 (ref 0–0.4)
EOSINOPHIL NFR BLD AUTO: 1.3 % (ref 0.3–6.2)
ERYTHROCYTE [DISTWIDTH] IN BLOOD BY AUTOMATED COUNT: 11.4 % (ref 12.3–15.4)
GLOBULIN UR ELPH-MCNC: 2.7 GM/DL
GLUCOSE SERPL-MCNC: 87 MG/DL (ref 65–99)
HCT VFR BLD AUTO: 37.5 % (ref 34–46.6)
HDLC SERPL QL: 3.64
HDLC SERPL-MCNC: 61 MG/DL (ref 40–60)
HGB BLD-MCNC: 12.9 G/DL (ref 12–15.9)
IMM GRANULOCYTES # BLD AUTO: 0.02 10*3/MM3 (ref 0–0.05)
IMM GRANULOCYTES NFR BLD AUTO: 0.3 % (ref 0–0.5)
LDLC SERPL CALC-MCNC: 145 MG/DL (ref 0–100)
LYMPHOCYTES # BLD AUTO: 1.98 10*3/MM3 (ref 0.7–3.1)
LYMPHOCYTES NFR BLD AUTO: 31 % (ref 19.6–45.3)
MCH RBC QN AUTO: 32.1 PG (ref 26.6–33)
MCHC RBC AUTO-ENTMCNC: 34.4 G/DL (ref 31.5–35.7)
MCV RBC AUTO: 93.3 FL (ref 79–97)
MONOCYTES # BLD AUTO: 0.64 10*3/MM3 (ref 0.1–0.9)
MONOCYTES NFR BLD AUTO: 10 % (ref 5–12)
NEUTROPHILS NFR BLD AUTO: 3.63 10*3/MM3 (ref 1.7–7)
NEUTROPHILS NFR BLD AUTO: 56.8 % (ref 42.7–76)
NRBC BLD AUTO-RTO: 0 /100 WBC (ref 0–0.2)
PLATELET # BLD AUTO: 218 10*3/MM3 (ref 140–450)
PMV BLD AUTO: 10.9 FL (ref 6–12)
POTASSIUM SERPL-SCNC: 3.7 MMOL/L (ref 3.5–5.2)
PROT SERPL-MCNC: 7.5 G/DL (ref 6–8.5)
RBC # BLD AUTO: 4.02 10*6/MM3 (ref 3.77–5.28)
SODIUM SERPL-SCNC: 141 MMOL/L (ref 136–145)
TRIGL SERPL-MCNC: 91 MG/DL (ref 0–150)
TSH SERPL DL<=0.05 MIU/L-ACNC: 2.26 UIU/ML (ref 0.27–4.2)
VLDLC SERPL-MCNC: 16 MG/DL (ref 5–40)
WBC NRBC COR # BLD AUTO: 6.39 10*3/MM3 (ref 3.4–10.8)

## 2024-01-23 PROCEDURE — 36415 COLL VENOUS BLD VENIPUNCTURE: CPT

## 2024-01-23 PROCEDURE — 80061 LIPID PANEL: CPT

## 2024-01-23 PROCEDURE — 82550 ASSAY OF CK (CPK): CPT

## 2024-01-23 PROCEDURE — 80053 COMPREHEN METABOLIC PANEL: CPT

## 2024-01-23 PROCEDURE — 84443 ASSAY THYROID STIM HORMONE: CPT

## 2024-01-23 PROCEDURE — 85025 COMPLETE CBC W/AUTO DIFF WBC: CPT

## 2024-01-24 ENCOUNTER — OFFICE VISIT (OUTPATIENT)
Dept: FAMILY MEDICINE CLINIC | Facility: CLINIC | Age: 84
End: 2024-01-24
Payer: MEDICARE

## 2024-01-24 VITALS
WEIGHT: 126 LBS | RESPIRATION RATE: 16 BRPM | BODY MASS INDEX: 23.79 KG/M2 | TEMPERATURE: 97.7 F | OXYGEN SATURATION: 96 % | HEIGHT: 61 IN | HEART RATE: 67 BPM | SYSTOLIC BLOOD PRESSURE: 126 MMHG | DIASTOLIC BLOOD PRESSURE: 82 MMHG

## 2024-01-24 DIAGNOSIS — E03.9 ACQUIRED HYPOTHYROIDISM: ICD-10-CM

## 2024-01-24 DIAGNOSIS — E78.49 OTHER HYPERLIPIDEMIA: ICD-10-CM

## 2024-01-24 DIAGNOSIS — Z78.9 STATIN INTOLERANCE: Primary | ICD-10-CM

## 2024-01-24 DIAGNOSIS — I10 ESSENTIAL HYPERTENSION: ICD-10-CM

## 2024-01-24 NOTE — PROGRESS NOTES
Subjective   Nguyen Zhang is a 83 y.o. female.     Chief Complaint   Patient presents with    Hypertension     Follow up    Hypothyroidism, hyperlipidemia, hypokalemia.    Hypertension  Pertinent negatives include no blurred vision, chest pain, palpitations or shortness of breath.      Patient here follow-up for hypertension, hypothyroidism, hyperlipidemia, hypokalemia.  No chest pain shortness of breath.  Off hydrochlorothiazide.  Taking potassium 10 every day.  Labs reviewed.  Discussed with patient continue diet and exercise.  Unfortunately she she has statin intolerance.  The following portions of the patient's history were reviewed and updated as appropriate: allergies, current medications, past family history, past medical history, past social history, past surgical history and problem list.    Review of Systems   Constitutional:  Negative for activity change, appetite change, fatigue and fever.   Eyes:  Negative for blurred vision and double vision.   Respiratory: Negative.  Negative for shortness of breath.    Cardiovascular:  Negative for chest pain, palpitations and leg swelling.   Gastrointestinal: Negative.    Genitourinary:  Negative for frequency.   Neurological:  Negative for dizziness, syncope, light-headedness and headache.       Allergies   Allergen Reactions    Ace Inhibitors Cough    Codeine Nausea And Vomiting       Current Outpatient Medications on File Prior to Visit   Medication Sig Dispense Refill    Acetaminophen (TYLENOL ARTHRITIS PAIN PO) Take 500 mg by mouth Daily.      amLODIPine (NORVASC) 5 MG tablet TAKE 1 TABLET BY MOUTH EVERY EVENING. 30 tablet 3    aspirin 81 MG EC tablet Take 1 tablet by mouth Daily. 30 tablet 0    atenolol (TENORMIN) 50 MG tablet TAKE 1 TABLET BY MOUTH 2 TIMES A DAY. 60 tablet 3    Bioflavonoid Products (DOROTEO C PO) Take 500 mg by mouth Daily.      Cholecalciferol (VITAMIN D3) 1000 units capsule Take 2 capsules by mouth Daily.      levothyroxine (SYNTHROID,  LEVOTHROID) 75 MCG tablet TAKE 1 TABLET BY MOUTH DAILY. 90 tablet 0    losartan (COZAAR) 100 MG tablet TAKE 1 TABLET BY MOUTH DAILY. (Patient taking differently: 1 tablet Every Night.) 90 tablet 3    multivitamin-minerals (CENTRUM) tablet Take 1 tablet by mouth Daily.      NIACIN PO Take 500 mg by mouth Daily.      potassium chloride (MICRO-K) 10 MEQ CR capsule Take 1 capsule by mouth Daily.      vitamin E 400 UNIT capsule Take 1 capsule by mouth Daily.       No current facility-administered medications on file prior to visit.       Family History   Problem Relation Age of Onset    Heart attack Father     Heart disease Father     Breast cancer Neg Hx     Malig Hyperthermia Neg Hx        Past Medical History:   Diagnosis Date    Arthritis     HL (hearing loss) March. 2023    Ringing in ears / hearing test confirmed loss of high tone sounds    Hyperlipidemia     Hypertension     Hypothyroid     Neck pain     PONV (postoperative nausea and vomiting)     Shoulder pain, left     sched total shoulder    Spinal headache        Past Surgical History:   Procedure Laterality Date    COLONOSCOPY      HYSTERECTOMY      JOINT REPLACEMENT  2010 and 2022    Knee and shoulder    REPLACEMENT TOTAL KNEE Right     SHOULDER SURGERY Right     tendon repair    TONSILLECTOMY      TOTAL SHOULDER ARTHROPLASTY W/ DISTAL CLAVICLE EXCISION Left 10/15/2021    Procedure: TOTAL SHOULDER REVERSE ARTHROPLASTY;  Surgeon: Scout Palmer MD;  Location[facility];  Service: Orthopedics;  Laterality: Left;       Social History     Socioeconomic History    Marital status:    Tobacco Use    Smoking status: Never     Passive exposure: Never    Smokeless tobacco: Never   Vaping Use    Vaping Use: Never used   Substance and Sexual Activity    Alcohol use: No    Drug use: No    Sexual activity: Not Currently     Partners: Male     Birth control/protection: Pill, Hysterectomy       Patient Active Problem List   Diagnosis    Precordial chest pain     "PVCs (premature ventricular contractions)    Essential hypertension    Acquired hypothyroidism    Low serum potassium level    Other hyperlipidemia    Acute pain of left shoulder    Osteoarthritis of multiple joints    Statin intolerance       /82 (BP Location: Left arm, Patient Position: Sitting, Cuff Size: Adult)   Pulse 67   Temp 97.7 °F (36.5 °C) (Temporal)   Resp 16   Ht 154.9 cm (61\")   Wt 57.2 kg (126 lb)   SpO2 96%   BMI 23.81 kg/m²   Body mass index is 23.81 kg/m².    Objective   Physical Exam  Vitals and nursing note reviewed.   Constitutional:       Appearance: She is well-developed.   Eyes:      Pupils: Pupils are equal, round, and reactive to light.   Neck:      Thyroid: No thyromegaly.      Vascular: No JVD.      Trachea: No tracheal deviation.   Cardiovascular:      Rate and Rhythm: Normal rate and regular rhythm.      Heart sounds: No murmur heard.  Pulmonary:      Effort: Pulmonary effort is normal. No respiratory distress.      Breath sounds: Normal breath sounds. No wheezing.   Abdominal:      General: Bowel sounds are normal. There is no distension.      Palpations: Abdomen is soft. There is no mass.      Tenderness: There is no abdominal tenderness. There is no right CVA tenderness, left CVA tenderness, guarding or rebound.      Hernia: No hernia is present.   Musculoskeletal:      Cervical back: Normal range of motion and neck supple.   Lymphadenopathy:      Cervical: No cervical adenopathy.   Neurological:      General: No focal deficit present.      Mental Status: She is alert and oriented to person, place, and time.   Psychiatric:         Mood and Affect: Mood normal.         Behavior: Behavior normal.           Assessment & Plan   Diagnoses and all orders for this visit:    1. Statin intolerance (Primary)    2. Essential hypertension    3. Other hyperlipidemia    4. Acquired hypothyroidism    Continue diet and exercise.  Check blood pressure at home.  Discussed with patient " labs that were done yesterday.  Unable to tolerate statins.  She can cut her potassium down to every other day.  She is off hydrochlorothiazide now.  Continue taking Synthroid, Norvasc, aspirin, Tenormin, Cozaar.  All problems are chronic and stable.  I will see him back in 3 months time.  EHR dragon/transcription disclaimer:  Part of this note are created by electronic transcription/translation of spoken language to printed text and thus may lead to erroneous, or at times, nonsensical words or phrases inadvertently transcribed.  Although I have reviewed for such errors, some may still exist.

## 2024-03-07 DIAGNOSIS — E03.9 ACQUIRED HYPOTHYROIDISM: ICD-10-CM

## 2024-03-07 RX ORDER — LEVOTHYROXINE SODIUM 0.07 MG/1
75 TABLET ORAL DAILY
Qty: 90 TABLET | Refills: 0 | Status: SHIPPED | OUTPATIENT
Start: 2024-03-07

## 2024-03-19 RX ORDER — AMLODIPINE BESYLATE 5 MG/1
TABLET ORAL
Qty: 90 TABLET | Refills: 0 | Status: SHIPPED | OUTPATIENT
Start: 2024-03-19

## 2024-03-19 NOTE — TELEPHONE ENCOUNTER
Rx Refill Note  Requested Prescriptions     Pending Prescriptions Disp Refills    amLODIPine (NORVASC) 5 MG tablet [Pharmacy Med Name: AMLODIPINE BESYLATE 5 MG TAB] 30 tablet 0     Sig: TAKE 1 TABLET BY MOUTH EVERY EVENING.      Last office visit with prescribing clinician: 1/24/2024   Last telemedicine visit with prescribing clinician: Visit date not found   Next office visit with prescribing clinician: 5/1/2024

## 2024-04-05 RX ORDER — LOSARTAN POTASSIUM 100 MG/1
TABLET ORAL
Qty: 90 TABLET | Refills: 0 | Status: SHIPPED | OUTPATIENT
Start: 2024-04-05

## 2024-04-05 NOTE — TELEPHONE ENCOUNTER
Rx Refill Note  Requested Prescriptions     Pending Prescriptions Disp Refills    losartan (COZAAR) 100 MG tablet [Pharmacy Med Name: LOSARTAN POTASSIUM 100 MG TAB] 90 tablet 0     Sig: TAKE 1 TABLET BY MOUTH DAILY.      Last office visit with prescribing clinician: 1/24/2024   Last telemedicine visit with prescribing clinician: Visit date not found   Next office visit with prescribing clinician: 5/1/2024

## 2024-04-11 ENCOUNTER — OFFICE VISIT (OUTPATIENT)
Dept: CARDIOLOGY | Facility: CLINIC | Age: 84
End: 2024-04-11
Payer: MEDICARE

## 2024-04-11 VITALS
HEIGHT: 61 IN | OXYGEN SATURATION: 98 % | HEART RATE: 69 BPM | SYSTOLIC BLOOD PRESSURE: 128 MMHG | DIASTOLIC BLOOD PRESSURE: 80 MMHG | BODY MASS INDEX: 23.96 KG/M2 | WEIGHT: 126.9 LBS

## 2024-04-11 DIAGNOSIS — I10 ESSENTIAL HYPERTENSION: Primary | ICD-10-CM

## 2024-04-11 DIAGNOSIS — E78.00 HYPERCHOLESTEROLEMIA: ICD-10-CM

## 2024-04-11 PROCEDURE — 99214 OFFICE O/P EST MOD 30 MIN: CPT | Performed by: INTERNAL MEDICINE

## 2024-04-11 PROCEDURE — 3079F DIAST BP 80-89 MM HG: CPT | Performed by: INTERNAL MEDICINE

## 2024-04-11 PROCEDURE — 3074F SYST BP LT 130 MM HG: CPT | Performed by: INTERNAL MEDICINE

## 2024-04-11 RX ORDER — PRAVASTATIN SODIUM 10 MG
10 TABLET ORAL DAILY
Qty: 30 TABLET | Refills: 3 | Status: SHIPPED | OUTPATIENT
Start: 2024-04-11

## 2024-04-11 RX ORDER — AZELASTINE HYDROCHLORIDE 137 UG/1
SPRAY, METERED NASAL
COMMUNITY
Start: 2024-04-08

## 2024-04-11 NOTE — PROGRESS NOTES
PATIENTINFORMATION    Date of Office Visit: 2024  Encounter Provider: Dev Schulte MD  Place of Service: [facility] CARDIOLOGY  Patient Name: Nguyen Zhang  : 1940    Subjective:     Encounter Date:2024      Patient ID: Nguyen Zhang is a 84 y.o. female.    No chief complaint on file.    HPI  Ms. Zhang is  a pleasant 83 yo lady who is here for fu visit.  No significant new complaints today.  She does water aerobics twice weekly without any chest discomfort or shortness of breath.  Compliant with current medications without any significant side effects.  Blood pressure runs normal during home monitoring.  No ER visits or hospitalizations since last clinic visit.      ROS  All systems reviewed and negative except as noted in HPI.    Past Medical History:   Diagnosis Date    Arthritis     HL (hearing loss) 2023    Ringing in ears / hearing test confirmed loss of high tone sounds    Hyperlipidemia     Hypertension     Hypothyroid     Neck pain     PONV (postoperative nausea and vomiting)     Shoulder pain, left     sched total shoulder    Spinal headache        Past Surgical History:   Procedure Laterality Date    COLONOSCOPY      HYSTERECTOMY      JOINT REPLACEMENT   and     Knee and shoulder    REPLACEMENT TOTAL KNEE Right     SHOULDER SURGERY Right     tendon repair    TONSILLECTOMY      TOTAL SHOULDER ARTHROPLASTY W/ DISTAL CLAVICLE EXCISION Left 10/15/2021    Procedure: TOTAL SHOULDER REVERSE ARTHROPLASTY;  Surgeon: Scout Palmer MD;  Location: [facility];  Service: Orthopedics;  Laterality: Left;       Social History     Socioeconomic History    Marital status:    Tobacco Use    Smoking status: Never     Passive exposure: Never    Smokeless tobacco: Never   Vaping Use    Vaping status: Never Used   Substance and Sexual Activity    Alcohol use: No    Drug use: No    Sexual activity: Not Currently     Partners: Male     Birth  "control/protection: Pill, Hysterectomy       Family History   Problem Relation Age of Onset    Heart attack Father     Heart disease Father     Breast cancer Neg Hx     Malig Hyperthermia Neg Hx          Procedures       Objective:     /80 (BP Location: Left arm)   Pulse 69   Ht 154.9 cm (61\")   Wt 57.6 kg (126 lb 14.4 oz)   SpO2 98%   BMI 23.98 kg/m²  Body mass index is 23.98 kg/m².     Constitutional:       General: Not in acute distress.     Appearance: Well-developed. Not diaphoretic.   Eyes:      Pupils: Pupils are equal, round, and reactive to light.   HENT:      Head: Normocephalic and atraumatic.   Neck:      Thyroid: No thyromegaly.   Pulmonary:      Effort: Pulmonary effort is normal. No respiratory distress.      Breath sounds: Normal breath sounds. No wheezing. No rales.   Chest:      Chest wall: Not tender to palpatation.   Cardiovascular:      Normal rate. Regular rhythm.      No gallop.    Pulses:     Intact distal pulses.   Edema:     Peripheral edema absent.   Abdominal:      General: Bowel sounds are normal. There is no distension.      Palpations: Abdomen is soft.      Tenderness: There is no guarding.   Musculoskeletal: Normal range of motion.         General: No deformity.      Cervical back: Normal range of motion and neck supple. Skin:     General: Skin is warm and dry.      Findings: No rash.   Neurological:      Mental Status: Alert and oriented to person, place, and time.      Cranial Nerves: No cranial nerve deficit.      Deep Tendon Reflexes: Reflexes are normal and symmetric.   Psychiatric:         Judgment: Judgment normal.         Review Of Data: I have reviewed pertinent recent labs, images and documents and pertinent findings included in HPI or assessment below.    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  Hypothyroidism     Doing fairly well from cardiac standpoint.  Continues to have elevated LDL.  She is willing to try low-dose pravastatin. Fu lipid panel with Dr. Rai  Good blood pressure control-continue current care.  She will follow-up in 1 year or sooner with any concerning symptoms.  Diagnosis and plan of care discussed with patient and verbalized understanding.            Your medication list            Accurate as of April 11, 2024  1:26 PM. If you have any questions, ask your nurse or doctor.                START taking these medications        Instructions Last Dose Given Next Dose Due   pravastatin 10 MG tablet  Commonly known as: PRAVACHOL  Started by: Dev Schulte MD      Take 1 tablet by mouth Daily.              CONTINUE taking these medications        Instructions Last Dose Given Next Dose Due   amLODIPine 5 MG tablet  Commonly known as: NORVASC      TAKE 1 TABLET BY MOUTH EVERY EVENING.       aspirin 81 MG EC tablet      Take 1 tablet by mouth Daily.       atenolol 50 MG tablet  Commonly known as: TENORMIN      TAKE 1 TABLET BY MOUTH 2 TIMES A DAY.       Azelastine HCl 137 MCG/SPRAY solution           DOROTEO C PO      Take 500 mg by mouth Daily.       levothyroxine 75 MCG tablet  Commonly known as: SYNTHROID, LEVOTHROID      TAKE 1 TABLET BY MOUTH DAILY.       losartan 100 MG tablet  Commonly known as: COZAAR      TAKE 1 TABLET BY MOUTH DAILY.       multivitamin-minerals tablet  Generic drug: multivitamin with minerals      Take 1 tablet by mouth Daily.       NIACIN PO      Take 500 mg by mouth Daily.       potassium chloride  10 MEQ CR capsule  Commonly known as: MICRO-K      Take 1 capsule by mouth Daily.       TYLENOL ARTHRITIS PAIN PO      Take 500 mg by mouth Daily.       Vitamin D3 25 MCG (1000 UT) capsule  Generic drug: Cholecalciferol      Take 2 capsules by mouth Daily.       vitamin E 400 UNIT capsule      Take 1 capsule by mouth Daily.                 Where to Get Your Medications        These medications were sent to [facility] - [place], [place] - [address] [place] [address] - [phone]  - [phone] [address] [address]      Phone: [phone]   pravastatin 10 MG tablet             Dev Schulte MD  04/11/24  13:26 EDT

## 2024-04-22 RX ORDER — ATENOLOL 50 MG/1
TABLET ORAL
Qty: 60 TABLET | Refills: 0 | Status: SHIPPED | OUTPATIENT
Start: 2024-04-22

## 2024-05-01 ENCOUNTER — OFFICE VISIT (OUTPATIENT)
Dept: FAMILY MEDICINE CLINIC | Facility: CLINIC | Age: 84
End: 2024-05-01
Payer: MEDICARE

## 2024-05-01 VITALS
SYSTOLIC BLOOD PRESSURE: 148 MMHG | WEIGHT: 127 LBS | BODY MASS INDEX: 23.98 KG/M2 | OXYGEN SATURATION: 98 % | DIASTOLIC BLOOD PRESSURE: 82 MMHG | RESPIRATION RATE: 16 BRPM | HEIGHT: 61 IN | TEMPERATURE: 97.7 F | HEART RATE: 64 BPM

## 2024-05-01 DIAGNOSIS — E03.9 ACQUIRED HYPOTHYROIDISM: ICD-10-CM

## 2024-05-01 DIAGNOSIS — E78.00 HYPERCHOLESTEROLEMIA: ICD-10-CM

## 2024-05-01 DIAGNOSIS — I10 ESSENTIAL HYPERTENSION: ICD-10-CM

## 2024-05-01 DIAGNOSIS — Z78.9 STATIN INTOLERANCE: Primary | ICD-10-CM

## 2024-05-01 PROCEDURE — 99214 OFFICE O/P EST MOD 30 MIN: CPT | Performed by: INTERNAL MEDICINE

## 2024-05-01 PROCEDURE — 3077F SYST BP >= 140 MM HG: CPT | Performed by: INTERNAL MEDICINE

## 2024-05-01 PROCEDURE — 1159F MED LIST DOCD IN RCRD: CPT | Performed by: INTERNAL MEDICINE

## 2024-05-01 PROCEDURE — 1160F RVW MEDS BY RX/DR IN RCRD: CPT | Performed by: INTERNAL MEDICINE

## 2024-05-01 PROCEDURE — 3079F DIAST BP 80-89 MM HG: CPT | Performed by: INTERNAL MEDICINE

## 2024-05-01 NOTE — PROGRESS NOTES
Subjective   Nguyen Zhang is a 84 y.o. female.     Chief Complaint   Patient presents with    Hyperlipidemia    Hypothyroidism    Hypertension       History of Present Illness   Patient seen follow-up for hyperlipidemia, hypothyroidism, hypertension, statin intolerance hypokalemia.  Patient denies any chest pain or shortness of breath.  She is taking calcium 10 mg 3 times a week now.  Taking other current medications.  She is having problems with her right ankle and foot seeing a podiatrist.  The following portions of the patient's history were reviewed and updated as appropriate: allergies, current medications, past family history, past medical history, past social history, past surgical history and problem list.    Review of Systems   Constitutional:  Negative for activity change, appetite change, fatigue and fever.   Eyes:  Negative for blurred vision and double vision.   Respiratory:  Negative for shortness of breath.    Cardiovascular:  Negative for chest pain, palpitations and leg swelling.   Neurological:  Negative for dizziness, syncope, light-headedness and headache.       Allergies   Allergen Reactions    Ace Inhibitors Cough    Codeine Nausea And Vomiting       Current Outpatient Medications on File Prior to Visit   Medication Sig Dispense Refill    Acetaminophen (TYLENOL ARTHRITIS PAIN PO) Take 500 mg by mouth Daily.      amLODIPine (NORVASC) 5 MG tablet TAKE 1 TABLET BY MOUTH EVERY EVENING. 90 tablet 0    aspirin 81 MG EC tablet Take 1 tablet by mouth Daily. 30 tablet 0    atenolol (TENORMIN) 50 MG tablet TAKE 1 TABLET BY MOUTH 2 TIMES A DAY. 60 tablet 0    Azelastine HCl 137 MCG/SPRAY solution       Bioflavonoid Products (DOROTEO C PO) Take 500 mg by mouth Daily.      Cholecalciferol (VITAMIN D3) 1000 units capsule Take 2 capsules by mouth Daily.      levothyroxine (SYNTHROID, LEVOTHROID) 75 MCG tablet TAKE 1 TABLET BY MOUTH DAILY. 90 tablet 0    losartan (COZAAR) 100 MG tablet TAKE 1 TABLET BY  MOUTH DAILY. 90 tablet 0    multivitamin-minerals (CENTRUM) tablet Take 1 tablet by mouth Daily.      NIACIN PO Take 500 mg by mouth Daily.      potassium chloride (MICRO-K) 10 MEQ CR capsule Take 1 capsule by mouth Daily.      pravastatin (PRAVACHOL) 10 MG tablet Take 1 tablet by mouth Daily. 30 tablet 3    vitamin E 400 UNIT capsule Take 1 capsule by mouth Daily.       No current facility-administered medications on file prior to visit.       Family History   Problem Relation Age of Onset    Heart attack Father     Heart disease Father     Breast cancer Neg Hx     Malig Hyperthermia Neg Hx        Past Medical History:   Diagnosis Date    Arthritis     HL (hearing loss) March. 2023    Ringing in ears / hearing test confirmed loss of high tone sounds    Hyperlipidemia     Hypertension     Hypothyroid     Neck pain     PONV (postoperative nausea and vomiting)     Shoulder pain, left     sched total shoulder    Spinal headache        Past Surgical History:   Procedure Laterality Date    COLONOSCOPY      HYSTERECTOMY      JOINT REPLACEMENT  2010 and 2022    Knee and shoulder    REPLACEMENT TOTAL KNEE Right     SHOULDER SURGERY Right     tendon repair    TONSILLECTOMY      TOTAL SHOULDER ARTHROPLASTY W/ DISTAL CLAVICLE EXCISION Left 10/15/2021    Procedure: TOTAL SHOULDER REVERSE ARTHROPLASTY;  Surgeon: Scout Palmer MD;  Location: [facility];  Service: Orthopedics;  Laterality: Left;       Social History     Socioeconomic History    Marital status:    Tobacco Use    Smoking status: Never     Passive exposure: Never    Smokeless tobacco: Never   Vaping Use    Vaping status: Never Used   Substance and Sexual Activity    Alcohol use: No    Drug use: No    Sexual activity: Not Currently     Partners: Male     Birth control/protection: Pill, Hysterectomy       Patient Active Problem List   Diagnosis    Precordial chest pain    PVCs (premature ventricular contractions)    Essential hypertension    Acquired  "hypothyroidism    Low serum potassium level    Hypercholesterolemia    Acute pain of left shoulder    Osteoarthritis of multiple joints    Statin intolerance       /82   Pulse 64   Temp 97.7 °F (36.5 °C)   Resp 16   Ht 154.9 cm (60.98\")   Wt 57.6 kg (127 lb)   SpO2 98%   BMI 24.01 kg/m²   Body mass index is 24.01 kg/m².    Objective   Physical Exam  Vitals and nursing note reviewed.   Constitutional:       Appearance: She is well-developed.   Eyes:      Pupils: Pupils are equal, round, and reactive to light.   Neck:      Thyroid: No thyromegaly.      Vascular: No JVD.      Trachea: No tracheal deviation.   Cardiovascular:      Rate and Rhythm: Normal rate and regular rhythm.      Heart sounds: No murmur heard.  Pulmonary:      Effort: Pulmonary effort is normal. No respiratory distress.      Breath sounds: Normal breath sounds. No wheezing.   Abdominal:      General: Bowel sounds are normal. There is no distension.      Palpations: Abdomen is soft. There is no mass.      Tenderness: There is no abdominal tenderness. There is no right CVA tenderness, left CVA tenderness, guarding or rebound.      Hernia: No hernia is present.   Musculoskeletal:      Cervical back: Normal range of motion and neck supple.   Lymphadenopathy:      Cervical: No cervical adenopathy.   Neurological:      General: No focal deficit present.      Mental Status: She is alert and oriented to person, place, and time.           Assessment & Plan   Diagnoses and all orders for this visit:    1. Statin intolerance (Primary)  -     CBC & Differential  -     CK  -     Comprehensive Metabolic Panel  -     Lipid Panel With / Chol / HDL Ratio  -     TSH  -     T4, Free    2. Essential hypertension  -     CBC & Differential  -     CK  -     Comprehensive Metabolic Panel  -     Lipid Panel With / Chol / HDL Ratio  -     TSH  -     T4, Free    3. Hypercholesterolemia  -     CBC & Differential  -     CK  -     Comprehensive Metabolic Panel  -    "  Lipid Panel With / Chol / HDL Ratio  -     TSH  -     T4, Free    4. Acquired hypothyroidism  -     CBC & Differential  -     CK  -     Comprehensive Metabolic Panel  -     Lipid Panel With / Chol / HDL Ratio  -     TSH  -     T4, Free    Continue diet and exercise.  Check blood pressure at home.  Continue Synthroid, Norvasc, Tenormin, aspirin, Cozaar, potassium, Pravachol.  She is taking potassium 3 times a week 10 mg.  Awaiting labs.  All her problems are chronic and stable.  Keep follow-up with specialist.  EHR dragon/transcription disclaimer:  Part of this note are created by electronic transcription/translation of spoken language to printed text and thus may lead to erroneous, or at times, nonsensical words or phrases inadvertently transcribed.  Although I have reviewed for such errors, some may still exist.

## 2024-05-01 NOTE — PROGRESS NOTES
Venipuncture Blood Specimen Collection  Venipuncture performed in right arm by Jeri España MA with good hemostasis. Patient tolerated the procedure well without complications.   05/01/24   Jeri España MA

## 2024-05-02 LAB
ALBUMIN SERPL-MCNC: 4.8 G/DL (ref 3.5–5.2)
ALBUMIN/GLOB SERPL: 2.3 G/DL
ALP SERPL-CCNC: 93 U/L (ref 39–117)
ALT SERPL-CCNC: 24 U/L (ref 1–33)
AST SERPL-CCNC: 23 U/L (ref 1–32)
BASOPHILS # BLD AUTO: 0.04 10*3/MM3 (ref 0–0.2)
BASOPHILS NFR BLD AUTO: 0.5 % (ref 0–1.5)
BILIRUB SERPL-MCNC: 0.5 MG/DL (ref 0–1.2)
BUN SERPL-MCNC: 18 MG/DL (ref 8–23)
BUN/CREAT SERPL: 17.6 (ref 7–25)
CALCIUM SERPL-MCNC: 9.8 MG/DL (ref 8.6–10.5)
CHLORIDE SERPL-SCNC: 100 MMOL/L (ref 98–107)
CHOLEST SERPL-MCNC: 201 MG/DL (ref 0–200)
CHOLEST/HDLC SERPL: 3.47 {RATIO}
CK SERPL-CCNC: 69 U/L (ref 20–180)
CO2 SERPL-SCNC: 32 MMOL/L (ref 22–29)
CREAT SERPL-MCNC: 1.02 MG/DL (ref 0.57–1)
EGFRCR SERPLBLD CKD-EPI 2021: 54.7 ML/MIN/1.73
EOSINOPHIL # BLD AUTO: 0.12 10*3/MM3 (ref 0–0.4)
EOSINOPHIL NFR BLD AUTO: 1.5 % (ref 0.3–6.2)
ERYTHROCYTE [DISTWIDTH] IN BLOOD BY AUTOMATED COUNT: 11.9 % (ref 12.3–15.4)
GLOBULIN SER CALC-MCNC: 2.1 GM/DL
GLUCOSE SERPL-MCNC: 91 MG/DL (ref 65–99)
HCT VFR BLD AUTO: 39 % (ref 34–46.6)
HDLC SERPL-MCNC: 58 MG/DL (ref 40–60)
HGB BLD-MCNC: 12.8 G/DL (ref 12–15.9)
IMM GRANULOCYTES # BLD AUTO: 0.03 10*3/MM3 (ref 0–0.05)
IMM GRANULOCYTES NFR BLD AUTO: 0.4 % (ref 0–0.5)
LDLC SERPL CALC-MCNC: 106 MG/DL (ref 0–100)
LYMPHOCYTES # BLD AUTO: 1.91 10*3/MM3 (ref 0.7–3.1)
LYMPHOCYTES NFR BLD AUTO: 24 % (ref 19.6–45.3)
MCH RBC QN AUTO: 30.8 PG (ref 26.6–33)
MCHC RBC AUTO-ENTMCNC: 32.8 G/DL (ref 31.5–35.7)
MCV RBC AUTO: 94 FL (ref 79–97)
MONOCYTES # BLD AUTO: 0.7 10*3/MM3 (ref 0.1–0.9)
MONOCYTES NFR BLD AUTO: 8.8 % (ref 5–12)
NEUTROPHILS # BLD AUTO: 5.15 10*3/MM3 (ref 1.7–7)
NEUTROPHILS NFR BLD AUTO: 64.8 % (ref 42.7–76)
NRBC BLD AUTO-RTO: 0 /100 WBC (ref 0–0.2)
PLATELET # BLD AUTO: 243 10*3/MM3 (ref 140–450)
POTASSIUM SERPL-SCNC: 3.9 MMOL/L (ref 3.5–5.2)
PROT SERPL-MCNC: 6.9 G/DL (ref 6–8.5)
RBC # BLD AUTO: 4.15 10*6/MM3 (ref 3.77–5.28)
SODIUM SERPL-SCNC: 142 MMOL/L (ref 136–145)
T4 FREE SERPL-MCNC: 1.51 NG/DL (ref 0.93–1.7)
TRIGL SERPL-MCNC: 215 MG/DL (ref 0–150)
TSH SERPL DL<=0.005 MIU/L-ACNC: 3.82 UIU/ML (ref 0.27–4.2)
VLDLC SERPL CALC-MCNC: 37 MG/DL (ref 5–40)
WBC # BLD AUTO: 7.95 10*3/MM3 (ref 3.4–10.8)

## 2024-05-02 RX ORDER — POTASSIUM CHLORIDE 750 MG/1
10 TABLET, FILM COATED, EXTENDED RELEASE ORAL 2 TIMES DAILY
Qty: 180 TABLET | Refills: 0 | Status: SHIPPED | OUTPATIENT
Start: 2024-05-02

## 2024-05-23 RX ORDER — ATENOLOL 50 MG/1
TABLET ORAL
Qty: 60 TABLET | Refills: 2 | Status: SHIPPED | OUTPATIENT
Start: 2024-05-23

## 2024-05-29 ENCOUNTER — TRANSCRIBE ORDERS (OUTPATIENT)
Dept: ADMINISTRATIVE | Facility: HOSPITAL | Age: 84
End: 2024-05-29
Payer: MEDICARE

## 2024-05-29 DIAGNOSIS — Z12.31 BREAST CANCER SCREENING BY MAMMOGRAM: Primary | ICD-10-CM

## 2024-06-06 DIAGNOSIS — E03.9 ACQUIRED HYPOTHYROIDISM: ICD-10-CM

## 2024-06-06 RX ORDER — AMLODIPINE BESYLATE 5 MG/1
TABLET ORAL
Qty: 90 TABLET | Refills: 0 | Status: SHIPPED | OUTPATIENT
Start: 2024-06-06

## 2024-06-06 RX ORDER — LEVOTHYROXINE SODIUM 0.07 MG/1
75 TABLET ORAL DAILY
Qty: 90 TABLET | Refills: 0 | Status: SHIPPED | OUTPATIENT
Start: 2024-06-06

## 2024-07-01 ENCOUNTER — HOSPITAL ENCOUNTER (OUTPATIENT)
Dept: MAMMOGRAPHY | Facility: HOSPITAL | Age: 84
Discharge: HOME | End: 2024-07-01
Admitting: INTERNAL MEDICINE
Payer: MEDICARE

## 2024-07-01 DIAGNOSIS — Z12.31 BREAST CANCER SCREENING BY MAMMOGRAM: ICD-10-CM

## 2024-07-01 PROCEDURE — 77063 BREAST TOMOSYNTHESIS BI: CPT | Performed by: RADIOLOGY

## 2024-07-01 PROCEDURE — 77067 SCR MAMMO BI INCL CAD: CPT

## 2024-07-01 PROCEDURE — 77063 BREAST TOMOSYNTHESIS BI: CPT

## 2024-07-01 PROCEDURE — 77067 SCR MAMMO BI INCL CAD: CPT | Performed by: RADIOLOGY

## 2024-07-01 RX ORDER — LOSARTAN POTASSIUM 100 MG/1
TABLET ORAL
Qty: 90 TABLET | Refills: 0 | Status: SHIPPED | OUTPATIENT
Start: 2024-07-01

## 2024-07-12 ENCOUNTER — APPOINTMENT (OUTPATIENT)
Dept: CT IMAGING | Facility: HOSPITAL | Age: 84
End: 2024-07-12
Payer: MEDICARE

## 2024-07-12 ENCOUNTER — APPOINTMENT (OUTPATIENT)
Dept: GENERAL RADIOLOGY | Facility: HOSPITAL | Age: 84
End: 2024-07-12
Payer: MEDICARE

## 2024-07-12 ENCOUNTER — HOSPITAL ENCOUNTER (EMERGENCY)
Facility: HOSPITAL | Age: 84
Discharge: HOME | End: 2024-07-12
Attending: EMERGENCY MEDICINE
Payer: MEDICARE

## 2024-07-12 ENCOUNTER — APPOINTMENT (OUTPATIENT)
Dept: MRI IMAGING | Facility: HOSPITAL | Age: 84
End: 2024-07-12
Payer: MEDICARE

## 2024-07-12 VITALS
RESPIRATION RATE: 16 BRPM | HEART RATE: 74 BPM | TEMPERATURE: 98.1 F | SYSTOLIC BLOOD PRESSURE: 165 MMHG | OXYGEN SATURATION: 96 % | DIASTOLIC BLOOD PRESSURE: 99 MMHG | BODY MASS INDEX: 23.92 KG/M2 | WEIGHT: 130 LBS | HEIGHT: 62 IN

## 2024-07-12 DIAGNOSIS — H81.12 BPPV (BENIGN PAROXYSMAL POSITIONAL VERTIGO), LEFT: Primary | ICD-10-CM

## 2024-07-12 LAB
ABO GROUP BLD: NORMAL
ALBUMIN SERPL-MCNC: 5.2 G/DL (ref 3.5–5.2)
ALBUMIN/GLOB SERPL: 1.7 G/DL
ALP SERPL-CCNC: 85 U/L (ref 39–117)
ALT SERPL W P-5'-P-CCNC: 16 U/L (ref 1–33)
ANION GAP SERPL CALCULATED.3IONS-SCNC: 13.6 MMOL/L (ref 5–15)
APTT PPP: 30.1 SECONDS (ref 24.3–38.1)
AST SERPL-CCNC: 28 U/L (ref 1–32)
BASOPHILS # BLD AUTO: 0.02 10*3/MM3 (ref 0–0.2)
BASOPHILS NFR BLD AUTO: 0.4 % (ref 0–1.5)
BILIRUB SERPL-MCNC: 0.7 MG/DL (ref 0–1.2)
BLD GP AB SCN SERPL QL: NEGATIVE
BUN SERPL-MCNC: 12 MG/DL (ref 8–23)
BUN/CREAT SERPL: 14.6 (ref 7–25)
CALCIUM SPEC-SCNC: 10.3 MG/DL (ref 8.6–10.5)
CHLORIDE SERPL-SCNC: 97 MMOL/L (ref 98–107)
CO2 SERPL-SCNC: 27.4 MMOL/L (ref 22–29)
CREAT SERPL-MCNC: 0.82 MG/DL (ref 0.57–1)
DEPRECATED RDW RBC AUTO: 41.4 FL (ref 37–54)
EGFRCR SERPLBLD CKD-EPI 2021: 71.1 ML/MIN/1.73
EOSINOPHIL # BLD AUTO: 0.02 10*3/MM3 (ref 0–0.4)
EOSINOPHIL NFR BLD AUTO: 0.4 % (ref 0.3–6.2)
ERYTHROCYTE [DISTWIDTH] IN BLOOD BY AUTOMATED COUNT: 11.7 % (ref 12.3–15.4)
GLOBULIN UR ELPH-MCNC: 3 GM/DL
GLUCOSE SERPL-MCNC: 107 MG/DL (ref 65–99)
HCT VFR BLD AUTO: 42.1 % (ref 34–46.6)
HGB BLD-MCNC: 14.1 G/DL (ref 12–15.9)
HOLD SPECIMEN: NORMAL
HOLD SPECIMEN: NORMAL
IMM GRANULOCYTES # BLD AUTO: 0 10*3/MM3 (ref 0–0.05)
IMM GRANULOCYTES NFR BLD AUTO: 0 % (ref 0–0.5)
INR PPP: 0.94 (ref 0.9–1.1)
LYMPHOCYTES # BLD AUTO: 1.46 10*3/MM3 (ref 0.7–3.1)
LYMPHOCYTES NFR BLD AUTO: 25.6 % (ref 19.6–45.3)
MCH RBC QN AUTO: 31.4 PG (ref 26.6–33)
MCHC RBC AUTO-ENTMCNC: 33.5 G/DL (ref 31.5–35.7)
MCV RBC AUTO: 93.8 FL (ref 79–97)
MONOCYTES # BLD AUTO: 0.49 10*3/MM3 (ref 0.1–0.9)
MONOCYTES NFR BLD AUTO: 8.6 % (ref 5–12)
NEUTROPHILS NFR BLD AUTO: 3.71 10*3/MM3 (ref 1.7–7)
NEUTROPHILS NFR BLD AUTO: 65 % (ref 42.7–76)
PLATELET # BLD AUTO: 214 10*3/MM3 (ref 140–450)
PMV BLD AUTO: 9.6 FL (ref 6–12)
POTASSIUM SERPL-SCNC: 3.6 MMOL/L (ref 3.5–5.2)
PROT SERPL-MCNC: 8.2 G/DL (ref 6–8.5)
PROTHROMBIN TIME: 12.6 SECONDS (ref 12.1–15)
QT INTERVAL: 387 MS
QTC INTERVAL: 422 MS
RBC # BLD AUTO: 4.49 10*6/MM3 (ref 3.77–5.28)
RH BLD: POSITIVE
SODIUM SERPL-SCNC: 138 MMOL/L (ref 136–145)
T&S EXPIRATION DATE: NORMAL
TROPONIN T SERPL HS-MCNC: 8 NG/L
WBC NRBC COR # BLD AUTO: 5.7 10*3/MM3 (ref 3.4–10.8)
WHOLE BLOOD HOLD COAG: NORMAL
WHOLE BLOOD HOLD SPECIMEN: NORMAL

## 2024-07-12 PROCEDURE — 93010 ELECTROCARDIOGRAM REPORT: CPT | Performed by: INTERNAL MEDICINE

## 2024-07-12 PROCEDURE — 86901 BLOOD TYPING SEROLOGIC RH(D): CPT | Performed by: NURSE PRACTITIONER

## 2024-07-12 PROCEDURE — 99285 EMERGENCY DEPT VISIT HI MDM: CPT

## 2024-07-12 PROCEDURE — 70498 CT ANGIOGRAPHY NECK: CPT

## 2024-07-12 PROCEDURE — 96374 THER/PROPH/DIAG INJ IV PUSH: CPT

## 2024-07-12 PROCEDURE — 85730 THROMBOPLASTIN TIME PARTIAL: CPT | Performed by: NURSE PRACTITIONER

## 2024-07-12 PROCEDURE — 86900 BLOOD TYPING SEROLOGIC ABO: CPT | Performed by: NURSE PRACTITIONER

## 2024-07-12 PROCEDURE — 85025 COMPLETE CBC W/AUTO DIFF WBC: CPT | Performed by: NURSE PRACTITIONER

## 2024-07-12 PROCEDURE — 86850 RBC ANTIBODY SCREEN: CPT | Performed by: NURSE PRACTITIONER

## 2024-07-12 PROCEDURE — 99204 OFFICE O/P NEW MOD 45 MIN: CPT | Performed by: PSYCHIATRY & NEUROLOGY

## 2024-07-12 PROCEDURE — 25510000001 IOPAMIDOL PER 1 ML: Performed by: EMERGENCY MEDICINE

## 2024-07-12 PROCEDURE — 82948 REAGENT STRIP/BLOOD GLUCOSE: CPT

## 2024-07-12 PROCEDURE — 25010000002 ONDANSETRON PER 1 MG: Performed by: NURSE PRACTITIONER

## 2024-07-12 PROCEDURE — 70450 CT HEAD/BRAIN W/O DYE: CPT

## 2024-07-12 PROCEDURE — 80053 COMPREHEN METABOLIC PANEL: CPT | Performed by: NURSE PRACTITIONER

## 2024-07-12 PROCEDURE — 93005 ELECTROCARDIOGRAM TRACING: CPT | Performed by: NURSE PRACTITIONER

## 2024-07-12 PROCEDURE — 84484 ASSAY OF TROPONIN QUANT: CPT | Performed by: NURSE PRACTITIONER

## 2024-07-12 PROCEDURE — 71045 X-RAY EXAM CHEST 1 VIEW: CPT

## 2024-07-12 PROCEDURE — 70496 CT ANGIOGRAPHY HEAD: CPT

## 2024-07-12 PROCEDURE — 70551 MRI BRAIN STEM W/O DYE: CPT

## 2024-07-12 PROCEDURE — 85610 PROTHROMBIN TIME: CPT | Performed by: NURSE PRACTITIONER

## 2024-07-12 RX ORDER — SCOLOPAMINE TRANSDERMAL SYSTEM 1 MG/1
1 PATCH, EXTENDED RELEASE TRANSDERMAL
Status: DISCONTINUED | OUTPATIENT
Start: 2024-07-12 | End: 2024-07-12 | Stop reason: HOSPADM

## 2024-07-12 RX ORDER — AMLODIPINE BESYLATE 5 MG/1
5 TABLET ORAL ONCE
Status: COMPLETED | OUTPATIENT
Start: 2024-07-12 | End: 2024-07-12

## 2024-07-12 RX ORDER — MECLIZINE HYDROCHLORIDE 25 MG/1
25 TABLET ORAL 3 TIMES DAILY PRN
Qty: 20 TABLET | Refills: 0 | Status: SHIPPED | OUTPATIENT
Start: 2024-07-12

## 2024-07-12 RX ORDER — SODIUM CHLORIDE 0.9 % (FLUSH) 0.9 %
10 SYRINGE (ML) INJECTION AS NEEDED
Status: DISCONTINUED | OUTPATIENT
Start: 2024-07-12 | End: 2024-07-12 | Stop reason: HOSPADM

## 2024-07-12 RX ORDER — ONDANSETRON 2 MG/ML
4 INJECTION INTRAMUSCULAR; INTRAVENOUS ONCE
Status: COMPLETED | OUTPATIENT
Start: 2024-07-12 | End: 2024-07-12

## 2024-07-12 RX ORDER — MECLIZINE HYDROCHLORIDE 25 MG/1
25 TABLET ORAL ONCE
Status: COMPLETED | OUTPATIENT
Start: 2024-07-12 | End: 2024-07-12

## 2024-07-12 RX ADMIN — SCOPOLAMINE 1 PATCH: 1.5 PATCH, EXTENDED RELEASE TRANSDERMAL at 16:29

## 2024-07-12 RX ADMIN — AMLODIPINE BESYLATE 5 MG: 5 TABLET ORAL at 13:52

## 2024-07-12 RX ADMIN — MECLIZINE HYDROCHLORIDE 25 MG: 25 TABLET ORAL at 15:42

## 2024-07-12 RX ADMIN — MECLIZINE HYDROCHLORIDE 25 MG: 25 TABLET ORAL at 13:52

## 2024-07-12 RX ADMIN — IOPAMIDOL 100 ML: 755 INJECTION, SOLUTION INTRAVENOUS at 14:14

## 2024-07-12 RX ADMIN — ONDANSETRON 4 MG: 2 INJECTION INTRAMUSCULAR; INTRAVENOUS at 15:20

## 2024-07-12 NOTE — DISCHARGE INSTRUCTIONS
Remove the scopolamine patch on Sunday    The pupil on side of patch may get enlarged, do not worry.    Although you are being discharged from the ED today, I encourage you to return for worsening symptoms. Things can, and do, change such that treatment at home with medication may not be adequate. Specifically I recommend returning for chest pain or discomfort, difficulty breathing, persistent vomiting or difficulty holding down liquids or medications, fever > 102.0 F,  or any other worsening or alarming symptoms.     Rest. Drink plenty of fluids.  Follow up with PCP or provider listed for further evaluation and management.  Follow up with primary care provider for further management and to have blood pressure rechecked.  Take all medications as prescribed.

## 2024-07-12 NOTE — ED PROVIDER NOTES
"        EMERGENCY DEPARTMENT ENCOUNTER    Room Number:  11/11  Date seen:  7/12/2024  Time seen: 13:06 EDT  PCP: Chelsie Rai MD  Historian: patient    Discussed/obtained information from independent historians: n/a    HPI:  Chief complaint:dizziness  A complete HPI/ROS/PMH/PSH/SH/FH are unobtainable due to: n/a  Context:Nguyen Zhang is a 84 y.o. female with history of hyperlipidemia, hypothyroid and hypertension who presents to the ED with c/o acute onset of dizziness described being worse with changing positions which started at 0445 this am.  She describes it as feeling of \"I'm going to fall over if I don't hold on to something\". She has had this sensation one other time and it was 14 years ago after knee surgery.  She does endorse some right sided neck pain at base of skull on right but DENIES headache, visual changes or unilateral weakness.  She has not taken her usual am medications (including antihypertensive medications) this am.      External (non-ED) record review: I reviewed recent primary care office visit with Dr. Mckeon 5/1/2024.  Patient had a stress test on Ashley 15, 2023 with LVEF 70%.  Was a normal myocardial perfusion without evidence of ischemia    Chronic or social conditions impacting care: Not applicable    ALLERGIES  Ace inhibitors and Codeine    PAST MEDICAL HISTORY  Active Ambulatory Problems     Diagnosis Date Noted    Precordial chest pain 05/14/2019    PVCs (premature ventricular contractions) 05/14/2019    Essential hypertension 06/05/2019    Acquired hypothyroidism 06/05/2019    Low serum potassium level 06/05/2019    Hypercholesterolemia 08/13/2019    Acute pain of left shoulder 08/13/2019    Osteoarthritis of multiple joints 05/19/2020    Statin intolerance 02/09/2022     Resolved Ambulatory Problems     Diagnosis Date Noted    Nontraumatic complete tear of left rotator cuff 09/12/2021    Glenohumeral arthritis, left 09/23/2021    Biceps tendinitis of left upper extremity " 09/23/2021    Unstable angina 06/14/2023     Past Medical History:   Diagnosis Date    Arthritis     HL (hearing loss) March. 2023    Hyperlipidemia     Hypertension     Hypothyroid     Neck pain     PONV (postoperative nausea and vomiting)     Shoulder pain, left     Spinal headache        PAST SURGICAL HISTORY  Past Surgical History:   Procedure Laterality Date    COLONOSCOPY      HYSTERECTOMY      JOINT REPLACEMENT  2010 and 2022    Knee and shoulder    REPLACEMENT TOTAL KNEE Right     SHOULDER SURGERY Right     tendon repair    TONSILLECTOMY      TOTAL SHOULDER ARTHROPLASTY W/ DISTAL CLAVICLE EXCISION Left 10/15/2021    Procedure: TOTAL SHOULDER REVERSE ARTHROPLASTY;  Surgeon: Scout Palmer MD;  Location: [facility];  Service: Orthopedics;  Laterality: Left;       FAMILY HISTORY  Family History   Problem Relation Age of Onset    Heart attack Father     Heart disease Father     Breast cancer Neg Hx     Malig Hyperthermia Neg Hx        SOCIAL HISTORY  Social History     Socioeconomic History    Marital status:    Tobacco Use    Smoking status: Never     Passive exposure: Never    Smokeless tobacco: Never   Vaping Use    Vaping status: Never Used   Substance and Sexual Activity    Alcohol use: No    Drug use: No    Sexual activity: Not Currently     Partners: Male     Birth control/protection: Pill, Hysterectomy       REVIEW OF SYSTEMS  Review of Systems    All systems reviewed and negative except for those discussed in HPI.     PHYSICAL EXAM    I have reviewed the triage vital signs and nursing notes.  Vitals:    07/12/24 1601   BP: 164/86   Pulse: 67   Resp: 16   Temp:    SpO2: 94%     Physical Exam    GENERAL: not distressed  HENT: nares patent, no facial droop  EYES: no scleral icterus, PERRL.  Sight left gaze nystagmus  NECK: no ROM limitations  CV: regular rhythm, regular rate, no murmur  RESPIRATORY: normal effort, CTAB  ABDOMEN: soft  : deferred  MUSCULOSKELETAL: no deformity  NEURO: alert,  moves all extremities, follows commands  SKIN: warm, dry  NIHSS:  0-->Alert: keenly responsive  0-->Answers both questions correctly  0-->Performs both tasks correctly  0=normal  0=No visual loss  0=Normal symmetric movement  0-->No drift: limb holds 90 (or 45) degrees for full 10 secs  0-->No drift: limb holds 90 (or 45) degrees for full 10 secs  0-->No drift: limb holds 90 (or 45) degrees for full 10 secs  0-->No drift: limb holds 90 (or 45) degrees for full 10 secs  0=Absent  0=Normal; no sensory loss  0=No aphasia, normal  0=Normal  0=No abnormality  Total score: 0     LAB RESULTS  Recent Results (from the past 24 hour(s))   Comprehensive Metabolic Panel    Collection Time: 07/12/24  1:30 PM    Specimen: Blood   Result Value Ref Range    Glucose 107 (H) 65 - 99 mg/dL    BUN 12 8 - 23 mg/dL    Creatinine 0.82 0.57 - 1.00 mg/dL    Sodium 138 136 - 145 mmol/L    Potassium 3.6 3.5 - 5.2 mmol/L    Chloride 97 (L) 98 - 107 mmol/L    CO2 27.4 22.0 - 29.0 mmol/L    Calcium 10.3 8.6 - 10.5 mg/dL    Total Protein 8.2 6.0 - 8.5 g/dL    Albumin 5.2 3.5 - 5.2 g/dL    ALT (SGPT) 16 1 - 33 U/L    AST (SGOT) 28 1 - 32 U/L    Alkaline Phosphatase 85 39 - 117 U/L    Total Bilirubin 0.7 0.0 - 1.2 mg/dL    Globulin 3.0 gm/dL    A/G Ratio 1.7 g/dL    BUN/Creatinine Ratio 14.6 7.0 - 25.0    Anion Gap 13.6 5.0 - 15.0 mmol/L    eGFR 71.1 >60.0 mL/min/1.73   Protime-INR    Collection Time: 07/12/24  1:30 PM    Specimen: Blood   Result Value Ref Range    Protime 12.6 12.1 - 15.0 Seconds    INR 0.94 0.90 - 1.10   aPTT    Collection Time: 07/12/24  1:30 PM    Specimen: Blood   Result Value Ref Range    PTT 30.1 24.3 - 38.1 seconds   Single High Sensitivity Troponin T    Collection Time: 07/12/24  1:30 PM    Specimen: Blood   Result Value Ref Range    HS Troponin T 8 <14 ng/L   Type & Screen    Collection Time: 07/12/24  1:30 PM    Specimen: Blood   Result Value Ref Range    ABO Type O     RH type Positive     Antibody Screen Negative      T&S Expiration Date 7/15/2024 11:59:59 PM    Green Top (Gel)    Collection Time: 07/12/24  1:30 PM   Result Value Ref Range    Extra Tube Hold for add-ons.    Lavender Top    Collection Time: 07/12/24  1:30 PM   Result Value Ref Range    Extra Tube hold for add-on    Gold Top - SST    Collection Time: 07/12/24  1:30 PM   Result Value Ref Range    Extra Tube Hold for add-ons.    Light Blue Top    Collection Time: 07/12/24  1:30 PM   Result Value Ref Range    Extra Tube Hold for add-ons.    CBC Auto Differential    Collection Time: 07/12/24  1:30 PM    Specimen: Blood   Result Value Ref Range    WBC 5.70 3.40 - 10.80 10*3/mm3    RBC 4.49 3.77 - 5.28 10*6/mm3    Hemoglobin 14.1 12.0 - 15.9 g/dL    Hematocrit 42.1 34.0 - 46.6 %    MCV 93.8 79.0 - 97.0 fL    MCH 31.4 26.6 - 33.0 pg    MCHC 33.5 31.5 - 35.7 g/dL    RDW 11.7 (L) 12.3 - 15.4 %    RDW-SD 41.4 37.0 - 54.0 fl    MPV 9.6 6.0 - 12.0 fL    Platelets 214 140 - 450 10*3/mm3    Neutrophil % 65.0 42.7 - 76.0 %    Lymphocyte % 25.6 19.6 - 45.3 %    Monocyte % 8.6 5.0 - 12.0 %    Eosinophil % 0.4 0.3 - 6.2 %    Basophil % 0.4 0.0 - 1.5 %    Immature Grans % 0.0 0.0 - 0.5 %    Neutrophils, Absolute 3.71 1.70 - 7.00 10*3/mm3    Lymphocytes, Absolute 1.46 0.70 - 3.10 10*3/mm3    Monocytes, Absolute 0.49 0.10 - 0.90 10*3/mm3    Eosinophils, Absolute 0.02 0.00 - 0.40 10*3/mm3    Basophils, Absolute 0.02 0.00 - 0.20 10*3/mm3    Immature Grans, Absolute 0.00 0.00 - 0.05 10*3/mm3   ECG 12 Lead Stroke Evaluation    Collection Time: 07/12/24  1:30 PM   Result Value Ref Range    QT Interval 387 ms    QTC Interval 422 ms       Ordered the above labs and independently interpreted results.  My findings will be discussed in the ED course or medical decision making section below    RADIOLOGY RESULTS  XR Chest 1 View    Result Date: 7/12/2024  XR CHEST 1 VW Date of Exam: 7/12/2024 2:48 PM EDT Indication: Acute Stroke Protocol (onset < 12 hrs) Comparison: Chest radiograph 6/14/2023  Findings: Stable mild cardiomegaly. Aortic atherosclerotic disease. Negative for lobar consolidation, edema, effusion or pneumothorax. Minimal apical capping. Small well-corticated lucencies in the proximal right humerus stable from prior. Left reverse total shoulder prosthesis. Degenerative related osseous changes.     Impression: No active pulmonary process. Electronically Signed: Shadi Gonzalez MD  7/12/2024 3:02 PM EDT  Workstation ID: [id number]    MRI Brain Without Contrast    Result Date: 7/12/2024  MRI BRAIN WO CONTRAST Date of Exam: 7/12/2024 2:21 PM EDT Indication: eval stroke.  PT with acute onset dizziness..  Comparison: CT head without contrast 7/12/2024 Technique:  Routine multiplanar/multisequence sequence images of the brain were obtained without contrast administration. Findings: There is no diffusion restriction to suggest acute infarct. There is no evidence of acute intracranial hemorrhage. No mass effect or midline shift. No abnormal extra-axial collections. There is age-related atrophy. Advanced, confluent subcortical and periventricular white matter hyperintensities are noted, suggesting chronic microvascular ischemic disease. There are multiple punctate foci of the susceptibility artifact consistent with remote microhemorrhages. Calvarial and superficial soft tissue signal is within normal limits. Orbits appear unremarkable. The paranasal sinuses and the mastoid air cells appear well aerated.     Impression: 1. No acute intracranial abnormality. 2. Age-related atrophy with advanced chronic microvascular ischemic disease. 3. Multiple remote microhemorrhages, which can be seen with cerebral amyloid angiopathy. Electronically Signed: Mira Alvarez MD  7/12/2024 2:55 PM EDT  Workstation ID: [id number]    CT Head Without Contrast    Result Date: 7/12/2024  CT HEAD WO CONTRAST Date of Exam: 7/12/2024 2:13 PM EDT Indication: eval stroke. Comparison: Head CT 7/3/2015 Technique: Axial CT images were  obtained of the head without contrast administration.  Coronal reconstructions were performed.  Automated exposure control and iterative reconstruction methods were used. Findings: Negative for large territory loss of gray-white differentiation, acute intracranial hemorrhage, large mass lesion, midline shift or hydrocephalus. Mild global parenchymal volume loss. Severe confluent periventricular and subcortical hypodensity bilaterally progressed from 2015 likely reflecting chronic microvascular ischemic change. No large extra-axial fluid collection. Orbits are symmetric. Scalp soft tissues unremarkable. Rightward deviated septum and spur. No obstructive sinus disease. No large mastoid effusion. Negative for depressed skull fracture. Pituitary unremarkable for age. Negative for cerebellar tonsillar ectopia.     Impression: 1. No acute intracranial abnormality by CT. 2. Mild global parenchymal volume loss and severe chronic microvascular ischemic change both progressed from 2015 comparison. Electronically Signed: Shadi Gonzalez MD  7/12/2024 2:23 PM EDT  Workstation ID: [id number]      Ordered the above noted radiological studies.  Independently interpreted by me.  My findings will be discussed in the medical decision section below.     PROGRESS, DATA ANALYSIS, CONSULTS AND MEDICAL DECISION MAKING    Please note that this section constitutes my independent interpretation of clinical data including lab results, radiology, EKG's.  This constitutes my independent professional opinion regarding differential diagnosis and management of this patient.  It may include any factors such as history from outside sources, review of external records, social determinants of health, management of medications, response to those treatments, and discussions with other providers.    ED Course as of 07/12/24 1627   Fri Jul 12, 2024   1323 I have ordered CT head, CTA's.  Discussed pt with Dr. Hardwick, on call stroke neurology.  He states not  to do perfusion study but get MRI [EW]   1334 EKG          EKG time: 1330  Rhythm/Rate: 72, sinus rhythm  P waves and NV: normal NV, normal CASE  QRS, axis: normal QRS and axis  ST and T waves: no acute ST/T wave abnormalities    Interpreted Contemporaneously by me, independently viewed    Similar to prior dated 6/14/23 [EW]   1524 I viewed CXR in PACS.  My independent interpretation is no acute infiltrate.    [EW]   1620 Pt had video visit with Dr. Hardwick, stroke neurologist.  He updated her on negative MRI for stroke.  Placing scopolamine patch.  I offered admission and patient declined.  She has help at home and a walker to use as needed.   [EW]      ED Course User Index  [EW] Patrica Rolle APRN     Orders placed during this visit:  Orders Placed This Encounter   Procedures    CT Angiogram Head w AI Analysis of LVO    CT Angiogram Neck    XR Chest 1 View    CT Head Without Contrast    MRI Brain Without Contrast    [facility] Draw    Comprehensive Metabolic Panel    Protime-INR    aPTT    Single High Sensitivity Troponin T    CBC Auto Differential    NPO Diet NPO Type: Strict NPO    Measure Actual Weight    Notify Provider    Notify Provider for SBP Greater Than 140 for Hemorrhagic Stroke Patient    Head of Bed 30 Degrees or Less    Undress and Gown    Continuous Pulse Oximetry    Vital Signs    Neuro Checks    No Hypotonic Fluids    Nursing Dysphagia Screening (Complete Prior to Giving anything PO)    RN to Place Order SLP Consult (IF swallow screen failed) - Eval & Treat Choosing Reason of RN Dysphagia Screen Failed    PT Consult: Eval & Treat Functional Mobility Below Baseline    Oxygen Therapy- Nasal Cannula; Titrate 1-6 LPM Per SpO2; 90 - 95%    POC Glucose Once    ECG 12 Lead Stroke Evaluation    Type & Screen    Insert Large-Bore Peripheral IV - RIGHT AC Preferred    CBC & Differential    Green Top (Gel)    Lavender Top    Gold Top - SST    Light Blue Top            Medical Decision Making  Problems  Addressed:  BPPV (benign paroxysmal positional vertigo), left: complicated acute illness or injury    Amount and/or Complexity of Data Reviewed  Labs: ordered.  Radiology: ordered.  ECG/medicine tests: ordered.    Risk  Prescription drug management.      Differential diagnosis considered include BPPV, central cause of vertigo, LVO, stroke, TIA. Pt arrived hypertensive but due to illness this am she had not taken her daily medications; I did give a dose of Amlodipine and her BP trended down nicely.  Pt was also given 2 doses of Meclizine and a dose of Zofran.  Her symptoms are much worse with changing position.  She did undergo CTA, CT head and also MRI which was negative for any acute occlusion.  I did offer for patient to stay overnight in the hospital for control of symptoms and she declined.  She does feel safe going home and her  is able to help her.  Did place a scopolamine patch on patient prior to dispo.      DIAGNOSIS  Final diagnoses:   BPPV (benign paroxysmal positional vertigo), left          Medication List        New Prescriptions      meclizine 25 MG tablet  Commonly known as: ANTIVERT  Take 1 tablet by mouth 3 (Three) Times a Day As Needed for Dizziness.               Where to Get Your Medications        These medications were sent to [facility] - [place], [place] - [address] [place] [address] - [phone]  - [phone] [address] [address]      Phone: [phone]   meclizine 25 MG tablet         FOLLOW-UP  Chelsie Rai MD  [address] [facility] [address] [phone]    Schedule an appointment as soon as possible for a visit   early next week        Latest Documented Vital Signs:  As of 16:27 EDT  BP- 164/86 HR- 67 Temp- 98.1 °F (36.7 °C) (Oral) O2 sat- 94%    Appropriate PPE utilized throughout this patient encounter to include mask, if indicated, per current protocol. Hand hygiene was performed before donning PPE and after removal when leaving the  room.    Please note that portions of this were completed with a voice recognition program.     Note Disclaimer: At [facility], we believe that sharing information builds trust and better relationships. You are receiving this note because you are receiving care at [facility] or recently visited. It is possible you will see health information before a provider has talked with you about it. This kind of information can be easy to misunderstand. To help you fully understand what it means for your health, we urge you to discuss this note with your provider.                 Patrica Rolle, ROMÁN  07/12/24 1640

## 2024-07-12 NOTE — CONSULTS
DOS: 2024  NAME: Nguyen Zhang   : 1940  PCP: Chelsie Rai MD  CC: Sudden onset of vertiginous feelings that started around 445 this morning  Referring MD: ROMÁN Rodriguez    Neurological Problem and Interval History:  84 y.o. right-handed white female with a Hx of progressive hearing loss along with ringing sensation described as crickets chirping woke up at around 4:45 AM with severe vertiginous feelings with extreme nausea and vomiting.  She had a similar episode a few years ago whereby she was feeling that the whole room was spinning but today any change in head position was making it worse and she does not want to move around in the bed.  Even if she tries to move her head side-to-side she has to keep her eyes closed otherwise the vertigo worsens.  She has been stabilized with intravenous Compazine and Benadryl and intravenous fluids.  However she did not want to attend the Epley maneuver as it will make her extremely sick.  She has no facial asymmetry or speech impediment or visual changes.  There is no drift noticeable in the upper or lower extremities and there is no dysmetria noticeable in the finger-to-nose or heel-to-shin testing.  There is no sensory loss noted.  There is no extinction on double simultaneous presentation of noxious stimuli.  However could not get the patient to change any position or get her up or ambulate her as it makes the vertiginous symptoms worse.    Past Medical/Surgical Hx:  Past Medical History:   Diagnosis Date    Arthritis     HL (hearing loss) 2023    Ringing in ears / hearing test confirmed loss of high tone sounds    Hyperlipidemia     Hypertension     Hypothyroid     Neck pain     PONV (postoperative nausea and vomiting)     Shoulder pain, left     sched total shoulder    Spinal headache      Past Surgical History:   Procedure Laterality Date    COLONOSCOPY      HYSTERECTOMY      JOINT REPLACEMENT   and     Knee and shoulder     REPLACEMENT TOTAL KNEE Right     SHOULDER SURGERY Right     tendon repair    TONSILLECTOMY      TOTAL SHOULDER ARTHROPLASTY W/ DISTAL CLAVICLE EXCISION Left 10/15/2021    Procedure: TOTAL SHOULDER REVERSE ARTHROPLASTY;  Surgeon: Scout Palmer MD;  Location: [facility];  Service: Orthopedics;  Laterality: Left;       Review of Systems:    Constitutional: Pleasant lady currently laying very still in the bed afraid to move her head side-to-side because of the vertigo.  Cardiovascular: Denies any chest pain or palpitations.  Respiratory: Denies any shortness of breath.  Gastrointestinal: Has been nauseated and was throwing up earlier because of the vertigo.  Genitourinary: Denies any bladder incontinence.  Musculoskeletal: Denies any aches and pains in the muscles or joints.  Dermatological: Denies any skin breakdown.  Neurological: No focal neurological deficits.  Psychiatric: Denies any major anxiety or depression.  Ophthalmological: Denies any vision changes.  She has prescription lenses.          Medications On Admission  (Not in a hospital admission)      Prior to Admission medications    Medication Sig Start Date End Date Taking? Authorizing Provider   Acetaminophen (TYLENOL ARTHRITIS PAIN PO) Take 500 mg by mouth Daily.    Luana Mckoy MD   amLODIPine (NORVASC) 5 MG tablet TAKE 1 TABLET BY MOUTH EVERY EVENING. 6/6/24   Chelsie Rai MD   aspirin 81 MG EC tablet Take 1 tablet by mouth Daily. 6/15/23   Abdulkadir Stephen MD   atenolol (TENORMIN) 50 MG tablet TAKE 1 TABLET BY MOUTH 2 TIMES A DAY. 5/23/24   Chelsie Rai MD   Azelastine HCl 137 MCG/SPRAY solution  4/8/24   Luana Mckoy MD   Bioflavonoid Products (DOROTEO C PO) Take 500 mg by mouth Daily.    Luana Mckoy MD   Cholecalciferol (VITAMIN D3) 1000 units capsule Take 2 capsules by mouth Daily.    Luana Mckoy MD   levothyroxine (SYNTHROID, LEVOTHROID) 75 MCG tablet TAKE 1 TABLET BY MOUTH DAILY. 6/6/24   Fredi  Chelsie FIGUEROA MD   losartan (COZAAR) 100 MG tablet TAKE 1 TABLET BY MOUTH DAILY. 7/1/24   Chelsie Rai MD   multivitamin-minerals (CENTRUM) tablet Take 1 tablet by mouth Daily.    Luana Mckoy MD   NIACIN PO Take 500 mg by mouth Daily.    Luana Mckoy MD   potassium chloride (MICRO-K) 10 MEQ CR capsule Take 1 capsule by mouth Daily.    Luana Mckoy MD   potassium chloride 10 MEQ CR tablet TAKE 1 TABLET BY MOUTH 2 TIMES A DAY. 5/2/24   hCelsie Rai MD   pravastatin (PRAVACHOL) 10 MG tablet Take 1 tablet by mouth Daily. 4/11/24   Dev Schulte MD   vitamin E 400 UNIT capsule Take 1 capsule by mouth Daily.    Luana Mckoy MD        Allergies:  Allergies   Allergen Reactions    Ace Inhibitors Cough    Codeine Nausea And Vomiting       Social Hx:  Social History     Socioeconomic History    Marital status:    Tobacco Use    Smoking status: Never     Passive exposure: Never    Smokeless tobacco: Never   Vaping Use    Vaping status: Never Used   Substance and Sexual Activity    Alcohol use: No    Drug use: No    Sexual activity: Not Currently     Partners: Male     Birth control/protection: Pill, Hysterectomy       Family Hx:  Family History   Problem Relation Age of Onset    Heart attack Father     Heart disease Father     Breast cancer Neg Hx     Malig Hyperthermia Neg Hx        Review of Imaging (Interpretation of images not reports): MRI of the cervical spine without contrast performed on September 19, 2019 was interpreted as follows:    FINDINGS: There is slight scoliosis, but alignment is otherwise normal.  There are mild degenerative marrow signal changes, but there is no evidence of marrow infiltration or replacement.  Cord signal is normal, and the posterior fossa and its contents  are normal.   The paraspinous soft tissues are unremarkable.     C2-3: The disc, canal, and foramina are within normal limits.     C3-4: There is degenerative change but no canal  stenosis. There is minimal right and moderate left foraminal stenosis.     C4-5: There is a left paracentral disc protrusion and left greater than right facet arthropathy. There is mild left-sided canal stenosis and there may even be slight left-sided cord compression, though there is no abnormal cord signal. There is mild  right and moderate left foraminal stenosis.     C5-6: There is a right-sided predominant disc and osteophyte complex with mild to moderate right-sided cord compression. There is no abnormal cord signal, and there is moderate to severe left and severe right foraminal stenosis.     C6-7: There is discogenic mild canal stenosis without cord compression, and moderate left and mild to moderate right foraminal stenosis.     C7-T1: There is degenerative change but without substantial canal or foraminal stenosis.     IMPRESSION:  Multilevel degenerative change. There is cord compression at some levels, but no abnormal cord signal. There is multilevel foraminal stenosis.    CT Head Without Contrast    Result Date: 7/12/2024  CT HEAD WO CONTRAST Date of Exam: 7/12/2024 2:13 PM EDT Indication: eval stroke. Comparison: Head CT 7/3/2015 Technique: Axial CT images were obtained of the head without contrast administration.  Coronal reconstructions were performed.  Automated exposure control and iterative reconstruction methods were used. Findings: Negative for large territory loss of gray-white differentiation, acute intracranial hemorrhage, large mass lesion, midline shift or hydrocephalus. Mild global parenchymal volume loss. Severe confluent periventricular and subcortical hypodensity bilaterally progressed from 2015 likely reflecting chronic microvascular ischemic change. No large extra-axial fluid collection. Orbits are symmetric. Scalp soft tissues unremarkable. Rightward deviated septum and spur. No obstructive sinus disease. No large mastoid effusion. Negative for depressed skull fracture. Pituitary  unremarkable for age. Negative for cerebellar tonsillar ectopia.     Impression: Impression: 1. No acute intracranial abnormality by CT. 2. Mild global parenchymal volume loss and severe chronic microvascular ischemic change both progressed from 2015 comparison. Electronically Signed: Shadi Gonzalez MD  7/12/2024 2:23 PM EDT  Workstation ID: [id number]           MRI Brain Without Contrast    Result Date: 7/12/2024  MRI BRAIN WO CONTRAST Date of Exam: 7/12/2024 2:21 PM EDT Indication: eval stroke.  PT with acute onset dizziness..  Comparison: CT head without contrast 7/12/2024 Technique:  Routine multiplanar/multisequence sequence images of the brain were obtained without contrast administration. Findings: There is no diffusion restriction to suggest acute infarct. There is no evidence of acute intracranial hemorrhage. No mass effect or midline shift. No abnormal extra-axial collections. There is age-related atrophy. Advanced, confluent subcortical and periventricular white matter hyperintensities are noted, suggesting chronic microvascular ischemic disease. There are multiple punctate foci of the susceptibility artifact consistent with remote microhemorrhages. Calvarial and superficial soft tissue signal is within normal limits. Orbits appear unremarkable. The paranasal sinuses and the mastoid air cells appear well aerated.     Impression: Impression: 1. No acute intracranial abnormality. 2. Age-related atrophy with advanced chronic microvascular ischemic disease. 3. Multiple remote microhemorrhages, which can be seen with cerebral amyloid angiopathy. Electronically Signed: Mira Alvarez MD  7/12/2024 2:55 PM EDT  Workstation ID: [id number]      Additional Tests Performed: The CT angiogram of the head and neck with contrast performed on July 12, 2024 was interpreted as follows:    FINDINGS:  VASCULAR FINDINGS:  The arteries of the [place] are patent without evidence for thrombosis or occlusion. The more  peripheral arteries throughout the head and neck are opacified and are symmetric bilaterally. Mild atherosclerosis is noted.     There is a small outpouching of contrast enhancement along the inferior margin of the distal left internal carotid artery just proximal to the carotid terminus. 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 The   aneurysm measures approximately 4 x 3 x 1 mm.      A normal aortic arch branching pattern is identified. There is mild to moderate atherosclerosis of the aortic arch. The origins of the great vessels are patent. The origins of the common carotid arteries are patent bilaterally. There is no evidence for   significant stenosis throughout the common carotid arteries bilaterally.      The carotid bulbs are patent. There is mild to moderate atherosclerosis. No significant stenosis is noted involving the carotid bulbs. The origins of the internal carotid arteries are patent bilaterally. There is no evidence for hemodynamically   significant stenosis throughout the internal carotid arteries bilaterally based on NASCET criteria.     The origins of the vertebral arteries are patent bilaterally. There is no evidence for significant stenosis throughout the vertebral arteries bilaterally. A left dominant vertebrobasilar system is noted.     There is no evidence for arterial dissection throughout the arteries of the neck bilaterally.     NONVASCULAR FINDINGS:  There is no evidence for intracranial abnormal focal enhancement or abnormal enhancing mass. No definitive focal ischemia is observed. There is no abnormal cerebral edema. There is no mass effect or midline shift. The ventricular system is nondilated.   The basilar cisterns are patent.     No acute abnormalities are seen throughout the neck soft tissues. There is no evidence for edema or abnormal fluid collection. There is no evidence for hemorrhage or hematoma. No acute osseous abnormalities are  identified. The lung apices are clear.     IMPRESSION:  1.No evidence for large vessel occlusion throughout the arteries of the head or neck.  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  3.No evidence for arterial dissection throughout the arteries of the neck.  4.No evidence for acute abnormality throughout the head or neck.     FINDINGS:  VASCULAR FINDINGS:  The arteries of the [place] are patent without evidence for thrombosis or occlusion. The more peripheral arteries throughout the head and neck are opacified and are symmetric bilaterally. Mild atherosclerosis is noted.     There is a small outpouching of contrast enhancement along the inferior margin of the distal left internal carotid artery just proximal to the carotid terminus. 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 The   aneurysm measures approximately 4 x 3 x 1 mm.      A normal aortic arch branching pattern is identified. There is mild to moderate atherosclerosis of the aortic arch. The origins of the great vessels are patent. The origins of the common carotid arteries are patent bilaterally. There is no evidence for   significant stenosis throughout the common carotid arteries bilaterally.      The carotid bulbs are patent. There is mild to moderate atherosclerosis. No significant stenosis is noted involving the carotid bulbs. The origins of the internal carotid arteries are patent bilaterally. There is no evidence for hemodynamically   significant stenosis throughout the internal carotid arteries bilaterally based on NASCET criteria.     The origins of the vertebral arteries are patent bilaterally. There is no evidence for significant stenosis throughout the vertebral arteries bilaterally. A left dominant vertebrobasilar system is noted.     There is no evidence for arterial  dissection throughout the arteries of the neck bilaterally.     NONVASCULAR FINDINGS:  There is no evidence for intracranial abnormal focal enhancement or abnormal enhancing mass. No definitive focal ischemia is observed. There is no abnormal cerebral edema. There is no mass effect or midline shift. The ventricular system is nondilated.   The basilar cisterns are patent.     No acute abnormalities are seen throughout the neck soft tissues. There is no evidence for edema or abnormal fluid collection. There is no evidence for hemorrhage or hematoma. No acute osseous abnormalities are identified. The lung apices are clear.     IMPRESSION:  1.No evidence for large vessel occlusion throughout the arteries of the head or neck.  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  3.No evidence for arterial dissection throughout the arteries of the neck.  4.No evidence for acute abnormality throughout the head or neck.        Results for orders placed during the hospital encounter of 05/13/19    Adult Transthoracic Echo Complete W/ Cont if Necessary Per Protocol    Interpretation Summary  · Left ventricular systolic function is normal. Calculated EF = 59.0%. Estimated EF was in agreement with the calculated EF. Normal left ventricular cavity size noted. All left ventricular wall segments contract normally. Left ventricular wall thickness is consistent with borderline concentric hypertrophy. Left ventricular diastolic dysfunction is noted (grade I) consistent with impaired relaxation.  · There is a small pericardial effusion anterior to the right ventricle.       Results for orders placed during the hospital encounter of 07/14/23    Vascular screening (bundle) CAR    Interpretation Summary    Carotid Artery Disease and Stroke Screening: The right carotid artery has no significant carotid stenosis (less than 50%).  "The left carotid artery has no significant carotid stenosis (less than 50%). Abdominal Aortic Aneurysm (AAA) Screening: Maximum proximal diameter of 1.8 cm. The artery was normal. Peripheral Artery Disease (P.A.D.) Screening: The right has normal arterial pressures. The left has normal arterial pressures.       Laboratory Results:   Lab Results   Component Value Date    GLUCOSE 107 (H) 07/12/2024    CALCIUM 10.3 07/12/2024     07/12/2024    K 3.6 07/12/2024    CO2 27.4 07/12/2024    CL 97 (L) 07/12/2024    BUN 12 07/12/2024    CREATININE 0.82 07/12/2024    EGFRIFAFRI 59 (L) 02/09/2022    EGFRIFNONA 51 (L) 02/09/2022    BCR 14.6 07/12/2024    ANIONGAP 13.6 07/12/2024     Lab Results   Component Value Date    WBC 5.70 07/12/2024    HGB 14.1 07/12/2024    HCT 42.1 07/12/2024    MCV 93.8 07/12/2024     07/12/2024     Lab Results   Component Value Date    CHOL 222 (H) 01/23/2024    CHOL 228 (H) 10/27/2023    CHOL 212 (H) 06/14/2023     Lab Results   Component Value Date    HDL 58 05/01/2024    HDL 61 (H) 01/23/2024    HDL 66 (H) 10/27/2023     Lab Results   Component Value Date     (H) 05/01/2024     (H) 01/23/2024     (H) 10/27/2023     Lab Results   Component Value Date    TRIG 215 (H) 05/01/2024    TRIG 91 01/23/2024    TRIG 94 10/27/2023       Lab Results   Component Value Date    INR 0.94 07/12/2024    INR 0.95 06/14/2023    INR 1.06 10/01/2021    PROTIME 12.6 07/12/2024    PROTIME 12.7 06/14/2023    PROTIME 13.8 10/01/2021       TSH          10/27/2023    10:34 1/23/2024    09:47 5/1/2024    00:00   TSH   TSH 2.570  2.260  3.820           Physical Examination:  /89   Pulse 75   Temp 98.1 °F (36.7 °C) (Oral)   Resp 16   Ht 157.5 cm (62\")   Wt 59 kg (130 lb)   SpO2 95%   BMI 23.78 kg/m²   General Appearance:   Well developed, well nourished, well groomed, alert, and cooperative.  HEENT: Normocephalic.    Neck and Spine: Normal range of motion.  Normal alignment. No mass " or tenderness. No bruits.    Extremities:    No edema or deformities. Normal joint ROM.   Skin:    No rashes or birth marks.    Neurological examination:  Higher Integrative  Function: Oriented to time, place and person. Normal registration, recall, attention span and concentration. Normal language including comprehension, spontaneous speech, repetition, reading, writing, naming and vocabulary. No neglect with normal visual-spatial function and construction. Normal fund of knowledge and higher integrative function.  CN II:  Pupils are equal, round, and reactive to light. Normal visual acuity and visual fields.    CN III IV VI: Extraocular movements are full without nystagmus.   CN V:  Normal facial sensation and strength of muscles of mastication.  CN VII:  Facial movements are symmetric. No weakness.  CN VIII: Auditory acuity is decreased and she usually hears chirping noises which has been ongoing for some time.  CN IX & X: Symmetric palatal movement.  CN XI:  Sternocleidomastoid and trapezius are normal.  No weakness.  CN XII:  The tongue is midline.  No atrophy or fasciculations.  Motor:  Normal muscle strength, bulk and tone in upper and lower extremities.  No fasciculations, rigidity, spasticity, or abnormal movements.  Sensation: Normal to light touch, pinprick, vibration, temperature, and proprioception in arms and legs. Normal graphesthesia and no extinction on DSS.  Station and Gait: Could not be tested at this time because of extreme vertigo.    Coordination:  Finger to nose test shows no dysmetria.   Heel to shin normal.    Diagnoses / Discussion:  84 y.o. who presents with Sx of acute onset of benign positional vertigo.    Plan:  Because the CT angiogram was showing a small aneurysm in the left internal carotid artery will get carotid ultrasound for tomorrow.  Scopolamine patch 1.5 mg to be switched every 3 days behind the styloid process.  Epley maneuver with a qualified physical therapist to help  reset the crystals in the inner ear canal which will improve the symptoms of benign positional vertigo.  She needs to be hydrated as she has been vomiting and extremely nauseated.  To treat her benign positional vertigo symptomatically with intravenous Compazine and intravenous Benadryl if needed.  After being released from the hospital she will benefit from ENT evaluation in a month's time..  She needs to be admitted to the hospitalist service as she is unable to go home in this condition.    I have discussed the above with the patient and Ms. ROMÁN Rodriguez  Time spent with patient: 70 minutes in face-to-face evaluation and management of the patient using the dedicated telemedicine device without any interruption with the help of ROMÁN Maloney with the patient located at the [facility] and myself at a remote location.    Electronically signed by Pablo Hardwick MD, 07/12/24, 4:19 PM EDT.    Dictated using Dragon dictation.

## 2024-07-15 ENCOUNTER — PATIENT OUTREACH (OUTPATIENT)
Dept: CASE MANAGEMENT | Facility: OTHER | Age: 84
End: 2024-07-15
Payer: MEDICARE

## 2024-07-15 NOTE — OUTREACH NOTE
AMBULATORY CASE MANAGEMENT NOTE    Names and Relationships of Patient/Support Persons: Contact: Nguyen Zhang; Relationship: Self -   Patient Outreach  RN-ACM outreach with patient. Discussed 7/12/24 ED visit regarding dizziness; BPPV. Patient treated and discharged. Patient states to be compliant with ED recommendations; taking Meclizine as ED directed and states symptoms have improved. Patient states improvement regarding dizziness and nausea. Patient states no difficulty with fever; chest pain; SOB; appetite or sleeping. Patient voiced intent to follow up with PCP as needed. Reviewed with patient ED AVS recommendations; education; role of RN-ACM and HRCM case management services. Patient verbalized understanding Patient states to appreciate outreach and declines needs for further outreach at this time. No further questions voiced at this time.       Adult Patient Profile  Questions/Answers      Flowsheet Row Most Recent Value   Symptoms/Conditions Managed at Home cardiovascular, other (see comments)  [Episodes of dizziness,  BPPV]   Cardiovascular Symptoms/Conditions hypertension   Cardiovascular Management Strategies medication therapy, other (see comments)  [Physician follow up]   Barriers to Taking Medication as Prescribed none   Primary Source of Support/Comfort spouse   People in Home spouse          Social Work Assessment  Questions/Answers      Flowsheet Row Most Recent Value   People in Home spouse   Functional Status Comments Patient states to be independent with ADL's,  light meal preparation,  ambulating without assistive device and receiving assistance with transportation at this time.        Send Education  Questions/Answers      Flowsheet Row Most Recent Value   Annual Wellness Visit:  Patient Has Completed   Other Patient Education/Resources  24/7 [facility] [place] Nurse Call Line, Jake   24/7 Nurse Call Line Education Method Verbal   Advanced Directives: Patient Has        SDOH updated  and reviewed with the patient during this program:  --     Food Insecurity: No Food Insecurity (7/15/2024)    Hunger Vital Sign     Worried About Running Out of Food in the Last Year: Never true     Ran Out of Food in the Last Year: Never true      --     Transportation Needs: No Transportation Needs (7/15/2024)    PRAPARE - Transportation     Lack of Transportation (Medical): No     Lack of Transportation (Non-Medical): No     Education Documentation  Unresolved/Worsening Symptoms, taught by Wilda Enriquez, RN at 7/15/2024  1:17 PM.  Learner: Patient  Readiness: Acceptance  Method: Explanation  Response: Verbalizes Understanding    Home Safety, taught by Wilda Enriquez, RN at 7/15/2024  1:17 PM.  Learner: Patient  Readiness: Acceptance  Method: Explanation  Response: Verbalizes Understanding    Energy Conservation, taught by Wilda Enriquez, RN at 7/15/2024  1:17 PM.  Learner: Patient  Readiness: Acceptance  Method: Explanation  Response: Verbalizes Understanding          Wilda PANTOJA  Ambulatory Case Management    7/15/2024, 13:17 EDT

## 2024-07-23 LAB — GLUCOSE BLDC GLUCOMTR-MCNC: 102 MG/DL (ref 70–130)

## 2024-07-30 ENCOUNTER — EXTERNAL PBMM DATA (OUTPATIENT)
Dept: PHARMACY | Facility: OTHER | Age: 84
End: 2024-07-30
Payer: MEDICARE

## 2024-08-07 ENCOUNTER — POP HEALTH PHARMACY (OUTPATIENT)
Dept: PHARMACY | Facility: OTHER | Age: 84
End: 2024-08-07
Payer: MEDICARE

## 2024-08-21 ENCOUNTER — OFFICE VISIT (OUTPATIENT)
Dept: FAMILY MEDICINE CLINIC | Facility: CLINIC | Age: 84
End: 2024-08-21
Payer: MEDICARE

## 2024-08-21 VITALS
SYSTOLIC BLOOD PRESSURE: 140 MMHG | BODY MASS INDEX: 23.19 KG/M2 | HEIGHT: 62 IN | TEMPERATURE: 97.5 F | OXYGEN SATURATION: 98 % | WEIGHT: 126 LBS | RESPIRATION RATE: 16 BRPM | HEART RATE: 66 BPM | DIASTOLIC BLOOD PRESSURE: 74 MMHG

## 2024-08-21 DIAGNOSIS — M15.9 PRIMARY OSTEOARTHRITIS INVOLVING MULTIPLE JOINTS: ICD-10-CM

## 2024-08-21 DIAGNOSIS — Z78.9 STATIN INTOLERANCE: ICD-10-CM

## 2024-08-21 DIAGNOSIS — I10 ESSENTIAL HYPERTENSION: ICD-10-CM

## 2024-08-21 DIAGNOSIS — E03.9 ACQUIRED HYPOTHYROIDISM: Primary | ICD-10-CM

## 2024-08-21 PROCEDURE — 36415 COLL VENOUS BLD VENIPUNCTURE: CPT | Performed by: INTERNAL MEDICINE

## 2024-08-21 PROCEDURE — 1160F RVW MEDS BY RX/DR IN RCRD: CPT | Performed by: INTERNAL MEDICINE

## 2024-08-21 PROCEDURE — 1126F AMNT PAIN NOTED NONE PRSNT: CPT | Performed by: INTERNAL MEDICINE

## 2024-08-21 PROCEDURE — 3078F DIAST BP <80 MM HG: CPT | Performed by: INTERNAL MEDICINE

## 2024-08-21 PROCEDURE — 99214 OFFICE O/P EST MOD 30 MIN: CPT | Performed by: INTERNAL MEDICINE

## 2024-08-21 PROCEDURE — 1159F MED LIST DOCD IN RCRD: CPT | Performed by: INTERNAL MEDICINE

## 2024-08-21 PROCEDURE — 3077F SYST BP >= 140 MM HG: CPT | Performed by: INTERNAL MEDICINE

## 2024-08-21 RX ORDER — ASPIRIN 325 MG
325 TABLET ORAL DAILY
COMMUNITY

## 2024-08-21 NOTE — PROGRESS NOTES
Venipuncture Blood Specimen Collection  Venipuncture performed in right arm by Jeri España MA with good hemostasis. Patient tolerated the procedure well without complications.   08/21/24   Jeri España MA

## 2024-08-21 NOTE — PROGRESS NOTES
Subjective   Nguyen Zhang is a 84 y.o. female.     Chief Complaint   Patient presents with    Hyperlipidemia    Hypertension   DJD, statin intolerance.    Hyperlipidemia  Pertinent negatives include no chest pain or shortness of breath.   Hypertension  Pertinent negatives include no blurred vision, chest pain, palpitations or shortness of breath.      Patient here follow-up for hypertension, hyperlipidemia, DJD, statin intolerance.  No chest pain shortness of breath.  Taking Pravachol very small dose 10 mg started last year.  Patient has no symptoms or side effects from it.  Discussed with patient may increase it to 22 of the 10 she can try if her cholesterol still not at goal.  She taking rest of the medication has no complaints.  In July she was at the hospital for vertigo, CT of the head, MRI all workup was negative.  No further symptoms.  The following portions of the patient's history were reviewed and updated as appropriate: allergies, current medications, past family history, past medical history, past social history, past surgical history and problem list.    Review of Systems   Constitutional:  Negative for activity change, appetite change, fatigue and fever.   Eyes:  Negative for blurred vision and double vision.   Respiratory: Negative.  Negative for shortness of breath.    Cardiovascular:  Negative for chest pain, palpitations and leg swelling.   Neurological:  Negative for dizziness, syncope, light-headedness and headache.       Allergies   Allergen Reactions    Ace Inhibitors Cough    Codeine Nausea And Vomiting    Meclizine Dystonia       Current Outpatient Medications on File Prior to Visit   Medication Sig Dispense Refill    Acetaminophen (TYLENOL ARTHRITIS PAIN PO) Take 500 mg by mouth Daily.      amLODIPine (NORVASC) 5 MG tablet TAKE 1 TABLET BY MOUTH EVERY EVENING. 90 tablet 0    aspirin 325 MG tablet Take 1 tablet by mouth Daily.      atenolol (TENORMIN) 50 MG tablet TAKE 1 TABLET BY MOUTH  2 TIMES A DAY. 60 tablet 2    Azelastine HCl 137 MCG/SPRAY solution       Bioflavonoid Products (DOROTEO C PO) Take 500 mg by mouth Daily.      Cholecalciferol (VITAMIN D3) 1000 units capsule Take 2 capsules by mouth Daily.      levothyroxine (SYNTHROID, LEVOTHROID) 75 MCG tablet TAKE 1 TABLET BY MOUTH DAILY. 90 tablet 0    losartan (COZAAR) 100 MG tablet TAKE 1 TABLET BY MOUTH DAILY. 90 tablet 0    multivitamin-minerals (CENTRUM) tablet Take 1 tablet by mouth Daily.      NIACIN PO Take 500 mg by mouth Daily.      potassium chloride 10 MEQ CR tablet TAKE 1 TABLET BY MOUTH 2 TIMES A DAY. (Patient taking differently: Take 1 tablet by mouth 3 (Three) Times a Week.) 180 tablet 0    pravastatin (PRAVACHOL) 10 MG tablet Take 1 tablet by mouth Daily. 30 tablet 3    vitamin E 400 UNIT capsule Take 1 capsule by mouth Daily.      aspirin 81 MG EC tablet Take 1 tablet by mouth Daily. 30 tablet 0    meclizine (ANTIVERT) 25 MG tablet Take 1 tablet by mouth 3 (Three) Times a Day As Needed for Dizziness. 20 tablet 0    potassium chloride (MICRO-K) 10 MEQ CR capsule Take 1 capsule by mouth Daily.       No current facility-administered medications on file prior to visit.       Family History   Problem Relation Age of Onset    Heart attack Father     Heart disease Father     Breast cancer Neg Hx     Malig Hyperthermia Neg Hx        Past Medical History:   Diagnosis Date    Arthritis     HL (hearing loss) March. 2023    Ringing in ears / hearing test confirmed loss of high tone sounds    Hyperlipidemia     Hypertension     Hypothyroid     Neck pain     PONV (postoperative nausea and vomiting)     Shoulder pain, left     sched total shoulder    Spinal headache        Past Surgical History:   Procedure Laterality Date    COLONOSCOPY      HYSTERECTOMY      JOINT REPLACEMENT  2010 and 2022    Knee and shoulder    REPLACEMENT TOTAL KNEE Right     SHOULDER SURGERY Right     tendon repair    TONSILLECTOMY      TOTAL SHOULDER ARTHROPLASTY W/  "DISTAL CLAVICLE EXCISION Left 10/15/2021    Procedure: TOTAL SHOULDER REVERSE ARTHROPLASTY;  Surgeon: Scout Palmer MD;  Location: [facility];  Service: Orthopedics;  Laterality: Left;       Social History     Socioeconomic History    Marital status:    Tobacco Use    Smoking status: Never     Passive exposure: Never    Smokeless tobacco: Never   Vaping Use    Vaping status: Never Used   Substance and Sexual Activity    Alcohol use: No    Drug use: No    Sexual activity: Not Currently     Partners: Male     Birth control/protection: Pill, Hysterectomy       Patient Active Problem List   Diagnosis    Precordial chest pain    PVCs (premature ventricular contractions)    Essential hypertension    Acquired hypothyroidism    Low serum potassium level    Hypercholesterolemia    Acute pain of left shoulder    Osteoarthritis of multiple joints    Statin intolerance       /74   Pulse 66   Temp 97.5 °F (36.4 °C)   Resp 16   Ht 157.5 cm (62.01\")   Wt 57.2 kg (126 lb)   SpO2 98%   BMI 23.04 kg/m²   Body mass index is 23.04 kg/m².    Objective   Physical Exam  Constitutional:       Appearance: She is well-developed.   Eyes:      Extraocular Movements: Extraocular movements intact.      Pupils: Pupils are equal, round, and reactive to light.   Neck:      Thyroid: No thyromegaly.      Vascular: No JVD.      Trachea: No tracheal deviation.   Cardiovascular:      Rate and Rhythm: Normal rate and regular rhythm.      Heart sounds: No murmur heard.  Pulmonary:      Effort: Pulmonary effort is normal. No respiratory distress.      Breath sounds: Normal breath sounds. No wheezing.   Abdominal:      General: Bowel sounds are normal. There is no distension.      Palpations: Abdomen is soft. There is no mass.      Tenderness: There is no abdominal tenderness. There is no right CVA tenderness, left CVA tenderness, guarding or rebound.      Hernia: No hernia is present.   Musculoskeletal:      Cervical back: Normal " range of motion and neck supple.   Lymphadenopathy:      Cervical: No cervical adenopathy.   Neurological:      General: No focal deficit present.      Mental Status: She is alert and oriented to person, place, and time.           Assessment & Plan   Diagnoses and all orders for this visit:    1. Acquired hypothyroidism (Primary)  -     CBC & Differential  -     Comprehensive Metabolic Panel  -     Lipid Panel With / Chol / HDL Ratio  -     TSH  -     T4, Free    2. Statin intolerance  -     CBC & Differential  -     Comprehensive Metabolic Panel  -     Lipid Panel With / Chol / HDL Ratio  -     TSH  -     T4, Free    3. Essential hypertension  -     CBC & Differential  -     Comprehensive Metabolic Panel  -     Lipid Panel With / Chol / HDL Ratio  -     TSH  -     T4, Free    4. Primary osteoarthritis involving multiple joints  -     CBC & Differential  -     Comprehensive Metabolic Panel  -     Lipid Panel With / Chol / HDL Ratio  -     TSH  -     T4, Free    Continue diet and exercise.  Continue checking blood pressure.  Continue Synthroid, amlodipine, aspirin high-dose, Tenormin, Cozaar.  Continue Pravachol 10, if cholesterol is not to goal will increase to 20.  All her problems are chronic and stable.  Vertigo has resolved.  Return in 3 months time.  EHR dragon/transcription disclaimer:  Part of this note are created by electronic transcription/translation of spoken language to printed text and thus may lead to erroneous, or at times, nonsensical words or phrases inadvertently transcribed.  Although I have reviewed for such errors, some may still exist.

## 2024-08-22 LAB
ALBUMIN SERPL-MCNC: 5 G/DL (ref 3.5–5.2)
ALBUMIN/GLOB SERPL: 2.1 G/DL
ALP SERPL-CCNC: 86 U/L (ref 39–117)
ALT SERPL-CCNC: 18 U/L (ref 1–33)
AST SERPL-CCNC: 26 U/L (ref 1–32)
BASOPHILS # BLD AUTO: 0.05 10*3/MM3 (ref 0–0.2)
BASOPHILS NFR BLD AUTO: 0.7 % (ref 0–1.5)
BILIRUB SERPL-MCNC: 0.6 MG/DL (ref 0–1.2)
BUN SERPL-MCNC: 12 MG/DL (ref 8–23)
BUN/CREAT SERPL: 12.1 (ref 7–25)
CALCIUM SERPL-MCNC: 9.7 MG/DL (ref 8.6–10.5)
CHLORIDE SERPL-SCNC: 98 MMOL/L (ref 98–107)
CHOLEST SERPL-MCNC: 209 MG/DL (ref 0–200)
CHOLEST/HDLC SERPL: 3.17 {RATIO}
CO2 SERPL-SCNC: 28.6 MMOL/L (ref 22–29)
CREAT SERPL-MCNC: 0.99 MG/DL (ref 0.57–1)
EGFRCR SERPLBLD CKD-EPI 2021: 56.7 ML/MIN/1.73
EOSINOPHIL # BLD AUTO: 0.14 10*3/MM3 (ref 0–0.4)
EOSINOPHIL NFR BLD AUTO: 2.1 % (ref 0.3–6.2)
ERYTHROCYTE [DISTWIDTH] IN BLOOD BY AUTOMATED COUNT: 11.8 % (ref 12.3–15.4)
GLOBULIN SER CALC-MCNC: 2.4 GM/DL
GLUCOSE SERPL-MCNC: 91 MG/DL (ref 65–99)
HCT VFR BLD AUTO: 38.7 % (ref 34–46.6)
HDLC SERPL-MCNC: 66 MG/DL (ref 40–60)
HGB BLD-MCNC: 13.1 G/DL (ref 12–15.9)
IMM GRANULOCYTES # BLD AUTO: 0.01 10*3/MM3 (ref 0–0.05)
IMM GRANULOCYTES NFR BLD AUTO: 0.1 % (ref 0–0.5)
LDLC SERPL CALC-MCNC: 125 MG/DL (ref 0–100)
LYMPHOCYTES # BLD AUTO: 1.63 10*3/MM3 (ref 0.7–3.1)
LYMPHOCYTES NFR BLD AUTO: 24.4 % (ref 19.6–45.3)
MCH RBC QN AUTO: 32 PG (ref 26.6–33)
MCHC RBC AUTO-ENTMCNC: 33.9 G/DL (ref 31.5–35.7)
MCV RBC AUTO: 94.6 FL (ref 79–97)
MONOCYTES # BLD AUTO: 0.67 10*3/MM3 (ref 0.1–0.9)
MONOCYTES NFR BLD AUTO: 10 % (ref 5–12)
NEUTROPHILS # BLD AUTO: 4.18 10*3/MM3 (ref 1.7–7)
NEUTROPHILS NFR BLD AUTO: 62.7 % (ref 42.7–76)
NRBC BLD AUTO-RTO: 0 /100 WBC (ref 0–0.2)
PLATELET # BLD AUTO: 221 10*3/MM3 (ref 140–450)
POTASSIUM SERPL-SCNC: 3.6 MMOL/L (ref 3.5–5.2)
PROT SERPL-MCNC: 7.4 G/DL (ref 6–8.5)
RBC # BLD AUTO: 4.09 10*6/MM3 (ref 3.77–5.28)
SODIUM SERPL-SCNC: 141 MMOL/L (ref 136–145)
T4 FREE SERPL-MCNC: 1.64 NG/DL (ref 0.92–1.68)
TRIGL SERPL-MCNC: 103 MG/DL (ref 0–150)
TSH SERPL DL<=0.005 MIU/L-ACNC: 2.86 UIU/ML (ref 0.27–4.2)
VLDLC SERPL CALC-MCNC: 18 MG/DL (ref 5–40)
WBC # BLD AUTO: 6.68 10*3/MM3 (ref 3.4–10.8)

## 2024-08-22 RX ORDER — ATENOLOL 50 MG/1
TABLET ORAL
Qty: 60 TABLET | Refills: 0 | Status: SHIPPED | OUTPATIENT
Start: 2024-08-22

## 2024-08-22 NOTE — TELEPHONE ENCOUNTER
Rx Refill Note  Requested Prescriptions     Pending Prescriptions Disp Refills    atenolol (TENORMIN) 50 MG tablet [Pharmacy Med Name: ATENOLOL 50 MG TABLET] 60 tablet 0     Sig: TAKE 1 TABLET BY MOUTH 2 TIMES A DAY.      Last office visit with prescribing clinician: 8/21/2024   Last telemedicine visit with prescribing clinician: Visit date not found   Next office visit with prescribing clinician: 11/14/2024

## 2024-09-04 DIAGNOSIS — E03.9 ACQUIRED HYPOTHYROIDISM: ICD-10-CM

## 2024-09-04 RX ORDER — LEVOTHYROXINE SODIUM 75 UG/1
75 TABLET ORAL DAILY
Qty: 90 TABLET | Refills: 0 | Status: SHIPPED | OUTPATIENT
Start: 2024-09-04

## 2024-09-18 RX ORDER — AMLODIPINE BESYLATE 5 MG/1
TABLET ORAL
Qty: 90 TABLET | Refills: 0 | Status: SHIPPED | OUTPATIENT
Start: 2024-09-18

## 2024-09-23 RX ORDER — ATENOLOL 50 MG/1
TABLET ORAL
Qty: 60 TABLET | Refills: 0 | Status: SHIPPED | OUTPATIENT
Start: 2024-09-23

## 2024-10-03 RX ORDER — LOSARTAN POTASSIUM 100 MG/1
TABLET ORAL
Qty: 90 TABLET | Refills: 0 | Status: SHIPPED | OUTPATIENT
Start: 2024-10-03

## 2024-10-08 RX ORDER — PRAVASTATIN SODIUM 10 MG
10 TABLET ORAL DAILY
Qty: 30 TABLET | Refills: 0 | Status: SHIPPED | OUTPATIENT
Start: 2024-10-08

## 2024-11-04 RX ORDER — ATENOLOL 50 MG/1
TABLET ORAL
Qty: 60 TABLET | Refills: 0 | Status: SHIPPED | OUTPATIENT
Start: 2024-11-04

## 2024-11-04 NOTE — TELEPHONE ENCOUNTER
Rx Refill Note  Requested Prescriptions     Pending Prescriptions Disp Refills    atenolol (TENORMIN) 50 MG tablet [Pharmacy Med Name: ATENOLOL 50 MG TABLET] 60 tablet 0     Sig: TAKE 1 TABLET BY MOUTH 2 TIMES A DAY.      Last office visit with prescribing clinician: 8/21/2024   Last telemedicine visit with prescribing clinician: Visit date not found   Next office visit with prescribing clinician: 11/14/2024   {

## 2024-11-14 ENCOUNTER — OFFICE VISIT (OUTPATIENT)
Dept: FAMILY MEDICINE CLINIC | Facility: CLINIC | Age: 84
End: 2024-11-14
Payer: MEDICARE

## 2024-11-14 VITALS
SYSTOLIC BLOOD PRESSURE: 122 MMHG | BODY MASS INDEX: 23.37 KG/M2 | HEART RATE: 63 BPM | DIASTOLIC BLOOD PRESSURE: 82 MMHG | OXYGEN SATURATION: 97 % | TEMPERATURE: 98.6 F | WEIGHT: 127 LBS | HEIGHT: 62 IN

## 2024-11-14 DIAGNOSIS — M15.0 PRIMARY OSTEOARTHRITIS INVOLVING MULTIPLE JOINTS: ICD-10-CM

## 2024-11-14 DIAGNOSIS — E03.9 ACQUIRED HYPOTHYROIDISM: ICD-10-CM

## 2024-11-14 DIAGNOSIS — Z78.9 STATIN INTOLERANCE: Primary | ICD-10-CM

## 2024-11-14 DIAGNOSIS — Z23 NEED FOR INFLUENZA VACCINATION: ICD-10-CM

## 2024-11-14 DIAGNOSIS — E78.00 HYPERCHOLESTEROLEMIA: ICD-10-CM

## 2024-11-14 DIAGNOSIS — I10 ESSENTIAL HYPERTENSION: ICD-10-CM

## 2024-11-14 NOTE — PROGRESS NOTES
Venipuncture Blood Specimen Collection  Venipuncture performed in left arm by Jeri España MA with good hemostasis. Patient tolerated the procedure well without complications.   11/14/24   Jeri España MA

## 2024-11-14 NOTE — PROGRESS NOTES
Subjective   gNuyen Zhang is a 84 y.o. female.     Chief Complaint   Patient presents with    Medicare Wellness-subsequent   Hyperlipidemia, statin intolerance, hypothyroidism, osteoarthritis.    History of Present Illness   Patient seen follow-up for hyperlipidemia, statin intolerance, hypothyroidism, osteoarthritis.  No chest pain shortness of breath no nausea vomiting.  Taking current medications.  The following portions of the patient's history were reviewed and updated as appropriate: allergies, current medications, past family history, past medical history, past social history, past surgical history and problem list.    Review of Systems   Constitutional:  Negative for activity change, appetite change, fatigue and fever.   Eyes:  Negative for blurred vision and double vision.   Respiratory: Negative.  Negative for shortness of breath.    Cardiovascular:  Negative for chest pain, palpitations and leg swelling.   Gastrointestinal: Negative.    Neurological:  Negative for dizziness, syncope, light-headedness and headache.       Allergies   Allergen Reactions    Ace Inhibitors Cough    Codeine Nausea And Vomiting    Meclizine Dystonia       Current Outpatient Medications on File Prior to Visit   Medication Sig Dispense Refill    Acetaminophen (TYLENOL ARTHRITIS PAIN PO) Take 500 mg by mouth Daily.      amLODIPine (NORVASC) 5 MG tablet TAKE 1 TABLET BY MOUTH EVERY EVENING. 90 tablet 0    aspirin 325 MG tablet Take 1 tablet by mouth Daily.      aspirin 81 MG EC tablet Take 1 tablet by mouth Daily. 30 tablet 0    atenolol (TENORMIN) 50 MG tablet TAKE 1 TABLET BY MOUTH 2 TIMES A DAY. 60 tablet 0    Azelastine HCl 137 MCG/SPRAY solution       Bioflavonoid Products (DOROTEO C PO) Take 500 mg by mouth Daily.      Cholecalciferol (VITAMIN D3) 1000 units capsule Take 2 capsules by mouth Daily.      levothyroxine (SYNTHROID, LEVOTHROID) 75 MCG tablet TAKE 1 TABLET BY MOUTH DAILY. 90 tablet 0    losartan (COZAAR) 100 MG  tablet TAKE 1 TABLET BY MOUTH DAILY. 90 tablet 0    meclizine (ANTIVERT) 25 MG tablet Take 1 tablet by mouth 3 (Three) Times a Day As Needed for Dizziness. 20 tablet 0    multivitamin-minerals (CENTRUM) tablet Take 1 tablet by mouth Daily.      NIACIN PO Take 500 mg by mouth Daily.      potassium chloride (MICRO-K) 10 MEQ CR capsule Take 1 capsule by mouth Daily.      potassium chloride 10 MEQ CR tablet TAKE 1 TABLET BY MOUTH 2 TIMES A DAY. (Patient taking differently: Take 1 tablet by mouth 3 (Three) Times a Week.) 180 tablet 0    vitamin E 400 UNIT capsule Take 1 capsule by mouth Daily.      [DISCONTINUED] pravastatin (PRAVACHOL) 10 MG tablet TAKE 1 TABLET BY MOUTH DAILY. 30 tablet 0     No current facility-administered medications on file prior to visit.       Family History   Problem Relation Age of Onset    Heart attack Father     Heart disease Father     Breast cancer Neg Hx     Malig Hyperthermia Neg Hx        Past Medical History:   Diagnosis Date    Arthritis     HL (hearing loss) March. 2023    Ringing in ears / hearing test confirmed loss of high tone sounds    Hyperlipidemia     Hypertension     Hypothyroid     Neck pain     PONV (postoperative nausea and vomiting)     Shoulder pain, left     sched total shoulder    Spinal headache        Past Surgical History:   Procedure Laterality Date    COLONOSCOPY      HYSTERECTOMY      JOINT REPLACEMENT  2010 and 2022    Knee and shoulder    REPLACEMENT TOTAL KNEE Right     SHOULDER SURGERY Right     tendon repair    TONSILLECTOMY      TOTAL SHOULDER ARTHROPLASTY W/ DISTAL CLAVICLE EXCISION Left 10/15/2021    Procedure: TOTAL SHOULDER REVERSE ARTHROPLASTY;  Surgeon: Scout Palmer MD;  Location: [facility];  Service: Orthopedics;  Laterality: Left;       Social History     Socioeconomic History    Marital status:    Tobacco Use    Smoking status: Never     Passive exposure: Never    Smokeless tobacco: Never   Vaping Use    Vaping status: Never Used  "  Substance and Sexual Activity    Alcohol use: No    Drug use: No    Sexual activity: Not Currently     Partners: Male     Birth control/protection: Pill, Hysterectomy       Patient Active Problem List   Diagnosis    Precordial chest pain    PVCs (premature ventricular contractions)    Essential hypertension    Acquired hypothyroidism    Low serum potassium level    Hypercholesterolemia    Acute pain of left shoulder    Osteoarthritis of multiple joints    Statin intolerance       /82 (BP Location: Left arm, Patient Position: Sitting, Cuff Size: Adult)   Pulse 63   Temp 98.6 °F (37 °C) (Infrared)   Ht 157.5 cm (62.01\")   Wt 57.6 kg (127 lb)   SpO2 97%   BMI 23.22 kg/m²   Body mass index is 23.22 kg/m².    Objective   Physical Exam  Vitals and nursing note reviewed.   Constitutional:       Appearance: She is well-developed.   Eyes:      Pupils: Pupils are equal, round, and reactive to light.   Neck:      Thyroid: No thyromegaly.      Vascular: No JVD.      Trachea: No tracheal deviation.   Cardiovascular:      Rate and Rhythm: Normal rate and regular rhythm.      Heart sounds: No murmur heard.  Pulmonary:      Effort: Pulmonary effort is normal. No respiratory distress.      Breath sounds: Normal breath sounds. No wheezing.   Abdominal:      General: Bowel sounds are normal. There is no distension.      Palpations: Abdomen is soft. There is no mass.      Tenderness: There is no abdominal tenderness. There is no right CVA tenderness, left CVA tenderness, guarding or rebound.      Hernia: No hernia is present.   Musculoskeletal:      Cervical back: Normal range of motion and neck supple.   Lymphadenopathy:      Cervical: No cervical adenopathy.   Neurological:      General: No focal deficit present.      Mental Status: She is alert and oriented to person, place, and time.   Psychiatric:         Mood and Affect: Mood normal.           Assessment & Plan   Diagnoses and all orders for this visit:    1. Statin " intolerance (Primary)  -     CBC & Differential  -     Comprehensive Metabolic Panel  -     Lipid Panel With / Chol / HDL Ratio  -     TSH  -     T4, Free  -     Vitamin B12  -     Folate    2. Essential hypertension  -     CBC & Differential  -     Comprehensive Metabolic Panel  -     Lipid Panel With / Chol / HDL Ratio  -     TSH  -     T4, Free  -     Vitamin B12  -     Folate    3. Hypercholesterolemia  -     CBC & Differential  -     Comprehensive Metabolic Panel  -     Lipid Panel With / Chol / HDL Ratio  -     TSH  -     T4, Free  -     Vitamin B12  -     Folate    4. Acquired hypothyroidism  -     CBC & Differential  -     Comprehensive Metabolic Panel  -     Lipid Panel With / Chol / HDL Ratio  -     TSH  -     T4, Free  -     Vitamin B12  -     Folate    5. Primary osteoarthritis involving multiple joints  -     CBC & Differential  -     Comprehensive Metabolic Panel  -     Lipid Panel With / Chol / HDL Ratio  -     TSH  -     T4, Free  -     Vitamin B12  -     Folate    Continue diet exercise.  Continue check blood pressure.  Continue taking higher dose of aspirin, Synthroid, Cozaar, potassium Tenormin.  Discussed with patient the Repatha.  Does not want to try it.  All her problems are chronic and stable.  Return in 3 months time.  Keep follow-up with specialist.  Off pravastatin cannot tolerate it.  EHR dragon/transcription disclaimer:  Part of this note are created by electronic transcription/translation of spoken language to printed text and thus may lead to erroneous, or at times, nonsensical words or phrases inadvertently transcribed.  Although I have reviewed for such errors, some may still exist.

## 2024-11-14 NOTE — PROGRESS NOTES
Subjective   The ABCs of the Annual Wellness Visit  Medicare Wellness Visit      Nguyen Zhang is a 84 y.o. patient who presents for a Medicare Wellness Visit.    The following portions of the patient's history were reviewed and   updated as appropriate: allergies, current medications, past family history, past medical history, past social history, past surgical history, and problem list.    Compared to one year ago, the patient's physical   health is the same.  Compared to one year ago, the patient's mental   health is the same.    Recent Hospitalizations:  She was not admitted to the hospital during the last year.     Current Medical Providers:  Patient Care Team:  Chelsie Rai MD as PCP - General (Internal Medicine)  Dev Schulte MD as Consulting Physician (Cardiology)    Outpatient Medications Prior to Visit   Medication Sig Dispense Refill    Acetaminophen (TYLENOL ARTHRITIS PAIN PO) Take 500 mg by mouth Daily.      amLODIPine (NORVASC) 5 MG tablet TAKE 1 TABLET BY MOUTH EVERY EVENING. 90 tablet 0    aspirin 325 MG tablet Take 1 tablet by mouth Daily.      aspirin 81 MG EC tablet Take 1 tablet by mouth Daily. 30 tablet 0    atenolol (TENORMIN) 50 MG tablet TAKE 1 TABLET BY MOUTH 2 TIMES A DAY. 60 tablet 0    Azelastine HCl 137 MCG/SPRAY solution       Bioflavonoid Products (DOROTEO C PO) Take 500 mg by mouth Daily.      Cholecalciferol (VITAMIN D3) 1000 units capsule Take 2 capsules by mouth Daily.      levothyroxine (SYNTHROID, LEVOTHROID) 75 MCG tablet TAKE 1 TABLET BY MOUTH DAILY. 90 tablet 0    losartan (COZAAR) 100 MG tablet TAKE 1 TABLET BY MOUTH DAILY. 90 tablet 0    meclizine (ANTIVERT) 25 MG tablet Take 1 tablet by mouth 3 (Three) Times a Day As Needed for Dizziness. 20 tablet 0    multivitamin-minerals (CENTRUM) tablet Take 1 tablet by mouth Daily.      NIACIN PO Take 500 mg by mouth Daily.      potassium chloride (MICRO-K) 10 MEQ CR capsule Take 1 capsule by mouth Daily.       "potassium chloride 10 MEQ CR tablet TAKE 1 TABLET BY MOUTH 2 TIMES A DAY. (Patient taking differently: Take 1 tablet by mouth 3 (Three) Times a Week.) 180 tablet 0    vitamin E 400 UNIT capsule Take 1 capsule by mouth Daily.      pravastatin (PRAVACHOL) 10 MG tablet TAKE 1 TABLET BY MOUTH DAILY. 30 tablet 0     No facility-administered medications prior to visit.     No opioid medication identified on active medication list. I have reviewed chart for other potential  high risk medication/s and harmful drug interactions in the elderly.      Aspirin is on active medication list. Aspirin use is indicated based on review of current medical condition/s. Pros and cons of this therapy have been discussed today. Benefits of this medication outweigh potential harm.  Patient has been encouraged to continue taking this medication.  .      Patient Active Problem List   Diagnosis    Precordial chest pain    PVCs (premature ventricular contractions)    Essential hypertension    Acquired hypothyroidism    Low serum potassium level    Hypercholesterolemia    Acute pain of left shoulder    Osteoarthritis of multiple joints    Statin intolerance     Advance Care Planning Advance Directive is on file.  ACP discussion was held with the patient during this visit. Patient has an advance directive in EMR which is still valid.             Objective   Vitals:    11/14/24 0953   BP: 122/82   BP Location: Left arm   Patient Position: Sitting   Cuff Size: Adult   Pulse: 63   Temp: 98.6 °F (37 °C)   TempSrc: Infrared   SpO2: 97%   Weight: 57.6 kg (127 lb)   Height: 157.5 cm (62.01\")       Estimated body mass index is 23.22 kg/m² as calculated from the following:    Height as of this encounter: 157.5 cm (62.01\").    Weight as of this encounter: 57.6 kg (127 lb).    BMI is within normal parameters. No other follow-up for BMI required.       Does the patient have evidence of cognitive impairment? No  Lab Results   Component Value Date    CHLPL 209 " (H) 2024    TRIG 103 2024    HDL 66 (H) 2024     (H) 2024    VLDL 18 2024                                                                                               Health  Risk Assessment    Smoking Status:  Social History     Tobacco Use   Smoking Status Never    Passive exposure: Never   Smokeless Tobacco Never     Alcohol Consumption:  Social History     Substance and Sexual Activity   Alcohol Use No       Fall Risk Screen  STEADI Fall Risk Assessment was completed, and patient is at MODERATE risk for falls. Assessment completed on:2024    Depression Screening   Little interest or pleasure in doing things? Not at all   Feeling down, depressed, or hopeless? Not at all   PHQ-2 Total Score 0      Health Habits and Functional and Cognitive Screenin/14/2024     9:55 AM   Functional & Cognitive Status   Do you have difficulty preparing food and eating? No   Do you have difficulty bathing yourself, getting dressed or grooming yourself? No   Do you have difficulty using the toilet? No   Do you have difficulty moving around from place to place? No   Do you have trouble with steps or getting out of a bed or a chair? Yes   Current Diet Well Balanced Diet   Dental Exam Not up to date   Eye Exam Up to date   Exercise (times per week) 3 times per week   Current Exercises Include Swim Aerobics Class   Do you need help using the phone?  No   Are you deaf or do you have serious difficulty hearing?  No   Do you need help to go to places out of walking distance? No   Do you need help shopping? No   Do you need help preparing meals?  No   Do you need help with housework?  Yes   Do you need help with laundry? No   Do you need help taking your medications? No   Do you need help managing money? No   Do you ever drive or ride in a car without wearing a seat belt? No   Have you felt unusual stress, anger or loneliness in the last month? No   Who do you live with? Spouse   If you  need help, do you have trouble finding someone available to you? No   Have you been bothered in the last four weeks by sexual problems? No   Do you have difficulty concentrating, remembering or making decisions? Yes           Age-appropriate Screening Schedule:  Refer to the list below for future screening recommendations based on patient's age, sex and/or medical conditions. Orders for these recommended tests are listed in the plan section. The patient has been provided with a written plan.    Health Maintenance List  Health Maintenance   Topic Date Due    DXA SCAN  Never done    ZOSTER VACCINE (1 of 2) Never done    RSV Vaccine - Adults (1 - 1-dose 75+ series) Never done    ANNUAL WELLNESS VISIT  Never done    INFLUENZA VACCINE  08/01/2024    COVID-19 Vaccine (8 - 2024-25 season) 09/01/2024    Pneumococcal Vaccine 65+ (2 of 2 - PCV) 08/13/2025    LIPID PANEL  08/21/2025    TDAP/TD VACCINES (4 - Tdap) 11/05/2029                                                                                                                                                CMS Preventative Services Quick Reference  Risk Factors Identified During Encounter  Fall Risk-High or Moderate: Discussed Fall Prevention in the home    The above risks/problems have been discussed with the patient.  Pertinent information has been shared with the patient in the After Visit Summary.  An After Visit Summary and PPPS were made available to the patient.    Follow Up:   Next Medicare Wellness visit to be scheduled in 1 year.     Assessment & Plan  Statin intolerance    Orders:    CBC & Differential    Comprehensive Metabolic Panel    Lipid Panel With / Chol / HDL Ratio    TSH    T4, Free    Vitamin B12    Folate    Essential hypertension      Orders:    CBC & Differential    Comprehensive Metabolic Panel    Lipid Panel With / Chol / HDL Ratio    TSH    T4, Free    Vitamin B12    Folate    Hypercholesterolemia       Orders:    CBC & Differential     Comprehensive Metabolic Panel    Lipid Panel With / Chol / HDL Ratio    TSH    T4, Free    Vitamin B12    Folate    Acquired hypothyroidism    Orders:    CBC & Differential    Comprehensive Metabolic Panel    Lipid Panel With / Chol / HDL Ratio    TSH    T4, Free    Vitamin B12    Folate    Primary osteoarthritis involving multiple joints    Orders:    CBC & Differential    Comprehensive Metabolic Panel    Lipid Panel With / Chol / HDL Ratio    TSH    T4, Free    Vitamin B12    Folate         Follow Up:   Return in about 3 months (around 2/14/2025).  Continue diet and exercise.  Fall precautions.  Keep pathways clear and lighted.  Discussed with patient vaccinations.

## 2024-11-14 NOTE — ASSESSMENT & PLAN NOTE
Orders:    CBC & Differential    Comprehensive Metabolic Panel    Lipid Panel With / Chol / HDL Ratio    TSH    T4, Free    Vitamin B12    Folate

## 2024-11-15 LAB
ALBUMIN SERPL-MCNC: 5.1 G/DL (ref 3.5–5.2)
ALBUMIN/GLOB SERPL: 2 G/DL
ALP SERPL-CCNC: 96 U/L (ref 39–117)
ALT SERPL-CCNC: 19 U/L (ref 1–33)
AST SERPL-CCNC: 28 U/L (ref 1–32)
BASOPHILS # BLD AUTO: 0.05 10*3/MM3 (ref 0–0.2)
BASOPHILS NFR BLD AUTO: 0.7 % (ref 0–1.5)
BILIRUB SERPL-MCNC: 0.6 MG/DL (ref 0–1.2)
BUN SERPL-MCNC: 12 MG/DL (ref 8–23)
BUN/CREAT SERPL: 12.4 (ref 7–25)
CALCIUM SERPL-MCNC: 9.8 MG/DL (ref 8.6–10.5)
CHLORIDE SERPL-SCNC: 100 MMOL/L (ref 98–107)
CHOLEST SERPL-MCNC: 228 MG/DL (ref 0–200)
CHOLEST/HDLC SERPL: 3.56 {RATIO}
CO2 SERPL-SCNC: 30.6 MMOL/L (ref 22–29)
CREAT SERPL-MCNC: 0.97 MG/DL (ref 0.57–1)
EGFRCR SERPLBLD CKD-EPI 2021: 58.1 ML/MIN/1.73
EOSINOPHIL # BLD AUTO: 0.09 10*3/MM3 (ref 0–0.4)
EOSINOPHIL NFR BLD AUTO: 1.3 % (ref 0.3–6.2)
ERYTHROCYTE [DISTWIDTH] IN BLOOD BY AUTOMATED COUNT: 11.5 % (ref 12.3–15.4)
FOLATE SERPL-MCNC: >20 NG/ML (ref 4.78–24.2)
GLOBULIN SER CALC-MCNC: 2.6 GM/DL
GLUCOSE SERPL-MCNC: 91 MG/DL (ref 65–99)
HCT VFR BLD AUTO: 38.5 % (ref 34–46.6)
HDLC SERPL-MCNC: 64 MG/DL (ref 40–60)
HGB BLD-MCNC: 13.1 G/DL (ref 12–15.9)
IMM GRANULOCYTES # BLD AUTO: 0.02 10*3/MM3 (ref 0–0.05)
IMM GRANULOCYTES NFR BLD AUTO: 0.3 % (ref 0–0.5)
LDLC SERPL CALC-MCNC: 139 MG/DL (ref 0–100)
LYMPHOCYTES # BLD AUTO: 2.18 10*3/MM3 (ref 0.7–3.1)
LYMPHOCYTES NFR BLD AUTO: 31.1 % (ref 19.6–45.3)
MCH RBC QN AUTO: 31.9 PG (ref 26.6–33)
MCHC RBC AUTO-ENTMCNC: 34 G/DL (ref 31.5–35.7)
MCV RBC AUTO: 93.7 FL (ref 79–97)
MONOCYTES # BLD AUTO: 0.62 10*3/MM3 (ref 0.1–0.9)
MONOCYTES NFR BLD AUTO: 8.8 % (ref 5–12)
NEUTROPHILS # BLD AUTO: 4.05 10*3/MM3 (ref 1.7–7)
NEUTROPHILS NFR BLD AUTO: 57.8 % (ref 42.7–76)
NRBC BLD AUTO-RTO: 0 /100 WBC (ref 0–0.2)
PLATELET # BLD AUTO: 246 10*3/MM3 (ref 140–450)
POTASSIUM SERPL-SCNC: 3.7 MMOL/L (ref 3.5–5.2)
PROT SERPL-MCNC: 7.7 G/DL (ref 6–8.5)
RBC # BLD AUTO: 4.11 10*6/MM3 (ref 3.77–5.28)
SODIUM SERPL-SCNC: 142 MMOL/L (ref 136–145)
T4 FREE SERPL-MCNC: 1.7 NG/DL (ref 0.92–1.68)
TRIGL SERPL-MCNC: 139 MG/DL (ref 0–150)
TSH SERPL DL<=0.005 MIU/L-ACNC: 2.94 UIU/ML (ref 0.27–4.2)
VIT B12 SERPL-MCNC: 586 PG/ML (ref 211–946)
VLDLC SERPL CALC-MCNC: 25 MG/DL (ref 5–40)
WBC # BLD AUTO: 7.01 10*3/MM3 (ref 3.4–10.8)

## 2024-12-09 DIAGNOSIS — E03.9 ACQUIRED HYPOTHYROIDISM: ICD-10-CM

## 2024-12-09 RX ORDER — LEVOTHYROXINE SODIUM 75 UG/1
75 TABLET ORAL DAILY
Qty: 90 TABLET | Refills: 0 | Status: SHIPPED | OUTPATIENT
Start: 2024-12-09

## 2024-12-09 NOTE — TELEPHONE ENCOUNTER
Pharmacy called to confirm dosage of HRT.  Advised should be 0.5-0.1mg, pharmacist voiced understanding of information given.       Rx Refill Note  Requested Prescriptions     Pending Prescriptions Disp Refills    levothyroxine (SYNTHROID, LEVOTHROID) 75 MCG tablet [Pharmacy Med Name: LEVOTHYROXINE 75 MCG TABLET] 90 tablet 0     Sig: TAKE 1 TABLET BY MOUTH DAILY.      Last office visit with prescribing clinician: 11/14/2024   Last telemedicine visit with prescribing clinician: Visit date not found   Next office visit with prescribing clinician: 2/17/2025

## 2024-12-16 RX ORDER — AMLODIPINE BESYLATE 5 MG/1
TABLET ORAL
Qty: 90 TABLET | Refills: 1 | Status: SHIPPED | OUTPATIENT
Start: 2024-12-16

## 2025-01-02 RX ORDER — LOSARTAN POTASSIUM 100 MG/1
TABLET ORAL
Qty: 90 TABLET | Refills: 3 | Status: SHIPPED | OUTPATIENT
Start: 2025-01-02

## 2025-01-07 RX ORDER — ATENOLOL 50 MG/1
TABLET ORAL
Qty: 60 TABLET | Refills: 3 | Status: SHIPPED | OUTPATIENT
Start: 2025-01-07

## 2025-01-09 DIAGNOSIS — E03.9 ACQUIRED HYPOTHYROIDISM: ICD-10-CM

## 2025-01-09 RX ORDER — LEVOTHYROXINE SODIUM 75 UG/1
75 TABLET ORAL DAILY
Qty: 90 TABLET | Refills: 3 | Status: SHIPPED | OUTPATIENT
Start: 2025-01-09

## 2025-01-14 ENCOUNTER — HOSPITAL ENCOUNTER (EMERGENCY)
Facility: HOSPITAL | Age: 85
Discharge: HOME | End: 2025-01-14
Attending: EMERGENCY MEDICINE | Admitting: EMERGENCY MEDICINE
Payer: MEDICARE

## 2025-01-14 ENCOUNTER — APPOINTMENT (OUTPATIENT)
Dept: GENERAL RADIOLOGY | Facility: HOSPITAL | Age: 85
End: 2025-01-14
Payer: MEDICARE

## 2025-01-14 VITALS
BODY MASS INDEX: 23.79 KG/M2 | OXYGEN SATURATION: 97 % | HEART RATE: 66 BPM | SYSTOLIC BLOOD PRESSURE: 167 MMHG | RESPIRATION RATE: 17 BRPM | TEMPERATURE: 97.8 F | DIASTOLIC BLOOD PRESSURE: 80 MMHG | HEIGHT: 61 IN | WEIGHT: 126 LBS

## 2025-01-14 DIAGNOSIS — S69.91XA INJURY OF RIGHT HAND, INITIAL ENCOUNTER: Primary | ICD-10-CM

## 2025-01-14 PROCEDURE — 99283 EMERGENCY DEPT VISIT LOW MDM: CPT | Performed by: EMERGENCY MEDICINE

## 2025-01-14 PROCEDURE — 73130 X-RAY EXAM OF HAND: CPT

## 2025-01-14 NOTE — ED PROVIDER NOTES
Subjective   History of Present Illness  Patient is an 84-year-old female had a mechanical fall 2 weeks ago and right hand.  She was walking her dog and somehow got tangled and she got twisted and demonstrated.  She has some bruises and some sore spots but everything is resolved and the only thing bothering her now is the fifth right metacarpal and the fourth and fifth fingers.      Review of Systems   Constitutional: Negative.    HENT: Negative.     Eyes: Negative.    Respiratory: Negative.     Cardiovascular: Negative.    Gastrointestinal: Negative.    Endocrine: Negative.    Genitourinary: Negative.    Musculoskeletal:  Positive for arthralgias.   Skin: Negative.    Neurological: Negative.    Psychiatric/Behavioral: Negative.     All other systems reviewed and are negative.      Past Medical History:   Diagnosis Date    Arthritis     HL (hearing loss) March. 2023    Ringing in ears / hearing test confirmed loss of high tone sounds    Hyperlipidemia     Hypertension     Hypothyroid     Neck pain     PONV (postoperative nausea and vomiting)     Shoulder pain, left     sched total shoulder    Spinal headache        Allergies   Allergen Reactions    Ace Inhibitors Cough    Codeine Nausea And Vomiting    Meclizine Dystonia       Past Surgical History:   Procedure Laterality Date    COLONOSCOPY      HYSTERECTOMY      JOINT REPLACEMENT  2010 and 2022    Knee and shoulder    REPLACEMENT TOTAL KNEE Right     SHOULDER SURGERY Right     tendon repair    TONSILLECTOMY      TOTAL SHOULDER ARTHROPLASTY W/ DISTAL CLAVICLE EXCISION Left 10/15/2021    Procedure: TOTAL SHOULDER REVERSE ARTHROPLASTY;  Surgeon: Scout Palmer MD;  Location: [facility];  Service: Orthopedics;  Laterality: Left;       Family History   Problem Relation Age of Onset    Heart attack Father     Heart disease Father     Breast cancer Neg Hx     Malig Hyperthermia Neg Hx        Social History     Socioeconomic History    Marital status:     Tobacco Use    Smoking status: Never     Passive exposure: Never    Smokeless tobacco: Never   Vaping Use    Vaping status: Never Used   Substance and Sexual Activity    Alcohol use: No    Drug use: No    Sexual activity: Not Currently     Partners: Male     Birth control/protection: Pill, Hysterectomy           Objective   Physical Exam  Vitals reviewed.   Constitutional:       Appearance: Normal appearance.   HENT:      Head:      Comments: Yellow bruise beneath her right eye  Eyes:      Conjunctiva/sclera: Conjunctivae normal.   Cardiovascular:      Rate and Rhythm: Normal rate and regular rhythm.      Pulses: Normal pulses.   Musculoskeletal:         General: No swelling or tenderness (Right fifth metacarpal). Normal range of motion.      Cervical back: Normal range of motion.   Skin:     General: Skin is warm and dry.      Capillary Refill: Capillary refill takes less than 2 seconds.      Findings: No erythema.   Neurological:      Mental Status: She is alert.   Psychiatric:         Mood and Affect: Mood normal.         Behavior: Behavior normal.         Procedures           ED Course                                                       Medical Decision Making  Presentation patient complains of pain in the right hand after a fall 2 weeks ago.  Says she did have some bruising on her right she had some sore areas that have resolved.  There is still a small green bruise beneath her right eye.  She did not want CAT scan and says is not bothering her anymore.  Only complaint is her right hand which is probably fifth metacarpal and says that it does bother her when she is moving her fourth and fifth fingers.  X-ray shows some chronic arthritis without acute fracture.  We put her in a Velcro splint and told her she can use it for comfort and follow-up with Ortho.  I did this instead of a nonremovable splint that would immobilize her fingers because I think should be more comfortable if she is able to take this off  for activities of daily living as needed.  She is not having a lot of pain moving her fingers and she says it feels much better just with the wrist support and still being able to move her fingers.  She is medically cleared and I told her if she has any concerns to return to emergency room we will be happy to reevaluate.  she has been using Voltaren to continue that was Tylenol.  Went to get I got a call    Problems Addressed:  Injury of right hand, initial encounter: complicated acute illness or injury    Amount and/or Complexity of Data Reviewed  Radiology: ordered.        Final diagnoses:   Injury of right hand, initial encounter       ED Disposition  ED Disposition       ED Disposition   Discharge    Condition   Stable    Comment   --               Cornelius Andrews MD  [address] [address] [phone]               Medication List        Changed      * potassium chloride 10 MEQ CR capsule  Commonly known as: MICRO-K  What changed: Another medication with the same name was changed. Make sure you understand how and when to take each.     * potassium chloride 10 MEQ CR tablet  TAKE 1 TABLET BY MOUTH 2 TIMES A DAY.  What changed: when to take this           * This list has 2 medication(s) that are the same as other medications prescribed for you. Read the directions carefully, and ask your doctor or other care provider to review them with you.                     Ruthie Guzman PA-C  01/14/25 2157

## 2025-01-17 ENCOUNTER — PATIENT OUTREACH (OUTPATIENT)
Dept: CASE MANAGEMENT | Facility: OTHER | Age: 85
End: 2025-01-17
Payer: MEDICARE

## 2025-01-17 NOTE — OUTREACH NOTE
AMBULATORY CASE MANAGEMENT NOTE    Names and Relationships of Patient/Support Persons: Contact: Nguyen Zhang HANK; Relationship: Self -     Patient Outreach  RN-ACM outreach with patient. Discussed 1/14/25 ED visit regarding injury of right hand. Patient treated and discharged. Patient states to be compliant with ED recommendations; wearing splint to right hand; states improvement regarding swelling. Patient states to be able to move fingers without difficulty and states improvement of symptoms while wearing splint. Patient states to have noted bruising after to fall to hip; left leg and right cheek. Patient voiced intent to follow up with physician as needed. Patient states no difficulty with fever; chest pain; SOB; appetite or sleeping. Patient states to be compliant with medications. Reviewed with patient ED AVS recommendations; education; role of RN-ACM and HRCM case management services. Patient verbalized understanding. Patient states to appreciate outreach and declines needs for further outreach at this time. No further questions voiced at this time.   Adult Patient Profile  Questions/Answers      Flowsheet Row Most Recent Value   Symptoms/Conditions Managed at Home musculoskeletal   Musculoskeletal Symptoms/Conditions joint pain  [right hand injury]   Musculoskeletal Management Strategies medical device, other (see comments)  [Physician follow up]   Barriers to Taking Medication as Prescribed none   Primary Source of Support/Comfort spouse   People in Home spouse        Social Work Assessment  Questions/Answers      Flowsheet Row Most Recent Value   People in Home spouse   Functional Status Comments Patient states to be independent with ADL's,  light meal preparation,  transportation and ambulating without assistive device        Send Education  Questions/Answers      Flowsheet Row Most Recent Value   Annual Wellness Visit:  Patient Has Completed   Other Patient Education/Resources  24/7 [facility]  Nurse Call Line, Advanced Care Planning, Jake   24/7 Nurse Call Line Education Method Verbal   Advanced Directives: Patient Has            Education Documentation  Unresolved/Worsening Symptoms, taught by Wilda Enriquez, RN at 1/17/2025 10:07 AM.  Learner: Patient  Readiness: Acceptance  Method: Explanation  Response: Verbalizes Understanding    Home Safety, taught by Wilda Enriquez, RN at 1/17/2025 10:07 AM.  Learner: Patient  Readiness: Acceptance  Method: Explanation  Response: Verbalizes Understanding    Energy Conservation, taught by Wilda Enriquez, RN at 1/17/2025 10:07 AM.  Learner: Patient  Readiness: Acceptance  Method: Explanation  Response: Verbalizes Understanding          Wilda PANTOJA  Ambulatory Case Management    1/17/2025, 10:07 EST

## 2025-02-17 ENCOUNTER — OFFICE VISIT (OUTPATIENT)
Dept: FAMILY MEDICINE CLINIC | Facility: CLINIC | Age: 85
End: 2025-02-17
Payer: MEDICARE

## 2025-02-17 ENCOUNTER — OFFICE VISIT (OUTPATIENT)
Dept: ORTHOPEDIC SURGERY | Facility: CLINIC | Age: 85
End: 2025-02-17
Payer: MEDICARE

## 2025-02-17 VITALS
HEIGHT: 61 IN | RESPIRATION RATE: 18 BRPM | WEIGHT: 127 LBS | SYSTOLIC BLOOD PRESSURE: 114 MMHG | HEART RATE: 75 BPM | OXYGEN SATURATION: 99 % | TEMPERATURE: 97.6 F | DIASTOLIC BLOOD PRESSURE: 80 MMHG | BODY MASS INDEX: 23.98 KG/M2

## 2025-02-17 VITALS
SYSTOLIC BLOOD PRESSURE: 171 MMHG | BODY MASS INDEX: 23.79 KG/M2 | HEART RATE: 65 BPM | WEIGHT: 126 LBS | HEIGHT: 61 IN | DIASTOLIC BLOOD PRESSURE: 71 MMHG

## 2025-02-17 DIAGNOSIS — S62.306A CLOSED NONDISPLACED FRACTURE OF FIFTH METACARPAL BONE OF RIGHT HAND, UNSPECIFIED PORTION OF METACARPAL, INITIAL ENCOUNTER: ICD-10-CM

## 2025-02-17 DIAGNOSIS — E87.6 LOW SERUM POTASSIUM LEVEL: ICD-10-CM

## 2025-02-17 DIAGNOSIS — Z78.9 STATIN INTOLERANCE: Primary | ICD-10-CM

## 2025-02-17 DIAGNOSIS — E78.00 HYPERCHOLESTEROLEMIA: ICD-10-CM

## 2025-02-17 DIAGNOSIS — M79.641 RIGHT HAND PAIN: Primary | ICD-10-CM

## 2025-02-17 DIAGNOSIS — I10 ESSENTIAL HYPERTENSION: ICD-10-CM

## 2025-02-17 DIAGNOSIS — E03.9 ACQUIRED HYPOTHYROIDISM: ICD-10-CM

## 2025-02-17 PROCEDURE — 1126F AMNT PAIN NOTED NONE PRSNT: CPT | Performed by: INTERNAL MEDICINE

## 2025-02-17 PROCEDURE — 99214 OFFICE O/P EST MOD 30 MIN: CPT | Performed by: INTERNAL MEDICINE

## 2025-02-17 PROCEDURE — 3079F DIAST BP 80-89 MM HG: CPT | Performed by: INTERNAL MEDICINE

## 2025-02-17 PROCEDURE — 3074F SYST BP LT 130 MM HG: CPT | Performed by: INTERNAL MEDICINE

## 2025-02-17 NOTE — PROGRESS NOTES
Subjective   Nguyen Zhang is a 84 y.o. female.     Chief Complaint   Patient presents with    Hypothyroidism     3 month check up    Hypertension, hyperlipidemia,    History of Present Illness   Patient seen in follow-up for hypertension, hyperlipidemia, low potassium, statin intolerance.  No chest pain shortness of breath.  Taking current medications.  Taking potassium 10 mg daily.  No chest pain shortness nausea vomiting abdominal pain.  Review of Systems   Constitutional:  Negative for activity change, appetite change, chills, diaphoresis and fever.   Respiratory: Negative.     Cardiovascular:  Negative for chest pain.   Gastrointestinal:  Negative for abdominal distention, abdominal pain, blood in stool, constipation, diarrhea, nausea, vomiting and GERD.   Genitourinary:  Negative for decreased urine volume, dysuria, hematuria and urgency.       Allergies   Allergen Reactions    Ace Inhibitors Cough    Codeine Nausea And Vomiting    Meclizine Dystonia       Current Outpatient Medications on File Prior to Visit   Medication Sig Dispense Refill    Acetaminophen (TYLENOL ARTHRITIS PAIN PO) Take 500 mg by mouth Daily.      amLODIPine (NORVASC) 5 MG tablet TAKE 1 TABLET BY MOUTH EVERY EVENING. 90 tablet 1    aspirin 325 MG tablet Take 1 tablet by mouth Daily.      atenolol (TENORMIN) 50 MG tablet TAKE 1 TABLET BY MOUTH 2 TIMES A DAY. 60 tablet 3    Azelastine HCl 137 MCG/SPRAY solution       Bioflavonoid Products (DOROTEO C PO) Take 500 mg by mouth Daily.      Cholecalciferol (VITAMIN D3) 1000 units capsule Take 2 capsules by mouth Daily.      levothyroxine (SYNTHROID, LEVOTHROID) 75 MCG tablet TAKE 1 TABLET BY MOUTH DAILY. 90 tablet 3    losartan (COZAAR) 100 MG tablet TAKE 1 TABLET BY MOUTH DAILY. 90 tablet 3    multivitamin-minerals (CENTRUM) tablet Take 1 tablet by mouth Daily.      NIACIN PO Take 500 mg by mouth Daily.      potassium chloride (MICRO-K) 10 MEQ CR capsule Take 1 capsule by mouth Daily.       potassium chloride 10 MEQ CR tablet TAKE 1 TABLET BY MOUTH 2 TIMES A DAY. (Patient taking differently: Take 1 tablet by mouth 3 (Three) Times a Week.) 180 tablet 0    vitamin E 400 UNIT capsule Take 1 capsule by mouth Daily.      aspirin 81 MG EC tablet Take 1 tablet by mouth Daily. (Patient not taking: Reported on 2/17/2025) 30 tablet 0    fluticasone (FLONASE) 50 MCG/ACT nasal spray Administer 2 sprays into the nostril(s) as directed by provider Daily for 30 days. 16 g 0    meclizine (ANTIVERT) 25 MG tablet Take 1 tablet by mouth 3 (Three) Times a Day As Needed for Dizziness. 20 tablet 0     No current facility-administered medications on file prior to visit.       Family History   Problem Relation Age of Onset    Heart attack Father     Heart disease Father     Breast cancer Neg Hx     Malig Hyperthermia Neg Hx        Past Medical History:   Diagnosis Date    Arthritis     HL (hearing loss) March. 2023    Ringing in ears / hearing test confirmed loss of high tone sounds    Hyperlipidemia     Hypertension     Hypothyroid     Neck pain     PONV (postoperative nausea and vomiting)     Shoulder pain, left     sched total shoulder    Spinal headache        Past Surgical History:   Procedure Laterality Date    COLONOSCOPY      HYSTERECTOMY      JOINT REPLACEMENT  2010 and 2022    Knee and shoulder    REPLACEMENT TOTAL KNEE Right     SHOULDER SURGERY Right     tendon repair    TONSILLECTOMY      TOTAL SHOULDER ARTHROPLASTY W/ DISTAL CLAVICLE EXCISION Left 10/15/2021    Procedure: TOTAL SHOULDER REVERSE ARTHROPLASTY;  Surgeon: Scout Palmer MD;  [facility];  Service: Orthopedics;  Laterality: Left;       Social History     Socioeconomic History    Marital status:    Tobacco Use    Smoking status: Never     Passive exposure: Never    Smokeless tobacco: Never   Vaping Use    Vaping status: Never Used   Substance and Sexual Activity    Alcohol use: No    Drug use: No    Sexual activity: Not Currently  "    Partners: Male     Birth control/protection: Pill, Hysterectomy       Patient Active Problem List   Diagnosis    Precordial chest pain    PVCs (premature ventricular contractions)    Essential hypertension    Acquired hypothyroidism    Low serum potassium level    Hypercholesterolemia    Acute pain of left shoulder    Osteoarthritis of multiple joints    Statin intolerance       /80 (BP Location: Right arm, Patient Position: Sitting, Cuff Size: Adult) Comment: 108/78 on left arm  Pulse 75   Temp 97.6 °F (36.4 °C) (Temporal)   Resp 18   Ht 154.9 cm (61\")   Wt 57.6 kg (127 lb)   SpO2 99%   BMI 24.00 kg/m²   Body mass index is 24 kg/m².    Objective   Physical Exam  Vitals and nursing note reviewed.   Constitutional:       Appearance: She is well-developed.   Eyes:      Pupils: Pupils are equal, round, and reactive to light.   Neck:      Thyroid: No thyromegaly.      Vascular: No JVD.      Trachea: No tracheal deviation.   Cardiovascular:      Rate and Rhythm: Normal rate and regular rhythm.      Heart sounds: No murmur heard.  Pulmonary:      Effort: Pulmonary effort is normal. No respiratory distress.      Breath sounds: Normal breath sounds. No wheezing.   Abdominal:      General: Bowel sounds are normal. There is no distension.      Palpations: Abdomen is soft. There is no mass.      Tenderness: There is no abdominal tenderness. There is no right CVA tenderness, left CVA tenderness, guarding or rebound.      Hernia: No hernia is present.   Musculoskeletal:      Cervical back: Normal range of motion and neck supple.   Lymphadenopathy:      Cervical: No cervical adenopathy.   Neurological:      Mental Status: She is alert and oriented to person, place, and time.           Assessment & Plan   Diagnoses and all orders for this visit:    1. Statin intolerance (Primary)  -     CBC & Differential  -     Lipid Panel With / Chol / HDL Ratio  -     Comprehensive Metabolic Panel  -     TSH  -     T4, " Free    2. Essential hypertension  -     CBC & Differential  -     Lipid Panel With / Chol / HDL Ratio  -     Comprehensive Metabolic Panel  -     TSH  -     T4, Free    3. Hypercholesterolemia  -     CBC & Differential  -     Lipid Panel With / Chol / HDL Ratio  -     Comprehensive Metabolic Panel  -     TSH  -     T4, Free    4. Acquired hypothyroidism  -     CBC & Differential  -     Lipid Panel With / Chol / HDL Ratio  -     Comprehensive Metabolic Panel  -     TSH  -     T4, Free    5. Low serum potassium level  -     CBC & Differential  -     Lipid Panel With / Chol / HDL Ratio  -     Comprehensive Metabolic Panel  -     TSH  -     T4, Free    Continue diet and exercise.  Continue all current medication.  Continue Synthroid, Norvasc, aspirin, atenolol, Cozaar check blood pressure at home.  Other problems are chronic and stable at this time.  I will see her back in 3 months.  EHR dragon/transcription disclaimer:  Part of this note may be an electronic transcription/translation of spoken language to printed text using the Dragon Dictation System.

## 2025-02-17 NOTE — PROGRESS NOTES
Venipuncture Blood Specimen Collection  Venipuncture performed in right arm by Jeri España MA with good hemostasis. Patient tolerated the procedure well without complications.   02/17/25   Jeri España MA

## 2025-02-17 NOTE — PROGRESS NOTES
Subjective:     Patient ID: Nguyen Zhang is a 84 y.o. female.    Chief Complaint:  Right hand injury, 12/27/2024, new patient to examiner  History of Present Illness  History of Present Illness  The patient is an 84-year-old female who presents to the clinic today for evaluation of her right hand.    She sustained a fall on 12/27/2024 while attempting to control her 80-pound dog by its collar. The dog became entangled in her right foot, causing her to fall and catch herself. She waited until approximately 2 weeks to present to the ER. X-ray images were completed, and she was fitted with a soft wrist immobilizer. She has continued to experience pain along the base of the fifth carpal bone at the right hand. Rates discomfort a 4 out of 10, throbbing in nature. Pain with flexion, and gripping. She is experiencing pain with working, driving. She has tried anti-inflammatory medications for symptom improvement. She has tried bracing, which does seem to help. She reports no recent x-ray imaging. Symptoms do seem to be improving with pain along the palmar and dorsum of the hand at the base of the fifth digit. She reports no numbness or tingling.  She was initially experiencing pain with driving she does continue to experience pain with increased movement at the base of the fifth metacarpal right hand.  She reports no other concerns at present.       Social History     Occupational History    Not on file   Tobacco Use    Smoking status: Never     Passive exposure: Never    Smokeless tobacco: Never   Vaping Use    Vaping status: Never Used   Substance and Sexual Activity    Alcohol use: No    Drug use: No    Sexual activity: Not Currently     Partners: Male     Birth control/protection: Pill, Hysterectomy      Past Medical History:   Diagnosis Date    Arthritis     HL (hearing loss) March. 2023    Ringing in ears / hearing test confirmed loss of high tone sounds    Hyperlipidemia     Hypertension     Hypothyroid      "Neck pain     PONV (postoperative nausea and vomiting)     Shoulder pain, left     sched total shoulder    Spinal headache      Past Surgical History:   Procedure Laterality Date    COLONOSCOPY      HYSTERECTOMY      JOINT REPLACEMENT  2010 and 2022    Knee and shoulder    REPLACEMENT TOTAL KNEE Right     SHOULDER SURGERY Right     tendon repair    TONSILLECTOMY      TOTAL SHOULDER ARTHROPLASTY W/ DISTAL CLAVICLE EXCISION Left 10/15/2021    Procedure: TOTAL SHOULDER REVERSE ARTHROPLASTY;  Surgeon: Scout Palmer MD;  Location: [facility];  Service: Orthopedics;  Laterality: Left;       Family History   Problem Relation Age of Onset    Heart attack Father     Heart disease Father     Breast cancer Neg Hx     Malig Hyperthermia Neg Hx                Objective:  Physical Exam    Vital signs reviewed.   General: No acute distress.  Eyes: conjunctiva clear; pupils equally round and reactive  ENT: external ears and nose atraumatic; oropharynx clear  CV: no peripheral edema  Resp: normal respiratory effort  Skin: no rashes or wounds; normal turgor  Psych: mood and affect appropriate; recent and remote memory intact    Vitals:    02/17/25 1101   BP: 171/71   Pulse: 65   Weight: 57.2 kg (126 lb)   Height: 154.9 cm (61\")         02/17/25  1101   Weight: 57.2 kg (126 lb)     Body mass index is 23.81 kg/m².      Ortho Exam     Physical Exam  Right hand examined out of soft wrist immobilizer  Flex/extend all digits right hand  Maximal tenderness was along the dorsum and palmar aspect of the base of the fifth metacarpal  No evidence of ecchymosis no evidence of swelling  Positive sensation the palmar and dorsum of the hand including all digits  Wrist cap refill all digits right hand  2+ distal radius pulse    Imaging:    XR Hand 3+ View Right    Result Date: 1/14/2025  1.No evidence for displaced fracture or dislocation. 2.Evidence for mild to moderate osteoarthritis. Electronically Signed: Darnell Buck MD  1/14/2025 12:17 " PM EST  Workstation ID: [id number]     Independently reviewed three-view x-ray imaging completed 1/14/2025 no evidence of a displaced fracture, concerns for nondisplaced fracture along the base of the fifth metacarpal, CMC degeneration, degeneration throughout the wrist    Right Hand X-Ray  Indication: Pain  AP, Lateral, and Oblique views    Findings:  Nondisplaced fracture base of the fifth metacarpal appears to be healing, degeneration throughout the CMC joint and wrist noted on prior x-ray imaging  No bony lesion  Normal soft tissues  Normal joint spaces    prior studies were available for comparison.    Assessment:        1. Right hand pain    2. Closed nondisplaced fracture of fifth metacarpal bone of right hand, unspecified portion of metacarpal, initial encounter         Assessment & Plan      Plan:  Discussed plan of care with patient.  We did review the x-ray imaging there is a nondisplaced fracture along the fifth metacarpal that does appear to be healing she is going to continue with the soft wrist immobilizer we did discuss for the next 3 weeks continue with gentle motion of the hand.  If the activity is increasing her pain discontinue the activity.  I do recommend she continue to do motion and warm water which she has noted does help with symptom improvement.  Continue with a soft wrist immobilizer for the next 3 weeks if she is not better in 3 weeks we will plan to see her back in clinic.  Encouraged to call with any question concerns for now we will plan to see her in clinic as needed but if no improvement in 3 weeks see her back.  All question answered.  Orders:  Orders Placed This Encounter   Procedures    XR Hand 2 View Right     No orders of the defined types were placed in this encounter.        Dragon dictation utilized  BMI is within normal parameters. No other follow-up for BMI required.       Patient or patient representative verbalized consent for the use of Ambient Listening during the visit  with  ROMÁN White for chart documentation. 2/17/2025  11:49 EST

## 2025-02-18 LAB
ALBUMIN SERPL-MCNC: 4.7 G/DL (ref 3.5–5.2)
ALBUMIN/GLOB SERPL: 2 G/DL
ALP SERPL-CCNC: 96 U/L (ref 39–117)
ALT SERPL-CCNC: 17 U/L (ref 1–33)
AST SERPL-CCNC: 27 U/L (ref 1–32)
BASOPHILS # BLD AUTO: 0.05 10*3/MM3 (ref 0–0.2)
BASOPHILS NFR BLD AUTO: 0.8 % (ref 0–1.5)
BILIRUB SERPL-MCNC: 0.5 MG/DL (ref 0–1.2)
BUN SERPL-MCNC: 16 MG/DL (ref 8–23)
BUN/CREAT SERPL: 16 (ref 7–25)
CALCIUM SERPL-MCNC: 10.1 MG/DL (ref 8.6–10.5)
CHLORIDE SERPL-SCNC: 100 MMOL/L (ref 98–107)
CHOLEST SERPL-MCNC: 223 MG/DL (ref 0–200)
CHOLEST/HDLC SERPL: 3.48 {RATIO}
CO2 SERPL-SCNC: 29.1 MMOL/L (ref 22–29)
CREAT SERPL-MCNC: 1 MG/DL (ref 0.57–1)
EGFRCR SERPLBLD CKD-EPI 2021: 56 ML/MIN/1.73
EOSINOPHIL # BLD AUTO: 0.16 10*3/MM3 (ref 0–0.4)
EOSINOPHIL NFR BLD AUTO: 2.6 % (ref 0.3–6.2)
ERYTHROCYTE [DISTWIDTH] IN BLOOD BY AUTOMATED COUNT: 11.8 % (ref 12.3–15.4)
GLOBULIN SER CALC-MCNC: 2.4 GM/DL
GLUCOSE SERPL-MCNC: 90 MG/DL (ref 65–99)
HCT VFR BLD AUTO: 36.5 % (ref 34–46.6)
HDLC SERPL-MCNC: 64 MG/DL (ref 40–60)
HGB BLD-MCNC: 12.4 G/DL (ref 12–15.9)
IMM GRANULOCYTES # BLD AUTO: 0.01 10*3/MM3 (ref 0–0.05)
IMM GRANULOCYTES NFR BLD AUTO: 0.2 % (ref 0–0.5)
LDLC SERPL CALC-MCNC: 143 MG/DL (ref 0–100)
LYMPHOCYTES # BLD AUTO: 1.78 10*3/MM3 (ref 0.7–3.1)
LYMPHOCYTES NFR BLD AUTO: 29.1 % (ref 19.6–45.3)
MCH RBC QN AUTO: 31.7 PG (ref 26.6–33)
MCHC RBC AUTO-ENTMCNC: 34 G/DL (ref 31.5–35.7)
MCV RBC AUTO: 93.4 FL (ref 79–97)
MONOCYTES # BLD AUTO: 0.65 10*3/MM3 (ref 0.1–0.9)
MONOCYTES NFR BLD AUTO: 10.6 % (ref 5–12)
NEUTROPHILS # BLD AUTO: 3.47 10*3/MM3 (ref 1.7–7)
NEUTROPHILS NFR BLD AUTO: 56.7 % (ref 42.7–76)
NRBC BLD AUTO-RTO: 0 /100 WBC (ref 0–0.2)
PLATELET # BLD AUTO: 211 10*3/MM3 (ref 140–450)
POTASSIUM SERPL-SCNC: 3.8 MMOL/L (ref 3.5–5.2)
PROT SERPL-MCNC: 7.1 G/DL (ref 6–8.5)
RBC # BLD AUTO: 3.91 10*6/MM3 (ref 3.77–5.28)
SODIUM SERPL-SCNC: 140 MMOL/L (ref 136–145)
T4 FREE SERPL-MCNC: 1.45 NG/DL (ref 0.92–1.68)
TRIGL SERPL-MCNC: 92 MG/DL (ref 0–150)
TSH SERPL DL<=0.005 MIU/L-ACNC: 3.64 UIU/ML (ref 0.27–4.2)
VLDLC SERPL CALC-MCNC: 16 MG/DL (ref 5–40)
WBC # BLD AUTO: 6.12 10*3/MM3 (ref 3.4–10.8)

## 2025-02-20 ENCOUNTER — PATIENT ROUNDING (BHMG ONLY) (OUTPATIENT)
Dept: FAMILY MEDICINE CLINIC | Facility: CLINIC | Age: 85
End: 2025-02-20
Payer: MEDICARE

## 2025-02-20 NOTE — PROGRESS NOTES
A [employer] message has been sent to the patient for [place] [facility]

## 2025-04-14 NOTE — PROGRESS NOTES
PCP:  Chelsie Rai MD                                                                         ROOM:____________    : 1940  AGE: 85 y.o.    ALLERGIES:Ace inhibitors, Codeine, and Meclizine    LAST OV:______________________ LAST EKG:________________ LAST WEIGHT:   Wt Readings from Last 1 Encounters:   25 57.2 kg (126 lb)        BP Readings from Last 3 Encounters:   25 171/71   25 114/80   25 167/80        WT: ____________  BP: __________ HR ______   02% _______      CHEST PAIN: YES OR NO                                           LIGHTHEADED: YES OR NO    SOA: YES OR NO                    FATIGUE: YES OR NO    PALPITATIONS: YES OR NO                                      EDEMA: YES OR NO    SLEEP APNEA: YES OR NO                                     CPAP     OR    BIPAP                                                  SMOKING:   Social History     Socioeconomic History    Marital status:    Tobacco Use    Smoking status: Never     Passive exposure: Never    Smokeless tobacco: Never   Vaping Use    Vaping status: Never Used   Substance and Sexual Activity    Alcohol use: No    Drug use: No    Sexual activity: Not Currently     Partners: Male     Birth control/protection: Pill, Hysterectomy

## 2025-04-17 ENCOUNTER — OFFICE VISIT (OUTPATIENT)
Dept: CARDIOLOGY | Facility: CLINIC | Age: 85
End: 2025-04-17
Payer: MEDICARE

## 2025-04-17 ENCOUNTER — TELEPHONE (OUTPATIENT)
Dept: CARDIOLOGY | Age: 85
End: 2025-04-17

## 2025-04-17 VITALS
SYSTOLIC BLOOD PRESSURE: 162 MMHG | WEIGHT: 124.3 LBS | HEIGHT: 61 IN | OXYGEN SATURATION: 97 % | BODY MASS INDEX: 23.47 KG/M2 | HEART RATE: 63 BPM | DIASTOLIC BLOOD PRESSURE: 76 MMHG

## 2025-04-17 DIAGNOSIS — R07.2 PRECORDIAL CHEST PAIN: ICD-10-CM

## 2025-04-17 DIAGNOSIS — E78.00 HYPERCHOLESTEROLEMIA: ICD-10-CM

## 2025-04-17 DIAGNOSIS — Z78.9 STATIN INTOLERANCE: Primary | ICD-10-CM

## 2025-04-17 DIAGNOSIS — I10 ESSENTIAL HYPERTENSION: ICD-10-CM

## 2025-04-17 RX ORDER — SPIRONOLACTONE 25 MG/1
25 TABLET ORAL DAILY
Qty: 90 TABLET | Refills: 3 | Status: SHIPPED | OUTPATIENT
Start: 2025-04-17

## 2025-04-17 RX ORDER — ASPIRIN 81 MG/1
81 TABLET ORAL DAILY
Qty: 90 TABLET | Refills: 3 | Status: SHIPPED | OUTPATIENT
Start: 2025-04-17

## 2025-04-17 NOTE — PROGRESS NOTES
PATIENTINFORMATION    Date of Office Visit: 2025  Encounter Provider: Dev Schulte MD  Place of Service: [facility] CARDIOLOGY  Patient Name: Nguyen Zhang  : 1940    Subjective:     Encounter Date:2025      Patient ID: Nguyen Zhang is a 85 y.o. female.    Chief Complaint   Patient presents with    Hypertension       HPI  Ms. Zhang is a pleasant 85 years old lady who came to cardiology clinic for follow-up visit.  She is compliant with all current medications without significant side effects and denies any ER visit or hospitalization since last clinic visit.  She denies any rest or exertional chest pain, shortness of breath, orthopnea, PND, palpitations, presyncope syncope or extremity swelling.   He is fairly active and also exercises including water aerobics.  No exertional chest discomfort  Blood pressure usually runs high    ROS  All systems reviewed and negative except as noted in HPI.    Past Medical History:   Diagnosis Date    Arthritis     HL (hearing loss) 2023    Ringing in ears / hearing test confirmed loss of high tone sounds    Hyperlipidemia     Hypertension     Hypothyroid     Neck pain     PONV (postoperative nausea and vomiting)     Shoulder pain, left     sched total shoulder    Spinal headache        Past Surgical History:   Procedure Laterality Date    COLONOSCOPY      HYSTERECTOMY      JOINT REPLACEMENT   and     Knee and shoulder    REPLACEMENT TOTAL KNEE Right     SHOULDER SURGERY Right     tendon repair    TONSILLECTOMY      TOTAL SHOULDER ARTHROPLASTY W/ DISTAL CLAVICLE EXCISION Left 10/15/2021    Procedure: TOTAL SHOULDER REVERSE ARTHROPLASTY;  Surgeon: Scout Palmer MD;  Location: [facility];  Service: Orthopedics;  Laterality: Left;       Social History     Socioeconomic History    Marital status:    Tobacco Use    Smoking status: Never     Passive exposure: Never    Smokeless tobacco: Never   Vaping  "Use    Vaping status: Never Used   Substance and Sexual Activity    Alcohol use: No    Drug use: No    Sexual activity: Not Currently     Partners: Male     Birth control/protection: Pill, Hysterectomy       Family History   Problem Relation Age of Onset    Heart attack Father     Heart disease Father     Breast cancer Neg Hx     Malig Hyperthermia Neg Hx          Procedures       Objective:     /76 (BP Location: Right arm, Patient Position: Sitting, Cuff Size: Adult)   Pulse 63   Ht 154.9 cm (61\")   Wt 56.4 kg (124 lb 4.8 oz)   SpO2 97%   BMI 23.49 kg/m²  Body mass index is 23.49 kg/m².     Constitutional:       General: Not in acute distress.     Appearance: Well-developed. Not diaphoretic.   Eyes:      Pupils: Pupils are equal, round, and reactive to light.   HENT:      Head: Normocephalic and atraumatic.   Neck:      Thyroid: No thyromegaly.   Pulmonary:      Effort: Pulmonary effort is normal. No respiratory distress.      Breath sounds: Normal breath sounds. No wheezing. No rales.   Chest:      Chest wall: Not tender to palpatation.   Cardiovascular:      Normal rate. Regular rhythm.      No gallop.    Pulses:     Intact distal pulses.   Edema:     Peripheral edema absent.   Abdominal:      General: Bowel sounds are normal. There is no distension.      Palpations: Abdomen is soft.      Tenderness: There is no guarding.   Musculoskeletal: Normal range of motion.         General: No deformity.      Cervical back: Normal range of motion and neck supple. Skin:     General: Skin is warm and dry.      Findings: No rash.   Neurological:      Mental Status: Alert and oriented to person, place, and time.      Cranial Nerves: No cranial nerve deficit.      Deep Tendon Reflexes: Reflexes are normal and symmetric.   Psychiatric:         Judgment: Judgment normal.         Review Of Data: I have reviewed pertinent recent labs, images and documents and pertinent findings included in HPI or assessment " below.    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  Hypothyroidism  Chronic hypokalemia on potassium supplement     Blood pressure remains uncontrolled  I will add spironolactone as she is also hypokalemic may be due to hyperaldosteronism.  DC potassium supplement  Repeat BMP in 1 week.  Start bempedoic acid  Return in 2 weeks for BP checks  Diagnosis and plan of care discussed with patient and verbalized understanding.            Your medication list            Accurate as of April 17, 2025 12:47 PM. If you have any questions, ask your nurse or doctor.                START taking these medications        Instructions Last Dose Given Next Dose Due   Bempedoic Acid 180 MG tablet  Started by: Dev Schulte      Take 1 tablet by mouth Daily.       spironolactone 25 MG tablet  Commonly known as: ALDACTONE  Started by: Dev Schulte      Take 1 tablet by mouth Daily.              CHANGE how you take these medications        Instructions Last Dose Given Next Dose Due   aspirin 81 MG EC tablet  What changed: Another medication with the same name was removed. Continue taking this medication, and follow the directions you see here.  Changed by: Dev Schulte      Take 1 tablet by mouth Daily.       fluticasone 50 MCG/ACT nasal spray  Commonly known as: FLONASE  What changed:   when to take this  reasons to take this      Administer 2 sprays into the nostril(s) as directed by provider Daily for 30 days.               CONTINUE taking these medications        Instructions Last Dose Given Next Dose Due   amLODIPine 5 MG tablet  Commonly known as: NORVASC      TAKE 1 TABLET BY MOUTH EVERY EVENING.       atenolol 50 MG tablet  Commonly known as: TENORMIN      TAKE 1 TABLET BY MOUTH 2 TIMES A DAY.       Azelastine HCl 137 MCG/SPRAY solution           DOROTEO C PO      Take 500 mg by mouth Daily.       levothyroxine 75 MCG tablet  Commonly known as: SYNTHROID, LEVOTHROID      TAKE 1 TABLET BY MOUTH DAILY.       losartan 100 MG tablet  Commonly known as: COZAAR      TAKE 1 TABLET BY MOUTH DAILY.       meclizine 25 MG tablet  Commonly known as: ANTIVERT      Take 1 tablet by mouth 3 (Three) Times a Day As Needed for Dizziness.       multivitamin-minerals tablet  Generic drug: multivitamin with minerals      Take 1 tablet by mouth Daily.       NIACIN PO      Take 500 mg by mouth Daily.       TYLENOL ARTHRITIS PAIN PO      Take 500 mg by mouth Daily.       Vitamin D3 25 MCG (1000 UT) capsule      Take 2 capsules by mouth Daily.       vitamin E 400 UNIT capsule      Take 1 capsule by mouth Daily.              STOP taking these medications      potassium chloride 10 MEQ CR tablet  Stopped by: Dev Schulte                  Where to Get Your Medications        These medications were sent to [facility] - [place], [place] - [address] [place] [address] - [phone]  - [phone] [address] [address]      Phone: [phone]   aspirin 81 MG EC tablet  Bempedoic Acid 180 MG tablet  spironolactone 25 MG tablet             Dev Schulte MD  04/17/25  12:47 EDT

## 2025-04-18 NOTE — TELEPHONE ENCOUNTER
Patient called into the [place] office this morning to let us know this medication is $400 and she will not be able to pay for this. Could she possibly receive samples of this? Please call patient to advise.

## 2025-04-18 NOTE — TELEPHONE ENCOUNTER
Dr Schulte is out today I will talked to him when he get back Tuesday.       Thanks  Liliane PANTOJA

## 2025-04-24 ENCOUNTER — LAB (OUTPATIENT)
Dept: LAB | Facility: HOSPITAL | Age: 85
End: 2025-04-24
Payer: MEDICARE

## 2025-04-24 DIAGNOSIS — I10 ESSENTIAL HYPERTENSION: ICD-10-CM

## 2025-04-24 DIAGNOSIS — Z78.9 STATIN INTOLERANCE: ICD-10-CM

## 2025-04-24 LAB
ALBUMIN SERPL-MCNC: 4.9 G/DL (ref 3.5–5.2)
ALBUMIN/GLOB SERPL: 1.8 G/DL
ALP SERPL-CCNC: 88 U/L (ref 39–117)
ALT SERPL W P-5'-P-CCNC: 15 U/L (ref 1–33)
ANION GAP SERPL CALCULATED.3IONS-SCNC: 13.6 MMOL/L (ref 5–15)
AST SERPL-CCNC: 27 U/L (ref 1–32)
BILIRUB SERPL-MCNC: 0.7 MG/DL (ref 0–1.2)
BUN SERPL-MCNC: 12 MG/DL (ref 8–23)
BUN/CREAT SERPL: 10.7 (ref 7–25)
CALCIUM SPEC-SCNC: 9.9 MG/DL (ref 8.6–10.5)
CHLORIDE SERPL-SCNC: 99 MMOL/L (ref 98–107)
CHOLEST SERPL-MCNC: 203 MG/DL (ref 0–200)
CO2 SERPL-SCNC: 29.4 MMOL/L (ref 22–29)
CREAT SERPL-MCNC: 1.12 MG/DL (ref 0.57–1)
EGFRCR SERPLBLD CKD-EPI 2021: 48.6 ML/MIN/1.73
GLOBULIN UR ELPH-MCNC: 2.8 GM/DL
GLUCOSE SERPL-MCNC: 100 MG/DL (ref 65–99)
HDLC SERPL-MCNC: 66 MG/DL (ref 40–60)
LDLC SERPL CALC-MCNC: 116 MG/DL (ref 0–100)
LDLC/HDLC SERPL: 1.71 {RATIO}
POTASSIUM SERPL-SCNC: 4 MMOL/L (ref 3.5–5.2)
PROT SERPL-MCNC: 7.7 G/DL (ref 6–8.5)
SODIUM SERPL-SCNC: 142 MMOL/L (ref 136–145)
TRIGL SERPL-MCNC: 121 MG/DL (ref 0–150)
VLDLC SERPL-MCNC: 21 MG/DL (ref 5–40)

## 2025-04-24 PROCEDURE — 80053 COMPREHEN METABOLIC PANEL: CPT

## 2025-04-24 PROCEDURE — 36415 COLL VENOUS BLD VENIPUNCTURE: CPT

## 2025-04-24 PROCEDURE — 80061 LIPID PANEL: CPT

## 2025-04-25 RX ORDER — EZETIMIBE 10 MG/1
10 TABLET ORAL DAILY
Qty: 90 TABLET | Refills: 3 | Status: SHIPPED | OUTPATIENT
Start: 2025-04-25 | End: 2025-04-25 | Stop reason: SDUPTHER

## 2025-04-25 RX ORDER — EZETIMIBE 10 MG/1
10 TABLET ORAL DAILY
Qty: 90 TABLET | Refills: 3 | Status: SHIPPED | OUTPATIENT
Start: 2025-04-25

## 2025-04-27 ENCOUNTER — RESULTS FOLLOW-UP (OUTPATIENT)
Dept: CARDIOLOGY | Facility: CLINIC | Age: 85
End: 2025-04-27
Payer: MEDICARE

## 2025-04-27 NOTE — PROGRESS NOTES
Please notify mouna throat shows stable kidney function and electrolyte levels.  Cholesterol still elevated.  Continue current care.  Thank you

## 2025-05-01 ENCOUNTER — CLINICAL SUPPORT (OUTPATIENT)
Dept: CARDIOLOGY | Facility: CLINIC | Age: 85
End: 2025-05-01
Payer: MEDICARE

## 2025-05-01 VITALS — SYSTOLIC BLOOD PRESSURE: 122 MMHG | HEART RATE: 71 BPM | DIASTOLIC BLOOD PRESSURE: 76 MMHG | OXYGEN SATURATION: 97 %

## 2025-05-01 NOTE — PROGRESS NOTES
Pt present today for a BP Check. Allergies, Hx, and Medications reviewed and confirmed.     BP/HR are as follows: BP R: 122/76    Pt no c/o      Per Dr Han, pt okay to go home today.    Recommendations:    Continue current regimen and keep her current appointment.    Liliane PANTOJA

## 2025-05-15 ENCOUNTER — TELEPHONE (OUTPATIENT)
Dept: FAMILY MEDICINE CLINIC | Facility: CLINIC | Age: 85
End: 2025-05-15
Payer: MEDICARE

## 2025-05-18 DIAGNOSIS — M79.673 PAIN OF FOOT, UNSPECIFIED LATERALITY: Primary | ICD-10-CM

## 2025-05-20 ENCOUNTER — OFFICE VISIT (OUTPATIENT)
Dept: FAMILY MEDICINE CLINIC | Facility: CLINIC | Age: 85
End: 2025-05-20
Payer: MEDICARE

## 2025-05-20 VITALS
RESPIRATION RATE: 16 BRPM | BODY MASS INDEX: 23.22 KG/M2 | DIASTOLIC BLOOD PRESSURE: 68 MMHG | WEIGHT: 123 LBS | OXYGEN SATURATION: 99 % | SYSTOLIC BLOOD PRESSURE: 138 MMHG | HEART RATE: 66 BPM | HEIGHT: 61 IN | TEMPERATURE: 97.6 F

## 2025-05-20 DIAGNOSIS — Z78.0 POSTMENOPAUSAL: ICD-10-CM

## 2025-05-20 DIAGNOSIS — E03.9 ACQUIRED HYPOTHYROIDISM: ICD-10-CM

## 2025-05-20 DIAGNOSIS — M71.471: ICD-10-CM

## 2025-05-20 DIAGNOSIS — Z78.9 STATIN INTOLERANCE: Primary | ICD-10-CM

## 2025-05-20 DIAGNOSIS — E78.00 HYPERCHOLESTEROLEMIA: ICD-10-CM

## 2025-05-20 DIAGNOSIS — I10 ESSENTIAL HYPERTENSION: ICD-10-CM

## 2025-05-20 DIAGNOSIS — E87.6 LOW SERUM POTASSIUM LEVEL: ICD-10-CM

## 2025-05-20 NOTE — PROGRESS NOTES
Subjective   Nguyen Zhang is a 85 y.o. female.     Chief Complaint   Patient presents with    Hypertension    Hyperlipidemia    Hypothyroidism    Foot Pain       Hypertension  Associated symptoms: no blurred vision, no chest pain, no palpitations, no shortness of breath and no dizziness    Hyperlipidemia  Exacerbating diseases include hypothyroidism. Pertinent negatives include no chest pain or shortness of breath.   Hypothyroidism  Patient reports no fatigue or palpitations. Her past medical history is significant for hyperlipidemia.   Foot Pain  Pertinent negative symptoms include no chest pain, no fatigue and no fever.      Patient seen in follow-up for hypertension, hyperlipidemia, hypothyroidism, right foot calluses.  History of Present Illness  The patient presents for evaluation of right foot callus, hyperlipidemia, and health maintenance.    She has been experiencing persistent issues with her right foot, which have been exacerbated by the use of a work boot. The boot has caused pressure on her toes, leading to soreness and cramping. To alleviate this, she has increased her shoe size. She has discontinued the use of the work boot due to these complications. She is under the care of Dr. Elpidio Hancock, a podiatrist, who has requested a referral as her previous one has . She has attempted to shave the callus herself but found this method ineffective. Her foot condition is further aggravated by driving, and she notes that her foot has been crooked for an extended period.    She has been on Zetia for approximately 1 week and reports no adverse effects. She is also taking over-the-counter niacin.    She is currently fasting. She has not received the shingles vaccine. She had an angiogram of her neck when she was having problems.    She is still taking Synthroid. She is taking a water pill again, Aldactone, which seems to have taken care of the swelling in her leg. She is taking amlodipine,  aspirin, Tenormin, Cozaar, and niacin.    The following portions of the patient's history were reviewed and updated as appropriate: allergies, current medications, past family history, past medical history, past social history, past surgical history and problem list.    Review of Systems   Constitutional:  Negative for activity change, appetite change, fatigue and fever.   Eyes:  Negative for blurred vision and double vision.   Respiratory: Negative.  Negative for shortness of breath.    Cardiovascular:  Negative for chest pain, palpitations and leg swelling.   Gastrointestinal: Negative.    Neurological:  Negative for dizziness, syncope, light-headedness and headache.       Allergies   Allergen Reactions    Ace Inhibitors Cough    Codeine Nausea And Vomiting    Meclizine Dystonia       Current Outpatient Medications on File Prior to Visit   Medication Sig Dispense Refill    Acetaminophen (TYLENOL ARTHRITIS PAIN PO) Take 500 mg by mouth Daily.      amLODIPine (NORVASC) 5 MG tablet TAKE 1 TABLET BY MOUTH EVERY EVENING. 90 tablet 1    aspirin 81 MG EC tablet Take 1 tablet by mouth Daily. 90 tablet 3    atenolol (TENORMIN) 50 MG tablet TAKE 1 TABLET BY MOUTH 2 TIMES A DAY. 60 tablet 3    Bioflavonoid Products (DOROTEO C PO) Take 500 mg by mouth Daily.      Cholecalciferol (VITAMIN D3) 1000 units capsule Take 2 capsules by mouth Daily.      ezetimibe (ZETIA) 10 MG tablet Take 1 tablet by mouth Daily. 90 tablet 3    fluticasone (FLONASE) 50 MCG/ACT nasal spray Administer 2 sprays into the nostril(s) as directed by provider Daily for 30 days. (Patient taking differently: Administer 2 sprays into the nostril(s) as directed by provider As Needed.) 16 g 0    levothyroxine (SYNTHROID, LEVOTHROID) 75 MCG tablet TAKE 1 TABLET BY MOUTH DAILY. 90 tablet 3    losartan (COZAAR) 100 MG tablet TAKE 1 TABLET BY MOUTH DAILY. 90 tablet 3    multivitamin-minerals (CENTRUM) tablet Take 1 tablet by mouth Daily.      NIACIN PO Take 500 mg by  mouth Daily.      spironolactone (ALDACTONE) 25 MG tablet Take 1 tablet by mouth Daily. 90 tablet 3    vitamin E 400 UNIT capsule Take 1 capsule by mouth Daily.      Azelastine HCl 137 MCG/SPRAY solution  (Patient not taking: Reported on 5/20/2025)      Bempedoic Acid 180 MG tablet Take 1 tablet by mouth Daily. (Patient not taking: Reported on 5/20/2025) 90 tablet 3    meclizine (ANTIVERT) 25 MG tablet Take 1 tablet by mouth 3 (Three) Times a Day As Needed for Dizziness. (Patient not taking: Reported on 5/20/2025) 20 tablet 0     No current facility-administered medications on file prior to visit.       Family History   Problem Relation Age of Onset    Heart attack Father     Heart disease Father     Breast cancer Neg Hx     Malig Hyperthermia Neg Hx        Past Medical History:   Diagnosis Date    Arthritis     HL (hearing loss) March. 2023    Ringing in ears / hearing test confirmed loss of high tone sounds    Hyperlipidemia     Hypertension     Hypothyroid     Neck pain     PONV (postoperative nausea and vomiting)     Shoulder pain, left     sched total shoulder    Spinal headache        Past Surgical History:   Procedure Laterality Date    COLONOSCOPY      HYSTERECTOMY      JOINT REPLACEMENT  2010 and 2022    Knee and shoulder    REPLACEMENT TOTAL KNEE Right     SHOULDER SURGERY Right     tendon repair    TONSILLECTOMY      TOTAL SHOULDER ARTHROPLASTY W/ DISTAL CLAVICLE EXCISION Left 10/15/2021    Procedure: TOTAL SHOULDER REVERSE ARTHROPLASTY;  Surgeon: Scout Palmer MD;  Location: [facility];  Service: Orthopedics;  Laterality: Left;       Social History     Socioeconomic History    Marital status:    Tobacco Use    Smoking status: Never     Passive exposure: Never    Smokeless tobacco: Never   Vaping Use    Vaping status: Never Used   Substance and Sexual Activity    Alcohol use: No    Drug use: No    Sexual activity: Not Currently     Partners: Male     Birth control/protection: Pill,  "Hysterectomy       Patient Active Problem List   Diagnosis    Precordial chest pain    PVCs (premature ventricular contractions)    Essential hypertension    Acquired hypothyroidism    Low serum potassium level    Hypercholesterolemia    Acute pain of left shoulder    Osteoarthritis of multiple joints    Statin intolerance    Closed nondisplaced fracture of fifth metacarpal bone of right hand       /68   Pulse 66   Temp 97.6 °F (36.4 °C)   Resp 16   Ht 154.9 cm (60.98\")   Wt 55.8 kg (123 lb)   SpO2 99%   BMI 23.25 kg/m²   Body mass index is 23.25 kg/m².    Objective   Physical Exam  Vitals and nursing note reviewed.   Constitutional:       Appearance: She is well-developed.   Eyes:      Pupils: Pupils are equal, round, and reactive to light.   Neck:      Thyroid: No thyromegaly.      Vascular: No JVD.      Trachea: No tracheal deviation.   Cardiovascular:      Rate and Rhythm: Normal rate and regular rhythm.      Heart sounds: No murmur heard.  Pulmonary:      Effort: Pulmonary effort is normal. No respiratory distress.      Breath sounds: Normal breath sounds. No wheezing.   Abdominal:      General: Bowel sounds are normal. There is no distension.      Palpations: Abdomen is soft. There is no mass.      Tenderness: There is no abdominal tenderness. There is no right CVA tenderness, left CVA tenderness, guarding or rebound.      Hernia: No hernia is present.   Musculoskeletal:      Cervical back: Normal range of motion and neck supple.      Comments: Right foot with calluses on toes   Lymphadenopathy:      Cervical: No cervical adenopathy.   Neurological:      Mental Status: She is alert and oriented to person, place, and time.           Assessment & Plan   Diagnoses and all orders for this visit:    1. Statin intolerance (Primary)  -     CBC & Differential  -     Comprehensive Metabolic Panel  -     Lipid Panel With / Chol / HDL Ratio  -     TSH  -     T4, Free  -     Vitamin B12  -     Folate    2. " Essential hypertension  -     CBC & Differential  -     Comprehensive Metabolic Panel  -     Lipid Panel With / Chol / HDL Ratio  -     TSH  -     T4, Free  -     Vitamin B12  -     Folate  -     XR Chest 2 View; Future    3. Hypercholesterolemia  -     CBC & Differential  -     Comprehensive Metabolic Panel  -     Lipid Panel With / Chol / HDL Ratio  -     TSH  -     T4, Free  -     Vitamin B12  -     Folate  -     XR Chest 2 View; Future    4. Acquired hypothyroidism  -     CBC & Differential  -     Comprehensive Metabolic Panel  -     Lipid Panel With / Chol / HDL Ratio  -     TSH  -     T4, Free  -     Vitamin B12  -     Folate    5. Low serum potassium level  -     CBC & Differential  -     Comprehensive Metabolic Panel  -     Lipid Panel With / Chol / HDL Ratio  -     TSH  -     T4, Free  -     Vitamin B12  -     Folate    6. Calcium deposit in bursa of right foot  -     Ambulatory Referral to Podiatry    7. Postmenopausal  -     DEXA Bone Density Axial             Assessment & Plan  1. Right foot callus.  - The callus is likely a result of friction from her footwear.  - Physical examination reveals calluses from friction.  - Referral to Dr. Elpidio Hancock, a podiatrist, will be initiated for further evaluation and potential treatment.  - Advised to avoid self-treatment and let the podiatrist manage the condition.    2. Hyperlipidemia.  - She has been on Zetia for approximately 1 week and reports no adverse effects.  - Blood work will be ordered to monitor her cholesterol levels.  - Discussed the importance of taking Zetia after breakfast to avoid gastrointestinal discomfort.  - Continue current medication regimen including Zetia.    3. Health maintenance.  - She is currently fasting.  - She has not received the shingles vaccine.  - Advised to receive the shingles vaccine.  - A chest x-ray will be ordered for today.  - A vascular screening will be scheduled by the end of this year.  - Blood work will be  ordered, including a comprehensive metabolic panel (CMP) and thyroid function tests.  Patient or patient representative verbalized consent for the use of Ambient Listening during the visit with  Chelsie Rai MD for chart documentation. 5/20/2025  11:15 EDT  Continue Synthroid, Norvasc, aspirin, Tenormin, Zetia, Cozaar, spironolactone.  All her problems are chronic and stable.  I will see her back in 3 months time.  EHR dragon/transcription disclaimer:  Part of this note may be an electronic transcription/translation of spoken language to printed text using the Dragon Dictation System.

## 2025-05-20 NOTE — PROGRESS NOTES
Venipuncture Blood Specimen Collection  Venipuncture performed in right arm by Jeri España MA with good hemostasis. Patient tolerated the procedure well without complications.   05/20/25   Jeri España MA

## 2025-05-21 LAB
ALBUMIN SERPL-MCNC: 5.3 G/DL (ref 3.5–5.2)
ALBUMIN/GLOB SERPL: 1.8 G/DL
ALP SERPL-CCNC: 94 U/L (ref 39–117)
ALT SERPL-CCNC: 22 U/L (ref 1–33)
AST SERPL-CCNC: 31 U/L (ref 1–32)
BASOPHILS # BLD AUTO: 0.05 10*3/MM3 (ref 0–0.2)
BASOPHILS NFR BLD AUTO: 0.8 % (ref 0–1.5)
BILIRUB SERPL-MCNC: 0.6 MG/DL (ref 0–1.2)
BUN SERPL-MCNC: 15 MG/DL (ref 8–23)
BUN/CREAT SERPL: 13.3 (ref 7–25)
CALCIUM SERPL-MCNC: 10.5 MG/DL (ref 8.6–10.5)
CHLORIDE SERPL-SCNC: 94 MMOL/L (ref 98–107)
CHOLEST SERPL-MCNC: 216 MG/DL (ref 0–200)
CHOLEST/HDLC SERPL: 3.13 {RATIO}
CO2 SERPL-SCNC: 27 MMOL/L (ref 22–29)
CREAT SERPL-MCNC: 1.13 MG/DL (ref 0.57–1)
EGFRCR SERPLBLD CKD-EPI 2021: 48.1 ML/MIN/1.73
EOSINOPHIL # BLD AUTO: 0.06 10*3/MM3 (ref 0–0.4)
EOSINOPHIL NFR BLD AUTO: 0.9 % (ref 0.3–6.2)
ERYTHROCYTE [DISTWIDTH] IN BLOOD BY AUTOMATED COUNT: 12 % (ref 12.3–15.4)
FOLATE SERPL-MCNC: >20 NG/ML (ref 4.78–24.2)
GLOBULIN SER CALC-MCNC: 2.9 GM/DL
GLUCOSE SERPL-MCNC: 98 MG/DL (ref 65–99)
HCT VFR BLD AUTO: 39.7 % (ref 34–46.6)
HDLC SERPL-MCNC: 69 MG/DL (ref 40–60)
HGB BLD-MCNC: 13.6 G/DL (ref 12–15.9)
IMM GRANULOCYTES # BLD AUTO: 0.02 10*3/MM3 (ref 0–0.05)
IMM GRANULOCYTES NFR BLD AUTO: 0.3 % (ref 0–0.5)
LDLC SERPL CALC-MCNC: 121 MG/DL (ref 0–100)
LYMPHOCYTES # BLD AUTO: 1.64 10*3/MM3 (ref 0.7–3.1)
LYMPHOCYTES NFR BLD AUTO: 25.3 % (ref 19.6–45.3)
MCH RBC QN AUTO: 32.8 PG (ref 26.6–33)
MCHC RBC AUTO-ENTMCNC: 34.3 G/DL (ref 31.5–35.7)
MCV RBC AUTO: 95.7 FL (ref 79–97)
MONOCYTES # BLD AUTO: 0.69 10*3/MM3 (ref 0.1–0.9)
MONOCYTES NFR BLD AUTO: 10.6 % (ref 5–12)
NEUTROPHILS # BLD AUTO: 4.02 10*3/MM3 (ref 1.7–7)
NEUTROPHILS NFR BLD AUTO: 62.1 % (ref 42.7–76)
NRBC BLD AUTO-RTO: 0 /100 WBC (ref 0–0.2)
PLATELET # BLD AUTO: 239 10*3/MM3 (ref 140–450)
POTASSIUM SERPL-SCNC: 4.4 MMOL/L (ref 3.5–5.2)
PROT SERPL-MCNC: 8.2 G/DL (ref 6–8.5)
RBC # BLD AUTO: 4.15 10*6/MM3 (ref 3.77–5.28)
SODIUM SERPL-SCNC: 135 MMOL/L (ref 136–145)
T4 FREE SERPL-MCNC: 1.53 NG/DL (ref 0.92–1.68)
TRIGL SERPL-MCNC: 149 MG/DL (ref 0–150)
TSH SERPL DL<=0.005 MIU/L-ACNC: 3.96 UIU/ML (ref 0.27–4.2)
VIT B12 SERPL-MCNC: 729 PG/ML (ref 211–946)
VLDLC SERPL CALC-MCNC: 26 MG/DL (ref 5–40)
WBC # BLD AUTO: 6.48 10*3/MM3 (ref 3.4–10.8)

## 2025-05-23 ENCOUNTER — HOSPITAL ENCOUNTER (OUTPATIENT)
Dept: GENERAL RADIOLOGY | Facility: HOSPITAL | Age: 85
Discharge: HOME | End: 2025-05-23
Payer: MEDICARE

## 2025-05-23 DIAGNOSIS — I10 ESSENTIAL HYPERTENSION: ICD-10-CM

## 2025-05-23 DIAGNOSIS — E78.00 HYPERCHOLESTEROLEMIA: ICD-10-CM

## 2025-05-23 PROCEDURE — 71046 X-RAY EXAM CHEST 2 VIEWS: CPT

## 2025-05-24 ENCOUNTER — RESULTS FOLLOW-UP (OUTPATIENT)
Dept: FAMILY MEDICINE CLINIC | Facility: CLINIC | Age: 85
End: 2025-05-24
Payer: MEDICARE

## 2025-06-03 ENCOUNTER — TRANSCRIBE ORDERS (OUTPATIENT)
Dept: ADMINISTRATIVE | Facility: HOSPITAL | Age: 85
End: 2025-06-03
Payer: MEDICARE

## 2025-06-03 DIAGNOSIS — Z12.31 VISIT FOR SCREENING MAMMOGRAM: Primary | ICD-10-CM

## 2025-06-04 ENCOUNTER — APPOINTMENT (OUTPATIENT)
Dept: BONE DENSITY | Facility: HOSPITAL | Age: 85
End: 2025-06-04
Payer: MEDICARE

## 2025-06-04 PROCEDURE — 77080 DXA BONE DENSITY AXIAL: CPT

## 2025-06-23 RX ORDER — AMLODIPINE BESYLATE 5 MG/1
5 TABLET ORAL EVERY EVENING
Qty: 90 TABLET | Refills: 3 | Status: SHIPPED | OUTPATIENT
Start: 2025-06-23

## 2025-07-08 ENCOUNTER — HOSPITAL ENCOUNTER (OUTPATIENT)
Dept: MAMMOGRAPHY | Facility: HOSPITAL | Age: 85
Discharge: HOME | End: 2025-07-08
Admitting: INTERNAL MEDICINE
Payer: MEDICARE

## 2025-07-08 DIAGNOSIS — Z12.31 VISIT FOR SCREENING MAMMOGRAM: ICD-10-CM

## 2025-07-08 PROCEDURE — 77063 BREAST TOMOSYNTHESIS BI: CPT | Performed by: RADIOLOGY

## 2025-07-08 PROCEDURE — 77067 SCR MAMMO BI INCL CAD: CPT | Performed by: RADIOLOGY

## 2025-07-08 PROCEDURE — 77063 BREAST TOMOSYNTHESIS BI: CPT

## 2025-07-08 PROCEDURE — 77067 SCR MAMMO BI INCL CAD: CPT

## 2025-07-15 ENCOUNTER — OFFICE VISIT (OUTPATIENT)
Dept: ORTHOPEDIC SURGERY | Facility: CLINIC | Age: 85
End: 2025-07-15
Payer: MEDICARE

## 2025-07-15 VITALS
WEIGHT: 123 LBS | HEIGHT: 61 IN | SYSTOLIC BLOOD PRESSURE: 162 MMHG | BODY MASS INDEX: 23.22 KG/M2 | DIASTOLIC BLOOD PRESSURE: 75 MMHG | HEART RATE: 60 BPM

## 2025-07-15 DIAGNOSIS — Z96.651 HISTORY OF TOTAL RIGHT KNEE REPLACEMENT: ICD-10-CM

## 2025-07-15 DIAGNOSIS — S80.01XA CONTUSION OF RIGHT KNEE, INITIAL ENCOUNTER: ICD-10-CM

## 2025-07-15 DIAGNOSIS — M25.561 ACUTE PAIN OF RIGHT KNEE: Primary | ICD-10-CM

## 2025-07-15 PROCEDURE — 99214 OFFICE O/P EST MOD 30 MIN: CPT | Performed by: ORTHOPAEDIC SURGERY

## 2025-07-15 PROCEDURE — 3078F DIAST BP <80 MM HG: CPT | Performed by: ORTHOPAEDIC SURGERY

## 2025-07-15 PROCEDURE — 3077F SYST BP >= 140 MM HG: CPT | Performed by: ORTHOPAEDIC SURGERY

## 2025-07-15 RX ORDER — MELOXICAM 7.5 MG/1
7.5 TABLET ORAL DAILY
Qty: 30 TABLET | Refills: 5 | Status: ON HOLD | OUTPATIENT
Start: 2025-07-15

## 2025-07-15 RX ORDER — METHYLPREDNISOLONE 4 MG/1
TABLET ORAL
Qty: 21 TABLET | Refills: 0 | Status: ON HOLD | OUTPATIENT
Start: 2025-07-15

## 2025-07-15 NOTE — PROGRESS NOTES
Subjective: Right knee pain     Patient ID: Nguyen Zhang is a 85 y.o. female.    Chief Complaint:    History of Present Illness 16-year-old female known to me is seen today for an injury sustained to her right knee back in the mid June.  She had a total knee arthroplasty by me on that right side over 15 years ago and had no complaints until she was injured again in mid June.  States at that time her dog who had lost control of her hind legs was coming down some steps.  She was at the bottom of the steps and the belt landed on the lateral aspect of her leg resulting in a valgus injury.  Developed immediate pain and discomfort which has persisted since that injury.  She describes the pain is moderate 5 out of 10 with an achiness in the lateral aspect of the leg.  Has some stiffness and tenderness has trouble standing and climbing stairs.  Denied any ecchymosis or bruising but is having persistent discomfort.  Has not taken any medication for the pain.  Social History     Occupational History    Not on file   Tobacco Use    Smoking status: Never     Passive exposure: Never    Smokeless tobacco: Never   Vaping Use    Vaping status: Never Used   Substance and Sexual Activity    Alcohol use: No    Drug use: No    Sexual activity: Not Currently     Partners: Male     Birth control/protection: Pill, Hysterectomy      Review of Systems      Past Medical History:   Diagnosis Date    Arthritis     HL (hearing loss) March. 2023    Ringing in ears / hearing test confirmed loss of high tone sounds    Hyperlipidemia     Hypertension     Hypothyroid     Neck pain     PONV (postoperative nausea and vomiting)     Shoulder pain, left     sched total shoulder    Spinal headache      Past Surgical History:   Procedure Laterality Date    COLONOSCOPY      HYSTERECTOMY      JOINT REPLACEMENT  2010 and 2022    Knee and shoulder    REPLACEMENT TOTAL KNEE Right     SHOULDER SURGERY Right     tendon repair    TONSILLECTOMY      TOTAL  SHOULDER ARTHROPLASTY W/ DISTAL CLAVICLE EXCISION Left 10/15/2021    Procedure: TOTAL SHOULDER REVERSE ARTHROPLASTY;  Surgeon: Scout Palmer MD;  Location: [facility];  Service: Orthopedics;  Laterality: Left;     Family History   Problem Relation Age of Onset    Heart attack Father     Heart disease Father     Breast cancer Neg Hx     Malig Hyperthermia Neg Hx          Objective:  Vitals:    07/15/25 1036   BP: 162/75   Pulse: 60         07/15/25  1036   Weight: 55.8 kg (123 lb)     Body mass index is 23.26 kg/m².  BMI is within normal parameters. No other follow-up for BMI required.        Ortho Exam   AP lateral sunrise view of that left knee shows perfect position of the implant with no acute.  She is alert and oriented x 3.  The knee shows no swelling effusion erythema or ecchymosis or bruising.  She has 0 to 130 degrees of motion.  Quad hamstring function 5/5.  There is tenderness over the lateral collateral ligament and there is pain with varus stressing over the lateral side of the knee.  There is no instability varus valgus stressing and there is no AP instability in flexion extension and the calf is nontender.  There is no joint line tenderness.  Again there is point tenderness over the lateral collateral ligament but again there is endpoint to varus stressing but there is pain.  She has good capillary refill.  Again the calf is nontender.    Assessment:        1. Acute pain of right knee    2. History of total right knee replacement    3. Contusion of right knee, initial encounter           Plan: Reviewed with the patient her x-ray history and physical exam.  There is no acute changes to the knee as far as the alignment of the bony anatomy but she has good soft tissue inflammation.  After reviewing treatment options begin a steroid pack followed by meloxicam 7.5 daily.  Return in 3 weeks if still symptomatic otherwise as needed.  Answered all questions            Work Status:    LOUISA query  complete.    Orders:  Orders Placed This Encounter   Procedures    XR Knee 3+ View With [place] Right       Medications:  New Medications Ordered This Visit   Medications    methylPREDNISolone (MEDROL) 4 MG dose pack     Sig: Use as directed by package instructions.  For completion of the steroid begin meloxicam 7.5 daily     Dispense:  21 tablet     Refill:  0    meloxicam (MOBIC) 7.5 MG tablet     Sig: Take 1 tablet by mouth Daily.     Dispense:  30 tablet     Refill:  5       Followup:  Return in about 3 weeks (around 8/5/2025).          Dictated utilizing Dragon dictation

## 2025-07-19 ENCOUNTER — APPOINTMENT (OUTPATIENT)
Dept: GENERAL RADIOLOGY | Facility: HOSPITAL | Age: 85
End: 2025-07-19
Payer: MEDICARE

## 2025-07-19 ENCOUNTER — APPOINTMENT (OUTPATIENT)
Dept: CT IMAGING | Facility: HOSPITAL | Age: 85
End: 2025-07-19
Payer: MEDICARE

## 2025-07-19 ENCOUNTER — HOSPITAL ENCOUNTER (INPATIENT)
Facility: HOSPITAL | Age: 85
LOS: 3 days | Discharge: HOME | End: 2025-07-22
Attending: STUDENT IN AN ORGANIZED HEALTH CARE EDUCATION/TRAINING PROGRAM
Payer: MEDICARE

## 2025-07-19 DIAGNOSIS — T88.7XXA MEDICATION SIDE EFFECT: ICD-10-CM

## 2025-07-19 DIAGNOSIS — R55 VASOVAGAL SYNCOPE: ICD-10-CM

## 2025-07-19 DIAGNOSIS — R11.2 NAUSEA AND VOMITING, UNSPECIFIED VOMITING TYPE: ICD-10-CM

## 2025-07-19 DIAGNOSIS — G44.209 ACUTE NON INTRACTABLE TENSION-TYPE HEADACHE: Primary | ICD-10-CM

## 2025-07-19 PROBLEM — E87.1 HYPONATREMIA: Status: ACTIVE | Noted: 2025-07-19

## 2025-07-19 LAB
ALBUMIN SERPL-MCNC: 4.8 G/DL (ref 3.5–5.2)
ALBUMIN/GLOB SERPL: 1.5 G/DL
ALP SERPL-CCNC: 76 U/L (ref 39–117)
ALT SERPL W P-5'-P-CCNC: 20 U/L (ref 1–33)
ANION GAP SERPL CALCULATED.3IONS-SCNC: 14.9 MMOL/L (ref 5–15)
ANION GAP SERPL CALCULATED.3IONS-SCNC: 19.9 MMOL/L (ref 5–15)
AST SERPL-CCNC: 33 U/L (ref 1–32)
BASOPHILS # BLD AUTO: 0.03 10*3/MM3 (ref 0–0.2)
BASOPHILS NFR BLD AUTO: 0.3 % (ref 0–1.5)
BILIRUB SERPL-MCNC: 0.6 MG/DL (ref 0–1.2)
BUN SERPL-MCNC: 12.2 MG/DL (ref 8–23)
BUN SERPL-MCNC: 14.2 MG/DL (ref 8–23)
BUN/CREAT SERPL: 14.9 (ref 7–25)
BUN/CREAT SERPL: 17.9 (ref 7–25)
CALCIUM SPEC-SCNC: 7.3 MG/DL (ref 8.6–10.5)
CALCIUM SPEC-SCNC: 9.7 MG/DL (ref 8.6–10.5)
CHLORIDE SERPL-SCNC: 81 MMOL/L (ref 98–107)
CHLORIDE SERPL-SCNC: 93 MMOL/L (ref 98–107)
CO2 SERPL-SCNC: 17.1 MMOL/L (ref 22–29)
CO2 SERPL-SCNC: 19.1 MMOL/L (ref 22–29)
CREAT SERPL-MCNC: 0.68 MG/DL (ref 0.57–1)
CREAT SERPL-MCNC: 0.95 MG/DL (ref 0.57–1)
D DIMER PPP FEU-MCNC: 0.38 MCGFEU/ML (ref 0–0.84)
DEPRECATED RDW RBC AUTO: 39.7 FL (ref 37–54)
EGFRCR SERPLBLD CKD-EPI 2021: 59.2 ML/MIN/1.73
EGFRCR SERPLBLD CKD-EPI 2021: 86 ML/MIN/1.73
EOSINOPHIL # BLD AUTO: 0.02 10*3/MM3 (ref 0–0.4)
EOSINOPHIL NFR BLD AUTO: 0.2 % (ref 0.3–6.2)
ERYTHROCYTE [DISTWIDTH] IN BLOOD BY AUTOMATED COUNT: 11.9 % (ref 12.3–15.4)
GEN 5 1HR TROPONIN T REFLEX: 7 NG/L
GLOBULIN UR ELPH-MCNC: 3.2 GM/DL
GLUCOSE BLDC GLUCOMTR-MCNC: 121 MG/DL (ref 70–130)
GLUCOSE SERPL-MCNC: 114 MG/DL (ref 65–99)
GLUCOSE SERPL-MCNC: 89 MG/DL (ref 65–99)
HBA1C MFR BLD: 5.55 % (ref 4.8–5.6)
HCT VFR BLD AUTO: 39.6 % (ref 34–46.6)
HGB BLD-MCNC: 14.3 G/DL (ref 12–15.9)
IMM GRANULOCYTES # BLD AUTO: 0.03 10*3/MM3 (ref 0–0.05)
IMM GRANULOCYTES NFR BLD AUTO: 0.3 % (ref 0–0.5)
LIPASE SERPL-CCNC: 34 U/L (ref 13–60)
LYMPHOCYTES # BLD AUTO: 3.78 10*3/MM3 (ref 0.7–3.1)
LYMPHOCYTES NFR BLD AUTO: 32.3 % (ref 19.6–45.3)
MAGNESIUM SERPL-MCNC: 2 MG/DL (ref 1.6–2.4)
MCH RBC QN AUTO: 32.9 PG (ref 26.6–33)
MCHC RBC AUTO-ENTMCNC: 36.1 G/DL (ref 31.5–35.7)
MCV RBC AUTO: 91 FL (ref 79–97)
MONOCYTES # BLD AUTO: 1.38 10*3/MM3 (ref 0.1–0.9)
MONOCYTES NFR BLD AUTO: 11.8 % (ref 5–12)
NEUTROPHILS NFR BLD AUTO: 55.1 % (ref 42.7–76)
NEUTROPHILS NFR BLD AUTO: 6.46 10*3/MM3 (ref 1.7–7)
NRBC BLD AUTO-RTO: 0 /100 WBC (ref 0–0.2)
PHOSPHATE SERPL-MCNC: 2.6 MG/DL (ref 2.5–4.5)
PLATELET # BLD AUTO: 277 10*3/MM3 (ref 140–450)
PMV BLD AUTO: 9.7 FL (ref 6–12)
POTASSIUM SERPL-SCNC: 3.2 MMOL/L (ref 3.5–5.2)
POTASSIUM SERPL-SCNC: 4.2 MMOL/L (ref 3.5–5.2)
PROT SERPL-MCNC: 8 G/DL (ref 6–8.5)
QT INTERVAL: 420 MS
QTC INTERVAL: 432 MS
RBC # BLD AUTO: 4.35 10*6/MM3 (ref 3.77–5.28)
SODIUM SERPL-SCNC: 120 MMOL/L (ref 136–145)
SODIUM SERPL-SCNC: 125 MMOL/L (ref 136–145)
T4 FREE SERPL-MCNC: 1.49 NG/DL (ref 0.92–1.68)
TROPONIN T NUMERIC DELTA: -3 NG/L
TROPONIN T SERPL HS-MCNC: 10 NG/L
TSH SERPL DL<=0.05 MIU/L-ACNC: 8.96 UIU/ML (ref 0.27–4.2)
WBC NRBC COR # BLD AUTO: 11.7 10*3/MM3 (ref 3.4–10.8)

## 2025-07-19 PROCEDURE — 84443 ASSAY THYROID STIM HORMONE: CPT | Performed by: NURSE PRACTITIONER

## 2025-07-19 PROCEDURE — 93005 ELECTROCARDIOGRAM TRACING: CPT | Performed by: STUDENT IN AN ORGANIZED HEALTH CARE EDUCATION/TRAINING PROGRAM

## 2025-07-19 PROCEDURE — 25010000002 CALCIUM GLUCONATE-NACL 1-0.675 GM/50ML-% SOLUTION: Performed by: NURSE PRACTITIONER

## 2025-07-19 PROCEDURE — 85379 FIBRIN DEGRADATION QUANT: CPT | Performed by: STUDENT IN AN ORGANIZED HEALTH CARE EDUCATION/TRAINING PROGRAM

## 2025-07-19 PROCEDURE — 70450 CT HEAD/BRAIN W/O DYE: CPT

## 2025-07-19 PROCEDURE — 83036 HEMOGLOBIN GLYCOSYLATED A1C: CPT | Performed by: NURSE PRACTITIONER

## 2025-07-19 PROCEDURE — 25010000002 KETOROLAC TROMETHAMINE PER 15 MG: Performed by: STUDENT IN AN ORGANIZED HEALTH CARE EDUCATION/TRAINING PROGRAM

## 2025-07-19 PROCEDURE — 85025 COMPLETE CBC W/AUTO DIFF WBC: CPT | Performed by: STUDENT IN AN ORGANIZED HEALTH CARE EDUCATION/TRAINING PROGRAM

## 2025-07-19 PROCEDURE — 84484 ASSAY OF TROPONIN QUANT: CPT | Performed by: STUDENT IN AN ORGANIZED HEALTH CARE EDUCATION/TRAINING PROGRAM

## 2025-07-19 PROCEDURE — 99222 1ST HOSP IP/OBS MODERATE 55: CPT | Performed by: NURSE PRACTITIONER

## 2025-07-19 PROCEDURE — 25810000003 SODIUM CHLORIDE 0.9 % SOLUTION: Performed by: NURSE PRACTITIONER

## 2025-07-19 PROCEDURE — 36415 COLL VENOUS BLD VENIPUNCTURE: CPT

## 2025-07-19 PROCEDURE — 83690 ASSAY OF LIPASE: CPT | Performed by: STUDENT IN AN ORGANIZED HEALTH CARE EDUCATION/TRAINING PROGRAM

## 2025-07-19 PROCEDURE — 83735 ASSAY OF MAGNESIUM: CPT | Performed by: NURSE PRACTITIONER

## 2025-07-19 PROCEDURE — 25810000003 SODIUM CHLORIDE 0.9 % SOLUTION: Performed by: STUDENT IN AN ORGANIZED HEALTH CARE EDUCATION/TRAINING PROGRAM

## 2025-07-19 PROCEDURE — 82948 REAGENT STRIP/BLOOD GLUCOSE: CPT

## 2025-07-19 PROCEDURE — 83735 ASSAY OF MAGNESIUM: CPT | Performed by: STUDENT IN AN ORGANIZED HEALTH CARE EDUCATION/TRAINING PROGRAM

## 2025-07-19 PROCEDURE — 25010000002 CALCIUM GLUCONATE 2-0.675 GM/100ML-% SOLUTION: Performed by: NURSE PRACTITIONER

## 2025-07-19 PROCEDURE — 71045 X-RAY EXAM CHEST 1 VIEW: CPT

## 2025-07-19 PROCEDURE — 80053 COMPREHEN METABOLIC PANEL: CPT | Performed by: STUDENT IN AN ORGANIZED HEALTH CARE EDUCATION/TRAINING PROGRAM

## 2025-07-19 PROCEDURE — 84439 ASSAY OF FREE THYROXINE: CPT | Performed by: NURSE PRACTITIONER

## 2025-07-19 PROCEDURE — 84100 ASSAY OF PHOSPHORUS: CPT | Performed by: STUDENT IN AN ORGANIZED HEALTH CARE EDUCATION/TRAINING PROGRAM

## 2025-07-19 PROCEDURE — 99285 EMERGENCY DEPT VISIT HI MDM: CPT | Performed by: STUDENT IN AN ORGANIZED HEALTH CARE EDUCATION/TRAINING PROGRAM

## 2025-07-19 PROCEDURE — 93010 ELECTROCARDIOGRAM REPORT: CPT | Performed by: INTERNAL MEDICINE

## 2025-07-19 RX ORDER — IBUPROFEN 600 MG/1
600 TABLET, FILM COATED ORAL EVERY 6 HOURS PRN
Qty: 30 TABLET | Refills: 0 | Status: SHIPPED | OUTPATIENT
Start: 2025-07-19 | End: 2025-07-19

## 2025-07-19 RX ORDER — SODIUM CHLORIDE 0.9 % (FLUSH) 0.9 %
10 SYRINGE (ML) INJECTION AS NEEDED
Status: DISCONTINUED | OUTPATIENT
Start: 2025-07-19 | End: 2025-07-22 | Stop reason: HOSPADM

## 2025-07-19 RX ORDER — IBUPROFEN 600 MG/1
600 TABLET, FILM COATED ORAL EVERY 6 HOURS PRN
Qty: 30 TABLET | Refills: 0 | Status: SHIPPED | OUTPATIENT
Start: 2025-07-19 | End: 2025-07-22 | Stop reason: HOSPADM

## 2025-07-19 RX ORDER — SODIUM CHLORIDE 9 MG/ML
40 INJECTION, SOLUTION INTRAVENOUS AS NEEDED
Status: DISCONTINUED | OUTPATIENT
Start: 2025-07-19 | End: 2025-07-22 | Stop reason: HOSPADM

## 2025-07-19 RX ORDER — ACETAMINOPHEN 325 MG/1
650 TABLET ORAL EVERY 4 HOURS PRN
Status: DISCONTINUED | OUTPATIENT
Start: 2025-07-19 | End: 2025-07-22 | Stop reason: HOSPADM

## 2025-07-19 RX ORDER — ACETAMINOPHEN 160 MG/5ML
650 SOLUTION ORAL EVERY 4 HOURS PRN
Status: DISCONTINUED | OUTPATIENT
Start: 2025-07-19 | End: 2025-07-22 | Stop reason: HOSPADM

## 2025-07-19 RX ORDER — POTASSIUM CHLORIDE 1500 MG/1
40 TABLET, EXTENDED RELEASE ORAL EVERY 4 HOURS
Status: COMPLETED | OUTPATIENT
Start: 2025-07-19 | End: 2025-07-20

## 2025-07-19 RX ORDER — BISACODYL 10 MG
10 SUPPOSITORY, RECTAL RECTAL DAILY PRN
Status: DISCONTINUED | OUTPATIENT
Start: 2025-07-19 | End: 2025-07-22 | Stop reason: HOSPADM

## 2025-07-19 RX ORDER — NITROGLYCERIN 0.4 MG/1
0.4 TABLET SUBLINGUAL
Status: DISCONTINUED | OUTPATIENT
Start: 2025-07-19 | End: 2025-07-22 | Stop reason: HOSPADM

## 2025-07-19 RX ORDER — ALUMINA, MAGNESIA, AND SIMETHICONE 2400; 2400; 240 MG/30ML; MG/30ML; MG/30ML
30 SUSPENSION ORAL ONCE
Status: COMPLETED | OUTPATIENT
Start: 2025-07-19 | End: 2025-07-19

## 2025-07-19 RX ORDER — POLYETHYLENE GLYCOL 3350 17 G/17G
17 POWDER, FOR SOLUTION ORAL DAILY PRN
Status: DISCONTINUED | OUTPATIENT
Start: 2025-07-19 | End: 2025-07-22 | Stop reason: HOSPADM

## 2025-07-19 RX ORDER — ONDANSETRON 4 MG/1
4 TABLET, ORALLY DISINTEGRATING ORAL EVERY 8 HOURS PRN
Qty: 10 TABLET | Refills: 0 | Status: SHIPPED | OUTPATIENT
Start: 2025-07-19 | End: 2025-07-22 | Stop reason: HOSPADM

## 2025-07-19 RX ORDER — SODIUM CHLORIDE 0.9 % (FLUSH) 0.9 %
10 SYRINGE (ML) INJECTION EVERY 12 HOURS SCHEDULED
Status: DISCONTINUED | OUTPATIENT
Start: 2025-07-19 | End: 2025-07-22 | Stop reason: HOSPADM

## 2025-07-19 RX ORDER — ONDANSETRON 4 MG/1
4 TABLET, ORALLY DISINTEGRATING ORAL EVERY 6 HOURS PRN
Status: DISCONTINUED | OUTPATIENT
Start: 2025-07-19 | End: 2025-07-22 | Stop reason: HOSPADM

## 2025-07-19 RX ORDER — ACETAMINOPHEN 500 MG
1000 TABLET ORAL ONCE
Status: COMPLETED | OUTPATIENT
Start: 2025-07-19 | End: 2025-07-19

## 2025-07-19 RX ORDER — ONDANSETRON 2 MG/ML
4 INJECTION INTRAMUSCULAR; INTRAVENOUS ONCE
Status: DISCONTINUED | OUTPATIENT
Start: 2025-07-19 | End: 2025-07-22 | Stop reason: HOSPADM

## 2025-07-19 RX ORDER — CALCIUM GLUCONATE 20 MG/ML
2000 INJECTION, SOLUTION INTRAVENOUS
Status: COMPLETED | OUTPATIENT
Start: 2025-07-20 | End: 2025-07-20

## 2025-07-19 RX ORDER — ACETAMINOPHEN 500 MG
1000 TABLET ORAL EVERY 6 HOURS PRN
Qty: 30 TABLET | Refills: 0 | Status: SHIPPED | OUTPATIENT
Start: 2025-07-19 | End: 2025-07-22 | Stop reason: HOSPADM

## 2025-07-19 RX ORDER — KETOROLAC TROMETHAMINE 30 MG/ML
30 INJECTION, SOLUTION INTRAMUSCULAR; INTRAVENOUS ONCE
Status: COMPLETED | OUTPATIENT
Start: 2025-07-19 | End: 2025-07-19

## 2025-07-19 RX ORDER — ACETAMINOPHEN 500 MG
1000 TABLET ORAL EVERY 6 HOURS PRN
Qty: 30 TABLET | Refills: 0 | Status: SHIPPED | OUTPATIENT
Start: 2025-07-19 | End: 2025-07-19

## 2025-07-19 RX ORDER — AMOXICILLIN 250 MG
2 CAPSULE ORAL 2 TIMES DAILY PRN
Status: DISCONTINUED | OUTPATIENT
Start: 2025-07-19 | End: 2025-07-22 | Stop reason: HOSPADM

## 2025-07-19 RX ORDER — ONDANSETRON 4 MG/1
4 TABLET, ORALLY DISINTEGRATING ORAL EVERY 8 HOURS PRN
Qty: 10 TABLET | Refills: 0 | Status: SHIPPED | OUTPATIENT
Start: 2025-07-19 | End: 2025-07-19

## 2025-07-19 RX ORDER — BISACODYL 5 MG/1
5 TABLET, DELAYED RELEASE ORAL DAILY PRN
Status: DISCONTINUED | OUTPATIENT
Start: 2025-07-19 | End: 2025-07-22 | Stop reason: HOSPADM

## 2025-07-19 RX ORDER — ONDANSETRON 2 MG/ML
4 INJECTION INTRAMUSCULAR; INTRAVENOUS EVERY 6 HOURS PRN
Status: DISCONTINUED | OUTPATIENT
Start: 2025-07-19 | End: 2025-07-22 | Stop reason: HOSPADM

## 2025-07-19 RX ORDER — ACETAMINOPHEN 650 MG/1
650 SUPPOSITORY RECTAL EVERY 4 HOURS PRN
Status: DISCONTINUED | OUTPATIENT
Start: 2025-07-19 | End: 2025-07-22 | Stop reason: HOSPADM

## 2025-07-19 RX ORDER — SODIUM CHLORIDE 9 MG/ML
100 INJECTION, SOLUTION INTRAVENOUS CONTINUOUS
Status: DISCONTINUED | OUTPATIENT
Start: 2025-07-19 | End: 2025-07-20

## 2025-07-19 RX ORDER — CALCIUM GLUCONATE 20 MG/ML
1000 INJECTION, SOLUTION INTRAVENOUS
Status: COMPLETED | OUTPATIENT
Start: 2025-07-19 | End: 2025-07-19

## 2025-07-19 RX ADMIN — CALCIUM GLUCONATE 2000 MG: 20 INJECTION, SOLUTION INTRAVENOUS at 23:52

## 2025-07-19 RX ADMIN — Medication 10 ML: at 22:01

## 2025-07-19 RX ADMIN — KETOROLAC TROMETHAMINE 30 MG: 30 INJECTION INTRAMUSCULAR; INTRAVENOUS at 18:02

## 2025-07-19 RX ADMIN — POTASSIUM CHLORIDE 40 MEQ: 1500 TABLET, EXTENDED RELEASE ORAL at 22:48

## 2025-07-19 RX ADMIN — CALCIUM GLUCONATE 1000 MG: 20 INJECTION, SOLUTION INTRAVENOUS at 22:45

## 2025-07-19 RX ADMIN — SODIUM CHLORIDE 1000 ML: 9 INJECTION, SOLUTION INTRAVENOUS at 19:37

## 2025-07-19 RX ADMIN — SODIUM CHLORIDE 100 ML/HR: 9 INJECTION, SOLUTION INTRAVENOUS at 23:52

## 2025-07-19 RX ADMIN — ALUMINUM HYDROXIDE, MAGNESIUM HYDROXIDE, AND DIMETHICONE 30 ML: 400; 400; 40 SUSPENSION ORAL at 18:02

## 2025-07-19 RX ADMIN — ACETAMINOPHEN 1000 MG: 500 TABLET, FILM COATED ORAL at 18:02

## 2025-07-19 NOTE — ED PROVIDER NOTES
Subjective   History of Present Illness  The patient is presenting with dizziness, nausea, dry heaves, and a sensation of a tight band around the head, which she attributes to a reaction to medication methylprednisolone she started on July 16th. The patient reports a history of a right leg injury caused by her dog, which resulted in muscle damage from the knee down, though her artificial knee was assessed and found to be fine. She notes that the swelling in the leg has decreased significantly and there was no external bruising. The patient denies loss of consciousness, falls, or head trauma. She has a history of hypertension but denies diabetes. The patient has known medication allergies, details of which are in her file. She does not smoke, drink alcohol, or use drugs. History was obtained from the patient.  Review of Systems  General: Reports dizziness; denies loss of consciousness.    Abdomen: Reports nausea and dry heaves.    Neurological: Reports feeling of a tight band around the head; denies falling or head injury.  Past Medical History:   Diagnosis Date    Arthritis     HL (hearing loss) March. 2023    Ringing in ears / hearing test confirmed loss of high tone sounds    Hyperlipidemia     Hypertension     Hypothyroid     Neck pain     PONV (postoperative nausea and vomiting)     Shoulder pain, left     sched total shoulder    Spinal headache        Allergies   Allergen Reactions    Ace Inhibitors Cough    Codeine Nausea And Vomiting    Meclizine Dystonia       Past Surgical History:   Procedure Laterality Date    COLONOSCOPY      HYSTERECTOMY      JOINT REPLACEMENT  2010 and 2022    Knee and shoulder    REPLACEMENT TOTAL KNEE Right     SHOULDER SURGERY Right     tendon repair    TONSILLECTOMY      TOTAL SHOULDER ARTHROPLASTY W/ DISTAL CLAVICLE EXCISION Left 10/15/2021    Procedure: TOTAL SHOULDER REVERSE ARTHROPLASTY;  Surgeon: Scout Palmer MD;  Location: [facility];  Service: Orthopedics;  Laterality:  Left;       Family History   Problem Relation Age of Onset    Heart attack Father     Heart disease Father     Breast cancer Neg Hx     Malig Hyperthermia Neg Hx        Social History     Socioeconomic History    Marital status:    Tobacco Use    Smoking status: Never     Passive exposure: Never    Smokeless tobacco: Never   Vaping Use    Vaping status: Never Used   Substance and Sexual Activity    Alcohol use: No    Drug use: No    Sexual activity: Not Currently     Partners: Male     Birth control/protection: Pill, Hysterectomy           Objective   Physical Exam  General: Patient is awake and alert.    Neurological: Alert, orientedx3, CN II-XII intact and symmetric, 5/5 str, sensation intact, coordinated, nl gait. 7/19/25 5:40 pm NIHSS 0.    Skin: No bruising noted on the right leg. No edema.     Lower Extremities: No edema. No bruising. Full range of motion. 2+ pulses  Procedures       EKG interpretation  Interpreted by: Petros Tucker MD   Date: 07/18/2025  Time:  1931  Rate: 64 BPM  Rhythm: NSR  Impression: -STEMI          ED Course                                                       Medical Decision Making  Problems Addressed:  Acute non intractable tension-type headache: complicated acute illness or injury  Medication side effect: complicated acute illness or injury  Nausea and vomiting, unspecified vomiting type: complicated acute illness or injury  Vasovagal syncope: complicated acute illness or injury    Amount and/or Complexity of Data Reviewed  Labs: ordered.  Radiology: ordered.  ECG/medicine tests: ordered.    Risk  OTC drugs.  Prescription drug management.    The patient presents with dizziness, nausea, dry heaves, and a sensation of a tight band around the head, which began after starting methylprednisolone on the 16th. The patient has a history of a right leg injury caused by a dog impact, which resulted in muscle damage from the knee down, though no bruising was visible externally. The  patient has an artificial knee in the affected leg. The patient denies loss of consciousness, falls, or head injury. Neurological examination, including eye tracking and limb coordination, appears intact. The patient has a history of hypertension but denies diabetes and has unspecified medication allergies documented in their file. The patient does not smoke, drink alcohol, or use drugs.     The symptoms are consistent with side effects from the medication, likely a steroid, rather than an allergic reaction. Consideration to methylprednisolone induced hyperglycemia.    The plan is to eval with accucheck, discontinue the medication, as the symptoms are attributed to its side effects. The patient is advised to monitor symptoms and follow up if they persist or worsen.    7:33 pm pt put her head down to throw up and had acute onset near syncope. Her vitals during this time were normal; EKG performed shows NSR.     I ordered blood work, head ct, cxr to eval further, tx w/ 1 L NS IV.    Though, I suspect this was a vagal response to emesis coupled with dehydration    8:41 pm CTB neg for ICH as per rads; EKG IS NSR cxr clr. TROP -.    CBC is normal; cmp shows sodium 120; tx w/ 1 L  cc/hr for 1 bag total.    Lipase nl    Concern for symptomatic acute hyponatremia. Med rec shows spironolactone started April '25, likely etiologic agent; Her exam shows mild skin turgor, and given n/v, hypovolemic hyponatremia suspected.    Admit to hospitalist, Dr. Saunders, who accepted s/o via alex [employer].    Hst pt  Dsp medsurg  Final diagnoses:   Acute non intractable tension-type headache   Nausea and vomiting, unspecified vomiting type   Medication side effect   Vasovagal syncope       ED Disposition  ED Disposition       ED Disposition   Intended Admit    Condition   --    Comment   --               Chelsie Rai MD  [address] [place] [address]  [phone]    In 2 days           Medication List        New Prescriptions       acetaminophen 500 MG tablet  Commonly known as: TYLENOL  Take 2 tablets by mouth Every 6 (Six) Hours As Needed for Mild Pain for up to 7 days.  Replaces: TYLENOL ARTHRITIS PAIN PO     ibuprofen 600 MG tablet  Commonly known as: ADVIL,MOTRIN  Take 1 tablet by mouth Every 6 (Six) Hours As Needed for Mild Pain.     ondansetron ODT 4 MG disintegrating tablet  Commonly known as: ZOFRAN-ODT  Take 1 tablet by mouth Every 8 (Eight) Hours As Needed for Nausea.            Changed      fluticasone 50 MCG/ACT nasal spray  Commonly known as: FLONASE  Administer 2 sprays into the nostril(s) as directed by provider Daily for 30 days.  What changed:   when to take this  reasons to take this            Stop      TYLENOL ARTHRITIS PAIN PO  Replaced by: acetaminophen 500 MG tablet               Where to Get Your Medications        You can get these medications from any pharmacy    Bring a paper prescription for each of these medications  acetaminophen 500 MG tablet  ibuprofen 600 MG tablet  ondansetron ODT 4 MG disintegrating tablet            Tra Dominguez MD  07/19/25 2043       Tra Dominguez MD  07/19/25 2055

## 2025-07-19 NOTE — Clinical Note
[facility] EMERGENCY DEPARTMENT  [address] [place] [address]  Phone: [phone]    Nguyen Zhang was seen and treated in our emergency department on 7/19/2025.  She may return to work on 07/21/2025.         Thank you for choosing [facility].    Tra Dominguez MD

## 2025-07-20 LAB
ALBUMIN SERPL-MCNC: 4.8 G/DL (ref 3.5–5.2)
ALBUMIN/GLOB SERPL: 1.8 G/DL
ALP SERPL-CCNC: 73 U/L (ref 39–117)
ALT SERPL W P-5'-P-CCNC: 20 U/L (ref 1–33)
ANION GAP SERPL CALCULATED.3IONS-SCNC: 15.7 MMOL/L (ref 5–15)
AST SERPL-CCNC: 25 U/L (ref 1–32)
BASOPHILS # BLD AUTO: 0.02 10*3/MM3 (ref 0–0.2)
BASOPHILS NFR BLD AUTO: 0.2 % (ref 0–1.5)
BILIRUB SERPL-MCNC: 0.5 MG/DL (ref 0–1.2)
BILIRUB UR QL STRIP: NEGATIVE
BUN SERPL-MCNC: 11.8 MG/DL (ref 8–23)
BUN/CREAT SERPL: 15.3 (ref 7–25)
CALCIUM SPEC-SCNC: 12 MG/DL (ref 8.6–10.5)
CHLORIDE SERPL-SCNC: 89 MMOL/L (ref 98–107)
CHLORIDE UR-SCNC: 53 MMOL/L
CLARITY UR: CLEAR
CO2 SERPL-SCNC: 22.3 MMOL/L (ref 22–29)
COLOR UR: YELLOW
CREAT SERPL-MCNC: 0.77 MG/DL (ref 0.57–1)
DEPRECATED RDW RBC AUTO: 39.4 FL (ref 37–54)
EGFRCR SERPLBLD CKD-EPI 2021: 76.2 ML/MIN/1.73
EOSINOPHIL # BLD AUTO: 0.01 10*3/MM3 (ref 0–0.4)
EOSINOPHIL NFR BLD AUTO: 0.1 % (ref 0.3–6.2)
ERYTHROCYTE [DISTWIDTH] IN BLOOD BY AUTOMATED COUNT: 11.9 % (ref 12.3–15.4)
GLOBULIN UR ELPH-MCNC: 2.7 GM/DL
GLUCOSE SERPL-MCNC: 89 MG/DL (ref 65–99)
GLUCOSE UR STRIP-MCNC: NEGATIVE MG/DL
HCT VFR BLD AUTO: 39.1 % (ref 34–46.6)
HGB BLD-MCNC: 14 G/DL (ref 12–15.9)
HGB UR QL STRIP.AUTO: NEGATIVE
IMM GRANULOCYTES # BLD AUTO: 0.02 10*3/MM3 (ref 0–0.05)
IMM GRANULOCYTES NFR BLD AUTO: 0.2 % (ref 0–0.5)
KETONES UR QL STRIP: NEGATIVE
LEUKOCYTE ESTERASE UR QL STRIP.AUTO: NEGATIVE
LYMPHOCYTES # BLD AUTO: 1.85 10*3/MM3 (ref 0.7–3.1)
LYMPHOCYTES NFR BLD AUTO: 21.9 % (ref 19.6–45.3)
MAGNESIUM SERPL-MCNC: 1.5 MG/DL (ref 1.6–2.4)
MAGNESIUM SERPL-MCNC: 3.6 MG/DL (ref 1.6–2.4)
MCH RBC QN AUTO: 32.4 PG (ref 26.6–33)
MCHC RBC AUTO-ENTMCNC: 35.8 G/DL (ref 31.5–35.7)
MCV RBC AUTO: 90.5 FL (ref 79–97)
MONOCYTES # BLD AUTO: 0.99 10*3/MM3 (ref 0.1–0.9)
MONOCYTES NFR BLD AUTO: 11.7 % (ref 5–12)
NEUTROPHILS NFR BLD AUTO: 5.55 10*3/MM3 (ref 1.7–7)
NEUTROPHILS NFR BLD AUTO: 65.9 % (ref 42.7–76)
NITRITE UR QL STRIP: NEGATIVE
NRBC BLD AUTO-RTO: 0 /100 WBC (ref 0–0.2)
PH UR STRIP.AUTO: 7 [PH] (ref 4.5–8)
PLATELET # BLD AUTO: 294 10*3/MM3 (ref 140–450)
PMV BLD AUTO: 9.7 FL (ref 6–12)
POTASSIUM SERPL-SCNC: 4.9 MMOL/L (ref 3.5–5.2)
POTASSIUM UR-SCNC: 8.2 MMOL/L
PROT SERPL-MCNC: 7.5 G/DL (ref 6–8.5)
PROT UR QL STRIP: NEGATIVE
RBC # BLD AUTO: 4.32 10*6/MM3 (ref 3.77–5.28)
SODIUM SERPL-SCNC: 125 MMOL/L (ref 136–145)
SODIUM SERPL-SCNC: 127 MMOL/L (ref 136–145)
SODIUM UR-SCNC: 62 MMOL/L
SP GR UR STRIP: 1.01 (ref 1–1.03)
UROBILINOGEN UR QL STRIP: NORMAL
WBC NRBC COR # BLD AUTO: 8.44 10*3/MM3 (ref 3.4–10.8)

## 2025-07-20 PROCEDURE — 85025 COMPLETE CBC W/AUTO DIFF WBC: CPT | Performed by: NURSE PRACTITIONER

## 2025-07-20 PROCEDURE — 80053 COMPREHEN METABOLIC PANEL: CPT | Performed by: NURSE PRACTITIONER

## 2025-07-20 PROCEDURE — 25010000002 CALCIUM GLUCONATE 2-0.675 GM/100ML-% SOLUTION: Performed by: NURSE PRACTITIONER

## 2025-07-20 PROCEDURE — 99232 SBSQ HOSP IP/OBS MODERATE 35: CPT

## 2025-07-20 PROCEDURE — 83735 ASSAY OF MAGNESIUM: CPT

## 2025-07-20 PROCEDURE — 25810000003 SODIUM CHLORIDE 0.9 % SOLUTION

## 2025-07-20 PROCEDURE — 84300 ASSAY OF URINE SODIUM: CPT | Performed by: NURSE PRACTITIONER

## 2025-07-20 PROCEDURE — 25010000002 ONDANSETRON PER 1 MG: Performed by: NURSE PRACTITIONER

## 2025-07-20 PROCEDURE — 84133 ASSAY OF URINE POTASSIUM: CPT | Performed by: NURSE PRACTITIONER

## 2025-07-20 PROCEDURE — 81003 URINALYSIS AUTO W/O SCOPE: CPT | Performed by: NURSE PRACTITIONER

## 2025-07-20 PROCEDURE — 25010000002 MAGNESIUM SULFATE 2 GM/50ML SOLUTION

## 2025-07-20 PROCEDURE — 84295 ASSAY OF SERUM SODIUM: CPT

## 2025-07-20 PROCEDURE — 82436 ASSAY OF URINE CHLORIDE: CPT | Performed by: NURSE PRACTITIONER

## 2025-07-20 RX ORDER — LOSARTAN POTASSIUM 50 MG/1
100 TABLET ORAL
Status: DISCONTINUED | OUTPATIENT
Start: 2025-07-20 | End: 2025-07-22 | Stop reason: HOSPADM

## 2025-07-20 RX ORDER — LEVOTHYROXINE SODIUM 75 UG/1
75 TABLET ORAL DAILY
Status: DISCONTINUED | OUTPATIENT
Start: 2025-07-20 | End: 2025-07-20

## 2025-07-20 RX ORDER — LEVOTHYROXINE SODIUM 88 UG/1
88 TABLET ORAL DAILY
Status: DISCONTINUED | OUTPATIENT
Start: 2025-07-20 | End: 2025-07-20

## 2025-07-20 RX ORDER — AMLODIPINE BESYLATE 5 MG/1
5 TABLET ORAL EVERY EVENING
Status: DISCONTINUED | OUTPATIENT
Start: 2025-07-20 | End: 2025-07-20

## 2025-07-20 RX ORDER — ATENOLOL 50 MG/1
50 TABLET ORAL 2 TIMES DAILY
Status: DISCONTINUED | OUTPATIENT
Start: 2025-07-20 | End: 2025-07-22 | Stop reason: HOSPADM

## 2025-07-20 RX ORDER — CHOLECALCIFEROL (VITAMIN D3) 25 MCG
2000 TABLET ORAL DAILY
Status: DISCONTINUED | OUTPATIENT
Start: 2025-07-20 | End: 2025-07-22 | Stop reason: HOSPADM

## 2025-07-20 RX ORDER — SODIUM CHLORIDE 9 MG/ML
75 INJECTION, SOLUTION INTRAVENOUS CONTINUOUS
Status: DISCONTINUED | OUTPATIENT
Start: 2025-07-20 | End: 2025-07-21

## 2025-07-20 RX ORDER — AMLODIPINE BESYLATE 5 MG/1
5 TABLET ORAL EVERY EVENING
Status: DISCONTINUED | OUTPATIENT
Start: 2025-07-20 | End: 2025-07-22 | Stop reason: HOSPADM

## 2025-07-20 RX ORDER — HYDRALAZINE HYDROCHLORIDE 20 MG/ML
10 INJECTION INTRAMUSCULAR; INTRAVENOUS EVERY 6 HOURS PRN
Status: DISCONTINUED | OUTPATIENT
Start: 2025-07-20 | End: 2025-07-22 | Stop reason: HOSPADM

## 2025-07-20 RX ORDER — MAGNESIUM SULFATE HEPTAHYDRATE 40 MG/ML
2 INJECTION, SOLUTION INTRAVENOUS
Status: COMPLETED | OUTPATIENT
Start: 2025-07-20 | End: 2025-07-20

## 2025-07-20 RX ORDER — ASPIRIN 81 MG/1
81 TABLET ORAL DAILY
Status: DISCONTINUED | OUTPATIENT
Start: 2025-07-20 | End: 2025-07-22 | Stop reason: HOSPADM

## 2025-07-20 RX ORDER — LEVOTHYROXINE SODIUM 50 UG/1
100 TABLET ORAL
Status: DISCONTINUED | OUTPATIENT
Start: 2025-07-20 | End: 2025-07-22 | Stop reason: HOSPADM

## 2025-07-20 RX ADMIN — Medication 2000 UNITS: at 09:11

## 2025-07-20 RX ADMIN — CALCIUM GLUCONATE 2000 MG: 20 INJECTION, SOLUTION INTRAVENOUS at 01:54

## 2025-07-20 RX ADMIN — SODIUM CHLORIDE 75 ML/HR: 9 INJECTION, SOLUTION INTRAVENOUS at 15:46

## 2025-07-20 RX ADMIN — CALCIUM GLUCONATE 2000 MG: 20 INJECTION, SOLUTION INTRAVENOUS at 04:00

## 2025-07-20 RX ADMIN — ONDANSETRON 4 MG: 2 INJECTION INTRAMUSCULAR; INTRAVENOUS at 07:36

## 2025-07-20 RX ADMIN — AMLODIPINE BESYLATE 5 MG: 5 TABLET ORAL at 16:57

## 2025-07-20 RX ADMIN — AMLODIPINE BESYLATE 5 MG: 5 TABLET ORAL at 05:41

## 2025-07-20 RX ADMIN — MAGNESIUM SULFATE HEPTAHYDRATE 2 G: 40 INJECTION, SOLUTION INTRAVENOUS at 00:29

## 2025-07-20 RX ADMIN — LOSARTAN POTASSIUM 100 MG: 50 TABLET, FILM COATED ORAL at 09:11

## 2025-07-20 RX ADMIN — ASPIRIN 81 MG: 81 TABLET, COATED ORAL at 09:11

## 2025-07-20 RX ADMIN — LEVOTHYROXINE SODIUM 100 MCG: 0.05 TABLET ORAL at 05:21

## 2025-07-20 RX ADMIN — ATENOLOL 50 MG: 50 TABLET ORAL at 20:38

## 2025-07-20 RX ADMIN — ATENOLOL 50 MG: 50 TABLET ORAL at 09:14

## 2025-07-20 RX ADMIN — ATENOLOL 50 MG: 50 TABLET ORAL at 03:30

## 2025-07-20 RX ADMIN — POTASSIUM CHLORIDE 40 MEQ: 1500 TABLET, EXTENDED RELEASE ORAL at 03:30

## 2025-07-20 RX ADMIN — MAGNESIUM SULFATE HEPTAHYDRATE 2 G: 40 INJECTION, SOLUTION INTRAVENOUS at 03:30

## 2025-07-20 RX ADMIN — Medication 10 ML: at 20:38

## 2025-07-20 RX ADMIN — Medication 10 ML: at 09:14

## 2025-07-20 RX ADMIN — MAGNESIUM SULFATE HEPTAHYDRATE 2 G: 40 INJECTION, SOLUTION INTRAVENOUS at 05:21

## 2025-07-20 NOTE — PLAN OF CARE
Goal Outcome Evaluation:  Plan of Care Reviewed With: patient        Progress: improving  Outcome Evaluation: Pt nauseous this AM beginning of shift - given Zofran with relief. Pt otherwise without complaints today. Pt does state she have intermittent dizziness; however, she also reports this in ongoing at home at baseline. Up with walker/SBA.

## 2025-07-20 NOTE — PROGRESS NOTES
"SERVICE: [facility] HOSPITALIST    CONSULTANTS:    CHIEF COMPLAINT:    SUBJECTIVE: Patient seen and examined at bedside. Patient reports Patient denies headache, fever or chills, nausea, vomiting, diarrhea, abdominal pain, chest pain or palpitations, shortness of breath, wheezing or coughing, leg pain or weakness.     OBJECTIVE:    Physical exam is largely unchanged from previous exam, except where documented below, examination is accurate as of 7/20/2025    Physical Exam:  General: Patient is awake and alert. Well developed and well nourished. No acute distress noted.   HENT: Head is atraumatic, normocephalic. Hearing is grossly intact. Nose is without obvious congestion and appears patent. Neck is supple and trachea is midline.   Eyes: Vision is grossly intact. Pupils appear equal and round.   Cardiovascular: Heart has regular rate and rhythm with no murmurs, rubs or gallops noted.   Respiratory: Lungs are clear to ausculation without wheezes, rhonchi or rales.   Abdominal/GI: Soft, nontender, bowel sounds present. No rebound or guarding present.   Extremities: No peripheral edema noted.   Musculoskeletal: Spontaneous movement of bilateral upper and lower extremities against gravity noted. No signs of injury or deformity noted.   Skin: Warm and dry.   Psych: Mood and affect are appropriate. Cooperative with exam.   Neuro: No facial asymmetry noted. No focal deficits noted, hearing and vision are grossly intact.     /77 (BP Location: Left arm, Patient Position: Lying)   Pulse 65   Temp 97.8 °F (36.6 °C) (Oral)   Resp 16   Ht 154.9 cm (61\")   Wt 56.1 kg (123 lb 10.9 oz)   SpO2 95%   BMI 23.37 kg/m²     MEDS/LABS REVIEWED AND ORDERED    amLODIPine, 5 mg, Oral, Q PM  aspirin, 81 mg, Oral, Daily  atenolol, 50 mg, Oral, BID  cholecalciferol, 2,000 Units, Oral, Daily  levothyroxine, 100 mcg, Oral, Q AM  losartan, 100 mg, Oral, Q24H  ondansetron, 4 mg, Intravenous, Once  sodium chloride, 10 " mL, Intravenous, Q12H          LAB/DIAGNOSTICS:    Lab Results (last 24 hours)       Procedure Component Value Units Date/Time    Magnesium [[id number]]  (Abnormal) Collected: 07/20/25 0542    Specimen: Blood Updated: 07/20/25 0712     Magnesium 3.6 mg/dL     Comprehensive Metabolic Panel [[id number]]  (Abnormal) Collected: 07/20/25 0542    Specimen: Blood Updated: 07/20/25 0712     Glucose 89 mg/dL      BUN 11.8 mg/dL      Creatinine 0.77 mg/dL      Sodium 127 mmol/L      Potassium 4.9 mmol/L      Chloride 89 mmol/L      CO2 22.3 mmol/L      Calcium 12.0 mg/dL      Total Protein 7.5 g/dL      Albumin 4.8 g/dL      ALT (SGPT) 20 U/L      AST (SGOT) 25 U/L      Alkaline Phosphatase 73 U/L      Total Bilirubin 0.5 mg/dL      Globulin 2.7 gm/dL      A/G Ratio 1.8 g/dL      BUN/Creatinine Ratio 15.3     Anion Gap 15.7 mmol/L      eGFR 76.2 mL/min/1.73     Narrative:      GFR Categories in Chronic Kidney Disease (CKD)              GFR Category          GFR (mL/min/1.73)    Interpretation  G1                    90 or greater        Normal or high (1)  G2                    60-89                Mild decrease (1)  G3a                   45-59                Mild to moderate decrease  G3b                   30-44                Moderate to severe decrease  G4                    15-29                Severe decrease  G5                    14 or less           Kidney failure    (1)In the absence of evidence of kidney disease, neither GFR category G1 or G2 fulfill the criteria for CKD.    eGFR calculation 2021 CKD-EPI creatinine equation, which does not include race as a factor    CBC Auto Differential [[id number]]  (Abnormal) Collected: 07/20/25 0542    Specimen: Blood Updated: 07/20/25 0616     WBC 8.44 10*3/mm3      RBC 4.32 10*6/mm3      Hemoglobin 14.0 g/dL      Hematocrit 39.1 %      MCV 90.5 fL      MCH 32.4 pg      MCHC 35.8 g/dL      RDW 11.9 %      RDW-SD 39.4 fl      MPV 9.7 fL      Platelets 294 10*3/mm3      Neutrophil %  65.9 %      Lymphocyte % 21.9 %      Monocyte % 11.7 %      Eosinophil % 0.1 %      Basophil % 0.2 %      Immature Grans % 0.2 %      Neutrophils, Absolute 5.55 10*3/mm3      Lymphocytes, Absolute 1.85 10*3/mm3      Monocytes, Absolute 0.99 10*3/mm3      Eosinophils, Absolute 0.01 10*3/mm3      Basophils, Absolute 0.02 10*3/mm3      Immature Grans, Absolute 0.02 10*3/mm3      nRBC 0.0 /100 WBC     Urinalysis With Culture If Indicated - Urine, Clean Catch [[id number]]  (Normal) Collected: 07/20/25 0129    Specimen: Urine, Clean Catch Updated: 07/20/25 0137     Color, UA Yellow     Appearance, UA Clear     pH, UA 7.0     Specific Gravity, UA 1.015     Glucose, UA Negative     Ketones, UA Negative     Bilirubin, UA Negative     Blood, UA Negative     Protein, UA Negative     Leuk Esterase, UA Negative     Nitrite, UA Negative     Urobilinogen, UA 0.2 E.U./dL    Narrative:      In absence of clinical symptoms, the presence of pyuria, bacteria, and/or nitrites on the urinalysis result does not correlate with infection.  Urine microscopic not indicated.    Urine Electrolytes - Urine, Clean Catch [[id number]] Collected: 07/20/25 0128    Specimen: Urine, Clean Catch Updated: 07/20/25 0136     Potassium, Urine 8.2 mmol/L      Sodium, Urine 62 mmol/L      Chloride, Urine 53 mmol/L     Narrative:      Reference intervals for random urine have not been established.  Clinical usage is dependent upon physician's interpretation in combination with other laboratory tests.       Magnesium [[id number]]  (Abnormal) Collected: 07/19/25 2046    Specimen: Blood Updated: 07/20/25 0003     Magnesium 1.5 mg/dL     T4, Free [[id number]]  (Normal) Collected: 07/19/25 2046    Specimen: Blood Updated: 07/19/25 2352     Free T4 1.49 ng/dL     Basic Metabolic Panel [[id number]]  (Abnormal) Collected: 07/19/25 2046    Specimen: Blood Updated: 07/19/25 2208     Glucose 89 mg/dL      BUN 12.2 mg/dL      Creatinine 0.68 mg/dL      Sodium 125 mmol/L       Potassium 3.2 mmol/L      Chloride 93 mmol/L      CO2 17.1 mmol/L      Calcium 7.3 mg/dL      BUN/Creatinine Ratio 17.9     Anion Gap 14.9 mmol/L      eGFR 86.0 mL/min/1.73     Narrative:      GFR Categories in Chronic Kidney Disease (CKD)              GFR Category          GFR (mL/min/1.73)    Interpretation  G1                    90 or greater        Normal or high (1)  G2                    60-89                Mild decrease (1)  G3a                   45-59                Mild to moderate decrease  G3b                   30-44                Moderate to severe decrease  G4                    15-29                Severe decrease  G5                    14 or less           Kidney failure    (1)In the absence of evidence of kidney disease, neither GFR category G1 or G2 fulfill the criteria for CKD.    eGFR calculation 2021 CKD-EPI creatinine equation, which does not include race as a factor    TSH [[id number]]  (Abnormal) Collected: 07/19/25 2046    Specimen: Blood Updated: 07/19/25 2207     TSH 8.960 uIU/mL     Hemoglobin A1c [[id number]]  (Normal) Collected: 07/19/25 1931    Specimen: Blood Updated: 07/19/25 2159     Hemoglobin A1C 5.55 %     Narrative:      Hemoglobin A1C Ranges:    Increased Risk for Diabetes  5.7% to 6.4%  Diabetes                     >= 6.5%  Diabetic Goal                < 7.0%    High Sensitivity Troponin T 1Hr [[id number]]  (Normal) Collected: 07/19/25 2046    Specimen: Blood Updated: 07/19/25 2106     HS Troponin T 7 ng/L      Troponin T Numeric Delta -3 ng/L     Narrative:      High Sensitive Troponin T Reference Range:  <14.0 ng/L- Negative Female for AMI  <22.0 ng/L- Negative Male for AMI  >=14 - Abnormal Female indicating possible myocardial injury.  >=22 - Abnormal Male indicating possible myocardial injury.   Clinicians would have to utilize clinical acumen, EKG, Troponin, and serial changes to determine if it is an Acute Myocardial Infarction or myocardial injury due to an underlying  chronic condition.         Comprehensive Metabolic Panel [[id number]]  (Abnormal) Collected: 07/19/25 1931    Specimen: Blood Updated: 07/19/25 1959     Glucose 114 mg/dL      BUN 14.2 mg/dL      Creatinine 0.95 mg/dL      Sodium 120 mmol/L      Potassium 4.2 mmol/L      Comment: Specimen hemolyzed.  Result may be falsely elevated.        Chloride 81 mmol/L      CO2 19.1 mmol/L      Calcium 9.7 mg/dL      Total Protein 8.0 g/dL      Albumin 4.8 g/dL      ALT (SGPT) 20 U/L      AST (SGOT) 33 U/L      Comment: Specimen hemolyzed.  Result may be falsely elevated.        Alkaline Phosphatase 76 U/L      Total Bilirubin 0.6 mg/dL      Globulin 3.2 gm/dL      A/G Ratio 1.5 g/dL      BUN/Creatinine Ratio 14.9     Anion Gap 19.9 mmol/L      eGFR 59.2 mL/min/1.73     Narrative:      GFR Categories in Chronic Kidney Disease (CKD)              GFR Category          GFR (mL/min/1.73)    Interpretation  G1                    90 or greater        Normal or high (1)  G2                    60-89                Mild decrease (1)  G3a                   45-59                Mild to moderate decrease  G3b                   30-44                Moderate to severe decrease  G4                    15-29                Severe decrease  G5                    14 or less           Kidney failure    (1)In the absence of evidence of kidney disease, neither GFR category G1 or G2 fulfill the criteria for CKD.    eGFR calculation 2021 CKD-EPI creatinine equation, which does not include race as a factor    Magnesium [[id number]]  (Normal) Collected: 07/19/25 1931    Specimen: Blood Updated: 07/19/25 1959     Magnesium 2.0 mg/dL     Lipase [[id number]]  (Normal) Collected: 07/19/25 1931    Specimen: Blood Updated: 07/19/25 1954     Lipase 34 U/L     Phosphorus [[id number]]  (Normal) Collected: 07/19/25 1931    Specimen: Blood Updated: 07/19/25 1954     Phosphorus 2.6 mg/dL     High Sensitivity Troponin T [[id number]]  (Normal) Collected: 07/19/25 1931     "Specimen: Blood Updated: 07/19/25 1954     HS Troponin T 10 ng/L     Narrative:      High Sensitive Troponin T Reference Range:  <14.0 ng/L- Negative Female for AMI  <22.0 ng/L- Negative Male for AMI  >=14 - Abnormal Female indicating possible myocardial injury.  >=22 - Abnormal Male indicating possible myocardial injury.   Clinicians would have to utilize clinical acumen, EKG, Troponin, and serial changes to determine if it is an Acute Myocardial Infarction or myocardial injury due to an underlying chronic condition.         D-dimer, Quantitative [[id number]]  (Normal) Collected: 07/19/25 1931    Specimen: Blood Updated: 07/19/25 1949     D-Dimer, Quantitative 0.38 MCGFEU/mL     Narrative:      According to the assay 's published package insert, a normal (<0.50 MCGFEU/mL) D-dimer result in conjunction with a non-high clinical probability assessment, excludes deep vein thrombosis (DVT) and pulmonary embolism (PE) with high sensitivity.    D-dimer values increase with age and this can make VTE exclusion of an older population difficult. To address this, the [employer], based on best available evidence and recent guidelines, recommends that clinicians use age-adjusted D-dimer thresholds in patients greater than 50 years of age with: a) a low probability of PE who do not meet all Pulmonary Embolism Rule Out Criteria, or b) in those with intermediate probability of PE.   The formula for an age-adjusted D-dimer cut-off is \"age/100\".  For example, a 60 year old patient would have an age-adjusted cut-off of 0.60 MCGFEU/mL and an 80 year old 0.80 MCGFEU/mL.    CBC & Differential [[id number]]  (Abnormal) Collected: 07/19/25 1931    Specimen: Blood Updated: 07/19/25 1936    Narrative:      The following orders were created for panel order CBC & Differential.  Procedure                               Abnormality         Status                     ---------                               -----------  "        ------                     CBC Auto Differential[[id number]]        Abnormal            Final result                 Please view results for these tests on the individual orders.    CBC Auto Differential [[id number]]  (Abnormal) Collected: 07/19/25 1931    Specimen: Blood Updated: 07/19/25 1936     WBC 11.70 10*3/mm3      RBC 4.35 10*6/mm3      Hemoglobin 14.3 g/dL      Hematocrit 39.6 %      MCV 91.0 fL      MCH 32.9 pg      MCHC 36.1 g/dL      RDW 11.9 %      RDW-SD 39.7 fl      MPV 9.7 fL      Platelets 277 10*3/mm3      Neutrophil % 55.1 %      Lymphocyte % 32.3 %      Monocyte % 11.8 %      Eosinophil % 0.2 %      Basophil % 0.3 %      Immature Grans % 0.3 %      Neutrophils, Absolute 6.46 10*3/mm3      Lymphocytes, Absolute 3.78 10*3/mm3      Monocytes, Absolute 1.38 10*3/mm3      Eosinophils, Absolute 0.02 10*3/mm3      Basophils, Absolute 0.03 10*3/mm3      Immature Grans, Absolute 0.03 10*3/mm3      nRBC 0.0 /100 WBC     POC Glucose Once [[id number]]  (Normal) Collected: 07/19/25 1812    Specimen: Blood Updated: 07/19/25 1819     Glucose 121 mg/dL           ECG 12 Lead Syncope   Final Result   HEART RATE=64  bpm   RR Pisrrmlt=929  ms   CT Lhevzppx=770  ms   P Horizontal Axis=12  deg   P Front Axis=69  deg   QRSD Interval=84  ms   QT Trlofjhy=658  ms   JEaU=764  ms   QRS Axis=49  deg   T Wave Axis=56  deg   - NORMAL ECG -   Sinus rhythm   No change from prior tracing   Electronically Signed By: Serena Kumar ([facility]) 2025-07-19 20:16:04   Date and Time of Study:2025-07-19 19:31:07        Results for orders placed during the hospital encounter of 05/13/19    Adult Transthoracic Echo Complete W/ Cont if Necessary Per Protocol 05/14/2019  9:49 AM    Interpretation Summary  · Left ventricular systolic function is normal. Calculated EF = 59.0%. Estimated EF was in agreement with the calculated EF. Normal left ventricular cavity size noted. All left ventricular wall segments contract normally. Left ventricular  wall thickness is consistent with borderline concentric hypertrophy. Left ventricular diastolic dysfunction is noted (grade I) consistent with impaired relaxation.  · There is a small pericardial effusion anterior to the right ventricle.    XR Chest 1 View  Result Date: 7/19/2025  Impression: No acute cardiopulmonary abnormality. Electronically Signed: Erik Rand MD  7/19/2025 8:19 PM EDT  Workstation ID: [id number]    CT Head Without Contrast  Result Date: 7/19/2025  Impression: 1.No acute intracranial abnormality. 2.Moderate chronic small vessel ischemic change. Electronically Signed: Erik Rand MD  7/19/2025 8:19 PM EDT  Workstation ID: [id number]        ASSESSMENT/PLAN:  Please note portions of this assessment/plan may have been copied and pasted, but I have personally seen this patient and reviewed each line of this assessment and plan for accuracy and made updates to reflect my necessary changes on 7/20/2025    1.hyponatremia  The patient was recently started on steroids for swelling in her leg after mechanical injury, likely in combination with her diuretic therapy for her blood pressure.  She also reported nausea after she was started on azithromycin and this may have led to decreased p.o. intake.   -Likely hypovolemic hyponatremia considering orthostasis near syncope.  She has improved with supplemental IV fluids  - Stop 100 cc an hour normal saline  -She has corrected from 120, July 19, 19:31 to 127, 7/2 5: 42  Sodium rechecked this afternoon  She generally ports she is symptomatically improved      2 hypertension  Rather hypertensive this morning  Resumed amlodipine, atenolol, losartan  Home spironolactone 1 hold    3 hypothyroidism TSH elevated with normal T4  -Will likely need inpatient follow-up dose was changed from  overnight    PLAN FOR DISPOSITION: Within the next 48 hours    Iron Saunders DO  07/20/25  08:46 EDT    At [facility], we believe that sharing information builds trust and  "better relationships. You are receiving this note because you recently visited [facility]. It is possible you will see health information before a provider has talked with you about it. This kind of information can be easy to misunderstand. To help you fully understand what it means for your health, we urge you to discuss this note with your provider.    \"Dictated utilizing Dragon dictation\"    "

## 2025-07-20 NOTE — H&P
"[facility] HOSPITALIST     Chelsie Rai MD    CHIEF COMPLAINT: nausea    HISTORY OF PRESENT ILLNESS:  The patient is an 85-year-old female who presented through the emergency department secondary to 1 day of nausea, dizziness.  Notably she was started on Solu-Medrol for knee pain on 7/16/2025.  ER provider also noted that patient was started on HCTZ sometime in the recent past (do not see this on med list-only spironolactone started in April).  While in ER, during episode of dry heaving, staff noted near syncope episode.  Patient does not remember exact event. Labs were checked in ER and found to have sodium of 120.  She was given bolus of normal saline and admission was requested.    Patient notes that her blood pressure is \"all over the place, high, low\"    CT head negative for acute findings  WBC 11.70, CO2 19.1, sodium 120, anion gap 19.9    The patient has a h/o HTN, Hypothyroidism, HLD, chronic neck pain    She otherwise currently denies f/c/headache/rhinorrhea/nasal congestion/lightheadedness/syncopal sensation/cough/soa/n/v/d/chest pain/abdominal pain/recent illness/sick exposures/change in bowel or bladder habits/no weight change/bloody emesis or bloody stools/change in medications or any other new concerns.    Past Medical History:   Diagnosis Date    Arthritis     HL (hearing loss) March. 2023    Ringing in ears / hearing test confirmed loss of high tone sounds    Hyperlipidemia     Hypertension     Hypothyroid     Neck pain     PONV (postoperative nausea and vomiting)     Shoulder pain, left     sched total shoulder    Spinal headache      Past Surgical History:   Procedure Laterality Date    COLONOSCOPY      HYSTERECTOMY      JOINT REPLACEMENT  2010 and 2022    Knee and shoulder    REPLACEMENT TOTAL KNEE Right     SHOULDER SURGERY Right     tendon repair    TONSILLECTOMY      TOTAL SHOULDER ARTHROPLASTY W/ DISTAL CLAVICLE EXCISION Left 10/15/2021    Procedure: TOTAL SHOULDER " REVERSE ARTHROPLASTY;  Surgeon: Scout Palmer MD;  Location: [facility];  Service: Orthopedics;  Laterality: Left;     Family History   Problem Relation Age of Onset    Heart attack Father     Heart disease Father     Breast cancer Neg Hx     Malig Hyperthermia Neg Hx      Social History     Tobacco Use    Smoking status: Never     Passive exposure: Never    Smokeless tobacco: Never   Vaping Use    Vaping status: Never Used   Substance Use Topics    Alcohol use: No    Drug use: No     Medications Prior to Admission   Medication Sig Dispense Refill Last Dose/Taking    amLODIPine (NORVASC) 5 MG tablet TAKE 1 TABLET BY MOUTH EVERY EVENING. 90 tablet 3     aspirin 81 MG EC tablet Take 1 tablet by mouth Daily. 90 tablet 3     atenolol (TENORMIN) 50 MG tablet TAKE 1 TABLET BY MOUTH 2 TIMES A DAY. 60 tablet 3     Azelastine HCl 137 MCG/SPRAY solution  (Patient not taking: Reported on 7/15/2025)       Bempedoic Acid 180 MG tablet Take 1 tablet by mouth Daily. (Patient not taking: Reported on 5/1/2025) 90 tablet 3     Bioflavonoid Products (DOROTEO C PO) Take 500 mg by mouth Daily.       Cholecalciferol (VITAMIN D3) 1000 units capsule Take 2 capsules by mouth Daily.       ezetimibe (ZETIA) 10 MG tablet Take 1 tablet by mouth Daily. 90 tablet 3     fluticasone (FLONASE) 50 MCG/ACT nasal spray Administer 2 sprays into the nostril(s) as directed by provider Daily for 30 days. (Patient taking differently: Administer 2 sprays into the nostril(s) as directed by provider As Needed.) 16 g 0     levothyroxine (SYNTHROID, LEVOTHROID) 75 MCG tablet TAKE 1 TABLET BY MOUTH DAILY. 90 tablet 3     losartan (COZAAR) 100 MG tablet TAKE 1 TABLET BY MOUTH DAILY. 90 tablet 3     meclizine (ANTIVERT) 25 MG tablet Take 1 tablet by mouth 3 (Three) Times a Day As Needed for Dizziness. (Patient not taking: Reported on 7/15/2025) 20 tablet 0     meloxicam (MOBIC) 7.5 MG tablet Take 1 tablet by mouth Daily. 30 tablet 5     methylPREDNISolone  "(MEDROL) 4 MG dose pack Use as directed by package instructions.  For completion of the steroid begin meloxicam 7.5 daily 21 tablet 0     multivitamin-minerals (CENTRUM) tablet Take 1 tablet by mouth Daily.       NIACIN PO Take 500 mg by mouth Daily.       spironolactone (ALDACTONE) 25 MG tablet Take 1 tablet by mouth Daily. 90 tablet 3     vitamin E 400 UNIT capsule Take 1 capsule by mouth Daily.       [DISCONTINUED] Acetaminophen (TYLENOL ARTHRITIS PAIN PO) Take 500 mg by mouth Daily.        Allergies:  Ace inhibitors, Codeine, and Meclizine    Immunization History   Administered Date(s) Administered    COVID-19 (MODERNA) 12YRS+ (SPIKEVAX) 10/25/2023    COVID-19 (MODERNA) 1st,2nd,3rd Dose Monovalent 02/03/2021, 02/03/2021, 03/03/2021, 03/03/2021, 12/08/2021, 08/16/2022    FLUAD TRI 65YR+ 11/05/2019    Fluad Quad 65+ 11/17/2020    Fluzone High-Dose 65+YRS 11/14/2024    Fluzone High-Dose 65+yrs 11/03/2021, 11/30/2022, 10/31/2023    Pneumococcal Polysaccharide (PPSV23) 08/13/2024    TD Preservative Free (Tenivac) 11/05/2019    Td (TDVAX) 08/26/2005, 08/26/2005       REVIEW OF SYSTEMS:  Please see the above history of present illness for pertinent positives and negatives.  The remainder of the patient's systems have been reviewed and are negative.     Vital Signs  Temp:  [98 °F (36.7 °C)] 98 °F (36.7 °C)  Heart Rate:  [64-71] 71  Resp:  [14-16] 16  BP: (114-182)/(70-99) 147/99  Body mass index is 22.67 kg/m².    Flowsheet Rows      Flowsheet Row First Filed Value   Admission Height 154.9 cm (61\") Documented at 07/19/2025 1702   Admission Weight 54.4 kg (120 lb) Documented at 07/19/2025 1702             Physical Exam  Vitals reviewed.   Constitutional:       General: She is not in acute distress.     Appearance: Normal appearance. She is not ill-appearing.   HENT:      Head: Normocephalic and atraumatic.      Mouth/Throat:      Mouth: Mucous membranes are moist.   Eyes:      Extraocular Movements: Extraocular movements " intact.      Pupils: Pupils are equal, round, and reactive to light.   Cardiovascular:      Rate and Rhythm: Normal rate and regular rhythm.   Pulmonary:      Effort: Pulmonary effort is normal. No respiratory distress.      Breath sounds: No wheezing or rales.   Abdominal:      General: Abdomen is flat. Bowel sounds are normal. There is no distension.      Palpations: Abdomen is soft.      Tenderness: There is no abdominal tenderness. There is no guarding.   Musculoskeletal:         General: Swelling present.      Comments: Right knee scar with mild right thigh/knee edema, non-tender   Skin:     General: Skin is warm and dry.      Capillary Refill: Capillary refill takes less than 2 seconds.      Findings: No erythema.   Neurological:      General: No focal deficit present.      Mental Status: She is alert and oriented to person, place, and time.   Psychiatric:         Mood and Affect: Mood normal.         Behavior: Behavior normal.       Emotional Behavior:    Judgment and Insight: Fair   Mental Status:  Alertness alert   Memory: Fair   Mood and Affect:         Depression none               Anxiety none    Debilities:   Physical Weakness unknown   Handicaps unknown   Disabilities unknown   Agitation none     Results Review:    I reviewed the patient's new clinical results.  Lab Results (most recent)       Procedure Component Value Units Date/Time    Comprehensive Metabolic Panel [[id number]]  (Abnormal) Collected: 07/19/25 1931    Specimen: Blood Updated: 07/19/25 1959     Glucose 114 mg/dL      BUN 14.2 mg/dL      Creatinine 0.95 mg/dL      Sodium 120 mmol/L      Potassium 4.2 mmol/L      Comment: Specimen hemolyzed.  Result may be falsely elevated.        Chloride 81 mmol/L      CO2 19.1 mmol/L      Calcium 9.7 mg/dL      Total Protein 8.0 g/dL      Albumin 4.8 g/dL      ALT (SGPT) 20 U/L      AST (SGOT) 33 U/L      Comment: Specimen hemolyzed.  Result may be falsely elevated.        Alkaline Phosphatase 76 U/L       Total Bilirubin 0.6 mg/dL      Globulin 3.2 gm/dL      A/G Ratio 1.5 g/dL      BUN/Creatinine Ratio 14.9     Anion Gap 19.9 mmol/L      eGFR 59.2 mL/min/1.73     Narrative:      GFR Categories in Chronic Kidney Disease (CKD)              GFR Category          GFR (mL/min/1.73)    Interpretation  G1                    90 or greater        Normal or high (1)  G2                    60-89                Mild decrease (1)  G3a                   45-59                Mild to moderate decrease  G3b                   30-44                Moderate to severe decrease  G4                    15-29                Severe decrease  G5                    14 or less           Kidney failure    (1)In the absence of evidence of kidney disease, neither GFR category G1 or G2 fulfill the criteria for CKD.    eGFR calculation 2021 CKD-EPI creatinine equation, which does not include race as a factor    Magnesium [[id number]]  (Normal) Collected: 07/19/25 1931    Specimen: Blood Updated: 07/19/25 1959     Magnesium 2.0 mg/dL     Lipase [[id number]]  (Normal) Collected: 07/19/25 1931    Specimen: Blood Updated: 07/19/25 1954     Lipase 34 U/L     Phosphorus [[id number]]  (Normal) Collected: 07/19/25 1931    Specimen: Blood Updated: 07/19/25 1954     Phosphorus 2.6 mg/dL     High Sensitivity Troponin T [[id number]]  (Normal) Collected: 07/19/25 1931    Specimen: Blood Updated: 07/19/25 1954     HS Troponin T 10 ng/L     Narrative:      High Sensitive Troponin T Reference Range:  <14.0 ng/L- Negative Female for AMI  <22.0 ng/L- Negative Male for AMI  >=14 - Abnormal Female indicating possible myocardial injury.  >=22 - Abnormal Male indicating possible myocardial injury.   Clinicians would have to utilize clinical acumen, EKG, Troponin, and serial changes to determine if it is an Acute Myocardial Infarction or myocardial injury due to an underlying chronic condition.         D-dimer, Quantitative [[id number]]  (Normal) Collected: 07/19/25 1931  "   Specimen: Blood Updated: 07/19/25 1949     D-Dimer, Quantitative 0.38 MCGFEU/mL     Narrative:      According to the assay 's published package insert, a normal (<0.50 MCGFEU/mL) D-dimer result in conjunction with a non-high clinical probability assessment, excludes deep vein thrombosis (DVT) and pulmonary embolism (PE) with high sensitivity.    D-dimer values increase with age and this can make VTE exclusion of an older population difficult. To address this, the [employer], based on best available evidence and recent guidelines, recommends that clinicians use age-adjusted D-dimer thresholds in patients greater than 50 years of age with: a) a low probability of PE who do not meet all Pulmonary Embolism Rule Out Criteria, or b) in those with intermediate probability of PE.   The formula for an age-adjusted D-dimer cut-off is \"age/100\".  For example, a 60 year old patient would have an age-adjusted cut-off of 0.60 MCGFEU/mL and an 80 year old 0.80 MCGFEU/mL.    CBC & Differential [[id number]]  (Abnormal) Collected: 07/19/25 1931    Specimen: Blood Updated: 07/19/25 1936    Narrative:      The following orders were created for panel order CBC & Differential.  Procedure                               Abnormality         Status                     ---------                               -----------         ------                     CBC Auto Differential[[id number]]        Abnormal            Final result                 Please view results for these tests on the individual orders.    CBC Auto Differential [[id number]]  (Abnormal) Collected: 07/19/25 1931    Specimen: Blood Updated: 07/19/25 1936     WBC 11.70 10*3/mm3      RBC 4.35 10*6/mm3      Hemoglobin 14.3 g/dL      Hematocrit 39.6 %      MCV 91.0 fL      MCH 32.9 pg      MCHC 36.1 g/dL      RDW 11.9 %      RDW-SD 39.7 fl      MPV 9.7 fL      Platelets 277 10*3/mm3      Neutrophil % 55.1 %      Lymphocyte % 32.3 %      Monocyte % 11.8 " %      Eosinophil % 0.2 %      Basophil % 0.3 %      Immature Grans % 0.3 %      Neutrophils, Absolute 6.46 10*3/mm3      Lymphocytes, Absolute 3.78 10*3/mm3      Monocytes, Absolute 1.38 10*3/mm3      Eosinophils, Absolute 0.02 10*3/mm3      Basophils, Absolute 0.03 10*3/mm3      Immature Grans, Absolute 0.03 10*3/mm3      nRBC 0.0 /100 WBC     POC Glucose Once [[id number]]  (Normal) Collected: 07/19/25 1812    Specimen: Blood Updated: 07/19/25 1819     Glucose 121 mg/dL             Imaging Results (Most Recent)       Procedure Component Value Units Date/Time    XR Chest 1 View [[id number]] Collected: 07/19/25 2019     Updated: 07/19/25 2022    Narrative:      XR CHEST 1 VW    Date of Exam: 7/19/2025 8:12 PM EDT    Indication: syncope    Comparison: 5/23/2025.    Findings:  There is no pneumothorax, pleural effusion or focal airspace consolidation. Heart size and pulmonary vasculature appear within normal limits. Regional bones appear intact.      Impression:      Impression:  No acute cardiopulmonary abnormality.          Electronically Signed: Erik Rand MD    7/19/2025 8:19 PM EDT    Workstation ID: [id number]    CT Head Without Contrast [[id number]] Collected: 07/19/25 2017     Updated: 07/19/25 2022    Narrative:      CT HEAD WO CONTRAST    Date of Exam: 7/19/2025 8:13 PM EDT    Indication: syncope.    Comparison: 7/12/2024 and brain MRI same date    Technique: Axial CT images were obtained of the head without contrast administration.  Coronal reconstructions were performed.  Automated exposure control and iterative reconstruction methods were used.      Findings:  Superficial soft tissues appear within normal limits. The calvarium is intact.  Paranasal sinuses and mastoid air cells appear well aerated. Orbits appear normal. There is no acute intracranial hemorrhage.  No mass effect or midline shift.  No abnormal   extra-axial collections.  Gray-white differentiation is within normal limits. There is moderate  patchy white matter hypoattenuation. The ventricles appear normal in size and configuration for the patient's age. Midline structures are atrophic, but   generally intact.      Impression:      Impression:  1.No acute intracranial abnormality.  2.Moderate chronic small vessel ischemic change.          Electronically Signed: Erik Rand MD    7/19/2025 8:19 PM EDT    Workstation ID: [id number]          reviewed    ECG/EMG Results (most recent)       Procedure Component Value Units Date/Time    ECG 12 Lead Syncope [[id number]] Collected: 07/19/25 1931     Updated: 07/19/25 2017     QT Interval 420 ms      QTC Interval 432 ms     Narrative:      HEART RATE=64  bpm  RR Lcytmgea=572  ms  MN Afcjijlu=410  ms  P Horizontal Axis=12  deg  P Front Axis=69  deg  QRSD Interval=84  ms  QT Ijxsstda=106  ms  AEuQ=599  ms  QRS Axis=49  deg  T Wave Axis=56  deg  - NORMAL ECG -  Sinus rhythm  No change from prior tracing  Electronically Signed By: Serena Kumar ([facility]) 2025-07-19 20:16:04  Date and Time of Study:2025-07-19 19:31:07          reviewed    Assessment & Plan   Acute hyponatremia:   HAGMA:   ?volume down from decreased intake and diuretic vs water retention from recent steroids?  Check urine electrolytes  Continue IVFs, recheck BMP at 10 pm  Fall precautions, seizure precautions  Check orthostatics  Allow regular diet  Monitor on telemetry    Leukocytosis:   Check cath UA C&S  Recheck lab in am    Near syncope:  Suspect vasovagal episode  Check orthostatics  Monitor on telemetry    HTN:  As above, check orthostatics  BP low goal currently, monitor only for now  HOLD spironolactone, amlodipine, atenolol, losartan    Hypothyroidism:  Check TSH    HLD: resume home meds at discharge    Chronic neck pain: nothing acute    Right leg muscle pain: managed per Dr. Jarrett with steroid pack  Hold for tonight    I discussed the patient's findings and my recommendations with patient and staff.     Pita Friend,  "ROMÁN  07/19/25  21:41 EDT    \"Dictated utilizing Dragon dictation\"    Note disclaimer: At [facility], we believe that sharing information builds trust and better relationships. You are receiving this note because you recently visited [facility]. It is possible you will see health information before a provider has talked with you about it. This kind of information can be easy to misunderstand. To help you fully understand what it means for your health, we urge you to discuss this note with your provider.        "

## 2025-07-20 NOTE — ED NOTES
"Nursing report ED to floor  Nguyen Zhang  85 y.o.  female    HPI :  HPI  Stated Reason for Visit: pt comes in with complaints of dizziness, nausea, and the feeling of a \"tight band\" around her head that started yesterday. pt has been taking prednisone  History Obtained From: patient    Chief Complaint  Chief Complaint   Patient presents with    Medication Reaction       Admitting doctor:   Iron Saunders DO    Admitting diagnosis:   The primary encounter diagnosis was Acute non intractable tension-type headache. Diagnoses of Nausea and vomiting, unspecified vomiting type, Medication side effect, and Vasovagal syncope were also pertinent to this visit.    Code status:   Current Code Status       Date Active Code Status Order ID Comments User Context       7/19/2025 2049 CPR (Attempt to Resuscitate) [id number]  Pita Friend, [facility]N ED        Question Answer    Code Status (Patient has no pulse and is not breathing) CPR (Attempt to Resuscitate)    Medical Interventions (Patient has pulse or is breathing) Full Support                    Allergies:   Ace inhibitors, Codeine, and Meclizine    Isolation:   No active isolations    Intake and Output  No intake or output data in the 24 hours ending 07/19/25 2106    Weight:       07/19/25  1702   Weight: 54.4 kg (120 lb)       Most recent vitals:   Vitals:    07/19/25 1801 07/19/25 1929 07/19/25 1930 07/19/25 2052   BP: 174/81 114/70     BP Location:       Patient Position:       Pulse: 66  64 70   Resp:       Temp:       TempSrc:       SpO2: 97%  97% 97%   Weight:       Height:           Active LDAs/IV Access:   Lines, Drains & Airways       Active LDAs       Name Placement date Placement time Site Days    Peripheral IV 07/19/25 1936 22 G Right Antecubital 07/19/25 1936  Antecubital  less than 1                    Labs (abnormal labs have a star):   Labs Reviewed   COMPREHENSIVE METABOLIC PANEL - Abnormal; Notable for the following components:       Result Value    " Glucose 114 (*)     Sodium 120 (*)     Chloride 81 (*)     CO2 19.1 (*)     AST (SGOT) 33 (*)     Anion Gap 19.9 (*)     eGFR 59.2 (*)     All other components within normal limits    Narrative:     GFR Categories in Chronic Kidney Disease (CKD)              GFR Category          GFR (mL/min/1.73)    Interpretation  G1                    90 or greater        Normal or high (1)  G2                    60-89                Mild decrease (1)  G3a                   45-59                Mild to moderate decrease  G3b                   30-44                Moderate to severe decrease  G4                    15-29                Severe decrease  G5                    14 or less           Kidney failure    (1)In the absence of evidence of kidney disease, neither GFR category G1 or G2 fulfill the criteria for CKD.    eGFR calculation 2021 CKD-EPI creatinine equation, which does not include race as a factor   CBC WITH AUTO DIFFERENTIAL - Abnormal; Notable for the following components:    WBC 11.70 (*)     MCHC 36.1 (*)     RDW 11.9 (*)     Eosinophil % 0.2 (*)     Lymphocytes, Absolute 3.78 (*)     Monocytes, Absolute 1.38 (*)     All other components within normal limits   LIPASE - Normal   MAGNESIUM - Normal   PHOSPHORUS - Normal   D-DIMER, QUANTITATIVE - Normal    Narrative:     According to the assay 's published package insert, a normal (<0.50 MCGFEU/mL) D-dimer result in conjunction with a non-high clinical probability assessment, excludes deep vein thrombosis (DVT) and pulmonary embolism (PE) with high sensitivity.    D-dimer values increase with age and this can make VTE exclusion of an older population difficult. To address this, the [employer], based on best available evidence and recent guidelines, recommends that clinicians use age-adjusted D-dimer thresholds in patients greater than 50 years of age with: a) a low probability of PE who do not meet all Pulmonary Embolism Rule Out  "Criteria, or b) in those with intermediate probability of PE.   The formula for an age-adjusted D-dimer cut-off is \"age/100\".  For example, a 60 year old patient would have an age-adjusted cut-off of 0.60 MCGFEU/mL and an 80 year old 0.80 MCGFEU/mL.   TROPONIN - Normal    Narrative:     High Sensitive Troponin T Reference Range:  <14.0 ng/L- Negative Female for AMI  <22.0 ng/L- Negative Male for AMI  >=14 - Abnormal Female indicating possible myocardial injury.  >=22 - Abnormal Male indicating possible myocardial injury.   Clinicians would have to utilize clinical acumen, EKG, Troponin, and serial changes to determine if it is an Acute Myocardial Infarction or myocardial injury due to an underlying chronic condition.        POCT GLUCOSE FINGERSTICK - Normal   URINALYSIS W/ CULTURE IF INDICATED   HIGH SENSITIVITIY TROPONIN T 1HR   POCT GLUCOSE FINGERSTICK   CBC AND DIFFERENTIAL    Narrative:     The following orders were created for panel order CBC & Differential.  Procedure                               Abnormality         Status                     ---------                               -----------         ------                     CBC Auto Differential[[id number]]        Abnormal            Final result                 Please view results for these tests on the individual orders.       EKG:   ECG 12 Lead Syncope   Final Result   HEART RATE=64  bpm   RR Vtjwhoaz=163  ms   KS Frsjxweb=912  ms   P Horizontal Axis=12  deg   P Front Axis=69  deg   QRSD Interval=84  ms   QT Avqylsjg=264  ms   SAuA=308  ms   QRS Axis=49  deg   T Wave Axis=56  deg   - NORMAL ECG -   Sinus rhythm   No change from prior tracing   Electronically Signed By: Serena Kumar ([facility]) 2025-07-19 20:16:04   Date and Time of Study:2025-07-19 19:31:07          Meds given in ED:   Medications   ondansetron (ZOFRAN) injection 4 mg (4 mg Intravenous Not Given 7/19/25 1801)   acetaminophen (TYLENOL) tablet 1,000 mg (1,000 mg Oral Given 7/19/25 1802) "   aluminum-magnesium hydroxide-simethicone (MAALOX MAX) 400-400-40 MG/5ML suspension 30 mL (30 mL Oral Given 7/19/25 1802)   ketorolac (TORADOL) injection 30 mg (30 mg Intramuscular Given 7/19/25 1802)   sodium chloride 0.9 % bolus 1,000 mL ( Intravenous Rate/Dose Change 7/19/25 2039)       Imaging results:  XR Chest 1 View  Result Date: 7/19/2025  Impression: No acute cardiopulmonary abnormality. Electronically Signed: Erik Rand MD  7/19/2025 8:19 PM EDT  Workstation ID: [id number]    CT Head Without Contrast  Result Date: 7/19/2025  Impression: 1.No acute intracranial abnormality. 2.Moderate chronic small vessel ischemic change. Electronically Signed: Erik Rand MD  7/19/2025 8:19 PM EDT  Workstation ID: [id number]      Ambulatory status:   - 2 assist    Social issues:   Social History     Socioeconomic History    Marital status:    Tobacco Use    Smoking status: Never     Passive exposure: Never    Smokeless tobacco: Never   Vaping Use    Vaping status: Never Used   Substance and Sexual Activity    Alcohol use: No    Drug use: No    Sexual activity: Not Currently     Partners: Male     Birth control/protection: Pill, Hysterectomy       Peripheral Neurovascular  Peripheral Neurovascular (Adult)  Peripheral Neurovascular WDL: WDL    Neuro Cognitive  Neuro Cognitive (Adult)  Cognitive/Neuro/Behavioral WDL: WDL    Learning  Learning Assessment  Learning Readiness and Ability: no barriers identified    Respiratory  Respiratory  Airway WDL: WDL  Respiratory WDL  Respiratory WDL: WDL    Abdominal Pain       Pain Assessments  Pain (Adult)  (0-10) Pain Rating: Rest: 6  (0-10) Pain Rating: Activity: 6  Pain Location: head  Pain Side/Orientation: generalized    NIH Stroke Scale       Javid Noble RN  07/19/25 21:06 EDT

## 2025-07-20 NOTE — CASE MANAGEMENT/SOCIAL WORK
Discharge Planning Assessment  [facility]     Patient Name: Nguyen Zhang  MRN: [id number]  Today's Date: 7/20/2025    Admit Date: 7/19/2025    Plan: Return home, lives alone   Discharge Needs Assessment       Row Name 07/20/25 1002       Living Environment    People in Home alone    Current Living Arrangements home    Primary Care Provided by self    Provides Primary Care For pet(s)    Family Caregiver if Needed child(nahun), adult    Able to Return to Prior Arrangements yes       Resource/Environmental Concerns    Transportation Concerns none       Transition Planning    Patient/Family Anticipates Transition to home    Patient/Family Anticipated Services at Transition none    Transportation Anticipated family or friend will provide;car, drives self       Discharge Needs Assessment    Readmission Within the Last 30 Days no previous admission in last 30 days    Equipment Currently Used at Home none  has walker and BSC available but does not use them    Concerns to be Addressed no discharge needs identified    Equipment Needed After Discharge none                   Discharge Plan       Row Name 07/20/25 0959       Plan    Plan Return home, lives alone    Patient/Family in Agreement with Plan yes    Plan Comments Met with pt at [facility], introduced self and explained role. Verified facesheet, PCP is Dr Rai. Pt uses [facility] in [place]. Pt lives alone in a single story home with a basement, there are 3 MERVIN. Pt has 4 children, all of whom live nearby within 2 miles of her and are available for assist if needed. Pt is independent for ADLs and mobility including driving. She has a walker and BSC available if needed. Pt denies having used HH previously. She has been to in rehab here at [facility] many years ago. Discussed dc planning, pt anticipates returning home at discharge with family to transport, no needs identified at this time. CCP will follow.                  Continued Care and Services - Admitted Since  7/19/2025    No active coordination exists.          Demographic Summary       Row Name 07/20/25 1002       General Information    Admission Type inpatient    Arrived From emergency department    Required Notices Provided Important Message from Medicare    Referral Source admission list    Reason for Consult discharge planning    Preferred Language English                   Functional Status    No documentation.                  Psychosocial    No documentation.                  Abuse/Neglect    No documentation.                  Legal    No documentation.                  Substance Abuse    No documentation.                  Patient Forms    No documentation.                     Abhay Lee RN

## 2025-07-20 NOTE — PLAN OF CARE
Goal Outcome Evaluation:  Plan of Care Reviewed With: patient        Progress: no change  Outcome Evaluation: ED admit: complaint of dizziness and nausea. Patient reports it all started when she was prescribed PO prednisolone. Orthostatic BP checked (see flowsheet), noted change when patient's standing. Still complain of dizziness when getting up, BSC set up, assist x1-2. Derranged electrolyte level, replaced per protocol. IV fluid onflow. Tolerating PO intake.

## 2025-07-21 LAB
ANION GAP SERPL CALCULATED.3IONS-SCNC: 12.3 MMOL/L (ref 5–15)
BUN SERPL-MCNC: 16.9 MG/DL (ref 8–23)
BUN/CREAT SERPL: 19.7 (ref 7–25)
CALCIUM SPEC-SCNC: 8.8 MG/DL (ref 8.6–10.5)
CHLORIDE SERPL-SCNC: 89 MMOL/L (ref 98–107)
CO2 SERPL-SCNC: 21.7 MMOL/L (ref 22–29)
CREAT SERPL-MCNC: 0.86 MG/DL (ref 0.57–1)
EGFRCR SERPLBLD CKD-EPI 2021: 66.7 ML/MIN/1.73
GLUCOSE SERPL-MCNC: 102 MG/DL (ref 65–99)
POTASSIUM SERPL-SCNC: 4.6 MMOL/L (ref 3.5–5.2)
SODIUM SERPL-SCNC: 123 MMOL/L (ref 136–145)

## 2025-07-21 PROCEDURE — 97161 PT EVAL LOW COMPLEX 20 MIN: CPT

## 2025-07-21 PROCEDURE — 80048 BASIC METABOLIC PNL TOTAL CA: CPT

## 2025-07-21 PROCEDURE — 25810000003 SODIUM CHLORIDE 0.9 % SOLUTION

## 2025-07-21 PROCEDURE — 99232 SBSQ HOSP IP/OBS MODERATE 35: CPT | Performed by: HOSPITALIST

## 2025-07-21 RX ADMIN — ATENOLOL 50 MG: 50 TABLET ORAL at 21:05

## 2025-07-21 RX ADMIN — LOSARTAN POTASSIUM 100 MG: 50 TABLET, FILM COATED ORAL at 08:50

## 2025-07-21 RX ADMIN — Medication 10 ML: at 21:05

## 2025-07-21 RX ADMIN — ATENOLOL 50 MG: 50 TABLET ORAL at 08:50

## 2025-07-21 RX ADMIN — Medication 10 ML: at 08:52

## 2025-07-21 RX ADMIN — SODIUM CHLORIDE 75 ML/HR: 9 INJECTION, SOLUTION INTRAVENOUS at 05:35

## 2025-07-21 RX ADMIN — Medication 2000 UNITS: at 08:50

## 2025-07-21 RX ADMIN — AMLODIPINE BESYLATE 5 MG: 5 TABLET ORAL at 16:10

## 2025-07-21 RX ADMIN — ASPIRIN 81 MG: 81 TABLET, COATED ORAL at 08:50

## 2025-07-21 RX ADMIN — LEVOTHYROXINE SODIUM 100 MCG: 0.05 TABLET ORAL at 05:33

## 2025-07-21 NOTE — CASE MANAGEMENT/SOCIAL WORK
Continued Stay Note  JONATHAN Freeman     Patient Name: Nguyen Zhang  MRN: [id number]  Today's Date: 7/21/2025    Admit Date: 7/19/2025    Plan: Plan home, lives alone/family can assist   Discharge Plan       Row Name 07/21/25 1150       Plan    Plan Plan home, lives alone/family can assist    Patient/Family in Agreement with Plan yes    Plan Comments Follow up visit, patient is sitting up in recliner. She does not anticipte any needs at dc and is hoping to go home tomorrow. CM # placed on white board, will continue to follow.                   Discharge Codes    No documentation.                 Expected Discharge Date and Time       Expected Discharge Date Expected Discharge Time    Jul 22, 2025               Jc Powers RN

## 2025-07-21 NOTE — THERAPY DISCHARGE NOTE
Patient Name: Nguyen Zhang  : 1940    MRN: [id number]                              Today's Date: 2025       Admit Date: 2025    Visit Dx:     ICD-10-CM ICD-9-CM   1. Acute non intractable tension-type headache  G44.209 339.10   2. Nausea and vomiting, unspecified vomiting type  R11.2 787.01   3. Medication side effect  T88.7XXA 995.20   4. Vasovagal syncope  R55 780.2     Patient Active Problem List   Diagnosis    Precordial chest pain    PVCs (premature ventricular contractions)    Essential hypertension    Acquired hypothyroidism    Low serum potassium level    Hypercholesterolemia    Acute pain of left shoulder    Osteoarthritis of multiple joints    Statin intolerance    Closed nondisplaced fracture of fifth metacarpal bone of right hand    Hyponatremia     Past Medical History:   Diagnosis Date    Arthritis     HL (hearing loss) 2023    Ringing in ears / hearing test confirmed loss of high tone sounds    Hyperlipidemia     Hypertension     Hypothyroid     Neck pain     PONV (postoperative nausea and vomiting)     Shoulder pain, left     sched total shoulder    Spinal headache      Past Surgical History:   Procedure Laterality Date    COLONOSCOPY      HYSTERECTOMY      JOINT REPLACEMENT   and     Knee and shoulder    REPLACEMENT TOTAL KNEE Right     SHOULDER SURGERY Right     tendon repair    TONSILLECTOMY      TOTAL SHOULDER ARTHROPLASTY W/ DISTAL CLAVICLE EXCISION Left 10/15/2021    Procedure: TOTAL SHOULDER REVERSE ARTHROPLASTY;  Surgeon: Scout Palmer MD;  Location: [facility];  Service: Orthopedics;  Laterality: Left;      General Information       Row Name 25 0905          Physical Therapy Time and Intention    Document Type evaluation  -JW     Mode of Treatment physical therapy  -       Row Name 25 0905          General Information    Patient Profile Reviewed yes  pt admitted with hyponatremia  -JW     Prior Level of Function independent:;all  household mobility;community mobility  -     Existing Precautions/Restrictions fall  -     Barriers to Rehab none identified  -       Row Name 07/21/25 0905          Living Environment    Current Living Arrangements home  -     People in Home alone  -       Row Name 07/21/25 0905          Home Main Entrance    Number of Stairs, Main Entrance three  -       Row Name 07/21/25 0905          Cognition    Orientation Status (Cognition) oriented x 3  -               User Key  (r) = Recorded By, (t) = Taken By, (c) = Cosigned By      Initials Name Provider Type     Faby Lathma, PT Physical Therapist                   Mobility       Row Name 07/21/25 0905          Bed Mobility    Assistive Device (Bed Mobility) other (see comments)  -     Comment, (Bed Mobility) deferred up in recliner  -       Row Name 07/21/25 0905          Sit-Stand Transfer    Sit-Stand [place] (Transfers) standby assist  -     Assistive Device (Sit-Stand Transfers) walker, front-wheeled  -       Row Name 07/21/25 0905          Gait/Stairs (Locomotion)    [place] Level (Gait) standby assist  -     Assistive Device (Gait) walker, front-wheeled  -     Distance in Feet (Gait) 125  -     Deviations/Abnormal Patterns (Gait) rachael decreased  -     Bilateral Gait Deviations forward flexed posture  -     Comment, (Gait/Stairs) pt manages direction changes without difficulty or balance loss  -               User Key  (r) = Recorded By, (t) = Taken By, (c) = Cosigned By      Initials Name Provider Type     Faby Latham, PT Physical Therapist                   Obj/Interventions       Row Name 07/21/25 0905          Range of Motion Comprehensive    Comment, General Range of Motion LE ROM functional within task  -       Row Name 07/21/25 0905          Strength Comprehensive (MMT)    Comment, General Manual Muscle Testing (MMT) Assessment LE strength functional within task  -       Row Name 07/21/25 0905     "      Balance    Comment, Balance SBA for standing balance with walker  -       Row Name 07/21/25 0905          Sensory Assessment (Somatosensory)    Sensory Assessment (Somatosensory) other (see comments)  pt reports no numbness/tingling  -               User Key  (r) = Recorded By, (t) = Taken By, (c) = Cosigned By      Initials Name Provider Type    Faby Lim PT Physical Therapist                   Goals/Plan    No documentation.                  Clinical Impression       [facility] Name 07/21/25 0905          Pain    Pretreatment Pain Rating 0/10 - no pain  -     Posttreatment Pain Rating 0/10 - no pain  -[facility] Name 07/21/25 0905          Plan of Care Review    Plan of Care Reviewed With patient  -     Outcome Evaluation Physical therapy evaluation complete.  Patient performs sit to/from stand with SBA and gait with rolling walker x125 feet, SBA.  Patient manages direction changes without difficulty or balance loss.  Patient reports no concerns regarding return home at this time, no further PT needs in acute care setting.  Recommend use of rolling walker for mobility upon return home.  -[facility] 07/21/25 0905          Therapy Assessment/Plan (PT)    Patient/Family Therapy Goals Statement (PT) \"go home\"  -     Criteria for Skilled Interventions Met (PT) no problems identified which require skilled intervention  -     Therapy Frequency (PT) evaluation only  -[facility] Name 07/21/25 0905          Positioning and Restraints    Pre-Treatment Position in bed  -     Post Treatment Position chair  -     In Chair reclined;call light within reach;encouraged to call for assist  -               User Key  (r) = Recorded By, (t) = Taken By, (c) = Cosigned By      Initials Name Provider Type    Faby Lim, TY Physical Therapist                   Outcome Measures       Row Name 07/21/25 0905 07/21/25 5330       How much help from another person do you currently need...    Turning " from your back to your side while in flat bed without using bedrails? 4  -JW 4  -AT    Moving from lying on back to sitting on the side of a flat bed without bedrails? 4  -JW 4  -AT    Moving to and from a bed to a chair (including a wheelchair)? 3  -JW 3  -AT    Standing up from a chair using your arms (e.g., wheelchair, bedside chair)? 3  -JW 3  -AT    Climbing 3-5 steps with a railing? 3  -JW 3  -AT    To walk in hospital room? 3  -JW 3  -AT    AM-PAC 6 Clicks Score (PT) 20  - 20  -AT    Highest Level of Mobility Goal Walk 10 Steps or More-6  -JW Walk 10 Steps or More-6  -AT      Row Name 07/21/25 0905          Functional Assessment    Outcome Measure Options AM-PAC 6 Clicks Basic Mobility (PT)  -               User Key  (r) = Recorded By, (t) = Taken By, (c) = Cosigned By      Initials Name Provider Type    JW Faby Latham, PT Physical Therapist    AT Abhay Jane RN Registered Nurse                  Physical Therapy Education       Title: PT OT SLP Therapies (Resolved)       Topic: Physical Therapy (Resolved)       Point: Mobility training (Resolved)       Learning Progress Summary            Patient Acceptance, E,TB, VU by  at 7/21/2025 1013                                      User Key       Initials Effective Dates Name Provider Type Discipline     06/16/21 -  Faby Latham, PT Physical Therapist PT                  PT Recommendation and Plan     Outcome Evaluation: Physical therapy evaluation complete.  Patient performs sit to/from stand with SBA and gait with rolling walker x125 feet, SBA.  Patient manages direction changes without difficulty or balance loss.  Patient reports no concerns regarding return home at this time, no further PT needs in acute care setting.  Recommend use of rolling walker for mobility upon return home.     Time Calculation:   PT Evaluation Complexity  History, PT Evaluation Complexity: 1-2 personal factors and/or comorbidities  Examination of Body Systems (PT Eval  Complexity): 1-2 elements  Clinical Presentation (PT Evaluation Complexity): stable  Clinical Decision Making (PT Evaluation Complexity): low complexity  Overall Complexity (PT Evaluation Complexity): low complexity     PT Charges       Row Name 07/21/25 1013             Time Calculation    Start Time 0905  -      Stop Time 0920  -      Time Calculation (min) 15 min  -      PT Received On 07/21/25  -                User Key  (r) = Recorded By, (t) = Taken By, (c) = Cosigned By      Initials Name Provider Type    Faby Lim, TY Physical Therapist                  Therapy Charges for Today       Code Description Service Date Service Provider Modifiers Qty    49585380414 HC PT EVAL LOW COMPLEXITY 1 7/21/2025 Faby Latham, PT GP 1            PT G-Codes  Outcome Measure Options: AM-PAC 6 Clicks Basic Mobility (PT)  AM-PAC 6 Clicks Score (PT): 20    PT Discharge Summary  Anticipated Discharge Disposition (PT): home    Faby Latham PT  7/21/2025

## 2025-07-21 NOTE — PROGRESS NOTES
"Hospitalist Team      Patient Care Team:  Chelsie Rai MD as PCP - General (Internal Medicine)  Dev Schulte MD as Consulting Physician (Cardiology)      Chief Complaint:  Follow-up Hyponatremia    Subjective    No acute events overnight.  Ms. Zhang reports she continues to feel well.  No dizziness and she is ambulating w/o difficulty.  She reports good PO intake.    Objective    Vital Signs  Temp:  [97 °F (36.1 °C)-98.1 °F (36.7 °C)] 97 °F (36.1 °C)  Heart Rate:  [64-70] 64  Resp:  [16] 16  BP: (124-168)/(65-87) 160/74  Oxygen Therapy  SpO2: 96 %  Pulse Oximetry Type: Intermittent  Device (Oxygen Therapy): room air}    Flowsheet Rows      Flowsheet Row First Filed Value   Admission Height 154.9 cm (61\") Documented at 07/19/2025 1702   Admission Weight 54.4 kg (120 lb) Documented at 07/19/2025 1702            Physical Exam:    General: Appears stated age in no acute distress.  Lungs: Breath sounds are clear throughout all fields.  Respirations are non-labored.  CV: Regular rate and rhythm.  No murmurs appreciated.  Radial and pedal pulses are 2+ and symmetric.  Abdomen: Soft and nontender with active bowel sounds.  MSK: No clubbing, cyanosis, or edema.  Neuro: Cranial nerves II through XII are grossly intact.  Psych: Pleasant affect.  Ox3.    Results Review:     I reviewed the patient's new clinical results.    Lab Results (last 24 hours)       Procedure Component Value Units Date/Time    Basic Metabolic Panel [[id number]]  (Abnormal) Collected: 07/21/25 0342    Specimen: Blood Updated: 07/21/25 0439     Glucose 102 mg/dL      BUN 16.9 mg/dL      Creatinine 0.86 mg/dL      Sodium 123 mmol/L      Potassium 4.6 mmol/L      Chloride 89 mmol/L      CO2 21.7 mmol/L      Calcium 8.8 mg/dL      BUN/Creatinine Ratio 19.7     Anion Gap 12.3 mmol/L      eGFR 66.7 mL/min/1.73     Narrative:      GFR Categories in Chronic Kidney Disease (CKD)              GFR Category          GFR (mL/min/1.73)    " Interpretation  G1                    90 or greater        Normal or high (1)  G2                    60-89                Mild decrease (1)  G3a                   45-59                Mild to moderate decrease  G3b                   30-44                Moderate to severe decrease  G4                    15-29                Severe decrease  G5                    14 or less           Kidney failure    (1)In the absence of evidence of kidney disease, neither GFR category G1 or G2 fulfill the criteria for CKD.    eGFR calculation 2021 CKD-EPI creatinine equation, which does not include race as a factor    Sodium [[id number]]  (Abnormal) Collected: 07/20/25 1453    Specimen: Blood Updated: 07/20/25 1506     Sodium 125 mmol/L             Imaging Results (Last 24 Hours)       ** No results found for the last 24 hours. **            Medication Review:   I have reviewed the patient's current medication list    Current Facility-Administered Medications:     acetaminophen (TYLENOL) tablet 650 mg, 650 mg, Oral, Q4H PRN **OR** acetaminophen (TYLENOL) 160 MG/5ML oral solution 650 mg, 650 mg, Oral, Q4H PRN **OR** acetaminophen (TYLENOL) suppository 650 mg, 650 mg, Rectal, Q4H PRN, Pita Friend R, APRN    amLODIPine (NORVASC) tablet 5 mg, 5 mg, Oral, Q PM, Pita Friend, APRN, 5 mg at 07/20/25 1657    aspirin EC tablet 81 mg, 81 mg, Oral, Daily, Pita Friend R, APRN, 81 mg at 07/21/25 0850    atenolol (TENORMIN) tablet 50 mg, 50 mg, Oral, BID, Pita Friend, APRN, 50 mg at 07/21/25 0850    sennosides-docusate (PERICOLACE) 8.6-50 MG per tablet 2 tablet, 2 tablet, Oral, BID PRN **AND** polyethylene glycol (MIRALAX) packet 17 g, 17 g, Oral, Daily PRN **AND** bisacodyl (DULCOLAX) EC tablet 5 mg, 5 mg, Oral, Daily PRN **AND** bisacodyl (DULCOLAX) suppository 10 mg, 10 mg, Rectal, Daily PRN, Pita Friend R, APRN    cholecalciferol (VITAMIN D3) tablet 2,000 Units, 2,000 Units, Oral, Daily, Pita Friend, APRN,  2,000 Units at 07/21/25 0850    hydrALAZINE (APRESOLINE) injection 10 mg, 10 mg, Intravenous, Q6H PRN, Iron Saunders T, DO    levothyroxine (SYNTHROID, LEVOTHROID) tablet 100 mcg, 100 mcg, Oral, Q AM, Dori Friendshana R, APRN, 100 mcg at 07/21/25 0533    losartan (COZAAR) tablet 100 mg, 100 mg, Oral, Q24H, Iron Saunders, DO, 100 mg at 07/21/25 0850    nitroglycerin (NITROSTAT) SL tablet 0.4 mg, 0.4 mg, Sublingual, Q5 Min PRN, TempleDori betancurshana R, APRN    ondansetron (ZOFRAN) injection 4 mg, 4 mg, Intravenous, Once, Tra Dominguez MD    ondansetron ODT (ZOFRAN-ODT) disintegrating tablet 4 mg, 4 mg, Oral, Q6H PRN **OR** ondansetron (ZOFRAN) injection 4 mg, 4 mg, Intravenous, Q6H PRN, PhucDori betancurshana R, APRN, 4 mg at 07/20/25 0736    Phosphorus Replacement - Follow Nurse / BPA Driven Protocol, , Not Applicable, PRN, Phuc, Pita R, APRN    Potassium Replacement - Follow Nurse / BPA Driven Protocol, , Not Applicable, PRN, Phuc, Pita R, APRN    sodium chloride 0.9 % flush 10 mL, 10 mL, Intravenous, Q12H, Temple, Pita R, APRN, 10 mL at 07/21/25 0852    sodium chloride 0.9 % flush 10 mL, 10 mL, Intravenous, PRN, Temple, Pita R, APRN    sodium chloride 0.9 % infusion 40 mL, 40 mL, Intravenous, PRN, Phuc, Pita R, APRN      Assessment & Plan     Hyponatremia: Sodium did back down a little this morning.  She appears clinically stable all this at this my first day seeing her.  I will try a slight fluid restriction and see if that helps.  Hypothyroidism: Note made of dose adjustment.  Essential hypertension: Near goal exam.  Continue to monitor trend.    Plan for disposition: Anticipate A.D. in A.M.    Abdulkadir Stephen MD  07/21/25  09:54 EDT

## 2025-07-21 NOTE — PLAN OF CARE
Problem: Adult Inpatient Plan of Care  Goal: Plan of Care Review  7/21/2025 1941 by Winifred Giraldo RN  Outcome: Progressing  7/21/2025 1940 by Winifred Giraldo RN  Outcome: Progressing  Goal: Patient-Specific Goal (Individualized)  7/21/2025 1941 by Winifred Giraldo RN  Outcome: Progressing  7/21/2025 1941 by Winifred Giraldo RN  Outcome: Progressing  Goal: Absence of Hospital-Acquired Illness or Injury  7/21/2025 1941 by Winifred Giraldo RN  Outcome: Progressing  7/21/2025 1941 by Winifred Giraldo RN  Outcome: Progressing  Intervention: Identify and Manage Fall Risk  Recent Flowsheet Documentation  Taken 7/21/2025 0850 by Winifred Giraldo RN  Safety Promotion/Fall Prevention: safety round/check completed  Intervention: Prevent Skin Injury  Recent Flowsheet Documentation  Taken 7/21/2025 0850 by Winifred Giraldo RN  Body Position: position changed independently  Skin Protection: transparent dressing maintained  Intervention: Prevent and Manage VTE (Venous Thromboembolism) Risk  Recent Flowsheet Documentation  Taken 7/21/2025 0850 by Winifred Giraldo RN  VTE Prevention/Management: SCDs (sequential compression devices) off  Intervention: Prevent Infection  Recent Flowsheet Documentation  Taken 7/21/2025 0850 by Winifred Giraldo RN  Infection Prevention: rest/sleep promoted  Goal: Optimal Comfort and Wellbeing  7/21/2025 1941 by Winifred Giraldo RN  Outcome: Progressing  7/21/2025 1941 by Winifred Giraldo RN  Outcome: Progressing  Intervention: Provide Person-Centered Care  Recent Flowsheet Documentation  Taken 7/21/2025 0850 by Winifred Giraldo RN  Trust Relationship/Rapport: care explained  Goal: Readiness for Transition of Care  7/21/2025 1941 by Winifred Giraldo RN  Outcome: Progressing  7/21/2025 1941 by Winifred Giraldo RN  Outcome: Progressing     Problem: Skin Injury Risk Increased  Goal: Skin Health and Integrity  7/21/2025 1941 by Winifred Giraldo RN  Outcome:  Progressing  7/21/2025 1941 by Winifred Giraldo RN  Outcome: Progressing  Intervention: Optimize Skin Protection  Recent Flowsheet Documentation  Taken 7/21/2025 0850 by Winifred Giraldo RN  Activity Management: ambulated outside room  Pressure Reduction Techniques:   frequent weight shift encouraged   weight shift assistance provided  Head of Bed (HOB) Positioning: HOB at 30-45 degrees  Pressure Reduction Devices: pressure-redistributing mattress utilized  Skin Protection: transparent dressing maintained     Problem: Electrolyte Imbalance  Goal: Electrolyte Balance  7/21/2025 1941 by Winifred Giraldo RN  Outcome: Progressing  7/21/2025 1941 by Winifred Giraldo RN  Outcome: Progressing  Intervention: Monitor and Manage Electrolyte Imbalance  Recent Flowsheet Documentation  Taken 7/21/2025 0850 by Winifred Giraldo RN  Fluid/Electrolyte Management: fluids provided     Problem: Fall Injury Risk  Goal: Absence of Fall and Fall-Related Injury  7/21/2025 1941 by Winifred Giraldo RN  Outcome: Progressing  7/21/2025 1941 by Winifred Giraldo RN  Outcome: Progressing  Intervention: Identify and Manage Contributors  Recent Flowsheet Documentation  Taken 7/21/2025 0850 by Winifred Giraldo RN  Medication Review/Management: medications reviewed  Intervention: Promote Injury-Free Environment  Recent Flowsheet Documentation  Taken 7/21/2025 0850 by Winifred Giraldo RN  Safety Promotion/Fall Prevention: safety round/check completed   Goal Outcome Evaluation:

## 2025-07-21 NOTE — PLAN OF CARE
Goal Outcome Evaluation:  Plan of Care Reviewed With: patient        Progress: improving  Outcome Evaluation: VS stable, BP controlled. Patient reports feeling better and dizziness has resolved. Ambulation tolerated, standby assist with a walker. IV fluid ongoing.

## 2025-07-21 NOTE — PLAN OF CARE
Goal Outcome Evaluation:  Plan of Care Reviewed With: patient           Outcome Evaluation: Physical therapy evaluation complete.  Patient performs sit to/from stand with SBA and gait with rolling walker x125 feet, SBA.  Patient manages direction changes without difficulty or balance loss.  Patient reports no concerns regarding return home at this time, no further PT needs in acute care setting.  Recommend use of rolling walker for mobility upon return home.    Anticipated Discharge Disposition (PT): home

## 2025-07-22 VITALS
DIASTOLIC BLOOD PRESSURE: 63 MMHG | OXYGEN SATURATION: 98 % | RESPIRATION RATE: 18 BRPM | SYSTOLIC BLOOD PRESSURE: 129 MMHG | TEMPERATURE: 97 F | BODY MASS INDEX: 23.35 KG/M2 | HEART RATE: 65 BPM | HEIGHT: 61 IN | WEIGHT: 123.68 LBS

## 2025-07-22 LAB
ANION GAP SERPL CALCULATED.3IONS-SCNC: 11.5 MMOL/L (ref 5–15)
BUN SERPL-MCNC: 21.2 MG/DL (ref 8–23)
BUN/CREAT SERPL: 20.2 (ref 7–25)
CALCIUM SPEC-SCNC: 9.6 MG/DL (ref 8.6–10.5)
CHLORIDE SERPL-SCNC: 91 MMOL/L (ref 98–107)
CO2 SERPL-SCNC: 22.5 MMOL/L (ref 22–29)
CREAT SERPL-MCNC: 1.05 MG/DL (ref 0.57–1)
EGFRCR SERPLBLD CKD-EPI 2021: 52.5 ML/MIN/1.73
GLUCOSE SERPL-MCNC: 87 MG/DL (ref 65–99)
POTASSIUM SERPL-SCNC: 4.6 MMOL/L (ref 3.5–5.2)
SODIUM SERPL-SCNC: 125 MMOL/L (ref 136–145)

## 2025-07-22 PROCEDURE — 99238 HOSP IP/OBS DSCHRG MGMT 30/<: CPT | Performed by: HOSPITALIST

## 2025-07-22 PROCEDURE — 80048 BASIC METABOLIC PNL TOTAL CA: CPT

## 2025-07-22 RX ORDER — MELOXICAM 7.5 MG/1
7.5 TABLET ORAL DAILY PRN
Qty: 30 TABLET | Refills: 5 | Status: SHIPPED | OUTPATIENT
Start: 2025-07-22

## 2025-07-22 RX ADMIN — LEVOTHYROXINE SODIUM 100 MCG: 0.05 TABLET ORAL at 06:17

## 2025-07-22 RX ADMIN — ASPIRIN 81 MG: 81 TABLET, COATED ORAL at 08:32

## 2025-07-22 RX ADMIN — ACETAMINOPHEN 650 MG: 325 TABLET, FILM COATED ORAL at 06:18

## 2025-07-22 RX ADMIN — ATENOLOL 50 MG: 50 TABLET ORAL at 08:34

## 2025-07-22 RX ADMIN — LOSARTAN POTASSIUM 100 MG: 50 TABLET, FILM COATED ORAL at 08:32

## 2025-07-22 RX ADMIN — Medication 10 ML: at 08:32

## 2025-07-22 RX ADMIN — Medication 2000 UNITS: at 08:32

## 2025-07-22 NOTE — PLAN OF CARE
Goal Outcome Evaluation: TASHA Bowen, independent, refused bed alarm, BM 7/18/2025 patient reported eating a banana and anticipates BM 7/22/2025, voiding, fluid restriction, hoping to be discharged.

## 2025-07-22 NOTE — DISCHARGE SUMMARY
Nguyen MCKINNEY [facility]  1940  [id number]    Hospitalists Discharge Summary    Date of Admission: 7/19/2025  Date of Discharge:  7/22/2025    Primary Discharge Diagnoses: ***    Secondary Discharge Diagnoses: ***      History of Present Illness:***    Hospital Course:      PCP  Patient Care Team:  Chelsie Rai MD as PCP - General (Internal Medicine)  Dev Schulte MD as Consulting Physician (Cardiology)    Consults:   Consults       No orders found from 6/20/2025 to 7/20/2025.            Operations and Procedures Performed:       XR Chest 1 View  Result Date: 7/19/2025  Narrative: XR CHEST 1 VW Date of Exam: 7/19/2025 8:12 PM EDT Indication: syncope Comparison: 5/23/2025. Findings: There is no pneumothorax, pleural effusion or focal airspace consolidation. Heart size and pulmonary vasculature appear within normal limits. Regional bones appear intact.     Impression: Impression: No acute cardiopulmonary abnormality. Electronically Signed: Erik Rand MD  7/19/2025 8:19 PM EDT  Workstation ID: [id number]    CT Head Without Contrast  Result Date: 7/19/2025  Narrative: CT HEAD WO CONTRAST Date of Exam: 7/19/2025 8:13 PM EDT Indication: syncope. Comparison: 7/12/2024 and brain MRI same date Technique: Axial CT images were obtained of the head without contrast administration.  Coronal reconstructions were performed.  Automated exposure control and iterative reconstruction methods were used. Findings: Superficial soft tissues appear within normal limits. The calvarium is intact.  Paranasal sinuses and mastoid air cells appear well aerated. Orbits appear normal. There is no acute intracranial hemorrhage.  No mass effect or midline shift.  No abnormal extra-axial collections.  Gray-white differentiation is within normal limits. There is moderate patchy white matter hypoattenuation. The ventricles appear normal in size and configuration for the patient's age. Midline structures are atrophic, but generally  intact.     Impression: Impression: 1.No acute intracranial abnormality. 2.Moderate chronic small vessel ischemic change. Electronically Signed: Erik Rand MD  7/19/2025 8:19 PM EDT  Workstation ID: [id number]    XR Knee 3+ View With [place] Right  Result Date: 7/15/2025  Narrative: Please see performing physician's note for result.    Mammo Screening Digital Tomosynthesis Bilateral With CAD  Result Date: 7/8/2025  Narrative: BILATERAL DIGITAL SCREENING MAMMOGRAM WITH TOMOSYNTHESIS  CLINICAL INDICATION: Screening mammogram  TECHNIQUE: Bilateral low dose full field digital breast tomosynthesis imaging was performed. CAD was utilized.  COMPARISON: Prior studies dating back to 6/14/2016  FINDINGS: There are scattered areas of fibroglandular density.  RIGHT BREAST: No suspicious masses, calcifications, or areas of distortion are seen.  LEFT BREAST: No suspicious masses, calcifications, or areas of distortion are seen.      Impression: No suspicious abnormality identified.  OVERALL ASSESSMENT: ACR BI-RADS CATEGORY: 1, NEGATIVE:  Recommend continued routine annual screening mammogram.  The standard false-negative rate of mammography is between 10% and 25%. Complex patterns or increased breast density will markedly elevate the false-negative rate of mammography.   A letter, in lay terminology, with the results of this exam will be mailed to the patient.   7/8/2025 1:37 PM by Renae Duran MD on Workstation: [id number]        Allergies:  is allergic to ace inhibitors, codeine, and meclizine.    Primo  {Desc; reviewed/not reviewed:92438}    Discharge Medications:     Discharge Medications        Changes to Medications        Instructions Start Date   fluticasone 50 MCG/ACT nasal spray  Commonly known as: FLONASE  What changed:   when to take this  reasons to take this   2 sprays, Nasal, Daily      meloxicam 7.5 MG tablet  Commonly known as: MOBIC  What changed:   when to take this  reasons to take this   7.5 mg, Oral,  Daily PRN             Continue These Medications        Instructions Start Date   amLODIPine 5 MG tablet  Commonly known as: NORVASC   5 mg, Oral, Every Evening      aspirin 81 MG EC tablet   81 mg, Oral, Daily      atenolol 50 MG tablet  Commonly known as: TENORMIN   TAKE 1 TABLET BY MOUTH 2 TIMES A DAY.      DOROTEO C PO   500 mg, Daily      ezetimibe 10 MG tablet  Commonly known as: ZETIA   10 mg, Oral, Daily      levothyroxine 75 MCG tablet  Commonly known as: SYNTHROID, LEVOTHROID   75 mcg, Oral, Daily      losartan 100 MG tablet  Commonly known as: COZAAR   TAKE 1 TABLET BY MOUTH DAILY.      meclizine 25 MG tablet  Commonly known as: ANTIVERT   25 mg, Oral, 3 Times Daily PRN      multivitamin-minerals tablet  Generic drug: multivitamin with minerals   1 tablet, Daily      NIACIN PO   500 mg, Daily      spironolactone 25 MG tablet  Commonly known as: ALDACTONE   25 mg, Oral, Daily      Vitamin D3 25 MCG (1000 UT) capsule   2,000 Units, Daily      vitamin E 400 UNIT capsule   400 Units, Daily             Stop These Medications      Azelastine HCl 137 MCG/SPRAY solution     Bempedoic Acid 180 MG tablet     methylPREDNISolone 4 MG dose pack  Commonly known as: MEDROL     TYLENOL ARTHRITIS PAIN PO              Last Lab Results:   Lab Results (most recent)       Procedure Component Value Units Date/Time    Basic Metabolic Panel [[id number]]  (Abnormal) Collected: 07/22/25 0344    Specimen: Blood Updated: 07/22/25 0431     Glucose 87 mg/dL      BUN 21.2 mg/dL      Creatinine 1.05 mg/dL      Sodium 125 mmol/L      Potassium 4.6 mmol/L      Chloride 91 mmol/L      CO2 22.5 mmol/L      Calcium 9.6 mg/dL      BUN/Creatinine Ratio 20.2     Anion Gap 11.5 mmol/L      eGFR 52.5 mL/min/1.73     Narrative:      GFR Categories in Chronic Kidney Disease (CKD)              GFR Category          GFR (mL/min/1.73)    Interpretation  G1                    90 or greater        Normal or high (1)  G2                    60-89                 Mild decrease (1)  G3a                   45-59                Mild to moderate decrease  G3b                   30-44                Moderate to severe decrease  G4                    15-29                Severe decrease  G5                    14 or less           Kidney failure    (1)In the absence of evidence of kidney disease, neither GFR category G1 or G2 fulfill the criteria for CKD.    eGFR calculation 2021 CKD-EPI creatinine equation, which does not include race as a factor    Basic Metabolic Panel [[id number]]  (Abnormal) Collected: 07/21/25 0342    Specimen: Blood Updated: 07/21/25 0439     Glucose 102 mg/dL      BUN 16.9 mg/dL      Creatinine 0.86 mg/dL      Sodium 123 mmol/L      Potassium 4.6 mmol/L      Chloride 89 mmol/L      CO2 21.7 mmol/L      Calcium 8.8 mg/dL      BUN/Creatinine Ratio 19.7     Anion Gap 12.3 mmol/L      eGFR 66.7 mL/min/1.73     Narrative:      GFR Categories in Chronic Kidney Disease (CKD)              GFR Category          GFR (mL/min/1.73)    Interpretation  G1                    90 or greater        Normal or high (1)  G2                    60-89                Mild decrease (1)  G3a                   45-59                Mild to moderate decrease  G3b                   30-44                Moderate to severe decrease  G4                    15-29                Severe decrease  G5                    14 or less           Kidney failure    (1)In the absence of evidence of kidney disease, neither GFR category G1 or G2 fulfill the criteria for CKD.    eGFR calculation 2021 CKD-EPI creatinine equation, which does not include race as a factor    Sodium [[id number]]  (Abnormal) Collected: 07/20/25 1453    Specimen: Blood Updated: 07/20/25 1506     Sodium 125 mmol/L     Magnesium [[id number]]  (Abnormal) Collected: 07/20/25 0542    Specimen: Blood Updated: 07/20/25 0712     Magnesium 3.6 mg/dL     Comprehensive Metabolic Panel [[id number]]  (Abnormal) Collected: 07/20/25 0542     Specimen: Blood Updated: 07/20/25 0712     Glucose 89 mg/dL      BUN 11.8 mg/dL      Creatinine 0.77 mg/dL      Sodium 127 mmol/L      Potassium 4.9 mmol/L      Chloride 89 mmol/L      CO2 22.3 mmol/L      Calcium 12.0 mg/dL      Total Protein 7.5 g/dL      Albumin 4.8 g/dL      ALT (SGPT) 20 U/L      AST (SGOT) 25 U/L      Alkaline Phosphatase 73 U/L      Total Bilirubin 0.5 mg/dL      Globulin 2.7 gm/dL      A/G Ratio 1.8 g/dL      BUN/Creatinine Ratio 15.3     Anion Gap 15.7 mmol/L      eGFR 76.2 mL/min/1.73     Narrative:      GFR Categories in Chronic Kidney Disease (CKD)              GFR Category          GFR (mL/min/1.73)    Interpretation  G1                    90 or greater        Normal or high (1)  G2                    60-89                Mild decrease (1)  G3a                   45-59                Mild to moderate decrease  G3b                   30-44                Moderate to severe decrease  G4                    15-29                Severe decrease  G5                    14 or less           Kidney failure    (1)In the absence of evidence of kidney disease, neither GFR category G1 or G2 fulfill the criteria for CKD.    eGFR calculation 2021 CKD-EPI creatinine equation, which does not include race as a factor    CBC Auto Differential [[id number]]  (Abnormal) Collected: 07/20/25 0542    Specimen: Blood Updated: 07/20/25 0616     WBC 8.44 10*3/mm3      RBC 4.32 10*6/mm3      Hemoglobin 14.0 g/dL      Hematocrit 39.1 %      MCV 90.5 fL      MCH 32.4 pg      MCHC 35.8 g/dL      RDW 11.9 %      RDW-SD 39.4 fl      MPV 9.7 fL      Platelets 294 10*3/mm3      Neutrophil % 65.9 %      Lymphocyte % 21.9 %      Monocyte % 11.7 %      Eosinophil % 0.1 %      Basophil % 0.2 %      Immature Grans % 0.2 %      Neutrophils, Absolute 5.55 10*3/mm3      Lymphocytes, Absolute 1.85 10*3/mm3      Monocytes, Absolute 0.99 10*3/mm3      Eosinophils, Absolute 0.01 10*3/mm3      Basophils, Absolute 0.02 10*3/mm3       Immature Grans, Absolute 0.02 10*3/mm3      nRBC 0.0 /100 WBC     Urinalysis With Culture If Indicated - Urine, Clean Catch [[id number]]  (Normal) Collected: 07/20/25 0129    Specimen: Urine, Clean Catch Updated: 07/20/25 0137     Color, UA Yellow     Appearance, UA Clear     pH, UA 7.0     Specific Gravity, UA 1.015     Glucose, UA Negative     Ketones, UA Negative     Bilirubin, UA Negative     Blood, UA Negative     Protein, UA Negative     Leuk Esterase, UA Negative     Nitrite, UA Negative     Urobilinogen, UA 0.2 E.U./dL    Narrative:      In absence of clinical symptoms, the presence of pyuria, bacteria, and/or nitrites on the urinalysis result does not correlate with infection.  Urine microscopic not indicated.    Urine Electrolytes - Urine, Clean Catch [[id number]] Collected: 07/20/25 0128    Specimen: Urine, Clean Catch Updated: 07/20/25 0136     Potassium, Urine 8.2 mmol/L      Sodium, Urine 62 mmol/L      Chloride, Urine 53 mmol/L     Narrative:      Reference intervals for random urine have not been established.  Clinical usage is dependent upon physician's interpretation in combination with other laboratory tests.       Magnesium [[id number]]  (Abnormal) Collected: 07/19/25 2046    Specimen: Blood Updated: 07/20/25 0003     Magnesium 1.5 mg/dL     T4, Free [[id number]]  (Normal) Collected: 07/19/25 2046    Specimen: Blood Updated: 07/19/25 2352     Free T4 1.49 ng/dL     TSH [[id number]]  (Abnormal) Collected: 07/19/25 2046    Specimen: Blood Updated: 07/19/25 2207     TSH 8.960 uIU/mL     Hemoglobin A1c [[id number]]  (Normal) Collected: 07/19/25 1931    Specimen: Blood Updated: 07/19/25 2159     Hemoglobin A1C 5.55 %     Narrative:      Hemoglobin A1C Ranges:    Increased Risk for Diabetes  5.7% to 6.4%  Diabetes                     >= 6.5%  Diabetic Goal                < 7.0%    High Sensitivity Troponin T 1Hr [[id number]]  (Normal) Collected: 07/19/25 2046    Specimen: Blood Updated: 07/19/25 2106     HS  Troponin T 7 ng/L      Troponin T Numeric Delta -3 ng/L     Narrative:      High Sensitive Troponin T Reference Range:  <14.0 ng/L- Negative Female for AMI  <22.0 ng/L- Negative Male for AMI  >=14 - Abnormal Female indicating possible myocardial injury.  >=22 - Abnormal Male indicating possible myocardial injury.   Clinicians would have to utilize clinical acumen, EKG, Troponin, and serial changes to determine if it is an Acute Myocardial Infarction or myocardial injury due to an underlying chronic condition.         Comprehensive Metabolic Panel [[id number]]  (Abnormal) Collected: 07/19/25 1931    Specimen: Blood Updated: 07/19/25 1959     Glucose 114 mg/dL      BUN 14.2 mg/dL      Creatinine 0.95 mg/dL      Sodium 120 mmol/L      Potassium 4.2 mmol/L      Comment: Specimen hemolyzed.  Result may be falsely elevated.        Chloride 81 mmol/L      CO2 19.1 mmol/L      Calcium 9.7 mg/dL      Total Protein 8.0 g/dL      Albumin 4.8 g/dL      ALT (SGPT) 20 U/L      AST (SGOT) 33 U/L      Comment: Specimen hemolyzed.  Result may be falsely elevated.        Alkaline Phosphatase 76 U/L      Total Bilirubin 0.6 mg/dL      Globulin 3.2 gm/dL      A/G Ratio 1.5 g/dL      BUN/Creatinine Ratio 14.9     Anion Gap 19.9 mmol/L      eGFR 59.2 mL/min/1.73     Narrative:      GFR Categories in Chronic Kidney Disease (CKD)              GFR Category          GFR (mL/min/1.73)    Interpretation  G1                    90 or greater        Normal or high (1)  G2                    60-89                Mild decrease (1)  G3a                   45-59                Mild to moderate decrease  G3b                   30-44                Moderate to severe decrease  G4                    15-29                Severe decrease  G5                    14 or less           Kidney failure    (1)In the absence of evidence of kidney disease, neither GFR category G1 or G2 fulfill the criteria for CKD.    eGFR calculation 2021 CKD-EPI creatinine equation,  "which does not include race as a factor    Lipase [[id number]]  (Normal) Collected: 07/19/25 1931    Specimen: Blood Updated: 07/19/25 1954     Lipase 34 U/L     Phosphorus [[id number]]  (Normal) Collected: 07/19/25 1931    Specimen: Blood Updated: 07/19/25 1954     Phosphorus 2.6 mg/dL     High Sensitivity Troponin T [[id number]]  (Normal) Collected: 07/19/25 1931    Specimen: Blood Updated: 07/19/25 1954     HS Troponin T 10 ng/L     Narrative:      High Sensitive Troponin T Reference Range:  <14.0 ng/L- Negative Female for AMI  <22.0 ng/L- Negative Male for AMI  >=14 - Abnormal Female indicating possible myocardial injury.  >=22 - Abnormal Male indicating possible myocardial injury.   Clinicians would have to utilize clinical acumen, EKG, Troponin, and serial changes to determine if it is an Acute Myocardial Infarction or myocardial injury due to an underlying chronic condition.         D-dimer, Quantitative [[id number]]  (Normal) Collected: 07/19/25 1931    Specimen: Blood Updated: 07/19/25 1949     D-Dimer, Quantitative 0.38 MCGFEU/mL     Narrative:      According to the assay 's published package insert, a normal (<0.50 MCGFEU/mL) D-dimer result in conjunction with a non-high clinical probability assessment, excludes deep vein thrombosis (DVT) and pulmonary embolism (PE) with high sensitivity.    D-dimer values increase with age and this can make VTE exclusion of an older population difficult. To address this, the [employer], based on best available evidence and recent guidelines, recommends that clinicians use age-adjusted D-dimer thresholds in patients greater than 50 years of age with: a) a low probability of PE who do not meet all Pulmonary Embolism Rule Out Criteria, or b) in those with intermediate probability of PE.   The formula for an age-adjusted D-dimer cut-off is \"age/100\".  For example, a 60 year old patient would have an age-adjusted cut-off of 0.60 MCGFEU/mL and an 80 " year old 0.80 MCGFEU/mL.    CBC & Differential [[id number]]  (Abnormal) Collected: 07/19/25 1931    Specimen: Blood Updated: 07/19/25 1936    Narrative:      The following orders were created for panel order CBC & Differential.  Procedure                               Abnormality         Status                     ---------                               -----------         ------                     CBC Auto Differential[[id number]]        Abnormal            Final result                 Please view results for these tests on the individual orders.    CBC Auto Differential [[id number]]  (Abnormal) Collected: 07/19/25 1931    Specimen: Blood Updated: 07/19/25 1936     WBC 11.70 10*3/mm3      RBC 4.35 10*6/mm3      Hemoglobin 14.3 g/dL      Hematocrit 39.6 %      MCV 91.0 fL      MCH 32.9 pg      MCHC 36.1 g/dL      RDW 11.9 %      RDW-SD 39.7 fl      MPV 9.7 fL      Platelets 277 10*3/mm3      Neutrophil % 55.1 %      Lymphocyte % 32.3 %      Monocyte % 11.8 %      Eosinophil % 0.2 %      Basophil % 0.3 %      Immature Grans % 0.3 %      Neutrophils, Absolute 6.46 10*3/mm3      Lymphocytes, Absolute 3.78 10*3/mm3      Monocytes, Absolute 1.38 10*3/mm3      Eosinophils, Absolute 0.02 10*3/mm3      Basophils, Absolute 0.03 10*3/mm3      Immature Grans, Absolute 0.03 10*3/mm3      nRBC 0.0 /100 WBC     POC Glucose Once [[id number]]  (Normal) Collected: 07/19/25 1812    Specimen: Blood Updated: 07/19/25 1819     Glucose 121 mg/dL           Imaging Results (Most Recent)       Procedure Component Value Units Date/Time    XR Chest 1 View [[id number]] Collected: 07/19/25 2019     Updated: 07/19/25 2022    Narrative:      XR CHEST 1 VW    Date of Exam: 7/19/2025 8:12 PM EDT    Indication: syncope    Comparison: 5/23/2025.    Findings:  There is no pneumothorax, pleural effusion or focal airspace consolidation. Heart size and pulmonary vasculature appear within normal limits. Regional bones appear intact.      Impression:       Impression:  No acute cardiopulmonary abnormality.          Electronically Signed: Erik Rand MD    7/19/2025 8:19 PM EDT    Workstation ID: [id number]    CT Head Without Contrast [[id number]] Collected: 07/19/25 2017     Updated: 07/19/25 2022    Narrative:      CT HEAD WO CONTRAST    Date of Exam: 7/19/2025 8:13 PM EDT    Indication: syncope.    Comparison: 7/12/2024 and brain MRI same date    Technique: Axial CT images were obtained of the head without contrast administration.  Coronal reconstructions were performed.  Automated exposure control and iterative reconstruction methods were used.      Findings:  Superficial soft tissues appear within normal limits. The calvarium is intact.  Paranasal sinuses and mastoid air cells appear well aerated. Orbits appear normal. There is no acute intracranial hemorrhage.  No mass effect or midline shift.  No abnormal   extra-axial collections.  Gray-white differentiation is within normal limits. There is moderate patchy white matter hypoattenuation. The ventricles appear normal in size and configuration for the patient's age. Midline structures are atrophic, but   generally intact.      Impression:      Impression:  1.No acute intracranial abnormality.  2.Moderate chronic small vessel ischemic change.          Electronically Signed: Erik Rand MD    7/19/2025 8:19 PM EDT    Workstation ID: [id number]            PROCEDURES      Condition on Discharge:  ***    Physical Exam at Discharge  Vital Signs  Temp:  [96.8 °F (36 °C)-98.1 °F (36.7 °C)] 97 °F (36.1 °C)  Heart Rate:  [62-69] 65  Resp:  [18-20] 18  BP: (125-153)/(60-73) 129/63    Physical Exam:  Physical Exam   Constitutional: Patient appears well-developed and well-nourished and in no acute distress   HEENT:   Head: Normocephalic and atraumatic.   Eyes:  Pupils are equal, round, and reactive to light. EOM are intact. Sclera are anicteric and non-injected.  Mouth and Throat: Patient has moist mucous membranes.  Oropharynx is clear of any erythema or exudate.     Neck: Neck supple. No JVD present. No thyromegaly present. No lymphadenopathy present.  Cardiovascular: Regular rate, regular rhythm, S1 normal and S2 normal.  Exam reveals no gallop and no friction rub.  No murmur heard.  Pulmonary/Chest: Lungs are clear to auscultation bilaterally. No respiratory distress. No wheezes. No rhonchi. No rales.   Abdominal: Soft. Bowel sounds are normal. No distension and no mass. There is no hepatosplenomegaly. There is no tenderness.   Musculoskeletal: Normal Muscle tone  Extremities: No edema. Pulses are palpable in all 4 extremities.  Neurological: Patient is alert and oriented to person, place, and time. Cranial nerves II-XII are grossly intact with no focal deficits.  Skin: Skin is warm. No rash noted. Nails show no clubbing.  No cyanosis or erythema.    Discharge Disposition  ***    Visiting Nurse:    {YES/NO:200010}    Home PT/OT:  {YES/NO:200010}    Home Safety Evaluation:  {YES/NO:200010}    DME  ***    Discharge Diet:      Dietary Orders (From admission, onward)       Start     Ordered    07/21/25 0826  Diet: Regular/House, Fluid Restriction (240 mL/tray); 1500 mL/day; Fluid Consistency: Thin (IDDSI 0)  Diet Effective Now        References:    Diet Order Definitions   Question Answer Comment   Diets: Regular/House    Diets: Fluid Restriction (240 mL/tray)    Fluid Restriction Diet (240 mL/tray): 1500 mL/day    Fluid Consistency: Thin (IDDSI 0)        07/21/25 0825                    Activity at Discharge:  ***    Pre-discharge education  {Discharge Education:99444}      Follow-up Appointments  Future Appointments   Date Time Provider Department Center   8/5/2025  9:45 AM Sulaiman Jarrett MD [facility]   10/30/2025 10:30 AM Dev Schulte MD [facility]     Additional Instructions for the Follow-ups that You Need to Schedule       Discharge Follow-up with PCP   As directed       Currently Documented PCP:     Chelsie Rai MD    PCP Phone Number:    [phone]     Follow Up Details: Early next week.  Needs repeat chemistry panel.                Test Results Pending at Discharge       Abdulkadir Stephen MD  07/22/25  11:22 EDT    Time: {Time spent:760883954} (if over 30 minutes give explanation as to why it took greater than 30 minutes)

## 2025-07-23 ENCOUNTER — READMISSION MANAGEMENT (OUTPATIENT)
Dept: CALL CENTER | Facility: HOSPITAL | Age: 85
End: 2025-07-23
Payer: MEDICARE

## 2025-07-23 NOTE — CASE MANAGEMENT/SOCIAL WORK
Case Management Discharge Note      Final Note: DC'd home         Selected Continued Care - Discharged on 7/22/2025 Admission date: 7/19/2025 - Discharge disposition: Home or Self Care      Destination    No services have been selected for the patient.                Durable Medical Equipment    No services have been selected for the patient.                Dialysis/Infusion    No services have been selected for the patient.                Home Medical Care    No services have been selected for the patient.                Therapy    No services have been selected for the patient.                Community Resources    No services have been selected for the patient.                Community & DME    No services have been selected for the patient.                         Final Discharge Disposition Code: 01 - home or self-care

## 2025-07-23 NOTE — OUTREACH NOTE
Prep Survey      Flowsheet Row Responses   [facility] facility patient discharged from? [place]   Is LACE score < 7 ? No   Eligibility Readm Mgmt   Discharge diagnosis Hyponatremia   Does the patient have one of the following disease processes/diagnoses(primary or secondary)? Other   Does the patient have Home health ordered? No   Is there a DME ordered? No   Prep survey completed? Yes            DOROTHY DENNEY - Registered Nurse

## 2025-07-29 ENCOUNTER — PATIENT ROUNDING (BHMG ONLY) (OUTPATIENT)
Age: 85
End: 2025-07-29
Payer: MEDICARE

## 2025-07-29 ENCOUNTER — OFFICE VISIT (OUTPATIENT)
Age: 85
End: 2025-07-29
Payer: MEDICARE

## 2025-07-29 VITALS
HEIGHT: 61 IN | HEART RATE: 68 BPM | BODY MASS INDEX: 22.84 KG/M2 | SYSTOLIC BLOOD PRESSURE: 150 MMHG | DIASTOLIC BLOOD PRESSURE: 82 MMHG | WEIGHT: 121 LBS | OXYGEN SATURATION: 99 %

## 2025-07-29 DIAGNOSIS — E87.1 HYPONATREMIA: Primary | ICD-10-CM

## 2025-07-29 DIAGNOSIS — Z00.00 HEALTHCARE MAINTENANCE: ICD-10-CM

## 2025-07-29 DIAGNOSIS — R42 LIGHTHEADEDNESS: ICD-10-CM

## 2025-07-29 DIAGNOSIS — I10 ESSENTIAL HYPERTENSION: ICD-10-CM

## 2025-07-29 DIAGNOSIS — I87.2 CHRONIC VENOUS INSUFFICIENCY: ICD-10-CM

## 2025-07-29 DIAGNOSIS — E78.2 MIXED HYPERLIPIDEMIA: ICD-10-CM

## 2025-07-29 DIAGNOSIS — R53.83 OTHER FATIGUE: ICD-10-CM

## 2025-07-29 DIAGNOSIS — E03.9 ACQUIRED HYPOTHYROIDISM: ICD-10-CM

## 2025-07-29 PROBLEM — Z85.820 HISTORY OF MALIGNANT MELANOMA OF SKIN: Status: ACTIVE | Noted: 2024-06-26

## 2025-07-29 PROCEDURE — 3077F SYST BP >= 140 MM HG: CPT

## 2025-07-29 PROCEDURE — 1159F MED LIST DOCD IN RCRD: CPT

## 2025-07-29 PROCEDURE — G2211 COMPLEX E/M VISIT ADD ON: HCPCS

## 2025-07-29 PROCEDURE — 99214 OFFICE O/P EST MOD 30 MIN: CPT

## 2025-07-29 PROCEDURE — 1160F RVW MEDS BY RX/DR IN RCRD: CPT

## 2025-07-29 PROCEDURE — 1126F AMNT PAIN NOTED NONE PRSNT: CPT

## 2025-07-29 PROCEDURE — 3079F DIAST BP 80-89 MM HG: CPT

## 2025-07-29 RX ORDER — ACETAMINOPHEN 500 MG
500 TABLET ORAL AS NEEDED
COMMUNITY

## 2025-07-29 RX ORDER — IBUPROFEN 600 MG/1
600 TABLET, FILM COATED ORAL EVERY 6 HOURS PRN
COMMUNITY

## 2025-07-29 NOTE — ASSESSMENT & PLAN NOTE
No swelling noted today  Advised to wear compression stockings to improve blood circulation and reduce dizziness  Explanation provided on the benefits of compression stockings

## 2025-07-29 NOTE — ASSESSMENT & PLAN NOTE
On levothyroxine 75 mcg daily  TSH noted to be 8.960, FT4 1.49, 10 days ago, during acute illness while hospitalized  Recheck thyroid levels  Orders:    TSH; Future    T4, Free; Future    T3, Free; Future

## 2025-07-29 NOTE — ASSESSMENT & PLAN NOTE
Unstable, not at goal  Bp 150/82 mmHg, however she is noticing low readings at home with multiple episodes of worsening dizziness and high risk for falls  Continue home meds Losartan 100 mg daily, amlodipine 5 mg daily, atenolol 50 mg bid for now  Bp log requested  Check CMP, may recommend discontinuing frequent sodium rich electrolyte drinks  Follow up in 1 month and adjust meds accordingly  Orders:    Comprehensive Metabolic Panel; Future    Magnesium; Future

## 2025-07-29 NOTE — PROGRESS NOTES
Chief Complaint  Establish Care, Dizziness (Off and on ), and Hospital Follow Up Visit    Subjective          Nguyen Zhang presents to [facility].  History of Present Illness    History of Present Illness  The patient presents to [facility]. She was hospitalized from July 20, 2025 till July 22, 2025 after she presented with nausea and dizziness after starting Solu-Medrol for knee pain on July 16th. Lab workup revealed hyponatremia which was presumed to be related to volume depletion. She received IV fluids and Solu-Medrol was discontinued.    She was prescribed a corticosteroid for knee pain, which she took for 4 days. The medication caused nausea, vomiting, and severe headaches, leading to a hospital admission. During her hospital stay, she experienced leg weakness and was unable to walk independently. She received intravenous fluids. She was then advised to avoid plain water as it was depleting her electrolytes. Since her discharge, she has not had any further tests. She was advised to drink 2 glasses of water with electrolytes in the morning, 2 at lunch, and 2 at dinner, which she has been adhering to. She also consumes orange juice and occasionally soda. She takes aspirin after breakfast for pain relief and ibuprofen as needed.    She reports experiencing lightheadedness upon waking, which typically resolves on its own. This issue has been ongoing for several years. The lightheadedness is also present when she is stationary, but it does not interfere with her daily activities. She has not experienced any falls. She has not been taking meclizine.    She engages in water aerobics twice a week, which she finds beneficial for her balance. However, due to recent circumstances, she has only been able to attend once a week.    She experienced constipation during her hospital stay, which resolved after she started eating bananas. Her bowel movements have been regular since her  "discharge.    She monitors her blood pressure at home, which can be low when she feels unwell. She is currently on losartan, atenolol 50 mg twice daily, amlodipine, ezetimibe, and vitamin D3 supplements. She takes her thyroid medication and atenolol in the morning, and ezetimibe, losartan, amlodipine, and atenolol at night.    She discontinued her diuretic medication several months ago due to leg swelling but resumed it briefly before stopping again. She has never used compression stockings.    She has not had a thyroidectomy and is currently on thyroid medication.    Tobacco: Reports no tobacco use      FAMILY HISTORY  Her father had heart disease and had his first heart attack at age 52. He lived to be 65 and was a smoker.       Objective   Vital Signs:   /82   Pulse 68   Ht 154.9 cm (61\")   Wt 54.9 kg (121 lb)   SpO2 99%   BMI 22.86 kg/m²     PHQ-9 Total Score:        Physical Exam  Vitals reviewed.   Cardiovascular:      Rate and Rhythm: Normal rate and regular rhythm.      Pulses: Normal pulses.      Heart sounds: Normal heart sounds.   Pulmonary:      Effort: Pulmonary effort is normal.      Breath sounds: Normal breath sounds.   Abdominal:      General: Abdomen is flat.      Palpations: Abdomen is soft.   Musculoskeletal:      Right lower leg: No edema.      Left lower leg: No edema.   Neurological:      General: No focal deficit present.      Mental Status: She is alert and oriented to person, place, and time. Mental status is at baseline.   Psychiatric:         Mood and Affect: Mood normal.         Behavior: Behavior normal.         Thought Content: Thought content normal.         Judgment: Judgment normal.          Result Review :   The following data was reviewed by: Baldemar Sparks MD on 07/29/2025:  Common labs          7/20/2025    05:42 7/20/2025    14:53 7/21/2025    03:42 7/22/2025    03:44   Common Labs   Glucose 89   102  87    BUN 11.8   16.9  21.2    Creatinine 0.77   0.86  1.05  "   Sodium 127  125  123  125    Potassium 4.9   4.6  4.6    Chloride 89   89  91    Calcium 12.0   8.8  9.6    Albumin 4.8       Total Bilirubin 0.5       Alkaline Phosphatase 73       AST (SGOT) 25       ALT (SGPT) 20       WBC 8.44       Hemoglobin 14.0       Hematocrit 39.1       Platelets 294                  XR Chest 1 View  Result Date: 7/19/2025  Impression: No acute cardiopulmonary abnormality. Electronically Signed: Erik Rand MD  7/19/2025 8:19 PM EDT  Workstation ID: [id number]    CT Head Without Contrast  Result Date: 7/19/2025  Impression: 1.No acute intracranial abnormality. 2.Moderate chronic small vessel ischemic change. Electronically Signed: Erik Rand MD  7/19/2025 8:19 PM EDT  Workstation ID: [id number]    Mammo Screening Digital Tomosynthesis Bilateral With CAD  Result Date: 7/8/2025  No suspicious abnormality identified.  OVERALL ASSESSMENT: ACR BI-RADS CATEGORY: 1, NEGATIVE:  Recommend continued routine annual screening mammogram.  The standard false-negative rate of mammography is between 10% and 25%. Complex patterns or increased breast density will markedly elevate the false-negative rate of mammography.   A letter, in lay terminology, with the results of this exam will be mailed to the patient.   7/8/2025 1:37 PM by Renae Duran MD on Workstation: [id number]      DEXA Bone Density Axial  Result Date: 6/4/2025  Impression: Osteoporosis Osteoporosis. Electronically Signed: Harris Chacon MD  6/4/2025 3:47 PM EDT  Workstation ID: [id number]    XR Chest 2 View  Result Date: 5/23/2025  Impression: 1.No acute pulmonary process. 2.Scoliosis. Electronically Signed: Cyril Shanks MD  5/23/2025 9:35 AM EDT  Workstation ID: [id number]          Current Outpatient Medications on File Prior to Visit   Medication Sig    amLODIPine (NORVASC) 5 MG tablet TAKE 1 TABLET BY MOUTH EVERY EVENING.    aspirin 81 MG EC tablet Take 1 tablet by mouth Daily.    atenolol (TENORMIN) 50 MG tablet TAKE 1 TABLET BY  MOUTH 2 TIMES A DAY.    Bioflavonoid Products (DOROTEO C PO) Take 500 mg by mouth Daily.    Cholecalciferol (VITAMIN D3) 1000 units capsule Take 2 capsules by mouth Daily.    ezetimibe (ZETIA) 10 MG tablet Take 1 tablet by mouth Daily.    fluticasone (FLONASE) 50 MCG/ACT nasal spray Administer 2 sprays into the nostril(s) as directed by provider Daily for 30 days. (Patient taking differently: Administer 2 sprays into the nostril(s) as directed by provider As Needed.)    levothyroxine (SYNTHROID, LEVOTHROID) 75 MCG tablet TAKE 1 TABLET BY MOUTH DAILY.    losartan (COZAAR) 100 MG tablet TAKE 1 TABLET BY MOUTH DAILY.    meloxicam (MOBIC) 7.5 MG tablet Take 1 tablet by mouth Daily As Needed for Mild Pain.    multivitamin-minerals (CENTRUM) tablet Take 1 tablet by mouth Daily.    NIACIN PO Take 500 mg by mouth Daily.    vitamin E 400 UNIT capsule Take 1 capsule by mouth Daily.    acetaminophen (TYLENOL) 500 MG tablet Take 1 tablet by mouth As Needed for Mild Pain.    Bempedoic Acid 180 MG tablet Take 1 tablet by mouth Daily.    ibuprofen (ADVIL,MOTRIN) 600 MG tablet Take 1 tablet by mouth Every 6 (Six) Hours As Needed for Mild Pain.    [DISCONTINUED] meclizine (ANTIVERT) 25 MG tablet Take 1 tablet by mouth 3 (Three) Times a Day As Needed for Dizziness. (Patient not taking: Reported on 7/29/2025)    [DISCONTINUED] spironolactone (ALDACTONE) 25 MG tablet Take 1 tablet by mouth Daily. (Patient not taking: Reported on 7/29/2025)     No current facility-administered medications on file prior to visit.           Assessment and Plan         Hyponatremia  Hospitalized from July 20 till July 22  While hospitalized, sodium increased initially from 120 -> 127 with IVFs, but then decreased to 123. She was discharged with a sodium of 125 on sodium restriction  Euvolemic, reports improvement of her symptoms in the past week  She is drinking sodium rich electrolyte drinks frequently and had stopped taking all diuretics (spironolactone,  HCTZ?)  Orders:    Comprehensive Metabolic Panel; Future    Magnesium; Future    Urinalysis With Microscopic If Indicated (No Culture) - Urine, Clean Catch; Future    Sodium, Urine, Random - Urine, Clean Catch; Future    Creatinine Urine Random (kidney function) GFR component - Urine, Clean Catch; Future    Urea Nitrogen, Urine - Urine, Clean Catch; Future    Essential hypertension  Unstable, not at goal  Bp 150/82 mmHg, however she is noticing low readings at home with multiple episodes of worsening dizziness and high risk for falls  Continue home meds Losartan 100 mg daily, amlodipine 5 mg daily, atenolol 50 mg bid for now  Bp log requested  Check CMP, may recommend discontinuing frequent sodium rich electrolyte drinks  Follow up in 1 month and adjust meds accordingly  Orders:    Comprehensive Metabolic Panel; Future    Magnesium; Future    Acquired hypothyroidism  On levothyroxine 75 mcg daily  TSH noted to be 8.960, FT4 1.49, 10 days ago, during acute illness while hospitalized  Recheck thyroid levels  Orders:    TSH; Future    T4, Free; Future    T3, Free; Future    Mixed hyperlipidemia  She has intolerance to statins  She is on Zetia 10 mg daily       Lightheadedness  Chronic (more than 2 years), worsened on admission to the hospital but improved on discharge  Did not improve with previous trial of meclizine  Undergoing aqua therapy sessions which reportedly have improved her dizziness  Bp log requested, adjustment of meds might be necessary in the future  Orders:    Phosphorus; Future    TSH; Future    Vitamin B12; Future    Folate; Future    CBC Auto Differential; Future    Healthcare maintenance  Receives flu vaccine annually; shingles booster scheduled in 10/2025 at her pharmacy  Advised to get RSV vaccine from pharmacy  Declines new COVID-19 vaccine; requested removal from chart       Chronic venous insufficiency  No swelling noted today  Advised to wear compression stockings to improve blood circulation  and reduce dizziness  Explanation provided on the benefits of compression stockings             I spent 50 minutes caring for Nguyen on this date of service. This time includes time spent by me in the following activities:preparing for the visit, reviewing tests, obtaining and/or reviewing a separately obtained history, performing a medically appropriate examination and/or evaluation , counseling and educating the patient/family/caregiver, ordering medications, tests, or procedures, referring and communicating with other health care professionals , and documenting information in the medical record    Follow Up   Return in about 1 month (around 8/29/2025).    Patient was given instructions and counseling regarding her condition or for health maintenance advice. Please see specific information pulled into the AVS if appropriate.       Transcribed from ambient dictation by aBldemar Sparks MD.  07/29/25   08:57 EDT    Patient or patient representative verbalized consent for the use of Ambient Listening during the visit with  Baldemar Sparks MD for chart documentation. 7/29/2025  09:06 EDT

## 2025-07-29 NOTE — ASSESSMENT & PLAN NOTE
Hospitalized from July 20 till July 22  While hospitalized, sodium increased initially from 120 -> 127 with IVFs, but then decreased to 123. She was discharged with a sodium of 125 on sodium restriction  Euvolemic, reports improvement of her symptoms in the past week  She is drinking sodium rich electrolyte drinks frequently and had stopped taking all diuretics (spironolactone, HCTZ?)  Orders:    Comprehensive Metabolic Panel; Future    Magnesium; Future    Urinalysis With Microscopic If Indicated (No Culture) - Urine, Clean Catch; Future    Sodium, Urine, Random - Urine, Clean Catch; Future    Creatinine Urine Random (kidney function) GFR component - Urine, Clean Catch; Future    Urea Nitrogen, Urine - Urine, Clean Catch; Future

## 2025-07-29 NOTE — ASSESSMENT & PLAN NOTE
Receives flu vaccine annually; shingles booster scheduled in 10/2025 at her pharmacy  Advised to get RSV vaccine from pharmacy  Declines new COVID-19 vaccine; requested removal from chart

## 2025-07-29 NOTE — ASSESSMENT & PLAN NOTE
Chronic (more than 2 years), worsened on admission to the hospital but improved on discharge  Did not improve with previous trial of meclizine  Undergoing aqua therapy sessions which reportedly have improved her dizziness  Bp log requested, adjustment of meds might be necessary in the future  Orders:    Phosphorus; Future    TSH; Future    Vitamin B12; Future    Folate; Future    CBC Auto Differential; Future

## 2025-07-30 ENCOUNTER — LAB (OUTPATIENT)
Dept: LAB | Facility: HOSPITAL | Age: 85
End: 2025-07-30
Payer: MEDICARE

## 2025-07-30 DIAGNOSIS — I10 ESSENTIAL HYPERTENSION: ICD-10-CM

## 2025-07-30 DIAGNOSIS — E87.1 HYPONATREMIA: ICD-10-CM

## 2025-07-30 DIAGNOSIS — R42 LIGHTHEADEDNESS: ICD-10-CM

## 2025-07-30 DIAGNOSIS — E03.9 ACQUIRED HYPOTHYROIDISM: ICD-10-CM

## 2025-07-30 LAB
ALBUMIN SERPL-MCNC: 4.7 G/DL (ref 3.5–5.2)
ALBUMIN/GLOB SERPL: 1.6 G/DL
ALP SERPL-CCNC: 83 U/L (ref 39–117)
ALT SERPL W P-5'-P-CCNC: 21 U/L (ref 1–33)
ANION GAP SERPL CALCULATED.3IONS-SCNC: 14.5 MMOL/L (ref 5–15)
AST SERPL-CCNC: 26 U/L (ref 1–32)
BACTERIA UR QL AUTO: ABNORMAL /HPF
BASOPHILS # BLD AUTO: 0.06 10*3/MM3 (ref 0–0.2)
BASOPHILS NFR BLD AUTO: 0.7 % (ref 0–1.5)
BILIRUB SERPL-MCNC: 0.5 MG/DL (ref 0–1.2)
BILIRUB UR QL STRIP: NEGATIVE
BUN SERPL-MCNC: 13 MG/DL (ref 8–23)
BUN/CREAT SERPL: 12.9 (ref 7–25)
CALCIUM SPEC-SCNC: 10.2 MG/DL (ref 8.6–10.5)
CHLORIDE SERPL-SCNC: 96 MMOL/L (ref 98–107)
CLARITY UR: CLEAR
CO2 SERPL-SCNC: 26.5 MMOL/L (ref 22–29)
COLOR UR: YELLOW
CREAT SERPL-MCNC: 1.01 MG/DL (ref 0.57–1)
CREAT UR-MCNC: 21.4 MG/DL
DEPRECATED RDW RBC AUTO: 40.2 FL (ref 37–54)
EGFRCR SERPLBLD CKD-EPI 2021: 55 ML/MIN/1.73
EOSINOPHIL # BLD AUTO: 0.19 10*3/MM3 (ref 0–0.4)
EOSINOPHIL NFR BLD AUTO: 2.1 % (ref 0.3–6.2)
ERYTHROCYTE [DISTWIDTH] IN BLOOD BY AUTOMATED COUNT: 11.7 % (ref 12.3–15.4)
FOLATE SERPL-MCNC: >20 NG/ML (ref 4.78–24.2)
GLOBULIN UR ELPH-MCNC: 3 GM/DL
GLUCOSE SERPL-MCNC: 78 MG/DL (ref 65–99)
GLUCOSE UR STRIP-MCNC: NEGATIVE MG/DL
HCT VFR BLD AUTO: 39.3 % (ref 34–46.6)
HGB BLD-MCNC: 13.1 G/DL (ref 12–15.9)
HGB UR QL STRIP.AUTO: NEGATIVE
HYALINE CASTS UR QL AUTO: ABNORMAL /LPF
IMM GRANULOCYTES # BLD AUTO: 0.02 10*3/MM3 (ref 0–0.05)
IMM GRANULOCYTES NFR BLD AUTO: 0.2 % (ref 0–0.5)
KETONES UR QL STRIP: NEGATIVE
LEUKOCYTE ESTERASE UR QL STRIP.AUTO: ABNORMAL
LYMPHOCYTES # BLD AUTO: 2.09 10*3/MM3 (ref 0.7–3.1)
LYMPHOCYTES NFR BLD AUTO: 23.4 % (ref 19.6–45.3)
MAGNESIUM SERPL-MCNC: 2.3 MG/DL (ref 1.6–2.4)
MCH RBC QN AUTO: 31.7 PG (ref 26.6–33)
MCHC RBC AUTO-ENTMCNC: 33.3 G/DL (ref 31.5–35.7)
MCV RBC AUTO: 95.2 FL (ref 79–97)
MONOCYTES # BLD AUTO: 1.06 10*3/MM3 (ref 0.1–0.9)
MONOCYTES NFR BLD AUTO: 11.8 % (ref 5–12)
NEUTROPHILS NFR BLD AUTO: 5.53 10*3/MM3 (ref 1.7–7)
NEUTROPHILS NFR BLD AUTO: 61.8 % (ref 42.7–76)
NITRITE UR QL STRIP: NEGATIVE
NRBC BLD AUTO-RTO: 0 /100 WBC (ref 0–0.2)
PH UR STRIP.AUTO: 8 [PH] (ref 5–8)
PHOSPHATE SERPL-MCNC: 3.7 MG/DL (ref 2.5–4.5)
PLATELET # BLD AUTO: 264 10*3/MM3 (ref 140–450)
PMV BLD AUTO: 10 FL (ref 6–12)
POTASSIUM SERPL-SCNC: 4.1 MMOL/L (ref 3.5–5.2)
PROT SERPL-MCNC: 7.7 G/DL (ref 6–8.5)
PROT UR QL STRIP: NEGATIVE
RBC # BLD AUTO: 4.13 10*6/MM3 (ref 3.77–5.28)
RBC # UR STRIP: ABNORMAL /HPF
REF LAB TEST METHOD: ABNORMAL
SODIUM SERPL-SCNC: 137 MMOL/L (ref 136–145)
SODIUM UR-SCNC: 87 MMOL/L
SP GR UR STRIP: 1.01 (ref 1–1.03)
SQUAMOUS #/AREA URNS HPF: ABNORMAL /HPF
T3FREE SERPL-MCNC: 2.72 PG/ML (ref 2–4.4)
T4 FREE SERPL-MCNC: 1.61 NG/DL (ref 0.92–1.68)
TSH SERPL DL<=0.05 MIU/L-ACNC: 2.42 UIU/ML (ref 0.27–4.2)
UROBILINOGEN UR QL STRIP: ABNORMAL
UUN 24H UR-MCNC: 155 MG/DL
VIT B12 BLD-MCNC: 748 PG/ML (ref 211–946)
WBC # UR STRIP: ABNORMAL /HPF
WBC NRBC COR # BLD AUTO: 8.95 10*3/MM3 (ref 3.4–10.8)

## 2025-07-30 PROCEDURE — 36415 COLL VENOUS BLD VENIPUNCTURE: CPT

## 2025-07-30 PROCEDURE — 84300 ASSAY OF URINE SODIUM: CPT

## 2025-07-30 PROCEDURE — 82746 ASSAY OF FOLIC ACID SERUM: CPT

## 2025-07-30 PROCEDURE — 81001 URINALYSIS AUTO W/SCOPE: CPT

## 2025-07-30 PROCEDURE — 83735 ASSAY OF MAGNESIUM: CPT

## 2025-07-30 PROCEDURE — 84439 ASSAY OF FREE THYROXINE: CPT

## 2025-07-30 PROCEDURE — 82570 ASSAY OF URINE CREATININE: CPT

## 2025-07-30 PROCEDURE — 84443 ASSAY THYROID STIM HORMONE: CPT

## 2025-07-30 PROCEDURE — 80053 COMPREHEN METABOLIC PANEL: CPT

## 2025-07-30 PROCEDURE — 82607 VITAMIN B-12: CPT

## 2025-07-30 PROCEDURE — 85025 COMPLETE CBC W/AUTO DIFF WBC: CPT

## 2025-07-30 PROCEDURE — 84100 ASSAY OF PHOSPHORUS: CPT

## 2025-07-30 PROCEDURE — 84540 ASSAY OF URINE/UREA-N: CPT

## 2025-07-30 PROCEDURE — 84481 FREE ASSAY (FT-3): CPT

## 2025-07-30 NOTE — PROGRESS NOTES
Grzegorz 84 YO WF in office to establish care today.  She states that she was please with Dr. Sparks and her visit today.  No recommendations were made for changes.

## 2025-07-31 ENCOUNTER — READMISSION MANAGEMENT (OUTPATIENT)
Dept: CALL CENTER | Facility: HOSPITAL | Age: 85
End: 2025-07-31
Payer: MEDICARE

## 2025-07-31 ENCOUNTER — RESULTS FOLLOW-UP (OUTPATIENT)
Age: 85
End: 2025-07-31
Payer: MEDICARE

## 2025-07-31 NOTE — OUTREACH NOTE
Medical Week 1 Survey      Flowsheet Row Responses   [facility] facility patient discharged from? [place]   Does the patient have one of the following disease processes/diagnoses(primary or secondary)? Other   Week 1 attempt successful? No   Unsuccessful attempts Attempt 1            SAMI VALLECILLO - Registered Nurse

## 2025-07-31 NOTE — TELEPHONE ENCOUNTER
Called and spoke to the patient regarding her lab results. Her sodium levels have normalized. She no longer has to drink the electrolyte rich fluids and can go back to drinking regular water.    Her thyroid levels are normal, no change in her Levothyroxine dose is necessary.    Otherwise she is doing well and just finished her water aerobics session when I called.    She had no further questions and was thankful.

## 2025-08-05 ENCOUNTER — READMISSION MANAGEMENT (OUTPATIENT)
Dept: CALL CENTER | Facility: HOSPITAL | Age: 85
End: 2025-08-05
Payer: MEDICARE

## 2025-08-07 RX ORDER — ATENOLOL 50 MG/1
50 TABLET ORAL 2 TIMES DAILY
Qty: 60 TABLET | Refills: 3 | Status: SHIPPED | OUTPATIENT
Start: 2025-08-07

## 2025-08-28 ENCOUNTER — OFFICE VISIT (OUTPATIENT)
Age: 85
End: 2025-08-28
Payer: MEDICARE

## 2025-08-28 VITALS
SYSTOLIC BLOOD PRESSURE: 130 MMHG | TEMPERATURE: 98 F | WEIGHT: 123.6 LBS | OXYGEN SATURATION: 96 % | HEIGHT: 61 IN | DIASTOLIC BLOOD PRESSURE: 70 MMHG | HEART RATE: 72 BPM | BODY MASS INDEX: 23.33 KG/M2

## 2025-08-28 DIAGNOSIS — Z23 NEED FOR VACCINATION: ICD-10-CM

## 2025-08-28 DIAGNOSIS — I10 ESSENTIAL HYPERTENSION: Primary | ICD-10-CM

## 2025-08-28 PROCEDURE — 3075F SYST BP GE 130 - 139MM HG: CPT

## 2025-08-28 PROCEDURE — 1160F RVW MEDS BY RX/DR IN RCRD: CPT

## 2025-08-28 PROCEDURE — G0009 ADMIN PNEUMOCOCCAL VACCINE: HCPCS

## 2025-08-28 PROCEDURE — 90677 PCV20 VACCINE IM: CPT

## 2025-08-28 PROCEDURE — 99213 OFFICE O/P EST LOW 20 MIN: CPT

## 2025-08-28 PROCEDURE — 1159F MED LIST DOCD IN RCRD: CPT

## 2025-08-28 PROCEDURE — 1126F AMNT PAIN NOTED NONE PRSNT: CPT

## 2025-08-28 PROCEDURE — 3078F DIAST BP <80 MM HG: CPT

## 2025-08-28 PROCEDURE — G2211 COMPLEX E/M VISIT ADD ON: HCPCS

## 2025-08-28 RX ORDER — VALSARTAN 160 MG/1
160 TABLET ORAL DAILY
Qty: 90 TABLET | Refills: 0 | Status: SHIPPED | OUTPATIENT
Start: 2025-08-28

## 2025-08-28 RX ORDER — NIFEDIPINE 30 MG/1
30 TABLET, EXTENDED RELEASE ORAL DAILY
Qty: 90 TABLET | Refills: 0 | Status: SHIPPED | OUTPATIENT
Start: 2025-08-28

## (undated) DEVICE — SYR CONTRL LUERLOK 10CC

## (undated) DEVICE — SHEET, ORTHO, SPLIT, STERILE: Brand: MEDLINE

## (undated) DEVICE — PREP SOL POVIDONE/IODINE BT 4OZ

## (undated) DEVICE — GLV SURG NEOLON 2G PF LF 7.5 STRL

## (undated) DEVICE — APPL CHLORAPREP HI/LITE 26ML ORNG

## (undated) DEVICE — COAXIAL FEMORAL CANAL TIP

## (undated) DEVICE — FEMORAL CANAL PRESSURIZER WITHOUT HUB, MEDIUM

## (undated) DEVICE — HOOD: Brand: FLYTE

## (undated) DEVICE — SUT VIC 2/0 CP2 CR8 18IN J762D

## (undated) DEVICE — INTENDED USE FOR SURGICAL MARKING ON INTACT SKIN, ALSO PROVIDES A PERMANENT METHOD OF IDENTIFYING OBJECTS IN THE OPERATING ROOM: Brand: WRITESITE® REGULAR TIP SKIN MARKER

## (undated) DEVICE — T-MAX DISPOSABLE FACE MASK 8 PER BOX

## (undated) DEVICE — SPNG LAP 18X18IN LF STRL PK/5

## (undated) DEVICE — COUNT NDL MAG FM/BLCK 40COUNT

## (undated) DEVICE — SPNG GZ WOVN 4X4IN 12PLY 10/BX STRL

## (undated) DEVICE — NDL SPINE 22G 31/2IN BLK

## (undated) DEVICE — Device

## (undated) DEVICE — DRSNG SURESITE WNDW 4X4.5

## (undated) DEVICE — SYS CLS SKIN PREMIERPRO EXOFINFUSION 22CM

## (undated) DEVICE — HANDPIECE SET WITH COAXIAL HIGH FLOW TIP AND SUCTION TUBE: Brand: INTERPULSE

## (undated) DEVICE — DRP Z/FRICTION 10X16IN

## (undated) DEVICE — ELECTRD NDL EZ CLN MOD 2.75IN

## (undated) DEVICE — PEEL-AWAY TOGA, 2X LARGE: Brand: FLYTE

## (undated) DEVICE — SUT VIC 0 CT1 CR8 18IN J840D

## (undated) DEVICE — SOL ISO/ALC RUB 70PCT 4OZ

## (undated) DEVICE — BOWL PLSTC LG 32OZ BLU STRL

## (undated) DEVICE — WRAP SHOULDER COLD THERAPY

## (undated) DEVICE — 1016 S-DRAPE IRRIG POUCH 10/BOX: Brand: STERI-DRAPE™

## (undated) DEVICE — DUAL CUT SAGITTAL BLADE

## (undated) DEVICE — 1010 S-DRAPE TOWEL DRAPE 10/BX: Brand: STERI-DRAPE™

## (undated) DEVICE — TRAP FLD MINIVAC MEGADYNE 100ML

## (undated) DEVICE — TOWEL,OR,DSP,ST,BLUE,STD,4/PK,20PK/CS: Brand: MEDLINE

## (undated) DEVICE — GLV SURG SENSICARE PI LF PF 7.0

## (undated) DEVICE — FRAZIER SUCTION INSTRUMENT 10 FR W/CONTROL VENT & OBTURATOR: Brand: FRAZIER

## (undated) DEVICE — PK SHLDR ARTHSCP 90

## (undated) DEVICE — GUIDE SHLDR POSTN SIGNATURE 110004347

## (undated) DEVICE — DISPOSABLE CEMENT SCULPS

## (undated) DEVICE — 3M™ STERI-DRAPE™ U-DRAPE 1015: Brand: STERI-DRAPE™

## (undated) DEVICE — 3M™ IOBAN™ 2 ANTIMICROBIAL INCISE DRAPE 6640EZ: Brand: IOBAN™ 2

## (undated) DEVICE — INTENDED FOR TISSUE SEPARATION, AND OTHER PROCEDURES THAT REQUIRE A SHARP SURGICAL BLADE TO PUNCTURE OR CUT.: Brand: BARD-PARKER ® STAINLESS STEEL BLADES

## (undated) DEVICE — 3M™ STERI-DRAPE™ INSTRUMENT POUCH 1018: Brand: STERI-DRAPE™

## (undated) DEVICE — PATIENT RETURN ELECTRODE, SINGLE-USE, CONTACT QUALITY MONITORING, ADULT, WITH 9FT CORD, FOR PATIENTS WEIGING OVER 33LBS. (15KG): Brand: MEGADYNE

## (undated) DEVICE — MAYO STAND COVER: Brand: CONVERTORS

## (undated) DEVICE — DECANT BG O JET

## (undated) DEVICE — IRRIGATOR BULB ASEPTO 60CC STRL

## (undated) DEVICE — PENCL E/S ULTRAVAC TELESCP NOSE HOLSTR 10FT

## (undated) DEVICE — BOWL AND CEMENT CARTRIDGE WITH BREAKAWAY FEMORAL NOZZLE: Brand: ACM

## (undated) DEVICE — COVER,TABLE,HEAVY DUTY,79"X110",STRL: Brand: MEDLINE

## (undated) DEVICE — GLV SURG SENSICARE PI PF LF 7 GRN STRL

## (undated) DEVICE — CONTAINER,SPECIMEN,OR STERILE,4OZ: Brand: MEDLINE